# Patient Record
Sex: FEMALE | Race: WHITE | Employment: OTHER | URBAN - METROPOLITAN AREA
[De-identification: names, ages, dates, MRNs, and addresses within clinical notes are randomized per-mention and may not be internally consistent; named-entity substitution may affect disease eponyms.]

---

## 2017-01-05 ENCOUNTER — HOSPITAL ENCOUNTER (OUTPATIENT)
Dept: LAB | Age: 57
Discharge: HOME | End: 2017-01-05
Payer: COMMERCIAL

## 2017-01-05 ENCOUNTER — OFFICE VISIT (OUTPATIENT)
Dept: FAMILY MEDICINE CLINIC | Age: 57
End: 2017-01-05

## 2017-01-05 VITALS
RESPIRATION RATE: 16 BRPM | TEMPERATURE: 97.5 F | HEART RATE: 105 BPM | OXYGEN SATURATION: 98 % | HEIGHT: 62 IN | BODY MASS INDEX: 33.6 KG/M2 | SYSTOLIC BLOOD PRESSURE: 102 MMHG | DIASTOLIC BLOOD PRESSURE: 68 MMHG | WEIGHT: 182.6 LBS

## 2017-01-05 DIAGNOSIS — J40 BRONCHITIS: Primary | ICD-10-CM

## 2017-01-05 LAB
C3 COMPLEMENT: 181 MG/DL (ref 90–180)
C4 COMPLEMENT: 31 MG/DL (ref 10–40)

## 2017-01-05 PROCEDURE — 86256 FLUORESCENT ANTIBODY TITER: CPT

## 2017-01-05 PROCEDURE — 86255 FLUORESCENT ANTIBODY SCREEN: CPT

## 2017-01-05 PROCEDURE — 86235 NUCLEAR ANTIGEN ANTIBODY: CPT

## 2017-01-05 PROCEDURE — 86160 COMPLEMENT ANTIGEN: CPT

## 2017-01-05 PROCEDURE — 36415 COLL VENOUS BLD VENIPUNCTURE: CPT

## 2017-01-05 PROCEDURE — 99213 OFFICE O/P EST LOW 20 MIN: CPT | Performed by: NURSE PRACTITIONER

## 2017-01-05 PROCEDURE — 83516 IMMUNOASSAY NONANTIBODY: CPT

## 2017-01-05 RX ORDER — DOXYCYCLINE HYCLATE 100 MG
100 TABLET ORAL 2 TIMES DAILY
Qty: 14 TABLET | Refills: 0 | Status: SHIPPED | OUTPATIENT
Start: 2017-01-05 | End: 2017-01-12

## 2017-01-05 ASSESSMENT — ENCOUNTER SYMPTOMS
CHEST TIGHTNESS: 0
TROUBLE SWALLOWING: 0
FACIAL SWELLING: 0
WHEEZING: 0
HEARTBURN: 0
SINUS PRESSURE: 1
ABDOMINAL PAIN: 0
RHINORRHEA: 1
COUGH: 1
SORE THROAT: 1
VOMITING: 0
DIARRHEA: 0
HEMOPTYSIS: 0
NAUSEA: 0
SHORTNESS OF BREATH: 1

## 2017-01-06 LAB
CENTROMERE AB IGG: 3 AU/ML (ref 0–40)
ENA TO RNP ANTIBODY: 1 AU/ML (ref 0–40)
ENA TO SMITH (SM) ANTIBODY: 0 AU/ML (ref 0–40)
ENA TO SSB (LA) ANTIBODY: 1 AU/ML (ref 0–40)
SCLERODERMA (SCL-70) AB: 0 AU/ML (ref 0–40)
SSA 52 (RO) (ENA) AB, IGG: 11 AU/ML (ref 0–40)
SSA 60 (RO) (ENA) AB, IGG: 7 AU/ML (ref 0–40)

## 2017-01-07 LAB — DOUBLE STRANDED DNA AB, IGG: DETECTED

## 2017-01-08 LAB — DSDNA ANTIBODY TITER: ABNORMAL

## 2017-01-24 ENCOUNTER — HOSPITAL ENCOUNTER (OUTPATIENT)
Dept: GENERAL RADIOLOGY | Age: 57
Discharge: HOME | End: 2017-01-24
Payer: COMMERCIAL

## 2017-01-24 ENCOUNTER — OFFICE VISIT (OUTPATIENT)
Dept: INTERVENTIONAL RADIOLOGY/VASCULAR | Age: 57
End: 2017-01-24

## 2017-01-24 VITALS
TEMPERATURE: 97.5 F | HEIGHT: 63 IN | DIASTOLIC BLOOD PRESSURE: 84 MMHG | SYSTOLIC BLOOD PRESSURE: 124 MMHG | WEIGHT: 195 LBS | BODY MASS INDEX: 34.55 KG/M2

## 2017-01-24 DIAGNOSIS — M16.10 ARTHRITIS OF HIP: Primary | ICD-10-CM

## 2017-01-24 DIAGNOSIS — M16.10 ARTHRITIS OF HIP: ICD-10-CM

## 2017-01-24 PROCEDURE — 73502 X-RAY EXAM HIP UNI 2-3 VIEWS: CPT

## 2017-01-24 PROCEDURE — G8484 FLU IMMUNIZE NO ADMIN: HCPCS | Performed by: RADIOLOGY

## 2017-01-24 PROCEDURE — 3014F SCREEN MAMMO DOC REV: CPT | Performed by: RADIOLOGY

## 2017-01-24 PROCEDURE — 1036F TOBACCO NON-USER: CPT | Performed by: RADIOLOGY

## 2017-01-24 PROCEDURE — 99204 OFFICE O/P NEW MOD 45 MIN: CPT | Performed by: RADIOLOGY

## 2017-01-24 PROCEDURE — G8427 DOCREV CUR MEDS BY ELIG CLIN: HCPCS | Performed by: RADIOLOGY

## 2017-01-24 PROCEDURE — G8419 CALC BMI OUT NRM PARAM NOF/U: HCPCS | Performed by: RADIOLOGY

## 2017-01-24 PROCEDURE — 3017F COLORECTAL CA SCREEN DOC REV: CPT | Performed by: RADIOLOGY

## 2017-01-24 ASSESSMENT — ENCOUNTER SYMPTOMS
NAUSEA: 0
COUGH: 1
SHORTNESS OF BREATH: 1
VOMITING: 0
BACK PAIN: 1
ABDOMINAL PAIN: 1
DIARRHEA: 0
EYES NEGATIVE: 1
HEARTBURN: 1

## 2017-02-01 DIAGNOSIS — M79.7 FIBROMYALGIA MUSCLE PAIN: ICD-10-CM

## 2017-02-01 RX ORDER — GABAPENTIN 400 MG/1
CAPSULE ORAL
Qty: 270 CAPSULE | Refills: 0 | Status: SHIPPED | OUTPATIENT
Start: 2017-02-01 | End: 2017-04-30 | Stop reason: SDUPTHER

## 2017-02-19 DIAGNOSIS — F32.A DEPRESSION, UNSPECIFIED DEPRESSION TYPE: ICD-10-CM

## 2017-02-19 RX ORDER — CITALOPRAM 40 MG/1
TABLET ORAL
Qty: 90 TABLET | Refills: 0 | Status: SHIPPED | OUTPATIENT
Start: 2017-02-19 | End: 2017-05-19 | Stop reason: SDUPTHER

## 2017-02-22 ENCOUNTER — OFFICE VISIT (OUTPATIENT)
Dept: PHYSICAL MEDICINE AND REHAB | Age: 57
End: 2017-02-22

## 2017-02-22 VITALS
HEIGHT: 63 IN | SYSTOLIC BLOOD PRESSURE: 114 MMHG | DIASTOLIC BLOOD PRESSURE: 84 MMHG | BODY MASS INDEX: 33.49 KG/M2 | WEIGHT: 189 LBS

## 2017-02-22 DIAGNOSIS — Z79.899 HIGH RISK MEDICATION USE: Chronic | ICD-10-CM

## 2017-02-22 DIAGNOSIS — D68.51 FACTOR 5 LEIDEN MUTATION, HETEROZYGOUS (HCC): ICD-10-CM

## 2017-02-22 DIAGNOSIS — M53.3 SI (SACROILIAC) PAIN: ICD-10-CM

## 2017-02-22 DIAGNOSIS — M79.7 FIBROMYALGIA MUSCLE PAIN: ICD-10-CM

## 2017-02-22 DIAGNOSIS — M79.7 FIBROMYALGIA MUSCLE PAIN: Primary | ICD-10-CM

## 2017-02-22 DIAGNOSIS — M54.2 NECK PAIN: ICD-10-CM

## 2017-02-22 DIAGNOSIS — E55.9 VITAMIN D DEFICIENCY: ICD-10-CM

## 2017-02-22 DIAGNOSIS — M54.6 BILATERAL THORACIC BACK PAIN, UNSPECIFIED CHRONICITY: ICD-10-CM

## 2017-02-22 DIAGNOSIS — G89.29 CHRONIC LOW BACK PAIN WITH SCIATICA, SCIATICA LATERALITY UNSPECIFIED, UNSPECIFIED BACK PAIN LATERALITY: ICD-10-CM

## 2017-02-22 DIAGNOSIS — R76.8 POSITIVE ANA (ANTINUCLEAR ANTIBODY): ICD-10-CM

## 2017-02-22 DIAGNOSIS — M54.40 CHRONIC LOW BACK PAIN WITH SCIATICA, SCIATICA LATERALITY UNSPECIFIED, UNSPECIFIED BACK PAIN LATERALITY: ICD-10-CM

## 2017-02-22 PROCEDURE — 3014F SCREEN MAMMO DOC REV: CPT | Performed by: PHYSICAL MEDICINE & REHABILITATION

## 2017-02-22 PROCEDURE — 3017F COLORECTAL CA SCREEN DOC REV: CPT | Performed by: PHYSICAL MEDICINE & REHABILITATION

## 2017-02-22 PROCEDURE — G8484 FLU IMMUNIZE NO ADMIN: HCPCS | Performed by: PHYSICAL MEDICINE & REHABILITATION

## 2017-02-22 PROCEDURE — G8417 CALC BMI ABV UP PARAM F/U: HCPCS | Performed by: PHYSICAL MEDICINE & REHABILITATION

## 2017-02-22 PROCEDURE — 99213 OFFICE O/P EST LOW 20 MIN: CPT | Performed by: PHYSICAL MEDICINE & REHABILITATION

## 2017-02-22 PROCEDURE — G8427 DOCREV CUR MEDS BY ELIG CLIN: HCPCS | Performed by: PHYSICAL MEDICINE & REHABILITATION

## 2017-02-22 PROCEDURE — 1036F TOBACCO NON-USER: CPT | Performed by: PHYSICAL MEDICINE & REHABILITATION

## 2017-02-22 RX ORDER — CYCLOBENZAPRINE HCL 10 MG
10 TABLET ORAL 2 TIMES DAILY PRN
Qty: 30 TABLET | Refills: 1 | Status: SHIPPED | OUTPATIENT
Start: 2017-02-22 | End: 2017-05-15 | Stop reason: ALTCHOICE

## 2017-02-22 RX ORDER — CARISOPRODOL 250 MG/1
250 TABLET ORAL 3 TIMES DAILY PRN
Qty: 60 TABLET | Refills: 1 | Status: SHIPPED | OUTPATIENT
Start: 2017-02-22 | End: 2017-02-22 | Stop reason: SDUPTHER

## 2017-02-22 RX ORDER — CARISOPRODOL 250 MG/1
250 TABLET ORAL 3 TIMES DAILY PRN
Qty: 270 TABLET | Refills: 1 | Status: SHIPPED | OUTPATIENT
Start: 2017-02-22 | End: 2017-05-15

## 2017-02-22 ASSESSMENT — ENCOUNTER SYMPTOMS
ABDOMINAL PAIN: 0
ANAL BLEEDING: 0
BOWEL INCONTINENCE: 0
CHOKING: 0
CONSTIPATION: 0
ABDOMINAL DISTENTION: 0
APNEA: 0
EYE PAIN: 0
VOMITING: 0
BLOOD IN STOOL: 0
TROUBLE SWALLOWING: 0
CHEST TIGHTNESS: 0
FACIAL SWELLING: 0
COUGH: 0
PHOTOPHOBIA: 0
VISUAL CHANGE: 0
COLOR CHANGE: 0
NAUSEA: 0
BACK PAIN: 1
DIARRHEA: 1
WHEEZING: 0
EYE REDNESS: 0
SHORTNESS OF BREATH: 1

## 2017-02-23 ENCOUNTER — OFFICE VISIT (OUTPATIENT)
Dept: FAMILY MEDICINE CLINIC | Age: 57
End: 2017-02-23

## 2017-02-23 ENCOUNTER — HOSPITAL ENCOUNTER (OUTPATIENT)
Dept: GENERAL RADIOLOGY | Age: 57
Discharge: HOME | End: 2017-02-23
Payer: COMMERCIAL

## 2017-02-23 ENCOUNTER — HOSPITAL ENCOUNTER (OUTPATIENT)
Age: 57
Discharge: HOME | End: 2017-02-23
Payer: COMMERCIAL

## 2017-02-23 ENCOUNTER — TELEPHONE (OUTPATIENT)
Dept: FAMILY MEDICINE CLINIC | Age: 57
End: 2017-02-23

## 2017-02-23 ENCOUNTER — HOSPITAL ENCOUNTER (OUTPATIENT)
Age: 57
Setting detail: SPECIMEN
Discharge: HOME | End: 2017-02-23
Payer: COMMERCIAL

## 2017-02-23 VITALS
DIASTOLIC BLOOD PRESSURE: 72 MMHG | WEIGHT: 188 LBS | RESPIRATION RATE: 18 BRPM | HEIGHT: 62 IN | SYSTOLIC BLOOD PRESSURE: 98 MMHG | HEART RATE: 92 BPM | TEMPERATURE: 97 F | BODY MASS INDEX: 34.6 KG/M2 | OXYGEN SATURATION: 98 %

## 2017-02-23 DIAGNOSIS — R10.9 FLANK PAIN: ICD-10-CM

## 2017-02-23 DIAGNOSIS — R10.9 FLANK PAIN: Primary | ICD-10-CM

## 2017-02-23 DIAGNOSIS — Z23 NEED FOR VACCINATION: ICD-10-CM

## 2017-02-23 DIAGNOSIS — M79.642 LEFT HAND PAIN: ICD-10-CM

## 2017-02-23 DIAGNOSIS — Z12.39 BREAST CANCER SCREENING: ICD-10-CM

## 2017-02-23 DIAGNOSIS — K21.00 GASTROESOPHAGEAL REFLUX DISEASE WITH ESOPHAGITIS: ICD-10-CM

## 2017-02-23 DIAGNOSIS — R76.8 POSITIVE ANA (ANTINUCLEAR ANTIBODY): ICD-10-CM

## 2017-02-23 LAB
BILIRUBIN, POC: ABNORMAL
BLOOD URINE, POC: ABNORMAL
CLARITY, POC: CLEAR
COLOR, POC: YELLOW
GLUCOSE URINE, POC: ABNORMAL
KETONES, POC: ABNORMAL
LEUKOCYTE EST, POC: ABNORMAL
NITRITE, POC: ABNORMAL
PH, POC: 5
PROTEIN, POC: ABNORMAL
SPECIFIC GRAVITY, POC: 1.03
UROBILINOGEN, POC: 0.2

## 2017-02-23 PROCEDURE — 1036F TOBACCO NON-USER: CPT | Performed by: NURSE PRACTITIONER

## 2017-02-23 PROCEDURE — G8417 CALC BMI ABV UP PARAM F/U: HCPCS | Performed by: NURSE PRACTITIONER

## 2017-02-23 PROCEDURE — 3014F SCREEN MAMMO DOC REV: CPT | Performed by: NURSE PRACTITIONER

## 2017-02-23 PROCEDURE — 73130 X-RAY EXAM OF HAND: CPT

## 2017-02-23 PROCEDURE — 87086 URINE CULTURE/COLONY COUNT: CPT

## 2017-02-23 PROCEDURE — 3017F COLORECTAL CA SCREEN DOC REV: CPT | Performed by: NURSE PRACTITIONER

## 2017-02-23 PROCEDURE — 90715 TDAP VACCINE 7 YRS/> IM: CPT | Performed by: NURSE PRACTITIONER

## 2017-02-23 PROCEDURE — G8484 FLU IMMUNIZE NO ADMIN: HCPCS | Performed by: NURSE PRACTITIONER

## 2017-02-23 PROCEDURE — 90471 IMMUNIZATION ADMIN: CPT | Performed by: NURSE PRACTITIONER

## 2017-02-23 PROCEDURE — 99214 OFFICE O/P EST MOD 30 MIN: CPT | Performed by: NURSE PRACTITIONER

## 2017-02-23 PROCEDURE — G8427 DOCREV CUR MEDS BY ELIG CLIN: HCPCS | Performed by: NURSE PRACTITIONER

## 2017-02-23 PROCEDURE — 81003 URINALYSIS AUTO W/O SCOPE: CPT | Performed by: NURSE PRACTITIONER

## 2017-02-25 LAB — URINE CULTURE, ROUTINE: NORMAL

## 2017-03-10 ENCOUNTER — HOSPITAL ENCOUNTER (OUTPATIENT)
Dept: INTERVENTIONAL RADIOLOGY/VASCULAR | Age: 57
Discharge: HOME | End: 2017-03-10
Payer: COMMERCIAL

## 2017-03-10 VITALS
HEIGHT: 63 IN | BODY MASS INDEX: 32.96 KG/M2 | OXYGEN SATURATION: 96 % | DIASTOLIC BLOOD PRESSURE: 70 MMHG | RESPIRATION RATE: 18 BRPM | WEIGHT: 186 LBS | SYSTOLIC BLOOD PRESSURE: 121 MMHG | HEART RATE: 81 BPM

## 2017-03-10 DIAGNOSIS — I70.209 ARTERIAL OCCLUSION, LOWER EXTREMITY (HCC): ICD-10-CM

## 2017-03-10 LAB
GFR AFRICAN AMERICAN: >60
GFR NON-AFRICAN AMERICAN: >60
INR BLD: 1
PERFORMED ON: NORMAL
POC CREATININE: 0.7 MG/DL (ref 0.6–1.1)
POC SAMPLE TYPE: NORMAL
PROTHROMBIN TIME: 10.9 SEC (ref 8.1–13.7)

## 2017-03-10 PROCEDURE — 2580000003 HC RX 258: Performed by: RADIOLOGY

## 2017-03-10 PROCEDURE — 75716 ARTERY X-RAYS ARMS/LEGS: CPT | Performed by: RADIOLOGY

## 2017-03-10 PROCEDURE — C1760 CLOSURE DEV, VASC: HCPCS

## 2017-03-10 PROCEDURE — 2500000003 HC RX 250 WO HCPCS: Performed by: RADIOLOGY

## 2017-03-10 PROCEDURE — 6360000004 HC RX CONTRAST MEDICATION: Performed by: RADIOLOGY

## 2017-03-10 PROCEDURE — 99152 MOD SED SAME PHYS/QHP 5/>YRS: CPT | Performed by: RADIOLOGY

## 2017-03-10 PROCEDURE — 6360000002 HC RX W HCPCS: Performed by: RADIOLOGY

## 2017-03-10 PROCEDURE — 76937 US GUIDE VASCULAR ACCESS: CPT | Performed by: RADIOLOGY

## 2017-03-10 PROCEDURE — 99153 MOD SED SAME PHYS/QHP EA: CPT | Performed by: RADIOLOGY

## 2017-03-10 PROCEDURE — 85610 PROTHROMBIN TIME: CPT

## 2017-03-10 PROCEDURE — 36245 INS CATH ABD/L-EXT ART 1ST: CPT | Performed by: RADIOLOGY

## 2017-03-10 PROCEDURE — 75630 X-RAY AORTA LEG ARTERIES: CPT | Performed by: RADIOLOGY

## 2017-03-10 PROCEDURE — 36246 INS CATH ABD/L-EXT ART 2ND: CPT | Performed by: RADIOLOGY

## 2017-03-10 PROCEDURE — 36247 INS CATH ABD/L-EXT ART 3RD: CPT | Performed by: RADIOLOGY

## 2017-03-10 RX ORDER — LIDOCAINE HYDROCHLORIDE 20 MG/ML
5 INJECTION, SOLUTION INFILTRATION; PERINEURAL ONCE
Status: COMPLETED | OUTPATIENT
Start: 2017-03-10 | End: 2017-03-10

## 2017-03-10 RX ORDER — MIDAZOLAM HYDROCHLORIDE 1 MG/ML
2 INJECTION INTRAMUSCULAR; INTRAVENOUS ONCE
Status: DISCONTINUED | OUTPATIENT
Start: 2017-03-10 | End: 2017-03-10

## 2017-03-10 RX ORDER — 0.9 % SODIUM CHLORIDE 0.9 %
250 INTRAVENOUS SOLUTION INTRAVENOUS ONCE
Status: COMPLETED | OUTPATIENT
Start: 2017-03-10 | End: 2017-03-10

## 2017-03-10 RX ORDER — FENTANYL CITRATE 50 UG/ML
25 INJECTION, SOLUTION INTRAMUSCULAR; INTRAVENOUS
Status: DISCONTINUED | OUTPATIENT
Start: 2017-03-10 | End: 2017-03-13 | Stop reason: HOSPADM

## 2017-03-10 RX ORDER — MIDAZOLAM HYDROCHLORIDE 1 MG/ML
2 INJECTION INTRAMUSCULAR; INTRAVENOUS PRN
Status: DISCONTINUED | OUTPATIENT
Start: 2017-03-10 | End: 2017-03-13 | Stop reason: HOSPADM

## 2017-03-10 RX ORDER — SODIUM CHLORIDE 9 MG/ML
500 INJECTION, SOLUTION INTRAVENOUS CONTINUOUS
Status: DISCONTINUED | OUTPATIENT
Start: 2017-03-10 | End: 2017-03-13 | Stop reason: HOSPADM

## 2017-03-10 RX ORDER — ACETAMINOPHEN 325 MG/1
650 TABLET ORAL EVERY 4 HOURS PRN
Status: DISCONTINUED | OUTPATIENT
Start: 2017-03-10 | End: 2017-03-13 | Stop reason: HOSPADM

## 2017-03-10 RX ADMIN — IOVERSOL 50 ML: 678 INJECTION INTRA-ARTERIAL; INTRAVENOUS at 10:47

## 2017-03-10 RX ADMIN — MIDAZOLAM HYDROCHLORIDE 0.25 MG: 1 INJECTION, SOLUTION INTRAMUSCULAR; INTRAVENOUS at 10:15

## 2017-03-10 RX ADMIN — FENTANYL CITRATE 25 MCG: 50 INJECTION, SOLUTION INTRAMUSCULAR; INTRAVENOUS at 10:08

## 2017-03-10 RX ADMIN — MIDAZOLAM HYDROCHLORIDE 0.25 MG: 1 INJECTION, SOLUTION INTRAMUSCULAR; INTRAVENOUS at 10:08

## 2017-03-10 RX ADMIN — FENTANYL CITRATE 25 MCG: 50 INJECTION, SOLUTION INTRAMUSCULAR; INTRAVENOUS at 10:54

## 2017-03-10 RX ADMIN — SODIUM CHLORIDE 250 ML: 9 INJECTION, SOLUTION INTRAVENOUS at 10:00

## 2017-03-10 RX ADMIN — LIDOCAINE HYDROCHLORIDE 10 ML: 20 INJECTION, SOLUTION INFILTRATION; PERINEURAL at 10:11

## 2017-03-10 RX ADMIN — SODIUM CHLORIDE 500 ML: 9 INJECTION, SOLUTION INTRAVENOUS at 10:00

## 2017-03-10 RX ADMIN — FENTANYL CITRATE 25 MCG: 50 INJECTION, SOLUTION INTRAMUSCULAR; INTRAVENOUS at 10:15

## 2017-03-10 RX ADMIN — MIDAZOLAM HYDROCHLORIDE 0.25 MG: 1 INJECTION, SOLUTION INTRAMUSCULAR; INTRAVENOUS at 10:54

## 2017-03-10 RX ADMIN — SODIUM CHLORIDE 500 ML: 9 INJECTION, SOLUTION INTRAVENOUS at 10:33

## 2017-03-10 ASSESSMENT — PAIN - FUNCTIONAL ASSESSMENT
PAIN_FUNCTIONAL_ASSESSMENT: 0-10

## 2017-03-10 ASSESSMENT — PAIN SCALES - GENERAL
PAINLEVEL_OUTOF10: 1

## 2017-03-17 ENCOUNTER — TELEPHONE (OUTPATIENT)
Dept: PHYSICAL MEDICINE AND REHAB | Age: 57
End: 2017-03-17

## 2017-03-17 ENCOUNTER — HOSPITAL ENCOUNTER (OUTPATIENT)
Dept: WOMENS IMAGING | Age: 57
Discharge: HOME | End: 2017-03-17
Payer: COMMERCIAL

## 2017-03-17 DIAGNOSIS — Z12.39 BREAST CANCER SCREENING: ICD-10-CM

## 2017-03-17 PROCEDURE — G0202 SCR MAMMO BI INCL CAD: HCPCS

## 2017-03-20 ENCOUNTER — OFFICE VISIT (OUTPATIENT)
Dept: INTERVENTIONAL RADIOLOGY/VASCULAR | Age: 57
End: 2017-03-20

## 2017-03-20 VITALS
DIASTOLIC BLOOD PRESSURE: 97 MMHG | SYSTOLIC BLOOD PRESSURE: 138 MMHG | RESPIRATION RATE: 16 BRPM | OXYGEN SATURATION: 83 % | HEART RATE: 79 BPM

## 2017-03-20 DIAGNOSIS — R29.898 WEAKNESS OF BOTH LEGS: Primary | ICD-10-CM

## 2017-03-20 DIAGNOSIS — I73.9 PAD (PERIPHERAL ARTERY DISEASE) (HCC): ICD-10-CM

## 2017-03-20 PROCEDURE — 3014F SCREEN MAMMO DOC REV: CPT | Performed by: PHYSICIAN ASSISTANT

## 2017-03-20 PROCEDURE — G8427 DOCREV CUR MEDS BY ELIG CLIN: HCPCS | Performed by: PHYSICIAN ASSISTANT

## 2017-03-20 PROCEDURE — 99214 OFFICE O/P EST MOD 30 MIN: CPT | Performed by: PHYSICIAN ASSISTANT

## 2017-03-20 PROCEDURE — G8417 CALC BMI ABV UP PARAM F/U: HCPCS | Performed by: PHYSICIAN ASSISTANT

## 2017-03-20 PROCEDURE — 1036F TOBACCO NON-USER: CPT | Performed by: PHYSICIAN ASSISTANT

## 2017-03-20 PROCEDURE — 3017F COLORECTAL CA SCREEN DOC REV: CPT | Performed by: PHYSICIAN ASSISTANT

## 2017-03-20 PROCEDURE — G8484 FLU IMMUNIZE NO ADMIN: HCPCS | Performed by: PHYSICIAN ASSISTANT

## 2017-03-20 PROCEDURE — G8598 ASA/ANTIPLAT THER USED: HCPCS | Performed by: PHYSICIAN ASSISTANT

## 2017-04-19 ENCOUNTER — OFFICE VISIT (OUTPATIENT)
Dept: CARDIOLOGY | Age: 57
End: 2017-04-19

## 2017-04-19 ENCOUNTER — HOSPITAL ENCOUNTER (OUTPATIENT)
Dept: LAB | Age: 57
Discharge: HOME | End: 2017-04-19
Payer: COMMERCIAL

## 2017-04-19 VITALS
HEIGHT: 63 IN | SYSTOLIC BLOOD PRESSURE: 106 MMHG | HEART RATE: 84 BPM | WEIGHT: 188.8 LBS | RESPIRATION RATE: 16 BRPM | BODY MASS INDEX: 33.45 KG/M2 | DIASTOLIC BLOOD PRESSURE: 80 MMHG | OXYGEN SATURATION: 97 %

## 2017-04-19 DIAGNOSIS — K21.00 GASTROESOPHAGEAL REFLUX DISEASE WITH ESOPHAGITIS: ICD-10-CM

## 2017-04-19 DIAGNOSIS — R06.02 SOB (SHORTNESS OF BREATH): ICD-10-CM

## 2017-04-19 DIAGNOSIS — F32.A DEPRESSION, UNSPECIFIED DEPRESSION TYPE: ICD-10-CM

## 2017-04-19 DIAGNOSIS — E78.5 DYSLIPIDEMIA: Primary | ICD-10-CM

## 2017-04-19 DIAGNOSIS — R03.0 BORDERLINE HYPERTENSION: ICD-10-CM

## 2017-04-19 DIAGNOSIS — E78.5 DYSLIPIDEMIA: ICD-10-CM

## 2017-04-19 DIAGNOSIS — E11.9 TYPE 2 DIABETES MELLITUS WITHOUT COMPLICATION, WITHOUT LONG-TERM CURRENT USE OF INSULIN (HCC): ICD-10-CM

## 2017-04-19 LAB
ALBUMIN SERPL-MCNC: 4 G/DL (ref 3.9–4.9)
ALP BLD-CCNC: 70 U/L (ref 40–130)
ALT SERPL-CCNC: 81 U/L (ref 0–33)
ANION GAP SERPL CALCULATED.3IONS-SCNC: 11 MEQ/L (ref 7–13)
AST SERPL-CCNC: 61 U/L (ref 0–35)
BASOPHILS ABSOLUTE: 0 K/UL (ref 0–0.2)
BASOPHILS RELATIVE PERCENT: 1.2 %
BILIRUB SERPL-MCNC: 0.4 MG/DL (ref 0–1.2)
BUN BLDV-MCNC: 16 MG/DL (ref 6–20)
CALCIUM SERPL-MCNC: 8.8 MG/DL (ref 8.6–10.2)
CHLORIDE BLD-SCNC: 99 MEQ/L (ref 98–107)
CHOLESTEROL, TOTAL: 161 MG/DL (ref 0–199)
CO2: 23 MEQ/L (ref 22–29)
CREAT SERPL-MCNC: 0.95 MG/DL (ref 0.5–0.9)
CREATININE URINE: 88.6 MG/DL
EOSINOPHILS ABSOLUTE: 0 K/UL (ref 0–0.7)
EOSINOPHILS RELATIVE PERCENT: 0.3 %
GFR AFRICAN AMERICAN: >60
GFR NON-AFRICAN AMERICAN: >60
GLOBULIN: 2.7 G/DL (ref 2.3–3.5)
GLUCOSE BLD-MCNC: 103 MG/DL (ref 74–109)
HBA1C MFR BLD: 5.8 % (ref 4.8–5.9)
HCT VFR BLD CALC: 37 % (ref 37–47)
HDLC SERPL-MCNC: 37 MG/DL (ref 40–59)
HEMOGLOBIN: 12.5 G/DL (ref 12–16)
LDL CHOLESTEROL CALCULATED: 91 MG/DL (ref 0–129)
LYMPHOCYTES ABSOLUTE: 1.5 K/UL (ref 1–4.8)
LYMPHOCYTES RELATIVE PERCENT: 42.2 %
MCH RBC QN AUTO: 30.6 PG (ref 27–31.3)
MCHC RBC AUTO-ENTMCNC: 33.7 % (ref 33–37)
MCV RBC AUTO: 90.8 FL (ref 82–100)
MICROALBUMIN UR-MCNC: <1.2 MG/DL
MICROALBUMIN/CREAT UR-RTO: NORMAL MG/G (ref 0–30)
MONOCYTES ABSOLUTE: 0.2 K/UL (ref 0.2–0.8)
MONOCYTES RELATIVE PERCENT: 5.4 %
NEUTROPHILS ABSOLUTE: 1.8 K/UL (ref 1.4–6.5)
NEUTROPHILS RELATIVE PERCENT: 50.9 %
PDW BLD-RTO: 13.2 % (ref 11.5–14.5)
PLATELET # BLD: 224 K/UL (ref 130–400)
POTASSIUM SERPL-SCNC: 4.3 MEQ/L (ref 3.5–5.1)
RBC # BLD: 4.08 M/UL (ref 4.2–5.4)
SLIDE REVIEW: ABNORMAL
SODIUM BLD-SCNC: 133 MEQ/L (ref 132–144)
TOTAL PROTEIN: 6.7 G/DL (ref 6.4–8.1)
TRIGL SERPL-MCNC: 163 MG/DL (ref 0–200)
WBC # BLD: 3.5 K/UL (ref 4.8–10.8)

## 2017-04-19 PROCEDURE — 1036F TOBACCO NON-USER: CPT | Performed by: INTERNAL MEDICINE

## 2017-04-19 PROCEDURE — 3014F SCREEN MAMMO DOC REV: CPT | Performed by: INTERNAL MEDICINE

## 2017-04-19 PROCEDURE — 36415 COLL VENOUS BLD VENIPUNCTURE: CPT

## 2017-04-19 PROCEDURE — 85025 COMPLETE CBC W/AUTO DIFF WBC: CPT

## 2017-04-19 PROCEDURE — 82043 UR ALBUMIN QUANTITATIVE: CPT

## 2017-04-19 PROCEDURE — 83036 HEMOGLOBIN GLYCOSYLATED A1C: CPT

## 2017-04-19 PROCEDURE — 3017F COLORECTAL CA SCREEN DOC REV: CPT | Performed by: INTERNAL MEDICINE

## 2017-04-19 PROCEDURE — G8427 DOCREV CUR MEDS BY ELIG CLIN: HCPCS | Performed by: INTERNAL MEDICINE

## 2017-04-19 PROCEDURE — G8598 ASA/ANTIPLAT THER USED: HCPCS | Performed by: INTERNAL MEDICINE

## 2017-04-19 PROCEDURE — G8417 CALC BMI ABV UP PARAM F/U: HCPCS | Performed by: INTERNAL MEDICINE

## 2017-04-19 PROCEDURE — 82570 ASSAY OF URINE CREATININE: CPT

## 2017-04-19 PROCEDURE — 99213 OFFICE O/P EST LOW 20 MIN: CPT | Performed by: INTERNAL MEDICINE

## 2017-04-19 PROCEDURE — 80061 LIPID PANEL: CPT

## 2017-04-19 PROCEDURE — 80053 COMPREHEN METABOLIC PANEL: CPT

## 2017-04-19 ASSESSMENT — ENCOUNTER SYMPTOMS
CHEST TIGHTNESS: 0
SHORTNESS OF BREATH: 1

## 2017-04-20 DIAGNOSIS — E78.5 DYSLIPIDEMIA: ICD-10-CM

## 2017-04-20 RX ORDER — EZETIMIBE 10 MG/1
TABLET ORAL
Qty: 90 TABLET | Refills: 0 | Status: SHIPPED | OUTPATIENT
Start: 2017-04-20 | End: 2017-07-19 | Stop reason: SDUPTHER

## 2017-04-24 ENCOUNTER — OFFICE VISIT (OUTPATIENT)
Dept: FAMILY MEDICINE CLINIC | Age: 57
End: 2017-04-24

## 2017-04-24 VITALS
RESPIRATION RATE: 22 BRPM | HEIGHT: 63 IN | BODY MASS INDEX: 33.66 KG/M2 | DIASTOLIC BLOOD PRESSURE: 70 MMHG | SYSTOLIC BLOOD PRESSURE: 124 MMHG | OXYGEN SATURATION: 95 % | HEART RATE: 90 BPM | TEMPERATURE: 98 F | WEIGHT: 190 LBS

## 2017-04-24 DIAGNOSIS — E78.5 DYSLIPIDEMIA: ICD-10-CM

## 2017-04-24 DIAGNOSIS — R03.0 BORDERLINE HYPERTENSION: ICD-10-CM

## 2017-04-24 DIAGNOSIS — F32.A DEPRESSION, UNSPECIFIED DEPRESSION TYPE: ICD-10-CM

## 2017-04-24 DIAGNOSIS — D68.51 FACTOR 5 LEIDEN MUTATION, HETEROZYGOUS (HCC): ICD-10-CM

## 2017-04-24 DIAGNOSIS — E11.9 TYPE 2 DIABETES MELLITUS WITHOUT COMPLICATION, WITHOUT LONG-TERM CURRENT USE OF INSULIN (HCC): Primary | ICD-10-CM

## 2017-04-24 DIAGNOSIS — E55.9 VITAMIN D DEFICIENCY: ICD-10-CM

## 2017-04-24 DIAGNOSIS — G57.93 NEUROPATHY OF BOTH FEET: ICD-10-CM

## 2017-04-24 DIAGNOSIS — K21.00 GASTROESOPHAGEAL REFLUX DISEASE WITH ESOPHAGITIS: ICD-10-CM

## 2017-04-24 DIAGNOSIS — R76.8 POSITIVE ANA (ANTINUCLEAR ANTIBODY): ICD-10-CM

## 2017-04-24 PROCEDURE — G8598 ASA/ANTIPLAT THER USED: HCPCS | Performed by: NURSE PRACTITIONER

## 2017-04-24 PROCEDURE — G8417 CALC BMI ABV UP PARAM F/U: HCPCS | Performed by: NURSE PRACTITIONER

## 2017-04-24 PROCEDURE — 99214 OFFICE O/P EST MOD 30 MIN: CPT | Performed by: NURSE PRACTITIONER

## 2017-04-24 PROCEDURE — 3017F COLORECTAL CA SCREEN DOC REV: CPT | Performed by: NURSE PRACTITIONER

## 2017-04-24 PROCEDURE — 1036F TOBACCO NON-USER: CPT | Performed by: NURSE PRACTITIONER

## 2017-04-24 PROCEDURE — 3044F HG A1C LEVEL LT 7.0%: CPT | Performed by: NURSE PRACTITIONER

## 2017-04-24 PROCEDURE — 3014F SCREEN MAMMO DOC REV: CPT | Performed by: NURSE PRACTITIONER

## 2017-04-24 PROCEDURE — G8427 DOCREV CUR MEDS BY ELIG CLIN: HCPCS | Performed by: NURSE PRACTITIONER

## 2017-04-24 ASSESSMENT — PATIENT HEALTH QUESTIONNAIRE - PHQ9
2. FEELING DOWN, DEPRESSED OR HOPELESS: 0
1. LITTLE INTEREST OR PLEASURE IN DOING THINGS: 0
SUM OF ALL RESPONSES TO PHQ9 QUESTIONS 1 & 2: 0
SUM OF ALL RESPONSES TO PHQ QUESTIONS 1-9: 0

## 2017-04-28 ENCOUNTER — HOSPITAL ENCOUNTER (OUTPATIENT)
Dept: PULMONOLOGY | Age: 57
Discharge: HOME | End: 2017-04-28
Payer: COMMERCIAL

## 2017-04-28 PROCEDURE — 6360000002 HC RX W HCPCS: Performed by: INTERNAL MEDICINE

## 2017-04-28 PROCEDURE — 94729 DIFFUSING CAPACITY: CPT

## 2017-04-28 PROCEDURE — 94060 EVALUATION OF WHEEZING: CPT

## 2017-04-28 PROCEDURE — 94726 PLETHYSMOGRAPHY LUNG VOLUMES: CPT

## 2017-04-28 RX ORDER — ALBUTEROL SULFATE 2.5 MG/3ML
2.5 SOLUTION RESPIRATORY (INHALATION) ONCE
Status: COMPLETED | OUTPATIENT
Start: 2017-04-28 | End: 2017-04-28

## 2017-04-28 RX ADMIN — ALBUTEROL SULFATE 2.5 MG: 2.5 SOLUTION RESPIRATORY (INHALATION) at 14:29

## 2017-04-30 DIAGNOSIS — M79.7 FIBROMYALGIA MUSCLE PAIN: ICD-10-CM

## 2017-04-30 RX ORDER — GABAPENTIN 400 MG/1
CAPSULE ORAL
Qty: 270 CAPSULE | Refills: 0 | Status: SHIPPED | OUTPATIENT
Start: 2017-04-30 | End: 2017-07-29 | Stop reason: SDUPTHER

## 2017-05-09 ENCOUNTER — HOSPITAL ENCOUNTER (OUTPATIENT)
Dept: SLEEP CENTER | Age: 57
Discharge: HOME | End: 2017-05-09
Payer: COMMERCIAL

## 2017-05-09 PROCEDURE — 95810 POLYSOM 6/> YRS 4/> PARAM: CPT

## 2017-05-15 ENCOUNTER — OFFICE VISIT (OUTPATIENT)
Dept: PHYSICAL MEDICINE AND REHAB | Age: 57
End: 2017-05-15

## 2017-05-15 VITALS
HEIGHT: 63 IN | DIASTOLIC BLOOD PRESSURE: 70 MMHG | SYSTOLIC BLOOD PRESSURE: 100 MMHG | WEIGHT: 188 LBS | BODY MASS INDEX: 33.31 KG/M2

## 2017-05-15 DIAGNOSIS — R76.8 POSITIVE ANA (ANTINUCLEAR ANTIBODY): ICD-10-CM

## 2017-05-15 DIAGNOSIS — M25.532 WRIST PAIN, LEFT: ICD-10-CM

## 2017-05-15 DIAGNOSIS — R29.898 WEAKNESS OF BOTH LEGS: Primary | ICD-10-CM

## 2017-05-15 DIAGNOSIS — M79.7 FIBROMYALGIA MUSCLE PAIN: ICD-10-CM

## 2017-05-15 DIAGNOSIS — M54.2 NECK PAIN: ICD-10-CM

## 2017-05-15 DIAGNOSIS — M54.6 BILATERAL THORACIC BACK PAIN, UNSPECIFIED CHRONICITY: ICD-10-CM

## 2017-05-15 DIAGNOSIS — R20.2 PARESTHESIA OF BOTH FEET: ICD-10-CM

## 2017-05-15 DIAGNOSIS — Z79.899 HIGH RISK MEDICATION USE: Chronic | ICD-10-CM

## 2017-05-15 DIAGNOSIS — M53.3 SI (SACROILIAC) PAIN: ICD-10-CM

## 2017-05-15 DIAGNOSIS — Z79.899 HIGH RISK MEDICATION USE: ICD-10-CM

## 2017-05-15 PROCEDURE — 3017F COLORECTAL CA SCREEN DOC REV: CPT | Performed by: PHYSICAL MEDICINE & REHABILITATION

## 2017-05-15 PROCEDURE — G8417 CALC BMI ABV UP PARAM F/U: HCPCS | Performed by: PHYSICAL MEDICINE & REHABILITATION

## 2017-05-15 PROCEDURE — G8427 DOCREV CUR MEDS BY ELIG CLIN: HCPCS | Performed by: PHYSICAL MEDICINE & REHABILITATION

## 2017-05-15 PROCEDURE — 1036F TOBACCO NON-USER: CPT | Performed by: PHYSICAL MEDICINE & REHABILITATION

## 2017-05-15 PROCEDURE — 3014F SCREEN MAMMO DOC REV: CPT | Performed by: PHYSICAL MEDICINE & REHABILITATION

## 2017-05-15 PROCEDURE — G8598 ASA/ANTIPLAT THER USED: HCPCS | Performed by: PHYSICAL MEDICINE & REHABILITATION

## 2017-05-15 PROCEDURE — 99214 OFFICE O/P EST MOD 30 MIN: CPT | Performed by: PHYSICAL MEDICINE & REHABILITATION

## 2017-05-15 ASSESSMENT — ENCOUNTER SYMPTOMS
BOWEL INCONTINENCE: 0
COUGH: 0
CONSTIPATION: 0
VOMITING: 0
CHEST TIGHTNESS: 0
VISUAL CHANGE: 0
COLOR CHANGE: 0
NAUSEA: 0
DIARRHEA: 0
BACK PAIN: 1
APNEA: 0
WHEEZING: 0
EYES NEGATIVE: 1
ABDOMINAL PAIN: 0
SHORTNESS OF BREATH: 1

## 2017-05-19 DIAGNOSIS — F32.A DEPRESSION, UNSPECIFIED DEPRESSION TYPE: ICD-10-CM

## 2017-05-19 RX ORDER — CITALOPRAM 40 MG/1
TABLET ORAL
Qty: 90 TABLET | Refills: 0 | Status: SHIPPED | OUTPATIENT
Start: 2017-05-19 | End: 2017-08-18 | Stop reason: SDUPTHER

## 2017-05-24 ENCOUNTER — OFFICE VISIT (OUTPATIENT)
Dept: CARDIOLOGY | Age: 57
End: 2017-05-24

## 2017-05-24 VITALS
HEIGHT: 63 IN | SYSTOLIC BLOOD PRESSURE: 120 MMHG | DIASTOLIC BLOOD PRESSURE: 80 MMHG | OXYGEN SATURATION: 98 % | WEIGHT: 193 LBS | BODY MASS INDEX: 34.2 KG/M2 | HEART RATE: 92 BPM

## 2017-05-24 DIAGNOSIS — R29.898 WEAKNESS OF BOTH LEGS: ICD-10-CM

## 2017-05-24 DIAGNOSIS — I26.99 OTHER PULMONARY EMBOLISM WITHOUT ACUTE COR PULMONALE, UNSPECIFIED CHRONICITY (HCC): Primary | ICD-10-CM

## 2017-05-24 DIAGNOSIS — I82.4Z9 DEEP VEIN THROMBOSIS (DVT) OF DISTAL VEIN OF LOWER EXTREMITY, UNSPECIFIED CHRONICITY, UNSPECIFIED LATERALITY (HCC): ICD-10-CM

## 2017-05-24 DIAGNOSIS — R06.02 SOB (SHORTNESS OF BREATH): ICD-10-CM

## 2017-05-24 PROCEDURE — G8417 CALC BMI ABV UP PARAM F/U: HCPCS | Performed by: INTERNAL MEDICINE

## 2017-05-24 PROCEDURE — G8427 DOCREV CUR MEDS BY ELIG CLIN: HCPCS | Performed by: INTERNAL MEDICINE

## 2017-05-24 PROCEDURE — 99213 OFFICE O/P EST LOW 20 MIN: CPT | Performed by: INTERNAL MEDICINE

## 2017-05-24 PROCEDURE — 3017F COLORECTAL CA SCREEN DOC REV: CPT | Performed by: INTERNAL MEDICINE

## 2017-05-24 PROCEDURE — 1036F TOBACCO NON-USER: CPT | Performed by: INTERNAL MEDICINE

## 2017-05-24 PROCEDURE — G8598 ASA/ANTIPLAT THER USED: HCPCS | Performed by: INTERNAL MEDICINE

## 2017-05-24 PROCEDURE — 3014F SCREEN MAMMO DOC REV: CPT | Performed by: INTERNAL MEDICINE

## 2017-05-24 ASSESSMENT — ENCOUNTER SYMPTOMS: SHORTNESS OF BREATH: 1

## 2017-07-12 ENCOUNTER — OFFICE VISIT (OUTPATIENT)
Dept: PHYSICAL MEDICINE AND REHAB | Age: 57
End: 2017-07-12

## 2017-07-12 VITALS
HEIGHT: 63 IN | BODY MASS INDEX: 32.07 KG/M2 | SYSTOLIC BLOOD PRESSURE: 104 MMHG | WEIGHT: 181 LBS | DIASTOLIC BLOOD PRESSURE: 70 MMHG

## 2017-07-12 DIAGNOSIS — Z79.899 HIGH RISK MEDICATION USE: ICD-10-CM

## 2017-07-12 DIAGNOSIS — M25.532 WRIST PAIN, LEFT: Primary | ICD-10-CM

## 2017-07-12 DIAGNOSIS — Z79.899 HIGH RISK MEDICATION USE: Chronic | ICD-10-CM

## 2017-07-12 DIAGNOSIS — M79.7 FIBROMYALGIA MUSCLE PAIN: ICD-10-CM

## 2017-07-12 DIAGNOSIS — M53.3 SI (SACROILIAC) PAIN: ICD-10-CM

## 2017-07-12 DIAGNOSIS — M54.40 CHRONIC LOW BACK PAIN WITH SCIATICA, SCIATICA LATERALITY UNSPECIFIED, UNSPECIFIED BACK PAIN LATERALITY: ICD-10-CM

## 2017-07-12 DIAGNOSIS — E55.9 HYPOVITAMINOSIS D: ICD-10-CM

## 2017-07-12 DIAGNOSIS — R76.8 POSITIVE ANA (ANTINUCLEAR ANTIBODY): ICD-10-CM

## 2017-07-12 DIAGNOSIS — R20.2 PARESTHESIA OF BOTH FEET: ICD-10-CM

## 2017-07-12 DIAGNOSIS — G89.29 CHRONIC LOW BACK PAIN WITH SCIATICA, SCIATICA LATERALITY UNSPECIFIED, UNSPECIFIED BACK PAIN LATERALITY: ICD-10-CM

## 2017-07-12 PROBLEM — K52.9 COLITIS: Status: ACTIVE | Noted: 2017-07-12

## 2017-07-12 PROCEDURE — 3014F SCREEN MAMMO DOC REV: CPT | Performed by: PHYSICAL MEDICINE & REHABILITATION

## 2017-07-12 PROCEDURE — 1036F TOBACCO NON-USER: CPT | Performed by: PHYSICAL MEDICINE & REHABILITATION

## 2017-07-12 PROCEDURE — G8417 CALC BMI ABV UP PARAM F/U: HCPCS | Performed by: PHYSICAL MEDICINE & REHABILITATION

## 2017-07-12 PROCEDURE — G8598 ASA/ANTIPLAT THER USED: HCPCS | Performed by: PHYSICAL MEDICINE & REHABILITATION

## 2017-07-12 PROCEDURE — 3017F COLORECTAL CA SCREEN DOC REV: CPT | Performed by: PHYSICAL MEDICINE & REHABILITATION

## 2017-07-12 PROCEDURE — 99213 OFFICE O/P EST LOW 20 MIN: CPT | Performed by: PHYSICAL MEDICINE & REHABILITATION

## 2017-07-12 PROCEDURE — G8427 DOCREV CUR MEDS BY ELIG CLIN: HCPCS | Performed by: PHYSICAL MEDICINE & REHABILITATION

## 2017-07-12 RX ORDER — SODIUM, POTASSIUM,MAG SULFATES 17.5-3.13G
SOLUTION, RECONSTITUTED, ORAL ORAL
COMMUNITY
Start: 2017-07-05 | End: 2017-08-25 | Stop reason: ALTCHOICE

## 2017-07-12 RX ORDER — CARISOPRODOL 250 MG/1
250 TABLET ORAL 2 TIMES DAILY PRN
Qty: 40 TABLET | Status: SHIPPED | COMMUNITY
Start: 2017-07-12 | End: 2017-07-12 | Stop reason: SDUPTHER

## 2017-07-12 RX ORDER — CARISOPRODOL 250 MG/1
250 TABLET ORAL 2 TIMES DAILY PRN
Qty: 40 TABLET | Refills: 0 | Status: SHIPPED | OUTPATIENT
Start: 2017-07-12 | End: 2017-11-09 | Stop reason: SDUPTHER

## 2017-07-12 RX ORDER — CARISOPRODOL 250 MG/1
1 TABLET ORAL 3 TIMES DAILY PRN
COMMUNITY
Start: 2017-05-24 | End: 2017-07-12 | Stop reason: DRUGHIGH

## 2017-07-12 ASSESSMENT — ENCOUNTER SYMPTOMS
EYES NEGATIVE: 1
DIARRHEA: 1
BOWEL INCONTINENCE: 0
COUGH: 0
APNEA: 0
CHEST TIGHTNESS: 0
WHEEZING: 0
VISUAL CHANGE: 0
NAUSEA: 0
BACK PAIN: 1
CONSTIPATION: 1
VOMITING: 0
SHORTNESS OF BREATH: 1
COLOR CHANGE: 0
ABDOMINAL PAIN: 0

## 2017-07-19 DIAGNOSIS — E78.5 DYSLIPIDEMIA: ICD-10-CM

## 2017-07-20 RX ORDER — EZETIMIBE 10 MG/1
TABLET ORAL
Qty: 90 TABLET | Refills: 1 | Status: SHIPPED | OUTPATIENT
Start: 2017-07-20 | End: 2018-02-16 | Stop reason: SDUPTHER

## 2017-07-29 DIAGNOSIS — M79.7 FIBROMYALGIA MUSCLE PAIN: ICD-10-CM

## 2017-07-29 RX ORDER — GABAPENTIN 400 MG/1
CAPSULE ORAL
Qty: 270 CAPSULE | Refills: 0 | Status: SHIPPED | OUTPATIENT
Start: 2017-07-29 | End: 2018-01-08 | Stop reason: SDUPTHER

## 2017-08-01 ENCOUNTER — TELEPHONE (OUTPATIENT)
Dept: FAMILY MEDICINE CLINIC | Age: 57
End: 2017-08-01

## 2017-08-01 ENCOUNTER — HOSPITAL ENCOUNTER (OUTPATIENT)
Dept: LAB | Age: 57
Discharge: HOME | End: 2017-08-01
Payer: COMMERCIAL

## 2017-08-01 ENCOUNTER — HOSPITAL ENCOUNTER (OUTPATIENT)
Age: 57
Setting detail: SPECIMEN
Discharge: HOME | End: 2017-08-01
Payer: COMMERCIAL

## 2017-08-01 DIAGNOSIS — E78.5 DYSLIPIDEMIA: ICD-10-CM

## 2017-08-01 DIAGNOSIS — E11.9 TYPE 2 DIABETES MELLITUS WITHOUT COMPLICATION, WITHOUT LONG-TERM CURRENT USE OF INSULIN (HCC): ICD-10-CM

## 2017-08-01 DIAGNOSIS — R30.0 DYSURIA: ICD-10-CM

## 2017-08-01 DIAGNOSIS — R03.0 BORDERLINE HYPERTENSION: ICD-10-CM

## 2017-08-01 DIAGNOSIS — R30.0 DYSURIA: Primary | ICD-10-CM

## 2017-08-01 DIAGNOSIS — E55.9 VITAMIN D DEFICIENCY: ICD-10-CM

## 2017-08-01 LAB
ALBUMIN SERPL-MCNC: 4.4 G/DL (ref 3.9–4.9)
ALP BLD-CCNC: 81 U/L (ref 40–130)
ALT SERPL-CCNC: 78 U/L (ref 0–33)
ANION GAP SERPL CALCULATED.3IONS-SCNC: 19 MEQ/L (ref 7–13)
AST SERPL-CCNC: 61 U/L (ref 0–35)
BACTERIA: ABNORMAL /HPF
BASOPHILS ABSOLUTE: 0 K/UL (ref 0–0.2)
BASOPHILS RELATIVE PERCENT: 0.9 %
BILIRUB SERPL-MCNC: 0.4 MG/DL (ref 0–1.2)
BILIRUBIN URINE: NEGATIVE
BLOOD, URINE: ABNORMAL
BUN BLDV-MCNC: 15 MG/DL (ref 6–20)
CALCIUM SERPL-MCNC: 9.2 MG/DL (ref 8.6–10.2)
CHLORIDE BLD-SCNC: 102 MEQ/L (ref 98–107)
CHOLESTEROL, TOTAL: 184 MG/DL (ref 0–199)
CLARITY: ABNORMAL
CO2: 21 MEQ/L (ref 22–29)
COLOR: YELLOW
CREAT SERPL-MCNC: 0.74 MG/DL (ref 0.5–0.9)
CREATININE URINE: 125.2 MG/DL
EOSINOPHILS ABSOLUTE: 0.1 K/UL (ref 0–0.7)
EOSINOPHILS RELATIVE PERCENT: 1.4 %
GFR AFRICAN AMERICAN: >60
GFR NON-AFRICAN AMERICAN: >60
GLOBULIN: 2.9 G/DL (ref 2.3–3.5)
GLUCOSE BLD-MCNC: 92 MG/DL (ref 74–109)
GLUCOSE URINE: NEGATIVE MG/DL
HBA1C MFR BLD: 5.9 % (ref 4.8–5.9)
HCT VFR BLD CALC: 38.2 % (ref 37–47)
HDLC SERPL-MCNC: 38 MG/DL (ref 40–59)
HEMOGLOBIN: 12.8 G/DL (ref 12–16)
KETONES, URINE: NEGATIVE MG/DL
LDL CHOLESTEROL CALCULATED: 104 MG/DL (ref 0–129)
LEUKOCYTE ESTERASE, URINE: ABNORMAL
LYMPHOCYTES ABSOLUTE: 1.8 K/UL (ref 1–4.8)
LYMPHOCYTES RELATIVE PERCENT: 35.2 %
MCH RBC QN AUTO: 30.6 PG (ref 27–31.3)
MCHC RBC AUTO-ENTMCNC: 33.6 % (ref 33–37)
MCV RBC AUTO: 91.1 FL (ref 82–100)
MICROALBUMIN UR-MCNC: 6.9 MG/DL
MICROALBUMIN/CREAT UR-RTO: 55.1 MG/G (ref 0–30)
MONOCYTES ABSOLUTE: 0.2 K/UL (ref 0.2–0.8)
MONOCYTES RELATIVE PERCENT: 3.6 %
NEUTROPHILS ABSOLUTE: 3 K/UL (ref 1.4–6.5)
NEUTROPHILS RELATIVE PERCENT: 58.9 %
NITRITE, URINE: NEGATIVE
PDW BLD-RTO: 13.5 % (ref 11.5–14.5)
PH UA: 7 (ref 5–9)
PLATELET # BLD: 249 K/UL (ref 130–400)
POTASSIUM SERPL-SCNC: 4.3 MEQ/L (ref 3.5–5.1)
PROTEIN UA: ABNORMAL MG/DL
RBC # BLD: 4.2 M/UL (ref 4.2–5.4)
RBC UA: ABNORMAL /HPF (ref 0–2)
SODIUM BLD-SCNC: 142 MEQ/L (ref 132–144)
SPECIFIC GRAVITY UA: 1.02 (ref 1–1.03)
TOTAL PROTEIN: 7.3 G/DL (ref 6.4–8.1)
TRIGL SERPL-MCNC: 211 MG/DL (ref 0–200)
UROBILINOGEN, URINE: 1 E.U./DL
VITAMIN D 25-HYDROXY: 47.8 NG/ML (ref 30–100)
WBC # BLD: 5 K/UL (ref 4.8–10.8)
WBC UA: >100 /HPF (ref 0–5)

## 2017-08-01 PROCEDURE — 36415 COLL VENOUS BLD VENIPUNCTURE: CPT

## 2017-08-01 PROCEDURE — 87186 SC STD MICRODIL/AGAR DIL: CPT

## 2017-08-01 PROCEDURE — 85025 COMPLETE CBC W/AUTO DIFF WBC: CPT

## 2017-08-01 PROCEDURE — 83036 HEMOGLOBIN GLYCOSYLATED A1C: CPT

## 2017-08-01 PROCEDURE — 82570 ASSAY OF URINE CREATININE: CPT

## 2017-08-01 PROCEDURE — 80053 COMPREHEN METABOLIC PANEL: CPT

## 2017-08-01 PROCEDURE — 82306 VITAMIN D 25 HYDROXY: CPT

## 2017-08-01 PROCEDURE — 87077 CULTURE AEROBIC IDENTIFY: CPT

## 2017-08-01 PROCEDURE — 82043 UR ALBUMIN QUANTITATIVE: CPT

## 2017-08-01 PROCEDURE — 87086 URINE CULTURE/COLONY COUNT: CPT

## 2017-08-01 PROCEDURE — 81001 URINALYSIS AUTO W/SCOPE: CPT

## 2017-08-01 PROCEDURE — 80061 LIPID PANEL: CPT

## 2017-08-04 ENCOUNTER — TELEPHONE (OUTPATIENT)
Dept: FAMILY MEDICINE CLINIC | Age: 57
End: 2017-08-04

## 2017-08-04 DIAGNOSIS — N30.01 ACUTE CYSTITIS WITH HEMATURIA: ICD-10-CM

## 2017-08-04 LAB
ORGANISM: ABNORMAL
URINE CULTURE, ROUTINE: ABNORMAL

## 2017-08-04 RX ORDER — NITROFURANTOIN 25; 75 MG/1; MG/1
100 CAPSULE ORAL 2 TIMES DAILY
Qty: 14 CAPSULE | Refills: 0 | Status: SHIPPED | OUTPATIENT
Start: 2017-08-04 | End: 2017-08-11

## 2017-08-04 RX ORDER — CIPROFLOXACIN 250 MG/1
250 TABLET, FILM COATED ORAL 2 TIMES DAILY
Qty: 14 TABLET | Refills: 0 | Status: SHIPPED | OUTPATIENT
Start: 2017-08-04 | End: 2017-08-04 | Stop reason: ALTCHOICE

## 2017-08-18 DIAGNOSIS — F32.A DEPRESSION, UNSPECIFIED DEPRESSION TYPE: ICD-10-CM

## 2017-08-18 RX ORDER — CITALOPRAM 40 MG/1
TABLET ORAL
Qty: 90 TABLET | Refills: 1 | Status: SHIPPED | OUTPATIENT
Start: 2017-08-18 | End: 2018-02-14 | Stop reason: SDUPTHER

## 2017-08-25 ENCOUNTER — OFFICE VISIT (OUTPATIENT)
Dept: FAMILY MEDICINE CLINIC | Age: 57
End: 2017-08-25

## 2017-08-25 VITALS
SYSTOLIC BLOOD PRESSURE: 110 MMHG | OXYGEN SATURATION: 98 % | WEIGHT: 186 LBS | TEMPERATURE: 97.4 F | BODY MASS INDEX: 32.96 KG/M2 | DIASTOLIC BLOOD PRESSURE: 70 MMHG | HEIGHT: 63 IN | RESPIRATION RATE: 18 BRPM | HEART RATE: 86 BPM

## 2017-08-25 DIAGNOSIS — Z86.711 HISTORY OF PULMONARY THROMBOSIS: ICD-10-CM

## 2017-08-25 DIAGNOSIS — E78.5 DYSLIPIDEMIA: ICD-10-CM

## 2017-08-25 DIAGNOSIS — E55.9 VITAMIN D DEFICIENCY: ICD-10-CM

## 2017-08-25 DIAGNOSIS — R03.0 BORDERLINE HYPERTENSION: ICD-10-CM

## 2017-08-25 DIAGNOSIS — D68.51 FACTOR 5 LEIDEN MUTATION, HETEROZYGOUS (HCC): ICD-10-CM

## 2017-08-25 DIAGNOSIS — G57.93 NEUROPATHY OF BOTH FEET: ICD-10-CM

## 2017-08-25 DIAGNOSIS — N39.0 RECURRENT UTI: ICD-10-CM

## 2017-08-25 DIAGNOSIS — Z86.718 HISTORY OF DVT (DEEP VEIN THROMBOSIS): ICD-10-CM

## 2017-08-25 DIAGNOSIS — M32.13 SYSTEMIC LUPUS ERYTHEMATOSUS WITH LUNG INVOLVEMENT, UNSPECIFIED SLE TYPE (HCC): ICD-10-CM

## 2017-08-25 DIAGNOSIS — E11.9 TYPE 2 DIABETES MELLITUS WITHOUT COMPLICATION, WITHOUT LONG-TERM CURRENT USE OF INSULIN (HCC): Primary | ICD-10-CM

## 2017-08-25 PROCEDURE — G8427 DOCREV CUR MEDS BY ELIG CLIN: HCPCS | Performed by: NURSE PRACTITIONER

## 2017-08-25 PROCEDURE — G8417 CALC BMI ABV UP PARAM F/U: HCPCS | Performed by: NURSE PRACTITIONER

## 2017-08-25 PROCEDURE — 3017F COLORECTAL CA SCREEN DOC REV: CPT | Performed by: NURSE PRACTITIONER

## 2017-08-25 PROCEDURE — 3014F SCREEN MAMMO DOC REV: CPT | Performed by: NURSE PRACTITIONER

## 2017-08-25 PROCEDURE — 99214 OFFICE O/P EST MOD 30 MIN: CPT | Performed by: NURSE PRACTITIONER

## 2017-08-25 PROCEDURE — 1036F TOBACCO NON-USER: CPT | Performed by: NURSE PRACTITIONER

## 2017-08-25 PROCEDURE — 3046F HEMOGLOBIN A1C LEVEL >9.0%: CPT | Performed by: NURSE PRACTITIONER

## 2017-08-25 PROCEDURE — G8598 ASA/ANTIPLAT THER USED: HCPCS | Performed by: NURSE PRACTITIONER

## 2017-08-25 ASSESSMENT — PATIENT HEALTH QUESTIONNAIRE - PHQ9
SUM OF ALL RESPONSES TO PHQ QUESTIONS 1-9: 2
1. LITTLE INTEREST OR PLEASURE IN DOING THINGS: 1
SUM OF ALL RESPONSES TO PHQ9 QUESTIONS 1 & 2: 2
2. FEELING DOWN, DEPRESSED OR HOPELESS: 1

## 2017-09-07 ENCOUNTER — OFFICE VISIT (OUTPATIENT)
Dept: UROLOGY | Age: 57
End: 2017-09-07

## 2017-09-07 VITALS
DIASTOLIC BLOOD PRESSURE: 80 MMHG | HEIGHT: 63 IN | BODY MASS INDEX: 32.43 KG/M2 | SYSTOLIC BLOOD PRESSURE: 120 MMHG | HEART RATE: 105 BPM | WEIGHT: 183 LBS

## 2017-09-07 DIAGNOSIS — N39.0 RECURRENT UTI: Primary | ICD-10-CM

## 2017-09-07 DIAGNOSIS — R10.31 RIGHT LOWER QUADRANT ABDOMINAL PAIN: ICD-10-CM

## 2017-09-07 LAB
BILIRUBIN, POC: NORMAL
BLOOD URINE, POC: NORMAL
CLARITY, POC: CLEAR
COLOR, POC: YELLOW
GLUCOSE URINE, POC: NORMAL
KETONES, POC: NORMAL
LEUKOCYTE EST, POC: NORMAL
NITRITE, POC: NORMAL
PH, POC: 6
PROTEIN, POC: NORMAL
SPECIFIC GRAVITY, POC: 1.01
UROBILINOGEN, POC: 0.2

## 2017-09-07 PROCEDURE — 99244 OFF/OP CNSLTJ NEW/EST MOD 40: CPT | Performed by: UROLOGY

## 2017-09-07 PROCEDURE — G8598 ASA/ANTIPLAT THER USED: HCPCS | Performed by: UROLOGY

## 2017-09-07 PROCEDURE — 81003 URINALYSIS AUTO W/O SCOPE: CPT | Performed by: UROLOGY

## 2017-09-07 PROCEDURE — 3014F SCREEN MAMMO DOC REV: CPT | Performed by: UROLOGY

## 2017-09-07 PROCEDURE — G8427 DOCREV CUR MEDS BY ELIG CLIN: HCPCS | Performed by: UROLOGY

## 2017-09-07 PROCEDURE — 3017F COLORECTAL CA SCREEN DOC REV: CPT | Performed by: UROLOGY

## 2017-09-07 PROCEDURE — 1036F TOBACCO NON-USER: CPT | Performed by: UROLOGY

## 2017-09-07 PROCEDURE — G8417 CALC BMI ABV UP PARAM F/U: HCPCS | Performed by: UROLOGY

## 2017-09-07 RX ORDER — NITROFURANTOIN 25; 75 MG/1; MG/1
100 CAPSULE ORAL 2 TIMES DAILY
Qty: 14 CAPSULE | Refills: 3 | Status: SHIPPED | OUTPATIENT
Start: 2017-09-07 | End: 2017-09-14

## 2017-09-07 ASSESSMENT — ENCOUNTER SYMPTOMS
NAUSEA: 0
SHORTNESS OF BREATH: 1
ABDOMINAL DISTENTION: 0
ABDOMINAL PAIN: 1
VOMITING: 0
DIARRHEA: 0
CONSTIPATION: 0

## 2017-09-11 ENCOUNTER — HOSPITAL ENCOUNTER (OUTPATIENT)
Dept: CT IMAGING | Age: 57
Discharge: HOME | End: 2017-09-11
Payer: COMMERCIAL

## 2017-09-11 ENCOUNTER — TELEPHONE (OUTPATIENT)
Dept: FAMILY MEDICINE CLINIC | Age: 57
End: 2017-09-11

## 2017-09-11 DIAGNOSIS — R10.31 RIGHT LOWER QUADRANT ABDOMINAL PAIN: ICD-10-CM

## 2017-09-11 DIAGNOSIS — N39.0 RECURRENT UTI: ICD-10-CM

## 2017-09-11 PROCEDURE — 74177 CT ABD & PELVIS W/CONTRAST: CPT

## 2017-09-11 PROCEDURE — 2500000003 HC RX 250 WO HCPCS: Performed by: RADIOLOGY

## 2017-09-11 PROCEDURE — 6360000004 HC RX CONTRAST MEDICATION: Performed by: RADIOLOGY

## 2017-09-11 RX ADMIN — BARIUM SULFATE 450 ML: 21 SUSPENSION ORAL at 08:38

## 2017-09-11 RX ADMIN — IOPAMIDOL 100 ML: 755 INJECTION, SOLUTION INTRAVENOUS at 08:39

## 2017-09-21 ENCOUNTER — OFFICE VISIT (OUTPATIENT)
Dept: UROLOGY | Age: 57
End: 2017-09-21

## 2017-09-21 VITALS
SYSTOLIC BLOOD PRESSURE: 124 MMHG | DIASTOLIC BLOOD PRESSURE: 84 MMHG | HEIGHT: 63 IN | HEART RATE: 91 BPM | BODY MASS INDEX: 32.43 KG/M2 | WEIGHT: 183 LBS

## 2017-09-21 DIAGNOSIS — Z87.440 HISTORY OF RECURRENT UTIS: Primary | ICD-10-CM

## 2017-09-21 DIAGNOSIS — R10.30 LOWER ABDOMINAL PAIN: ICD-10-CM

## 2017-09-21 PROCEDURE — 1036F TOBACCO NON-USER: CPT | Performed by: UROLOGY

## 2017-09-21 PROCEDURE — G8417 CALC BMI ABV UP PARAM F/U: HCPCS | Performed by: UROLOGY

## 2017-09-21 PROCEDURE — G8598 ASA/ANTIPLAT THER USED: HCPCS | Performed by: UROLOGY

## 2017-09-21 PROCEDURE — G8427 DOCREV CUR MEDS BY ELIG CLIN: HCPCS | Performed by: UROLOGY

## 2017-09-21 PROCEDURE — 3014F SCREEN MAMMO DOC REV: CPT | Performed by: UROLOGY

## 2017-09-21 PROCEDURE — 3017F COLORECTAL CA SCREEN DOC REV: CPT | Performed by: UROLOGY

## 2017-09-21 PROCEDURE — 99214 OFFICE O/P EST MOD 30 MIN: CPT | Performed by: UROLOGY

## 2017-09-21 RX ORDER — NITROFURANTOIN 25; 75 MG/1; MG/1
CAPSULE ORAL
Status: ON HOLD | COMMUNITY
Start: 2017-09-07 | End: 2017-09-25 | Stop reason: HOSPADM

## 2017-09-21 RX ORDER — HYDROXYCHLOROQUINE SULFATE 200 MG/1
200 TABLET, FILM COATED ORAL 2 TIMES DAILY
COMMUNITY
Start: 2017-09-08

## 2017-09-21 ASSESSMENT — ENCOUNTER SYMPTOMS
ABDOMINAL DISTENTION: 0
SHORTNESS OF BREATH: 0

## 2017-09-25 ENCOUNTER — HOSPITAL ENCOUNTER (OUTPATIENT)
Age: 57
Setting detail: OUTPATIENT SURGERY
Discharge: HOME | End: 2017-09-25
Attending: UROLOGY | Admitting: UROLOGY
Payer: COMMERCIAL

## 2017-09-25 VITALS
RESPIRATION RATE: 16 BRPM | SYSTOLIC BLOOD PRESSURE: 123 MMHG | HEART RATE: 85 BPM | BODY MASS INDEX: 32.25 KG/M2 | HEIGHT: 63 IN | WEIGHT: 182 LBS | TEMPERATURE: 97 F | DIASTOLIC BLOOD PRESSURE: 76 MMHG | OXYGEN SATURATION: 97 %

## 2017-09-25 PROCEDURE — 6370000000 HC RX 637 (ALT 250 FOR IP): Performed by: UROLOGY

## 2017-09-25 PROCEDURE — 52000 CYSTOURETHROSCOPY: CPT | Performed by: UROLOGY

## 2017-09-25 PROCEDURE — 2580000003 HC RX 258: Performed by: UROLOGY

## 2017-09-25 PROCEDURE — 3600000004 HC SURGERY LEVEL 4 BASE: Performed by: UROLOGY

## 2017-09-25 RX ORDER — ACETAMINOPHEN 325 MG/1
650 TABLET ORAL EVERY 4 HOURS PRN
Status: CANCELLED | OUTPATIENT
Start: 2017-09-25

## 2017-09-25 RX ORDER — CHLORHEXIDINE GLUCONATE 4 G/100ML
SOLUTION TOPICAL PRN
Status: DISCONTINUED | OUTPATIENT
Start: 2017-09-25 | End: 2017-09-25 | Stop reason: HOSPADM

## 2017-09-25 RX ORDER — ONDANSETRON 2 MG/ML
4 INJECTION INTRAMUSCULAR; INTRAVENOUS EVERY 6 HOURS PRN
Status: CANCELLED | OUTPATIENT
Start: 2017-09-25

## 2017-09-25 RX ORDER — NITROFURANTOIN 25; 75 MG/1; MG/1
100 CAPSULE ORAL
Status: COMPLETED | OUTPATIENT
Start: 2017-09-25 | End: 2017-09-25

## 2017-09-25 RX ORDER — SODIUM CHLORIDE 0.9 % (FLUSH) 0.9 %
10 SYRINGE (ML) INJECTION PRN
Status: CANCELLED | OUTPATIENT
Start: 2017-09-25

## 2017-09-25 RX ORDER — MAGNESIUM HYDROXIDE 1200 MG/15ML
LIQUID ORAL PRN
Status: DISCONTINUED | OUTPATIENT
Start: 2017-09-25 | End: 2017-09-25 | Stop reason: HOSPADM

## 2017-09-25 RX ORDER — SODIUM CHLORIDE 0.9 % (FLUSH) 0.9 %
10 SYRINGE (ML) INJECTION EVERY 12 HOURS SCHEDULED
Status: CANCELLED | OUTPATIENT
Start: 2017-09-25

## 2017-09-25 RX ADMIN — NITROFURANTOIN MONOHYDRATE AND NITROFURANTOIN MACROCRYSTALLINE 100 MG: 75; 25 CAPSULE ORAL at 09:18

## 2017-09-25 ASSESSMENT — PAIN - FUNCTIONAL ASSESSMENT: PAIN_FUNCTIONAL_ASSESSMENT: 0-10

## 2017-10-02 ENCOUNTER — OFFICE VISIT (OUTPATIENT)
Dept: PHYSICAL MEDICINE AND REHAB | Age: 57
End: 2017-10-02

## 2017-10-02 VITALS
BODY MASS INDEX: 32.43 KG/M2 | WEIGHT: 183 LBS | HEIGHT: 63 IN | SYSTOLIC BLOOD PRESSURE: 110 MMHG | DIASTOLIC BLOOD PRESSURE: 70 MMHG

## 2017-10-02 DIAGNOSIS — M32.13 DRUG-INDUCED SYSTEMIC LUPUS ERYTHEMATOSUS WITH LUNG INVOLVEMENT (HCC): ICD-10-CM

## 2017-10-02 DIAGNOSIS — Z79.899 HIGH RISK MEDICATION USE: ICD-10-CM

## 2017-10-02 DIAGNOSIS — M54.41 CHRONIC MIDLINE LOW BACK PAIN WITH RIGHT-SIDED SCIATICA: Primary | ICD-10-CM

## 2017-10-02 DIAGNOSIS — M53.3 SI (SACROILIAC) PAIN: ICD-10-CM

## 2017-10-02 DIAGNOSIS — M54.6 CHRONIC BILATERAL THORACIC BACK PAIN: ICD-10-CM

## 2017-10-02 DIAGNOSIS — G89.29 CHRONIC MIDLINE LOW BACK PAIN WITH RIGHT-SIDED SCIATICA: Primary | ICD-10-CM

## 2017-10-02 DIAGNOSIS — M79.7 FIBROMYALGIA MUSCLE PAIN: ICD-10-CM

## 2017-10-02 DIAGNOSIS — R76.8 POSITIVE ANA (ANTINUCLEAR ANTIBODY): ICD-10-CM

## 2017-10-02 DIAGNOSIS — E55.9 VITAMIN D DEFICIENCY: ICD-10-CM

## 2017-10-02 DIAGNOSIS — M32.0 DRUG-INDUCED SYSTEMIC LUPUS ERYTHEMATOSUS WITH LUNG INVOLVEMENT (HCC): ICD-10-CM

## 2017-10-02 DIAGNOSIS — G89.29 CHRONIC BILATERAL THORACIC BACK PAIN: ICD-10-CM

## 2017-10-02 DIAGNOSIS — D68.51 FACTOR 5 LEIDEN MUTATION, HETEROZYGOUS (HCC): ICD-10-CM

## 2017-10-02 DIAGNOSIS — Z79.899 HIGH RISK MEDICATION USE: Chronic | ICD-10-CM

## 2017-10-02 PROBLEM — M15.9 GENERALIZED OA: Status: ACTIVE | Noted: 2017-09-08

## 2017-10-02 PROBLEM — M79.642 PAIN IN BOTH HANDS: Status: ACTIVE | Noted: 2017-09-08

## 2017-10-02 PROBLEM — M79.641 PAIN IN BOTH HANDS: Status: ACTIVE | Noted: 2017-09-08

## 2017-10-02 PROBLEM — M32.9 SYSTEMIC LUPUS ERYTHEMATOSUS (HCC): Status: ACTIVE | Noted: 2017-09-08

## 2017-10-02 PROCEDURE — G8598 ASA/ANTIPLAT THER USED: HCPCS | Performed by: PHYSICAL MEDICINE & REHABILITATION

## 2017-10-02 PROCEDURE — 99214 OFFICE O/P EST MOD 30 MIN: CPT | Performed by: PHYSICAL MEDICINE & REHABILITATION

## 2017-10-02 PROCEDURE — 1036F TOBACCO NON-USER: CPT | Performed by: PHYSICAL MEDICINE & REHABILITATION

## 2017-10-02 PROCEDURE — 3017F COLORECTAL CA SCREEN DOC REV: CPT | Performed by: PHYSICAL MEDICINE & REHABILITATION

## 2017-10-02 PROCEDURE — G8484 FLU IMMUNIZE NO ADMIN: HCPCS | Performed by: PHYSICAL MEDICINE & REHABILITATION

## 2017-10-02 PROCEDURE — 3014F SCREEN MAMMO DOC REV: CPT | Performed by: PHYSICAL MEDICINE & REHABILITATION

## 2017-10-02 PROCEDURE — G8427 DOCREV CUR MEDS BY ELIG CLIN: HCPCS | Performed by: PHYSICAL MEDICINE & REHABILITATION

## 2017-10-02 PROCEDURE — G8417 CALC BMI ABV UP PARAM F/U: HCPCS | Performed by: PHYSICAL MEDICINE & REHABILITATION

## 2017-10-02 RX ORDER — METAXALONE 800 MG/1
800 TABLET ORAL 3 TIMES DAILY PRN
Qty: 90 TABLET | Refills: 1 | Status: SHIPPED | OUTPATIENT
Start: 2017-10-02 | End: 2018-04-18 | Stop reason: SDUPTHER

## 2017-10-02 RX ORDER — CARISOPRODOL 250 MG/1
250 TABLET ORAL 2 TIMES DAILY PRN
Qty: 40 TABLET | Refills: 0 | Status: SHIPPED | OUTPATIENT
Start: 2017-10-02 | End: 2017-12-11 | Stop reason: SDUPTHER

## 2017-10-02 ASSESSMENT — ENCOUNTER SYMPTOMS
EYES NEGATIVE: 1
VISUAL CHANGE: 0
ABDOMINAL PAIN: 0
DIARRHEA: 1
CONSTIPATION: 1
COUGH: 0
BACK PAIN: 1
SHORTNESS OF BREATH: 1
BOWEL INCONTINENCE: 0
WHEEZING: 0
CHEST TIGHTNESS: 0
APNEA: 0
VOMITING: 0
COLOR CHANGE: 0
NAUSEA: 0

## 2017-10-02 NOTE — PROGRESS NOTES
Subjective  Wayne Yoana, 62 y.o. female presents today with:    Back Pain      Leg Pain bilateral    Foot Pain bilateral, have not been hurting as much    Hand Pain bilateral    Arm Pain bilateral    Medication Refill Ultracet and Soma, pill count today-compliant       HPI Comments: She rarely takes the Ultracet- she has been taking the Soma more recently because of her recent pain flareup.-they both help a lot. Trailing Plaquenil with Dr Ty Guerin. She has no signs of drug abuse or overuse. Back Pain   This is a recurrent problem. The current episode started more than 1 year ago. The problem occurs constantly. The problem is unchanged. The pain is present in the lumbar spine. The quality of the pain is described as aching. Radiates to: Occasional down bilateral legs. The pain is at a severity of 8/10. The pain is moderate. The pain is the same all the time. The symptoms are aggravated by bending, twisting, coughing and position. Stiffness is present in the morning. Associated symptoms include leg pain and weakness. Pertinent negatives include no abdominal pain, bladder incontinence, bowel incontinence, chest pain, dysuria, fever, headaches, numbness, paresis, paresthesias, perianal numbness, tingling or weight loss. Risk factors include obesity, menopause, lack of exercise and sedentary lifestyle. Treatments tried: Caudal Blocks, TP injections, Tramadol and Flexeril,  The treatment provided moderate relief. Foot Pain   This is a chronic problem. The current episode started more than 1 year ago. The problem occurs constantly. The problem has been unchanged. Associated symptoms include arthralgias, fatigue, joint swelling, myalgias and weakness. Pertinent negatives include no abdominal pain, chest pain, chills, coughing, fever, headaches, nausea, neck pain, numbness, rash, visual change or vomiting. The symptoms are aggravated by bending and walking.  She has tried acetaminophen, rest, heat, relaxation, oral narcotics and lying down for the symptoms. The treatment provided moderate relief.        Past Medical History:   Diagnosis Date    Back pain     Depression     DM (diabetes mellitus) ([facility] [address] 11/25/2014    [facility] thrombosis) ([facility]     Dyslipidemia     Elevated troponin 5/25/2016    GERD (gastroesophageal reflux disease)     Neuropathy of both feet 8/22/2016    Obesity     Osteoarthritis     Pulmonary embolism (HCC)     SOB (shortness of breath) 4/19/2017    Systemic lupus erythematosus ([facility] [address] 9/8/2017    Vasomotor flushing     on hormone replacement therapy     Past Surgical History:   Procedure Laterality Date   Amalia Velancio Erp    COLONOSCOPY  12/16/13    DR SINGER repeat in 10 yrs per pt    COLONOSCOPY  08/2017    Dr Adela Jackson CYSTOURETHROSCOPY N/A 9/25/2017    Oneta Gall performed by Jimmy Hensley MD at [address] [place] [address]  12/16/13    DR SINGER     Social History     Social History    Marital status:      Spouse name: N/A    Number of children: N/A    Years of education: N/A     Occupational History    Housewife      Social History Main Topics    Smoking status: Former Smoker     Packs/day: 1.25     Years: 10.00     Types: Cigarettes     Quit date: 1/1/2006    Smokeless tobacco: Never Used      Comment: start smoking age 25    Alcohol use 0.6 oz/week     1 Cans of beer per week      Comment: mod    Drug use: No    Sexual activity: Yes     Other Topics Concern    None     Social History Narrative     Family History   Problem Relation Age of Onset    Substance Abuse Brother     High Blood Pressure Mother     High Cholesterol Mother     Arthritis Mother     High Blood Pressure Father     High Cholesterol Father     Clotting Disorder Father      clot to intestines    Arthritis Father     Substance Abuse Brother     Substance does not appear ill. No distress. HENT:   Head: Normocephalic and atraumatic. Right Ear: Hearing normal.   Left Ear: Hearing normal.   Nose: Nose normal.   Mouth/Throat: Oropharynx is clear and moist and mucous membranes are normal. No oral lesions. Normal dentition. No oropharyngeal exudate. No signs of toxic erosions. Eyes: Conjunctivae and EOM are normal. Pupils are equal, round, and reactive to light. Right eye exhibits no chemosis, no discharge and no exudate. Left eye exhibits no chemosis, no discharge and no exudate. No scleral icterus. Neck: Normal range of motion. Neck supple. No JVD present. No rigidity. No tracheal deviation and no edema present. No thyromegaly present. Cardiovascular: Intact distal pulses. Exam reveals no decreased pulses. Pulmonary/Chest: Effort normal and breath sounds normal. No accessory muscle usage. No apnea, no tachypnea and no bradypnea. No respiratory distress. She has no wheezes. She exhibits no tenderness. Abdominal: Soft. Bowel sounds are normal. She exhibits no distension and no mass. There is no tenderness. There is no rebound and no guarding. Musculoskeletal: She exhibits tenderness. She exhibits no edema. Right shoulder: Normal.        Left shoulder: Normal.        Right elbow: Normal.       Left elbow: Normal.        Right wrist: Normal.        Left wrist: Normal.        Right hip: Normal.        Left hip: Normal.        Right knee: Normal.        Left knee: Normal.        Right ankle: She exhibits decreased range of motion and swelling. Achilles tendon normal.        Left ankle: She exhibits decreased range of motion and swelling. Tenderness. Achilles tendon normal.        Cervical back: She exhibits tenderness and pain. Thoracic back: She exhibits tenderness and pain. Lumbar back: She exhibits decreased range of motion, tenderness, bony tenderness and pain.  She exhibits no swelling, no edema, no deformity, no laceration and normal pulse. Back:         Right upper arm: Normal.        Left upper arm: Normal.        Right forearm: She exhibits tenderness and bony tenderness. Left forearm: She exhibits tenderness and bony tenderness. Right hand: Normal.        Left hand: Normal.        Right upper leg: Normal.        Left upper leg: Normal.        Right lower leg: Normal.        Left lower leg: Normal.        Legs:       Right foot: There is decreased range of motion, tenderness and bony tenderness. Left foot: There is decreased range of motion, tenderness and bony tenderness. Tender areas are indicated by numbered spot         Lymphadenopathy:     She has no cervical adenopathy. Neurological: She is alert and oriented to person, place, and time. She displays abnormal reflex. She displays no atrophy and no tremor. A sensory deficit is present. No cranial nerve deficit. She exhibits normal muscle tone. Gait abnormal. Coordination normal. She displays no Babinski's sign on the right side. She displays no Babinski's sign on the left side. Skin: Skin is warm, dry and intact. No abrasion, no bruising, no ecchymosis, no laceration, no petechiae and no rash noted. Rash is not macular, not pustular and not urticarial. She is not diaphoretic. No cyanosis or erythema. No pallor. Nails show no clubbing. Psychiatric: She has a normal mood and affect. Her behavior is normal. Judgment and thought content normal. Her mood appears not anxious. Her affect is not angry, not blunt, not labile and not inappropriate. Her speech is not rapid and/or pressured, not delayed, not tangential and not slurred. She is not agitated, not aggressive, not hyperactive, not slowed, not withdrawn, not actively hallucinating and not combative. Thought content is not paranoid and not delusional. Cognition and memory are normal. Cognition and memory are not impaired. She does not express impulsivity or inappropriate judgment.  She does not exhibit a depressed mood. She expresses no homicidal and no suicidal ideation. She expresses no suicidal plans and no homicidal plans. She is communicative. She exhibits normal recent memory and normal remote memory. She is attentive. Vitals reviewed. Ortho Exam  Neurologic Exam     Mental Status   Oriented to person, place, and time. Speech: not slurred   Level of consciousness: alert  Knowledge: good. Able to name object. Able to read. Able to repeat. Cranial Nerves     CN III, IV, VI   Pupils are equal, round, and reactive to light. Extraocular motions are normal.     Motor Exam   Muscle bulk: normal  Overall muscle tone: normal          After a thorough review and discussion of the previous medical records, patient comprehensive medical, surgical, and family and social history, Review of Systems, their OARRS, their Screener and Opioid Assessment for Patients with Pain (SOAPP®-R), recent diagnostics, and symptomatic results to previous treatment, it is my impression that the patients is suffering with progressive and severe:    1. Chronic midline low back pain with right-sided sciatica  carisoprodol (SOMA) 250 MG tablet    metaxalone (SKELAXIN) 800 MG tablet   2. SI (sacroiliac) pain  traMADol-acetaminophen (ULTRACET) 37.5-325 MG per tablet    carisoprodol (SOMA) 250 MG tablet    metaxalone (SKELAXIN) 800 MG tablet   3. Chronic bilateral thoracic back pain  carisoprodol (SOMA) 250 MG tablet    metaxalone (SKELAXIN) 800 MG tablet   4. Fibromyalgia muscle pain  traMADol-acetaminophen (ULTRACET) 37.5-325 MG per tablet    metaxalone (SKELAXIN) 800 MG tablet   5. Vitamin D deficiency     6. High risk medication use - 07/11/17 OARRS PM&R, 09/29/17 OARRS PM&R, 02/22/17 Med Contract PM&R     7. High risk medication use  traMADol-acetaminophen (ULTRACET) 37.5-325 MG per tablet    OARRS and last refill reviewed. 2015 narcotic contract signed. 11/14/14 tox screening    8.  High risk medication use  OARRS PM&R 2/21/17, oarrs pm&r 5/13/17  traMADol-acetaminophen (ULTRACET) 37.5-325 MG per tablet    metaxalone (SKELAXIN) 800 MG tablet   9. Positive DESTINEE (antinuclear antibody) Dr. Tanisha Welsh  metaxalone (SKELAXIN) 800 MG tablet   10. Factor 5 Leiden mutation, heterozygous (HCC)  metaxalone (SKELAXIN) 800 MG tablet   11. 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 ([facility] [address]  metaxalone (SKELAXIN) 800 MG tablet       I am also concerned by lifestyle and mood issues including:    Past Medical History:   Diagnosis Date    Back pain     Depression     DM (diabetes mellitus) ([facility] [address] 11/25/2014    [facility] ([facility]     Dyslipidemia     Elevated troponin 5/25/2016    GERD (gastroesophageal reflux disease)     Neuropathy of both feet 8/22/2016    Obesity     Osteoarthritis     Pulmonary embolism (HCC)     SOB (shortness of breath) 4/19/2017    [facility] 9[facility]8/2017    Vasomotor flushing     on hormone replacement therapy           Given their medication, chronic pain and lifestyle and medications they are at risk for :    Falls, constipation, addiction  Loss of livelyhood due to severe pain, debility, weight gain and  vitamin D deficiency    The patient was educated regarding proper diet, fitness routine, and regulatory restrictions concerning pain medications. Previous notes, comprehensive past medical, surgical, family history, and diagnostics were reviewed. Patient education and councelling were provided regarding off label use,treatment options and medication and injection risks. Current and old OARRS ([place] Automated Prescription Reporting System) records reviewed, all refills reviewed since last visit,  Behavioral agreement/FELICITAS regulations   and Toxicology screen was reviewed with patient and is up to date. There are no current red flags.    They are making good progress regarding pain relief, they are performing at a functional level regarding activities of daily living and psychological functioning, they're not having any adverse effects or side effects from the current medications, and I see no findings of aberrant drug taking her addiction related behaviors. Attestation: The Prescription Monitoring Report for this patient was reviewed today. (Chuck Foy, )  Documentation: Possible medication side effects, risk of tolerance and/or dependence, and alternative treatments discussed., Obtaining appropriate analgesic effect of treatment., No signs of potential drug abuse or diversion identified. (Chuck Foy, )       Patient is currently taking:       I have changed Ms. Jackson's carisoprodol. I am also having her start on metaxalone. Additionally, I am having her maintain her mesalamine, vitamin D, ACCU-CHEK ADVANTAGE DIABETES, BLOOD GLUCOSE TEST STRIPS, Lancets, esomeprazole, albuterol sulfate HFA, hydrocortisone, hydrOXYzine, amitriptyline, traMADol-acetaminophen, diclofenac sodium, carisoprodol, ezetimibe, gabapentin, citalopram, metFORMIN, rivaroxaban, hydroxychloroquine, and traMADol-acetaminophen. I also recommend the following Medications:    Orders Placed This Encounter   Medications    traMADol-acetaminophen (ULTRACET) 37.5-325 MG per tablet     Sig: Take one tablet every 4 hours as needed for pain. Maximum of 2.5 tablets daily. Do not get narcotic pain medication from any other providers. Generic equivalent acceptable, unless otherwise noted. Dispense:  80 tablet     Refill:  0    carisoprodol (SOMA) 250 MG tablet     Sig: Take 1 tablet by mouth 2 times daily as needed (pain or muscles)     Dispense:  40 tablet     Refill:  0    metaxalone (SKELAXIN) 800 MG tablet     Sig: Take 1 tablet by mouth 3 times daily as needed for Pain Generic equivalent acceptable, unless otherwise noted. Dispense:  90 tablet     Refill:  1        -which helps with pain and function.     Otherwise, continue the current pain medications that I have prescibed. Radiologic:   Old films reviewed    CT ABDOMEN AND PELVIS WITH CONTRAST       CLINICAL HISTORY: N39.0 Recurrent UTI ICD10 RIGHT LOWER ABDOMINAL PAIN FOR 2-3 MONTHS       COMPARISON: 3/13/2015       TECHNIQUE: Spiral scans with oral contrast and intravenous bolus administration of  100ml of Isovue 370. Multiplanar 2-D reconstructions. All CT scans at this facility use dose modulation, iterative reconstruction, and/or weight based dosing when    appropriate to reduce radiation dose to as low as reasonably achievable.       FINDINGS: Patient is known to be status post appendectomy. The bowel is nonspecific. There is considerable formed stool throughout the colon which could represent constipation in the appropriate clinical setting. There are nondilated fluid-filled small    bowel loops in the right lower quadrant which could represent localized ileus or enteritis in the appropriate clinical setting.       There is mild hepatic steatosis. The gallbladder, bile ducts, and pancreas are unremarkable.       There is no urolithiasis or hydroureteronephrosis. There is right renal upper pole old focal cortical scarring and calyceal deformity, consistent with chronic pyelonephritis, unchanged from prior examination. The urinary bladder is unremarkable. Patient    is status post hysterectomy. There is no adnexal pathology.       There are small fat-containing inguinal hernias. There is minimal fat-containing umbilical hernia. There are atherosclerotic calcifications in the abdominal aorta and iliac arteries. There is no evidence of abdominal aortic aneurysm. There is no    retroperitoneal adenopathy or fibrosis.       There is mild spondylosis. There is slight degenerative L4-5 anterolisthesis and L2-3 retrolisthesis.  There are no suspicious or acute bone lesions.       Note: This interpretation includes assessment of the liver, spleen, gallbladder, bile ducts, pancreas, adrenal glands, kidneys, urogenital structures, abdominal vasculature, retroperitoneum, gastrointestinal tract, mesentery, lymph nodes, inguinal    regions, osseous structures, abdominopelvic walls, and visualized portions of the lower thorax.  Some structures may be congenitally or surgically absent/altered. Rocio Velásquez otherwise indicated in this report, these organs and structures demonstrate no    significant pathology or demonstrate findings which do not require additional follow-up/evaluation.           Impression   NONSPECIFIC BOWEL. CONSIDERABLE FORMED STOOL THROUGHOUT THE COLON WHICH COULD REPRESENT CONSTIPATION IN THE APPROPRIATE CLINICAL SETTING. NONDILATED FLUID-FILLED SMALL BOWEL LOOPS IN THE RIGHT LOWER QUADRANT WHICH COULD REPRESENT LOCALIZED    ILEUS OR ENTERITIS IN THE APPROPRIATE CLINICAL SETTING. FOCAL CHRONIC PYELONEPHRITIS RIGHT RENAL UPPER POLE. HEPATIC STEATOSIS. ADDITIONAL FINDINGS AS ABOVE.         ,     I discussed results with patients. see Follow up plans below  For any new studies. Care Everywhere Updates:  requested and reviewed. No new issues noted.            Labs:  Previous labs reviewed     Lab Results   Component Value Date     08/01/2017    K 4.3 08/01/2017     08/01/2017    CO2 21 08/01/2017    BUN 15 08/01/2017    CREATININE 0.74 08/01/2017    CALCIUM 9.2 08/01/2017    LABALBU 4.4 08/01/2017    LABALBU 4.2 03/13/2012    BILITOT 0.4 08/01/2017    ALKPHOS 81 08/01/2017    AST 61 08/01/2017    ALT 78 08/01/2017     Lab Results   Component Value Date    WBC 5.0 08/01/2017    RBC 4.20 08/01/2017    RBC 4.77 03/13/2012    HGB 12.8 08/01/2017    HCT 38.2 08/01/2017    MCV 91.1 08/01/2017    MCH 30.6 08/01/2017    MCHC 33.6 08/01/2017    RDW 13.5 08/01/2017     08/01/2017    MPV 8.2 07/31/2015       Lab Results   Component Value Date    LABAMPH Neg 11/14/2014    BARBSCNU Neg 11/14/2014    LABBENZ Neg 11/14/2014    LABBENZ NotDTCD 10/11/2012    CANSU Neg 11/14/2014 COCAIMETSCRU Neg 11/14/2014    PHENCYCLIDINESCREENURINE Neg 70/99/3177    TRICYCLIC Not Available 39/05/8459    DSCOMMENT see below 11/14/2014       Lab Results   Component Value Date    CODEINE Not Detected 12/19/2016    MORPHINE Not Detected 12/19/2016    Clarnce Shakira Not Detected 12/19/2016    OXYCODONE Not Detected 12/19/2016    NOROXYCODONE Not Detected 12/19/2016    NOROXYMU Not Detected 12/19/2016    [facility] Not Detected 12/19/2016    NORHYDU Not Detected 12/19/2016    HYDROMO Not Detected 12/19/2016    Kristina Patch Not Detected 12/19/2016    Caren Freiberg Not Detected 12/19/2016    FENTA Not Detected 12/19/2016    NORFENT Not Detected 12/19/2016    MEPERIDINE Not Detected 12/19/2016    TAPENU Not Detected 12/19/2016    TAPOSULFUR Not Detected 12/19/2016    METHADONE Not Detected 12/19/2016    LABPROP Not Detected 12/19/2016    TRAM Present 12/19/2016    AMPH Not Detected 12/19/2016    METHAMP Not Detected 12/19/2016    MDMA Not Detected 12/19/2016    ECMDA Not Detected 12/19/2016       Lab Results   Component Value Date    METPHEN Not Detected 12/19/2016    PHENTERMINE Not Detected 12/19/2016    BENZOYL Not Detected 12/19/2016    Pavithra Brunswick Not Detected 12/19/2016    ALPHAOHALPRA Not Detected 12/19/2016    CLONAZEPAM Not Detected 12/19/2016    Coello Lazar Not Detected 12/19/2016    DIAZEP Not Detected 12/19/2016    DEBORA Not Detected 12/19/2016    OXAZ Not Detected 12/19/2016    Dodge Eliz Not Detected 12/19/2016    LORAZEPAM Not Detected 12/19/2016    MIDAZOLAM Not Detected 12/19/2016    ZOLPIDEM Not Detected 12/19/2016    TEO Not Detected 12/19/2016    ETG See Note 12/19/2016    MARIJMET Not Detected 12/19/2016    PCP Not Detected 12/19/2016    PAINMGTDRUGP See Below 12/19/2016    EERPAINMGTPA See Note 12/19/2016    LABCREA 125.2 08/01/2017         , I discussed results with patient. See follow-up plans for new studies.     Therapies:  HEP-gentle stretching and relaxation techniques-demonstrated with patient-they are to do them twice a day. They are also advised to make the following lifestyle changes:  Goals       Limit Carbs to 2-3/meal maximum      Exercise 3x per week (30 min per time)            Piriformis stretches and abd strengthening       HEMOGLOBIN A1C < 6.5      SOAPP- R GOAL LESS THAN 9            12/19/16 SCORE: 15-MODERATE RISK   02/22/17 score: 22-High risk   05/15/17 score: 26-high risk  07/12/17 score: 22-high risk  10/02/17 score: 15-moderate risk       Weight < 160 lb (72.576 kg)           Injections or Epidurals:  Injection options were discussed. Patient gave verbal consent to ordered injections. See follow-up plans for planned injections. Supplements:  Vitamin D,   Education was given on:   Dietary and Fitness             Follow up with Primary Care Physician regarding their general medical needs. They are to follow up in 2 months to review medication, efficacy of injections, pill counts, OARRS check, SOAPPR assessment, review diagnostics, to review previous and future treatment plans and assess appropriateness for continued therapy. New Diagnostics  No orders of the defined types were placed in this encounter.       Francisca Dukes DO

## 2017-10-02 NOTE — MR AVS SNAPSHOT
[facility] [facility] [address]  [address] [phone]      You Were Seen for:         Comments    Chronic midline low back pain with right-sided sciatica   [[id number]]         Vital Signs     Blood Pressure Height Weight Breastfeeding? Body Mass Index Smoking Status    110/70 5' 3\" (1.6 m) 183 lb (83 kg) No 32.42 kg/m2 Former Smoker      Additional Information about your Body Mass Index (BMI)           Your BMI as listed above is considered obese (30 or more). BMI is an estimate of body fat, calculated from your height and weight. The higher your BMI, the greater your risk of heart disease, high blood pressure, type 2 diabetes, stroke, gallstones, arthritis, sleep apnea, and certain cancers. BMI is not perfect. It may overestimate body fat in athletes and people who are more muscular. Even a small weight loss (between 5 and 10 percent of your current weight) by decreasing your calorie intake and becoming more physically active will help lower your risk of developing or worsening diseases associated with obesity. Learn more at: [employer]             Today's Medication Changes          These changes are accurate as of: 10/2/17 11:10 AM.  If you have any questions, ask your nurse or doctor. START taking these medications           metaxalone 800 MG tablet   Commonly known as:  SKELAXIN   Instructions: Take 1 tablet by mouth 3 times daily as needed for Pain Generic equivalent acceptable, unless otherwise noted. Quantity:  90 tablet   Refills:  1   Started by:  Anil Smith, DO         CHANGE how you take these medications           * carisoprodol 250 MG tablet   Commonly known as:  SOMA   Instructions: Take 1 tablet by mouth 2 times daily as needed (muscle spasm)   Quantity:  40 tablet   Refills:  0   What changed:  Another medication with the same name was added.  Make sure you understand how and when to take each. Changed by:  Toño Wood DO       * carisoprodol 250 MG tablet   Commonly known as:  SOMA   Instructions: Take 1 tablet by mouth 2 times daily as needed (pain or muscles)   Quantity:  40 tablet   Refills:  0   What changed: You were already taking a medication with the same name, and this prescription was added. Make sure you understand how and when to take each. Changed by:  Toño Wood DO       * Notice: This list has 2 medication(s) that are the same as other medications prescribed for you. Read the directions carefully, and ask your doctor or other care provider to review them with you. Where to Get Your Medications      You can get these medications from any pharmacy     Bring a paper prescription for each of these medications     carisoprodol 250 MG tablet    metaxalone 800 MG tablet    traMADol-acetaminophen 37.5-325 MG per tablet               Your Current Medications Are              traMADol-acetaminophen (ULTRACET) 37.5-325 MG per tablet Take one tablet every 4 hours as needed for pain. Maximum of 2.5 tablets daily. Do not get narcotic pain medication from any other providers. Generic equivalent acceptable, unless otherwise noted. carisoprodol (SOMA) 250 MG tablet Take 1 tablet by mouth 2 times daily as needed (pain or muscles)    metaxalone (SKELAXIN) 800 MG tablet Take 1 tablet by mouth 3 times daily as needed for Pain Generic equivalent acceptable, unless otherwise noted.     hydroxychloroquine (PLAQUENIL) 200 MG tablet Take 200 mg by mouth 2 times daily     rivaroxaban (XARELTO) 20 MG TABS tablet TAKE ONE TABLET BY MOUTH ONCE DAILY    metFORMIN (GLUCOPHAGE) 500 MG tablet TAKE 1 TABLET THREE TIMES A DAY    citalopram (CELEXA) 40 MG tablet TAKE 1 TABLET DAILY    gabapentin (NEURONTIN) 400 MG capsule TAKE 1 CAPSULE THREE TIMES A DAY    ezetimibe (ZETIA) 10 MG tablet TAKE 1 TABLET DAILY Wrist pain, left    Type 2 diabetes mellitus without complication, without long-term current use of insulin (HCC)    SOB (shortness of breath)    Positive DESTINEE (antinuclear antibody) Dr. Solomon Cantor    Weakness of both legs    Paresthesia of both feet    Neuropathy of both feet- Dr. Joshua Oconnor    Elevated troponin    Factor 5 Leiden mutation, heterozygous ([facility]    Neck pain    Thoracic back pain    Borderline hypertension    Reactive airway disease    DM (diabetes mellitus) ([facility] [facility]    Pulmonary embolism ([facility]    DVT (deep venous thrombosis) ([facility]    Postmenopausal symptoms    Postmenopausal hormone replacement therapy    Hypovitaminosis D    Obesity    Dyslipidemia    GERD (gastroesophageal reflux disease)    Depression      Your Goals as of 10/2/2017 at 11:01 AM              Today    8/1/17 7/12/17       Diet     Limit Carbs to 2-3/meal maximum   Not on track    On track       Exercise    Exercise 3x per week (30 min per time)   On track    On track    Notes    Piriformis stretches and abd strengthening         Lifestyle    SOAPP- R GOAL LESS THAN 9   Not on track    Not on track    Notes    12/19/16 SCORE: 15-MODERATE RISK   02/22/17 score: 22-High risk   05/15/17 score: 26-high risk  07/12/17 score: 22-high risk  10/02/17 score: 15-moderate risk         Result Component    HEMOGLOBIN A1C < 6.5     5.9         Weight    Weight < 160 lb (72.576 kg)             Immunizations as of 10/2/2017     Name Date    Influenza Virus Vaccine 10/7/2014, 11/11/2013    Influenza, Misha Hebert, 3 Years and older, IM 10/29/2016    Pneumococcal Conjugate 7-valent 8/21/2014    Pneumococcal Polysaccharide ([id number]) 8/21/2014    Tdap (Boostrix, Adacel) 2/23/2017      Preventive Care        Date Due    HIV screening is recommended for all people regardless of risk factors  aged 15-65 years at least once (lifetime) who have never been HIV tested.  4/4/1975    Pap Smear 2/17/2014    Eye Exam By An Eye Doctor 9/8/2015    Diabetic Foot Exam 8/22/2017

## 2017-10-09 DIAGNOSIS — M25.532 WRIST PAIN, LEFT: ICD-10-CM

## 2017-10-09 DIAGNOSIS — M53.3 SI (SACROILIAC) PAIN: ICD-10-CM

## 2017-10-13 ENCOUNTER — OFFICE VISIT (OUTPATIENT)
Dept: PULMONOLOGY | Age: 57
End: 2017-10-13

## 2017-10-13 VITALS
BODY MASS INDEX: 33.31 KG/M2 | HEART RATE: 66 BPM | TEMPERATURE: 96.6 F | SYSTOLIC BLOOD PRESSURE: 102 MMHG | WEIGHT: 188 LBS | OXYGEN SATURATION: 95 % | HEIGHT: 63 IN | DIASTOLIC BLOOD PRESSURE: 66 MMHG

## 2017-10-13 DIAGNOSIS — R06.02 SHORTNESS OF BREATH: Primary | ICD-10-CM

## 2017-10-13 DIAGNOSIS — J98.4 RESTRICTIVE LUNG DISEASE: ICD-10-CM

## 2017-10-13 DIAGNOSIS — D68.59 HYPERCOAGULABLE STATE, PRIMARY (HCC): ICD-10-CM

## 2017-10-13 PROCEDURE — 3017F COLORECTAL CA SCREEN DOC REV: CPT | Performed by: INTERNAL MEDICINE

## 2017-10-13 PROCEDURE — G8484 FLU IMMUNIZE NO ADMIN: HCPCS | Performed by: INTERNAL MEDICINE

## 2017-10-13 PROCEDURE — 99204 OFFICE O/P NEW MOD 45 MIN: CPT | Performed by: INTERNAL MEDICINE

## 2017-10-13 PROCEDURE — G8598 ASA/ANTIPLAT THER USED: HCPCS | Performed by: INTERNAL MEDICINE

## 2017-10-13 PROCEDURE — 3014F SCREEN MAMMO DOC REV: CPT | Performed by: INTERNAL MEDICINE

## 2017-10-13 PROCEDURE — G8427 DOCREV CUR MEDS BY ELIG CLIN: HCPCS | Performed by: INTERNAL MEDICINE

## 2017-10-13 PROCEDURE — 1036F TOBACCO NON-USER: CPT | Performed by: INTERNAL MEDICINE

## 2017-10-13 PROCEDURE — G8417 CALC BMI ABV UP PARAM F/U: HCPCS | Performed by: INTERNAL MEDICINE

## 2017-10-13 ASSESSMENT — ENCOUNTER SYMPTOMS
BLOOD IN STOOL: 0
BACK PAIN: 0
DIARRHEA: 0
CONSTIPATION: 0
SINUS PRESSURE: 0
COUGH: 0
WHEEZING: 0
SHORTNESS OF BREATH: 1
RHINORRHEA: 0
CHEST TIGHTNESS: 0
ABDOMINAL PAIN: 0

## 2017-10-13 NOTE — PROGRESS NOTES
Taylor Sharp MD at [facility] ENDOSCOPY  12/16/13    DR SINGER     Family History   Problem Relation Age of Onset    Substance Abuse Brother     High Blood Pressure Mother     High Cholesterol Mother     Arthritis Mother     High Blood Pressure Father     High Cholesterol Father     Clotting Disorder Father      clot to intestines    Arthritis Father     Substance Abuse Brother     Substance Abuse Brother     Stroke Paternal Uncle 36     Social History     Social History    Marital status:      Spouse name: N/A    Number of children: N/A    Years of education: N/A     Occupational History    Housewife      Social History Main Topics    Smoking status: Former Smoker     Packs/day: 1.25     Years: 10.00     Types: Cigarettes     Quit date: 1/1/2006    Smokeless tobacco: Never Used      Comment: start smoking age 25    Alcohol use 0.6 oz/week     1 Cans of beer per week      Comment: mod    Drug use: No    Sexual activity: Yes     Other Topics Concern    Not on file     Social History Narrative    No narrative on file         Review of Systems   Constitutional: Negative for chills. HENT: Negative for congestion, rhinorrhea, sinus pressure and sneezing. Respiratory: Positive for shortness of breath. Negative for cough, chest tightness and wheezing. Cardiovascular: Negative for chest pain, palpitations and leg swelling. Gastrointestinal: Negative for abdominal pain, blood in stool, constipation and diarrhea. Genitourinary: Negative for difficulty urinating and hematuria. Musculoskeletal: Negative for back pain and gait problem. Neurological: Negative for dizziness and headaches. Psychiatric/Behavioral: Positive for sleep disturbance.          Objective:     Vitals:    10/13/17 1340   BP: 102/66   Site: Right Arm   Position: Sitting   Cuff Size: Large Adult   Pulse: 66   Temp: 96.6 °F (35.9 °C)   TempSrc: Temporal   SpO2: 95%   Weight: 188 lb (85.3 kg)   Height: 5' 3\" (1.6 m)         Physical Exam   Constitutional: She is oriented to person, place, and time. She appears well-developed and well-nourished. HENT:   Head: Normocephalic and atraumatic. Mouth/Throat: Oropharynx is clear and moist.   Eyes: EOM are normal. Pupils are equal, round, and reactive to light. Neck: No JVD present. No tracheal deviation present. No thyromegaly present. Cardiovascular: Normal rate and regular rhythm. Exam reveals no gallop. No murmur heard. Pulmonary/Chest: She has no wheezes. She has no rales. She exhibits no tenderness. Abdominal: She exhibits no distension. Musculoskeletal: Normal range of motion. She exhibits no edema. Neurological: She is alert and oriented to person, place, and time. Coordination normal.   Skin: Skin is warm and dry. No rash noted. Psychiatric: She has a normal mood and affect. Assessment:     1. Shortness of breath  ECHO Complete 2D W Doppler W Color   2. Hypercoagulable state, primary ([address]     3. Restrictive lung disease  CT Chest High Resolution    Pulse oximetry, overnight     The possibility of pulmonary hypertension as a result of recurrent PEs in the past plus minus other contributing factors such as chronic lung disease and/or sleep apnea should all be considered and evaluated all of this was discussed with the patient. I doubt lupus associated pneumonitis or pulmonary fibrosis but high-resolution CT will be evaluated.       Plan:     Orders Placed This Encounter   Procedures    CT Chest High Resolution     Standing Status:   Future     Standing Expiration Date:   11/13/2017     Order Specific Question:   Reason for exam:     Answer:   Restrictive lung disease    Pulse oximetry, overnight     Standing Status:   Future     Standing Expiration Date:   11/13/2017    ECHO Complete 2D W Doppler W Color     Standing Status:   Future     Standing Expiration Date:   11/13/2017     Order Specific Question:   Reason for exam:     Answer:   Shortness of breath     No orders of the defined types were placed in this encounter. Return in about 6 weeks (around 11/24/2017) for re-evaluation, after follow up CT, review tests.       Dia Ny MD

## 2017-10-16 ENCOUNTER — OFFICE VISIT (OUTPATIENT)
Dept: SURGERY | Age: 57
End: 2017-10-16

## 2017-10-16 VITALS
BODY MASS INDEX: 33.13 KG/M2 | HEART RATE: 84 BPM | SYSTOLIC BLOOD PRESSURE: 124 MMHG | WEIGHT: 187 LBS | HEIGHT: 63 IN | TEMPERATURE: 97.9 F | DIASTOLIC BLOOD PRESSURE: 80 MMHG | RESPIRATION RATE: 14 BRPM

## 2017-10-16 DIAGNOSIS — K40.20 NON-RECURRENT BILATERAL INGUINAL HERNIA WITHOUT OBSTRUCTION OR GANGRENE: ICD-10-CM

## 2017-10-16 DIAGNOSIS — D68.51 FACTOR V LEIDEN CARRIER (HCC): ICD-10-CM

## 2017-10-16 DIAGNOSIS — K42.9 UMBILICAL HERNIA WITHOUT OBSTRUCTION AND WITHOUT GANGRENE: Primary | ICD-10-CM

## 2017-10-16 PROCEDURE — 99202 OFFICE O/P NEW SF 15 MIN: CPT | Performed by: SURGERY

## 2017-10-16 PROCEDURE — 3017F COLORECTAL CA SCREEN DOC REV: CPT | Performed by: SURGERY

## 2017-10-16 PROCEDURE — G8598 ASA/ANTIPLAT THER USED: HCPCS | Performed by: SURGERY

## 2017-10-16 PROCEDURE — G8427 DOCREV CUR MEDS BY ELIG CLIN: HCPCS | Performed by: SURGERY

## 2017-10-16 PROCEDURE — 3014F SCREEN MAMMO DOC REV: CPT | Performed by: SURGERY

## 2017-10-16 PROCEDURE — G8484 FLU IMMUNIZE NO ADMIN: HCPCS | Performed by: SURGERY

## 2017-10-16 PROCEDURE — 1036F TOBACCO NON-USER: CPT | Performed by: SURGERY

## 2017-10-16 PROCEDURE — G8417 CALC BMI ABV UP PARAM F/U: HCPCS | Performed by: SURGERY

## 2017-10-18 NOTE — PROGRESS NOTES
Subjective:      Patient ID: Porfirio Bear is a 62 y.o. female. HPI She is here today to discuss hernia repair. She presented to Dr Chi Yeung with RLQ pain and recurrent UTIs. She underwent CT scan of the abdomen and pelvis and multiple hernias were noted and she was referred here. She reports long standing intermittent RLQ pain which is dull. The pain is worse with activity especially walking. Past Medical History:   Diagnosis Date    Back pain     Depression     DM (diabetes mellitus) [facility] [address] 2014    DVT [facility]deep venous thrombosis) [facility]     Dyslipidemia     Elevated troponin 2016    GERD (gastroesophageal reflux disease)     Neuropathy of both feet 2016    Obesity     Osteoarthritis     Pulmonary embolism (HCC)     SOB (shortness of breath) 2017    [facility] 2017    Vasomotor flushing     on hormone replacement therapy     Past Surgical History:   Procedure Laterality Date   [facility] COLONOSCOPY  13    DR SINGER repeat in 10 yrs per pt    COLONOSCOPY  2017    Dr Drake Melton CYSTOURETHROSCOPY N/A 2017    [address] [place] [address] performed by Milan Coe MD at [facility]  13    DR SINGER     Current Outpatient Prescriptions   Medication Sig Dispense Refill    diclofenac sodium (VOLTAREN) 1 % GEL Apply 4 g topically 4 times daily 300 g 3    carisoprodol (SOMA) 250 MG tablet Take 1 tablet by mouth 2 times daily as needed (pain or muscles) 40 tablet 0    metaxalone (SKELAXIN) 800 MG tablet Take 1 tablet by mouth 3 times daily as needed for Pain Generic equivalent acceptable, unless otherwise noted.  90 tablet 1    hydroxychloroquine (PLAQUENIL) 200 MG tablet Take 200 mg by mouth 2 times daily       rivaroxaban (XARELTO) 20 MG TABS tablet TAKE ONE TABLET BY MOUTH ONCE DAILY 30 tablet 5   

## 2017-10-26 ENCOUNTER — HOSPITAL ENCOUNTER (OUTPATIENT)
Dept: NON INVASIVE DIAGNOSTICS | Age: 57
Discharge: HOME | End: 2017-10-26
Payer: COMMERCIAL

## 2017-10-26 ENCOUNTER — HOSPITAL ENCOUNTER (OUTPATIENT)
Dept: CT IMAGING | Age: 57
Discharge: HOME | End: 2017-10-26
Payer: COMMERCIAL

## 2017-10-26 VITALS
DIASTOLIC BLOOD PRESSURE: 78 MMHG | WEIGHT: 185 LBS | HEIGHT: 63 IN | HEART RATE: 84 BPM | BODY MASS INDEX: 32.78 KG/M2 | SYSTOLIC BLOOD PRESSURE: 121 MMHG | RESPIRATION RATE: 16 BRPM

## 2017-10-26 DIAGNOSIS — R06.02 SHORTNESS OF BREATH: ICD-10-CM

## 2017-10-26 DIAGNOSIS — J98.4 RESTRICTIVE LUNG DISEASE: ICD-10-CM

## 2017-10-26 LAB
LV EF: 60 %
LVEF MODALITY: NORMAL

## 2017-10-26 PROCEDURE — 71250 CT THORAX DX C-: CPT

## 2017-10-26 PROCEDURE — 93306 TTE W/DOPPLER COMPLETE: CPT

## 2017-11-08 ENCOUNTER — HOSPITAL ENCOUNTER (OUTPATIENT)
Dept: LAB | Age: 57
Discharge: HOME | End: 2017-11-08
Payer: COMMERCIAL

## 2017-11-08 DIAGNOSIS — E55.9 VITAMIN D DEFICIENCY: ICD-10-CM

## 2017-11-08 DIAGNOSIS — E11.9 TYPE 2 DIABETES MELLITUS WITHOUT COMPLICATION, WITHOUT LONG-TERM CURRENT USE OF INSULIN (HCC): ICD-10-CM

## 2017-11-08 LAB
ALBUMIN SERPL-MCNC: 4.1 G/DL (ref 3.9–4.9)
ALP BLD-CCNC: 65 U/L (ref 40–130)
ALT SERPL-CCNC: 83 U/L (ref 0–33)
ANION GAP SERPL CALCULATED.3IONS-SCNC: 18 MEQ/L (ref 7–13)
AST SERPL-CCNC: 59 U/L (ref 0–35)
BASOPHILS ABSOLUTE: 0 K/UL (ref 0–0.2)
BASOPHILS RELATIVE PERCENT: 0.7 %
BILIRUB SERPL-MCNC: 0.3 MG/DL (ref 0–1.2)
BUN BLDV-MCNC: 19 MG/DL (ref 6–20)
CALCIUM SERPL-MCNC: 9.3 MG/DL (ref 8.6–10.2)
CHLORIDE BLD-SCNC: 102 MEQ/L (ref 98–107)
CHOLESTEROL, TOTAL: 158 MG/DL (ref 0–199)
CO2: 17 MEQ/L (ref 22–29)
CREAT SERPL-MCNC: 0.77 MG/DL (ref 0.5–0.9)
CREATININE URINE: 106.6 MG/DL
EOSINOPHILS ABSOLUTE: 0.1 K/UL (ref 0–0.7)
EOSINOPHILS RELATIVE PERCENT: 2.7 %
GFR AFRICAN AMERICAN: >60
GFR NON-AFRICAN AMERICAN: >60
GLOBULIN: 2.8 G/DL (ref 2.3–3.5)
GLUCOSE BLD-MCNC: 105 MG/DL (ref 74–109)
HBA1C MFR BLD: 6 % (ref 4.8–5.9)
HCT VFR BLD CALC: 38.9 % (ref 37–47)
HDLC SERPL-MCNC: 47 MG/DL (ref 40–59)
HEMOGLOBIN: 12.8 G/DL (ref 12–16)
LDL CHOLESTEROL CALCULATED: 88 MG/DL (ref 0–129)
LYMPHOCYTES ABSOLUTE: 0.7 K/UL (ref 1–4.8)
LYMPHOCYTES RELATIVE PERCENT: 16.6 %
MCH RBC QN AUTO: 30.3 PG (ref 27–31.3)
MCHC RBC AUTO-ENTMCNC: 33 % (ref 33–37)
MCV RBC AUTO: 91.8 FL (ref 82–100)
MICROALBUMIN UR-MCNC: <1.2 MG/DL
MICROALBUMIN/CREAT UR-RTO: NORMAL MG/G (ref 0–30)
MONOCYTES ABSOLUTE: 0.2 K/UL (ref 0.2–0.8)
MONOCYTES RELATIVE PERCENT: 3.8 %
NEUTROPHILS ABSOLUTE: 3.4 K/UL (ref 1.4–6.5)
NEUTROPHILS RELATIVE PERCENT: 76.2 %
PDW BLD-RTO: 13.8 % (ref 11.5–14.5)
PLATELET # BLD: 235 K/UL (ref 130–400)
POTASSIUM SERPL-SCNC: 4.7 MEQ/L (ref 3.5–5.1)
RBC # BLD: 4.23 M/UL (ref 4.2–5.4)
SODIUM BLD-SCNC: 137 MEQ/L (ref 132–144)
TOTAL PROTEIN: 6.9 G/DL (ref 6.4–8.1)
TRIGL SERPL-MCNC: 117 MG/DL (ref 0–200)
VITAMIN D 25-HYDROXY: 53.3 NG/ML (ref 30–100)
WBC # BLD: 4.4 K/UL (ref 4.8–10.8)

## 2017-11-08 PROCEDURE — 80061 LIPID PANEL: CPT

## 2017-11-08 PROCEDURE — 82570 ASSAY OF URINE CREATININE: CPT

## 2017-11-08 PROCEDURE — 80053 COMPREHEN METABOLIC PANEL: CPT

## 2017-11-08 PROCEDURE — 85025 COMPLETE CBC W/AUTO DIFF WBC: CPT

## 2017-11-08 PROCEDURE — 36415 COLL VENOUS BLD VENIPUNCTURE: CPT

## 2017-11-08 PROCEDURE — 82306 VITAMIN D 25 HYDROXY: CPT

## 2017-11-08 PROCEDURE — 82043 UR ALBUMIN QUANTITATIVE: CPT

## 2017-11-08 PROCEDURE — 83036 HEMOGLOBIN GLYCOSYLATED A1C: CPT

## 2017-11-09 ENCOUNTER — HOSPITAL ENCOUNTER (OUTPATIENT)
Dept: PREADMISSION TESTING | Age: 57
Discharge: HOME | End: 2017-11-09
Payer: COMMERCIAL

## 2017-11-09 VITALS
BODY MASS INDEX: 33.13 KG/M2 | RESPIRATION RATE: 20 BRPM | HEART RATE: 87 BPM | OXYGEN SATURATION: 96 % | TEMPERATURE: 97.1 F | SYSTOLIC BLOOD PRESSURE: 117 MMHG | WEIGHT: 187 LBS | HEIGHT: 63 IN | DIASTOLIC BLOOD PRESSURE: 79 MMHG

## 2017-11-09 DIAGNOSIS — K40.90 RIGHT INGUINAL HERNIA: Chronic | ICD-10-CM

## 2017-11-09 PROBLEM — K42.9 UMBILICAL HERNIA: Chronic | Status: ACTIVE | Noted: 2017-11-09

## 2017-11-09 LAB
EKG ATRIAL RATE: 85 BPM
EKG P AXIS: 45 DEGREES
EKG P-R INTERVAL: 128 MS
EKG Q-T INTERVAL: 374 MS
EKG QRS DURATION: 88 MS
EKG QTC CALCULATION (BAZETT): 445 MS
EKG R AXIS: -9 DEGREES
EKG T AXIS: 12 DEGREES
EKG VENTRICULAR RATE: 85 BPM

## 2017-11-09 PROCEDURE — 93005 ELECTROCARDIOGRAM TRACING: CPT

## 2017-11-09 RX ORDER — LIDOCAINE HYDROCHLORIDE 10 MG/ML
1 INJECTION, SOLUTION EPIDURAL; INFILTRATION; INTRACAUDAL; PERINEURAL
Status: CANCELLED | OUTPATIENT
Start: 2017-11-09 | End: 2017-11-09

## 2017-11-09 RX ORDER — SODIUM CHLORIDE 0.9 % (FLUSH) 0.9 %
10 SYRINGE (ML) INJECTION EVERY 12 HOURS SCHEDULED
Status: CANCELLED | OUTPATIENT
Start: 2017-11-09

## 2017-11-09 RX ORDER — KETOROLAC TROMETHAMINE 30 MG/ML
30 INJECTION, SOLUTION INTRAMUSCULAR; INTRAVENOUS ONCE
Status: CANCELLED | OUTPATIENT
Start: 2017-11-15 | End: 2017-11-19

## 2017-11-09 RX ORDER — SODIUM CHLORIDE 9 MG/ML
INJECTION, SOLUTION INTRAVENOUS CONTINUOUS
Status: CANCELLED | OUTPATIENT
Start: 2017-11-15

## 2017-11-09 RX ORDER — SODIUM CHLORIDE 0.9 % (FLUSH) 0.9 %
10 SYRINGE (ML) INJECTION PRN
Status: CANCELLED | OUTPATIENT
Start: 2017-11-09

## 2017-11-10 ENCOUNTER — PREP FOR PROCEDURE (OUTPATIENT)
Dept: SURGERY | Age: 57
End: 2017-11-10

## 2017-11-14 PROCEDURE — 93010 ELECTROCARDIOGRAM REPORT: CPT | Performed by: INTERNAL MEDICINE

## 2017-11-15 ENCOUNTER — HOSPITAL ENCOUNTER (OUTPATIENT)
Age: 57
Setting detail: OUTPATIENT SURGERY
Discharge: HOME | End: 2017-11-15
Attending: SURGERY | Admitting: SURGERY
Payer: COMMERCIAL

## 2017-11-15 ENCOUNTER — ANESTHESIA (OUTPATIENT)
Dept: OPERATING ROOM | Age: 57
End: 2017-11-15
Payer: COMMERCIAL

## 2017-11-15 ENCOUNTER — ANESTHESIA EVENT (OUTPATIENT)
Dept: OPERATING ROOM | Age: 57
End: 2017-11-15
Payer: COMMERCIAL

## 2017-11-15 VITALS
HEART RATE: 88 BPM | DIASTOLIC BLOOD PRESSURE: 71 MMHG | SYSTOLIC BLOOD PRESSURE: 105 MMHG | RESPIRATION RATE: 16 BRPM | OXYGEN SATURATION: 93 % | TEMPERATURE: 98.6 F

## 2017-11-15 VITALS
OXYGEN SATURATION: 85 % | RESPIRATION RATE: 2 BRPM | SYSTOLIC BLOOD PRESSURE: 129 MMHG | DIASTOLIC BLOOD PRESSURE: 82 MMHG

## 2017-11-15 PROBLEM — G89.29 CHRONIC ABDOMINAL PAIN: Chronic | Status: ACTIVE | Noted: 2017-11-15

## 2017-11-15 PROBLEM — R10.9 CHRONIC ABDOMINAL PAIN: Chronic | Status: ACTIVE | Noted: 2017-11-15

## 2017-11-15 LAB
GLUCOSE BLD-MCNC: 101 MG/DL (ref 60–115)
GLUCOSE BLD-MCNC: 99 MG/DL (ref 60–115)
PERFORMED ON: NORMAL
PERFORMED ON: NORMAL

## 2017-11-15 PROCEDURE — 6360000002 HC RX W HCPCS: Performed by: ANESTHESIOLOGY

## 2017-11-15 PROCEDURE — 2580000003 HC RX 258: Performed by: ANESTHESIOLOGY

## 2017-11-15 PROCEDURE — 6360000002 HC RX W HCPCS: Performed by: SURGERY

## 2017-11-15 PROCEDURE — 7100000011 HC PHASE II RECOVERY - ADDTL 15 MIN: Performed by: SURGERY

## 2017-11-15 PROCEDURE — 2500000003 HC RX 250 WO HCPCS: Performed by: SURGERY

## 2017-11-15 PROCEDURE — 3600000004 HC SURGERY LEVEL 4 BASE: Performed by: SURGERY

## 2017-11-15 PROCEDURE — 7100000001 HC PACU RECOVERY - ADDTL 15 MIN: Performed by: SURGERY

## 2017-11-15 PROCEDURE — 49505 PRP I/HERN INIT REDUC >5 YR: CPT | Performed by: SURGERY

## 2017-11-15 PROCEDURE — 3700000000 HC ANESTHESIA ATTENDED CARE: Performed by: SURGERY

## 2017-11-15 PROCEDURE — A6402 STERILE GAUZE <= 16 SQ IN: HCPCS | Performed by: SURGERY

## 2017-11-15 PROCEDURE — 6370000000 HC RX 637 (ALT 250 FOR IP): Performed by: SURGERY

## 2017-11-15 PROCEDURE — 2500000003 HC RX 250 WO HCPCS: Performed by: ANESTHESIOLOGY

## 2017-11-15 PROCEDURE — 3600000014 HC SURGERY LEVEL 4 ADDTL 15MIN: Performed by: SURGERY

## 2017-11-15 PROCEDURE — 2580000003 HC RX 258: Performed by: SURGERY

## 2017-11-15 PROCEDURE — 7100000010 HC PHASE II RECOVERY - FIRST 15 MIN: Performed by: SURGERY

## 2017-11-15 PROCEDURE — 2500000003 HC RX 250 WO HCPCS

## 2017-11-15 PROCEDURE — 7100000000 HC PACU RECOVERY - FIRST 15 MIN: Performed by: SURGERY

## 2017-11-15 PROCEDURE — 2720000010 HC SURG SUPPLY STERILE: Performed by: SURGERY

## 2017-11-15 PROCEDURE — 3700000001 HC ADD 15 MINUTES (ANESTHESIA): Performed by: SURGERY

## 2017-11-15 PROCEDURE — 49585 REPAIR UMBILICAL HERN,5+Y/O,REDUC: CPT | Performed by: SURGERY

## 2017-11-15 RX ORDER — OXYCODONE HYDROCHLORIDE AND ACETAMINOPHEN 5; 325 MG/1; MG/1
1 TABLET ORAL EVERY 4 HOURS PRN
Status: DISCONTINUED | OUTPATIENT
Start: 2017-11-15 | End: 2017-11-15 | Stop reason: HOSPADM

## 2017-11-15 RX ORDER — SODIUM CHLORIDE, SODIUM LACTATE, POTASSIUM CHLORIDE, CALCIUM CHLORIDE 600; 310; 30; 20 MG/100ML; MG/100ML; MG/100ML; MG/100ML
INJECTION, SOLUTION INTRAVENOUS CONTINUOUS
Status: DISCONTINUED | OUTPATIENT
Start: 2017-11-15 | End: 2017-11-15 | Stop reason: ALTCHOICE

## 2017-11-15 RX ORDER — PROPOFOL 10 MG/ML
INJECTION, EMULSION INTRAVENOUS PRN
Status: DISCONTINUED | OUTPATIENT
Start: 2017-11-15 | End: 2017-11-15 | Stop reason: SDUPTHER

## 2017-11-15 RX ORDER — ACETAMINOPHEN 325 MG/1
650 TABLET ORAL EVERY 4 HOURS PRN
Status: DISCONTINUED | OUTPATIENT
Start: 2017-11-15 | End: 2017-11-15 | Stop reason: HOSPADM

## 2017-11-15 RX ORDER — ONDANSETRON 2 MG/ML
4 INJECTION INTRAMUSCULAR; INTRAVENOUS
Status: COMPLETED | OUTPATIENT
Start: 2017-11-15 | End: 2017-11-15

## 2017-11-15 RX ORDER — SODIUM CHLORIDE 0.9 % (FLUSH) 0.9 %
10 SYRINGE (ML) INJECTION PRN
Status: DISCONTINUED | OUTPATIENT
Start: 2017-11-15 | End: 2017-11-15 | Stop reason: HOSPADM

## 2017-11-15 RX ORDER — DIPHENHYDRAMINE HYDROCHLORIDE 50 MG/ML
12.5 INJECTION INTRAMUSCULAR; INTRAVENOUS
Status: DISCONTINUED | OUTPATIENT
Start: 2017-11-15 | End: 2017-11-15 | Stop reason: HOSPADM

## 2017-11-15 RX ORDER — HYDROCODONE BITARTRATE AND ACETAMINOPHEN 5; 325 MG/1; MG/1
2 TABLET ORAL PRN
Status: DISCONTINUED | OUTPATIENT
Start: 2017-11-15 | End: 2017-11-15 | Stop reason: HOSPADM

## 2017-11-15 RX ORDER — SODIUM CHLORIDE 0.9 % (FLUSH) 0.9 %
10 SYRINGE (ML) INJECTION EVERY 12 HOURS SCHEDULED
Status: DISCONTINUED | OUTPATIENT
Start: 2017-11-15 | End: 2017-11-15 | Stop reason: HOSPADM

## 2017-11-15 RX ORDER — SODIUM CHLORIDE, SODIUM LACTATE, POTASSIUM CHLORIDE, CALCIUM CHLORIDE 600; 310; 30; 20 MG/100ML; MG/100ML; MG/100ML; MG/100ML
INJECTION, SOLUTION INTRAVENOUS CONTINUOUS PRN
Status: DISCONTINUED | OUTPATIENT
Start: 2017-11-15 | End: 2017-11-15 | Stop reason: SDUPTHER

## 2017-11-15 RX ORDER — OXYCODONE HYDROCHLORIDE AND ACETAMINOPHEN 5; 325 MG/1; MG/1
1 TABLET ORAL EVERY 6 HOURS PRN
Qty: 28 TABLET | Refills: 0 | Status: SHIPPED | OUTPATIENT
Start: 2017-11-15 | End: 2017-12-11 | Stop reason: ALTCHOICE

## 2017-11-15 RX ORDER — LIDOCAINE HYDROCHLORIDE 10 MG/ML
1 INJECTION, SOLUTION EPIDURAL; INFILTRATION; INTRACAUDAL; PERINEURAL
Status: DISCONTINUED | OUTPATIENT
Start: 2017-11-15 | End: 2017-11-15 | Stop reason: HOSPADM

## 2017-11-15 RX ORDER — MIDAZOLAM HYDROCHLORIDE 1 MG/ML
INJECTION INTRAMUSCULAR; INTRAVENOUS PRN
Status: DISCONTINUED | OUTPATIENT
Start: 2017-11-15 | End: 2017-11-15 | Stop reason: SDUPTHER

## 2017-11-15 RX ORDER — OXYCODONE HYDROCHLORIDE AND ACETAMINOPHEN 5; 325 MG/1; MG/1
2 TABLET ORAL EVERY 4 HOURS PRN
Status: DISCONTINUED | OUTPATIENT
Start: 2017-11-15 | End: 2017-11-15 | Stop reason: HOSPADM

## 2017-11-15 RX ORDER — HYDROMORPHONE HCL 110MG/55ML
0.5 PATIENT CONTROLLED ANALGESIA SYRINGE INTRAVENOUS EVERY 10 MIN PRN
Status: DISCONTINUED | OUTPATIENT
Start: 2017-11-15 | End: 2017-11-15 | Stop reason: HOSPADM

## 2017-11-15 RX ORDER — MAGNESIUM HYDROXIDE 1200 MG/15ML
LIQUID ORAL CONTINUOUS PRN
Status: DISCONTINUED | OUTPATIENT
Start: 2017-11-15 | End: 2017-11-15 | Stop reason: HOSPADM

## 2017-11-15 RX ORDER — ONDANSETRON 2 MG/ML
4 INJECTION INTRAMUSCULAR; INTRAVENOUS EVERY 6 HOURS PRN
Status: DISCONTINUED | OUTPATIENT
Start: 2017-11-15 | End: 2017-11-15 | Stop reason: HOSPADM

## 2017-11-15 RX ORDER — HYDROCODONE BITARTRATE AND ACETAMINOPHEN 5; 325 MG/1; MG/1
1 TABLET ORAL PRN
Status: DISCONTINUED | OUTPATIENT
Start: 2017-11-15 | End: 2017-11-15 | Stop reason: HOSPADM

## 2017-11-15 RX ORDER — 0.9 % SODIUM CHLORIDE 0.9 %
INTRAVENOUS SOLUTION INTRAVENOUS CONTINUOUS
Status: DISCONTINUED | OUTPATIENT
Start: 2017-11-15 | End: 2017-11-15 | Stop reason: HOSPADM

## 2017-11-15 RX ORDER — ROCURONIUM BROMIDE 10 MG/ML
INJECTION, SOLUTION INTRAVENOUS PRN
Status: DISCONTINUED | OUTPATIENT
Start: 2017-11-15 | End: 2017-11-15 | Stop reason: SDUPTHER

## 2017-11-15 RX ORDER — HYDROMORPHONE HCL 110MG/55ML
0.5 PATIENT CONTROLLED ANALGESIA SYRINGE INTRAVENOUS
Status: DISCONTINUED | OUTPATIENT
Start: 2017-11-15 | End: 2017-11-15 | Stop reason: HOSPADM

## 2017-11-15 RX ORDER — BUPIVACAINE HYDROCHLORIDE AND EPINEPHRINE 5; 5 MG/ML; UG/ML
INJECTION, SOLUTION EPIDURAL; INTRACAUDAL; PERINEURAL PRN
Status: DISCONTINUED | OUTPATIENT
Start: 2017-11-15 | End: 2017-11-15 | Stop reason: HOSPADM

## 2017-11-15 RX ORDER — HYDROMORPHONE HCL 110MG/55ML
1 PATIENT CONTROLLED ANALGESIA SYRINGE INTRAVENOUS
Status: DISCONTINUED | OUTPATIENT
Start: 2017-11-15 | End: 2017-11-15 | Stop reason: HOSPADM

## 2017-11-15 RX ORDER — ACETAMINOPHEN 650 MG/1
650 SUPPOSITORY RECTAL EVERY 4 HOURS PRN
Status: DISCONTINUED | OUTPATIENT
Start: 2017-11-15 | End: 2017-11-15 | Stop reason: HOSPADM

## 2017-11-15 RX ORDER — FENTANYL CITRATE 50 UG/ML
50 INJECTION, SOLUTION INTRAMUSCULAR; INTRAVENOUS EVERY 10 MIN PRN
Status: DISCONTINUED | OUTPATIENT
Start: 2017-11-15 | End: 2017-11-15 | Stop reason: HOSPADM

## 2017-11-15 RX ORDER — METOCLOPRAMIDE HYDROCHLORIDE 5 MG/ML
10 INJECTION INTRAMUSCULAR; INTRAVENOUS
Status: DISCONTINUED | OUTPATIENT
Start: 2017-11-15 | End: 2017-11-15 | Stop reason: HOSPADM

## 2017-11-15 RX ORDER — MEPERIDINE HYDROCHLORIDE 50 MG/ML
12.5 INJECTION INTRAMUSCULAR; INTRAVENOUS; SUBCUTANEOUS EVERY 5 MIN PRN
Status: DISCONTINUED | OUTPATIENT
Start: 2017-11-15 | End: 2017-11-15 | Stop reason: HOSPADM

## 2017-11-15 RX ADMIN — ROCURONIUM BROMIDE 40 MG: 10 INJECTION INTRAVENOUS at 08:53

## 2017-11-15 RX ADMIN — HYDROMORPHONE HYDROCHLORIDE 0.5 MG: 2 INJECTION, SOLUTION INTRAMUSCULAR; INTRAVENOUS; SUBCUTANEOUS at 10:25

## 2017-11-15 RX ADMIN — PROPOFOL 200 MG: 10 INJECTION, EMULSION INTRAVENOUS at 08:53

## 2017-11-15 RX ADMIN — SODIUM CHLORIDE, POTASSIUM CHLORIDE, SODIUM LACTATE AND CALCIUM CHLORIDE: 600; 310; 30; 20 INJECTION, SOLUTION INTRAVENOUS at 09:28

## 2017-11-15 RX ADMIN — FENTANYL CITRATE 200 MCG: 50 INJECTION, SOLUTION INTRAMUSCULAR; INTRAVENOUS at 08:53

## 2017-11-15 RX ADMIN — SODIUM CHLORIDE: 9 INJECTION, SOLUTION INTRAVENOUS at 08:19

## 2017-11-15 RX ADMIN — CEFTRIAXONE 1 G: 1 INJECTION, POWDER, FOR SOLUTION INTRAMUSCULAR; INTRAVENOUS at 08:58

## 2017-11-15 RX ADMIN — HYDROMORPHONE HYDROCHLORIDE 0.5 MG: 2 INJECTION, SOLUTION INTRAMUSCULAR; INTRAVENOUS; SUBCUTANEOUS at 10:12

## 2017-11-15 RX ADMIN — MIDAZOLAM HYDROCHLORIDE 2 MG: 1 INJECTION, SOLUTION INTRAMUSCULAR; INTRAVENOUS at 08:51

## 2017-11-15 RX ADMIN — ONDANSETRON HYDROCHLORIDE 4 MG: 2 INJECTION, SOLUTION INTRAVENOUS at 08:53

## 2017-11-15 RX ADMIN — HYDROMORPHONE HYDROCHLORIDE 0.5 MG: 2 INJECTION, SOLUTION INTRAMUSCULAR; INTRAVENOUS; SUBCUTANEOUS at 10:40

## 2017-11-15 RX ADMIN — OXYCODONE HYDROCHLORIDE AND ACETAMINOPHEN 1 TABLET: 5; 325 TABLET ORAL at 12:52

## 2017-11-15 ASSESSMENT — PULMONARY FUNCTION TESTS
PIF_VALUE: 21
PIF_VALUE: 19
PIF_VALUE: 16
PIF_VALUE: 20
PIF_VALUE: 2
PIF_VALUE: 22
PIF_VALUE: 21
PIF_VALUE: 19
PIF_VALUE: 20
PIF_VALUE: 21
PIF_VALUE: 21
PIF_VALUE: 32
PIF_VALUE: 1
PIF_VALUE: 1
PIF_VALUE: 22
PIF_VALUE: 33
PIF_VALUE: 19
PIF_VALUE: 21
PIF_VALUE: 19
PIF_VALUE: 22
PIF_VALUE: 22
PIF_VALUE: 20
PIF_VALUE: 20
PIF_VALUE: 29
PIF_VALUE: 21
PIF_VALUE: 2
PIF_VALUE: 20
PIF_VALUE: 19
PIF_VALUE: 20
PIF_VALUE: 22
PIF_VALUE: 31
PIF_VALUE: 4
PIF_VALUE: 33
PIF_VALUE: 32
PIF_VALUE: 2
PIF_VALUE: 20
PIF_VALUE: 20
PIF_VALUE: 1
PIF_VALUE: 20
PIF_VALUE: 20
PIF_VALUE: 0
PIF_VALUE: 19
PIF_VALUE: 1
PIF_VALUE: 21
PIF_VALUE: 3
PIF_VALUE: 20
PIF_VALUE: 20
PIF_VALUE: 22
PIF_VALUE: 2
PIF_VALUE: 20
PIF_VALUE: 2
PIF_VALUE: 22
PIF_VALUE: 20
PIF_VALUE: 21
PIF_VALUE: 12
PIF_VALUE: 29
PIF_VALUE: 20

## 2017-11-15 ASSESSMENT — ENCOUNTER SYMPTOMS: SHORTNESS OF BREATH: 1

## 2017-11-15 ASSESSMENT — PAIN SCALES - GENERAL
PAINLEVEL_OUTOF10: 9
PAINLEVEL_OUTOF10: 10

## 2017-11-15 NOTE — ANESTHESIA PRE PROCEDURE
Nursing notes reviewed no history of anesthetic complications:   Airway: Mallampati: II  TM distance: >3 FB   Neck ROM: full  Mouth opening: > = 3 FB Dental: normal exam         Pulmonary:normal exam    (+) shortness of breath: no interval change,                             Cardiovascular:    (+) hypertension: no interval change,       ECG reviewed                        Neuro/Psych:   Negative Neuro/Psych ROS              GI/Hepatic/Renal:   (+) GERD: no interval change,           Endo/Other:    (+) Type II DM, no interval change, , .                 Abdominal:           Vascular:                                        Anesthesia Plan      general     ASA 3       Induction: intravenous. MIPS: Postoperative opioids intended and Prophylactic antiemetics administered. Anesthetic plan and risks discussed with patient. Plan discussed with CRNA.     Attending anesthesiologist reviewed and agrees with Pre Eval content              Xiao Cervantes MD   11/15/2017

## 2017-11-15 NOTE — PROGRESS NOTES
Pt up to bathroom unable to urinate did tolerate ambulation. Discharge instructions gven to  and patient who verbalize understanding of them. Spoke with Dr. [address] patient may start Xarelto tomorrow.

## 2017-11-20 ENCOUNTER — OFFICE VISIT (OUTPATIENT)
Dept: FAMILY MEDICINE CLINIC | Age: 57
End: 2017-11-20

## 2017-11-20 VITALS
HEIGHT: 63 IN | RESPIRATION RATE: 16 BRPM | BODY MASS INDEX: 33.13 KG/M2 | OXYGEN SATURATION: 99 % | HEART RATE: 76 BPM | DIASTOLIC BLOOD PRESSURE: 70 MMHG | WEIGHT: 187 LBS | SYSTOLIC BLOOD PRESSURE: 124 MMHG | TEMPERATURE: 97.4 F

## 2017-11-20 DIAGNOSIS — G57.93 NEUROPATHY OF BOTH FEET: ICD-10-CM

## 2017-11-20 DIAGNOSIS — F32.A DEPRESSION, UNSPECIFIED DEPRESSION TYPE: ICD-10-CM

## 2017-11-20 DIAGNOSIS — J45.909 REACTIVE AIRWAY DISEASE WITHOUT COMPLICATION, UNSPECIFIED ASTHMA SEVERITY, UNSPECIFIED WHETHER PERSISTENT: ICD-10-CM

## 2017-11-20 DIAGNOSIS — E78.5 DYSLIPIDEMIA: ICD-10-CM

## 2017-11-20 DIAGNOSIS — R76.8 POSITIVE ANA (ANTINUCLEAR ANTIBODY): ICD-10-CM

## 2017-11-20 DIAGNOSIS — E11.9 TYPE 2 DIABETES MELLITUS WITHOUT COMPLICATION, WITHOUT LONG-TERM CURRENT USE OF INSULIN (HCC): Primary | ICD-10-CM

## 2017-11-20 DIAGNOSIS — N89.8 VAGINAL DRYNESS: ICD-10-CM

## 2017-11-20 DIAGNOSIS — L93.0 LUPUS ERYTHEMATOSUS, UNSPECIFIED FORM: ICD-10-CM

## 2017-11-20 DIAGNOSIS — E55.9 VITAMIN D DEFICIENCY: ICD-10-CM

## 2017-11-20 DIAGNOSIS — R03.0 BORDERLINE HYPERTENSION: ICD-10-CM

## 2017-11-20 DIAGNOSIS — Z90.710 S/P TOTAL HYSTERECTOMY: ICD-10-CM

## 2017-11-20 PROCEDURE — 3017F COLORECTAL CA SCREEN DOC REV: CPT | Performed by: NURSE PRACTITIONER

## 2017-11-20 PROCEDURE — G8598 ASA/ANTIPLAT THER USED: HCPCS | Performed by: NURSE PRACTITIONER

## 2017-11-20 PROCEDURE — 1036F TOBACCO NON-USER: CPT | Performed by: NURSE PRACTITIONER

## 2017-11-20 PROCEDURE — 99214 OFFICE O/P EST MOD 30 MIN: CPT | Performed by: NURSE PRACTITIONER

## 2017-11-20 PROCEDURE — 3014F SCREEN MAMMO DOC REV: CPT | Performed by: NURSE PRACTITIONER

## 2017-11-20 PROCEDURE — G8417 CALC BMI ABV UP PARAM F/U: HCPCS | Performed by: NURSE PRACTITIONER

## 2017-11-20 PROCEDURE — G8427 DOCREV CUR MEDS BY ELIG CLIN: HCPCS | Performed by: NURSE PRACTITIONER

## 2017-11-20 PROCEDURE — 3044F HG A1C LEVEL LT 7.0%: CPT | Performed by: NURSE PRACTITIONER

## 2017-11-20 PROCEDURE — G8482 FLU IMMUNIZE ORDER/ADMIN: HCPCS | Performed by: NURSE PRACTITIONER

## 2017-11-20 RX ORDER — DIAPER,BRIEF,INFANT-TODD,DISP
EACH MISCELLANEOUS
Qty: 1 TUBE | Refills: 1 | Status: SHIPPED | OUTPATIENT
Start: 2017-11-20

## 2017-11-20 ASSESSMENT — PATIENT HEALTH QUESTIONNAIRE - PHQ9
SUM OF ALL RESPONSES TO PHQ QUESTIONS 1-9: 0
2. FEELING DOWN, DEPRESSED OR HOPELESS: 0
1. LITTLE INTEREST OR PLEASURE IN DOING THINGS: 0
SUM OF ALL RESPONSES TO PHQ9 QUESTIONS 1 & 2: 0

## 2017-11-20 NOTE — PROGRESS NOTES
Subjective:     Diabetes Mellitus Type 2: Current symptoms/problems include none and neuropathy. Home blood sugar records:  trend: stable  Any episodes of hypoglycemia? no  Tobacco history: She  reports that she quit smoking about 11 years ago. Her smoking use included Cigarettes. She has a 12.50 pack-year smoking history. She has never used smokeless tobacco.   Known diabetic complications: none    Hypertension:  Home blood pressure monitoring: No.  She is adherent to a low sodium diet. Antihypertensive medication side effects: no medication side effects noted. Use of agents associated with hypertension: estrogens. Hyperlipidemia:  No new myalgias or GI upset on eztemibe (Zetia). Lupus: she has been feeling a lot better since being started on the Plaquenil for Lupus. She is also using topical Voltaren. Depression: Liz Byrd reports being in a good mood that is stable. The patient is not reporting insomnia, difficulty concentrating and usual interest in activities. This patient is not homicidal or suicidal.    Recent hernia surgery: she had surgery last week and had 2 hernia's repaired. There is one hernia left and it was \"difficult\". Vaginal dryness: she states that when she saw Dr. Emili Ortiz for recurrent UTI, he advised her to discuss a hormonal gel to use twice per week. She would like to try this. The five diabetic measures for control:   Hemoglobin A1C (%)   Date Value   11/08/2017 6.0 (H)     LDL Calculated (mg/dL)   Date Value   11/08/2017 88         Blood pressure less than 131/81,   BP Readings from Last 1 Encounters:   11/20/17 124/70     Smoking, non smoker is goal. This pt is not a smoker. This pt does an aspirin a day. Last eye exam was 2017  Last diabetic foot exam was 2017  Last urine microalbumin creatinine ratio was 2017    PMH: This 62 y.o. female  patient is  hypertensive, is  diabetic, is  hyperlipidemic.   She has no history of smoking and has a  family history of heart disease. She is  obese. Review of Systems  Patient denies chest pain, shortness of breath, headache and palpitations. Eyes: no rapid change in vision  Ears, nose, mouth, throat, and face: no changes in hearing or sore throat. Respiratory: No shortness of breath or cough. Cardiovascular: no chest pain or pressure. This patient reports no polyuria, polydipsia or episodes of hypoglycemia. Treatment Adherence:   Medication compliance:  compliant most of the time  Diet compliance:  compliant most of the time  Weight trend: stable  Current exercise: no regular exercise  What might prevent you from meeting your goal?: impairment:  physical: breathing and Lupus pain. Patient plan for overcoming barriers: N/A     Patient Confidence: 8/10      Objective:   EXAM:  Constitutional Blood pressure 124/70, pulse 76, temperature 97.4 °F (36.3 °C), temperature source Oral, resp. rate 16, height 5' 3\" (1.6 m), weight 187 lb (84.8 kg), SpO2 99 %, not currently breastfeeding. .  She has a normal affect, no acute distress, appears well developed and well nourished. Neck:  neck- supple, no mass, non-tender and no bruits  Lungs:  Normal expansion. Clear to auscultation. No rales, rhonchi, or wheezing., No chest wall tenderness. Heart:  Heart sounds are normal.  Regular rate and rhythm without murmur, gallop or rub. Abdomen:  Soft, non-tender, normal bowel sounds. No bruits, organomegaly or masses. Extremities: Extremities warm to touch, pink, with no edema. Assessment:     1. Type 2 diabetes mellitus without complication, without long-term current use of insulin (HCC)  Hemoglobin A1C    Microalbumin / Creatinine Urine Ratio   2. Borderline hypertension  CBC Auto Differential    Comprehensive Metabolic Panel   3. Dyslipidemia  Lipid Panel   4. Vitamin D deficiency  Vitamin D 25 Hydroxy   5. Depression, unspecified depression type     6. Neuropathy of both feet- Dr. Charles Amador     7.  Positive DESTINEE (antinuclear antibody) Dr. Cleopatra Feldman     8. Reactive airway disease without complication, unspecified asthma severity, unspecified whether persistent     9. Vaginal dryness  conjugated estrogens (PREMARIN) 0.625 MG/GM vaginal cream   10. Lupus erythematosus, unspecified form  Referral To Unknown External Rheumatology   11. S/P total hysterectomy         PLAN: Include orders in the DX section. Diabetes Counseling   Patient was counseled regarding disease risks and adopting healthy behaviors. Patient was provided education materials to assist with self management. Patient was provided log (or received log during previous visit) to record blood pressure, food intake and/or blood sugar. Patient was instructed to keep log up-to-date and to always bring log to all office visits. Follow up: 3 months and as needed. Blood work one week prior as ordered. 1. Diabetes is very well controlled on metformin TID. No side effects reported. Continue the same. 2. Blood pressure is stable with diet and physical activity. 3. Lipid panel is stable on zetia. Continue the same. 4. Vitamin D is stable on supplementation. Continue the same. 5. Mood has been stable on current medication. Continue the same. 6. 7. 10. She states that neuropathy symptoms have improved since starting the plaquenil. She states that she may not follow with Dr. Miky Perea due to her co-pay of 600 dollars. She is advised to call [facility] to clarify this bill. Referral given for Dr. Miky Perea today as well. She continues to follow with Dr. Steph Sims as well. 8. Breathing has been stable. She recently did sleep study but has not heard results. She follows with Dr. Alexandra Tijerina on November 30.     9. 11. Will start premarin twice weekly vaginally. She has had complete hysterectomy. I recommend routine pap testing of the vaginal canal while taking HRT.     Electronically signed by Danisha Quesada, 1:04 PM 11/20/17

## 2017-11-27 ENCOUNTER — OFFICE VISIT (OUTPATIENT)
Dept: SURGERY | Age: 57
End: 2017-11-27

## 2017-11-27 VITALS
WEIGHT: 188 LBS | HEIGHT: 63 IN | TEMPERATURE: 97.9 F | BODY MASS INDEX: 33.31 KG/M2 | DIASTOLIC BLOOD PRESSURE: 82 MMHG | HEART RATE: 72 BPM | RESPIRATION RATE: 12 BRPM | SYSTOLIC BLOOD PRESSURE: 120 MMHG

## 2017-11-27 DIAGNOSIS — Z09 SURGICAL FOLLOWUP: Primary | ICD-10-CM

## 2017-11-27 PROBLEM — K42.9 UMBILICAL HERNIA: Chronic | Status: RESOLVED | Noted: 2017-11-09 | Resolved: 2017-11-27

## 2017-11-27 PROBLEM — K40.90 RIGHT INGUINAL HERNIA: Chronic | Status: RESOLVED | Noted: 2017-11-09 | Resolved: 2017-11-27

## 2017-11-27 PROCEDURE — 99024 POSTOP FOLLOW-UP VISIT: CPT | Performed by: SURGERY

## 2017-11-27 NOTE — PROGRESS NOTES
Surgery Progress Note    She is here today for surgical follow up. She is s/p diagnostic laparoscopy with repair [facility] and  on 11/15/2017. She has no complaints today and is feeling better. Her pain is improving. She is still not sleeping well and sometimes wakes at 4 am. Her BMs have been irregular and she is having issues with constipation as well as diarrhea. She drove herself to the appointment today. She appears well. The incisions at the umbilicus and right groin are clear. There is some redness at the groin incision but there is no drainage from either incision. The belly is soft and non tender. She has no trouble getting off the exam table and ambulates fine. I gave her a copy of the pictures done at the time of surgery. Satisfactory course. She can slowly increase her activities back to normal over the next two weeks. She will recheck with me as needed. She will call if she wants to have the [facility] repaired.

## 2017-11-30 ENCOUNTER — OFFICE VISIT (OUTPATIENT)
Dept: PULMONOLOGY | Age: 57
End: 2017-11-30

## 2017-11-30 VITALS
WEIGHT: 187 LBS | TEMPERATURE: 97.6 F | OXYGEN SATURATION: 99 % | DIASTOLIC BLOOD PRESSURE: 76 MMHG | BODY MASS INDEX: 33.13 KG/M2 | HEART RATE: 94 BPM | SYSTOLIC BLOOD PRESSURE: 108 MMHG | HEIGHT: 63 IN

## 2017-11-30 DIAGNOSIS — J98.4 RESTRICTIVE LUNG DISEASE: Primary | ICD-10-CM

## 2017-11-30 PROCEDURE — G8417 CALC BMI ABV UP PARAM F/U: HCPCS | Performed by: INTERNAL MEDICINE

## 2017-11-30 PROCEDURE — 3014F SCREEN MAMMO DOC REV: CPT | Performed by: INTERNAL MEDICINE

## 2017-11-30 PROCEDURE — G8427 DOCREV CUR MEDS BY ELIG CLIN: HCPCS | Performed by: INTERNAL MEDICINE

## 2017-11-30 PROCEDURE — 3017F COLORECTAL CA SCREEN DOC REV: CPT | Performed by: INTERNAL MEDICINE

## 2017-11-30 PROCEDURE — 1036F TOBACCO NON-USER: CPT | Performed by: INTERNAL MEDICINE

## 2017-11-30 PROCEDURE — G8482 FLU IMMUNIZE ORDER/ADMIN: HCPCS | Performed by: INTERNAL MEDICINE

## 2017-11-30 PROCEDURE — 99214 OFFICE O/P EST MOD 30 MIN: CPT | Performed by: INTERNAL MEDICINE

## 2017-11-30 PROCEDURE — G8598 ASA/ANTIPLAT THER USED: HCPCS | Performed by: INTERNAL MEDICINE

## 2017-11-30 ASSESSMENT — ENCOUNTER SYMPTOMS
NAUSEA: 0
DIARRHEA: 0
RHINORRHEA: 0
CHEST TIGHTNESS: 0
SORE THROAT: 0
COUGH: 0
SINUS PRESSURE: 0
ABDOMINAL PAIN: 0
WHEEZING: 0
VOMITING: 0
SHORTNESS OF BREATH: 0

## 2017-11-30 NOTE — PROGRESS NOTES
Subjective: Rodney Greer is a 62 y.o. female who complains today of:     Chief Complaint   Patient presents with    Shortness of Breath     6 week follow up    Results     PFT, Sleep study       HPI  Patient is here for a follow-up visit regarding shortness breath. He should not PFTs done over 6 months ago which showed mild restriction with mild diffusion impairment and echocardiogram done recently showed mild pulmonary hypertension that otherwise was unremarkable. Sleep study done in May of this year showed minimal sleep-disordered breathing with an AHI of 2.3 and minimum oxygen saturation of 90%. I repeated a pulse oximetry study at night which again showed a minimal hypoxia with lowest O2 of 87% but only for very short time other than that she maintain oxygen saturation in the mid 90s. She did have cyclical changes which would suggest mild sleep apnea as was confirmed by sleep study done in May. CT of the chest showed minimal bilateral dependent fibrotic changes. Allergies:  Ciprocinonide [fluocinolone];  Sulfa antibiotics; Nsaids; and Statins  Past Medical History:   Diagnosis Date    Back pain     Depression     DM (diabetes mellitus) ([address] 11/25/2014    DVT (deep venous thrombosis) (HCC)     Dyslipidemia     Elevated troponin 5/25/2016    Factor V Leiden carrier [facility])     history of DVT, on Xarelto    GERD (gastroesophageal reflux disease)     Hx of blood clots 2013    PE, DVT    Hyperlipidemia     Neuropathy of both feet 8/22/2016    Obesity     Osteoarthritis     Pulmonary embolism ([address]     Right inguinal hernia 11/9/2017    SOB (shortness of breath) 4/19/2017    Systemic lupus erythematosus ([address] 9/9/2683    Umbilical hernia 99/8/1608    Vasomotor flushing     on hormone replacement therapy     Past Surgical History:   Procedure Laterality Date   Dillan Casper    COLONOSCOPY  12/16/13    DR SINGER repeat in 10 yrs per pt    COLONOSCOPY  08/2017    Dr De Paz Pac Right 11/15/2017    RIGHT  [facility]     LAPAROSCOPY N/A 11/15/2017    DIAGNOSTIC LAPAROSCOPY performed by Suraj White MD at [facility] [address] N/A 9/25/2017    [address] CYSTOSCOPY performed by Beryl Butler MD at [facility],1+N/M,XNZYC N/A 02/18/5757    HERNIA UMBILICAL REPAIR, [place] performed by Suraj White MD at [facility]  12/16/13    DR SINGER     Family History   Problem Relation Age of Onset    Substance Abuse Brother     High Blood Pressure Mother     High Cholesterol Mother     Arthritis Mother     High Blood Pressure Father     High Cholesterol Father     Clotting Disorder Father      clot to intestines    Arthritis Father     Substance Abuse Brother     Substance Abuse Brother     Stroke Paternal Uncle 36    Cystic Fibrosis Daughter      Social History     Social History    Marital status:      Spouse name: N/A    Number of children: N/A    Years of education: N/A     Occupational History    Housewife      Social History Main Topics    Smoking status: Former Smoker     Packs/day: 1.25     Years: 10.00     Types: Cigarettes     Quit date: 1/1/2006    Smokeless tobacco: Never Used      Comment: start smoking age 25    Alcohol use 0.6 oz/week     1 Cans of beer per week      Comment: mod    Drug use: No    Sexual activity: Yes     Other Topics Concern    Not on file     Social History Narrative    No narrative on file         Review of Systems   Constitutional: Negative for appetite change, chills, diaphoresis, fatigue and fever. HENT: Negative for congestion, nosebleeds, postnasal drip, rhinorrhea, sinus pressure, sneezing and sore throat. Eyes: Negative for visual disturbance.    Respiratory: with the patient and encouraged. Will continue observation at this point and follow-up in few months to ensure stability. If patient shows any progression then we can reevaluate with further testing      Plan:     No orders of the defined types were placed in this encounter. No orders of the defined types were placed in this encounter. Return in about 4 months (around 3/30/2018) for re-evaluation.       Tushar Flower MD

## 2017-12-11 ENCOUNTER — OFFICE VISIT (OUTPATIENT)
Dept: PHYSICAL MEDICINE AND REHAB | Age: 57
End: 2017-12-11

## 2017-12-11 VITALS
DIASTOLIC BLOOD PRESSURE: 70 MMHG | BODY MASS INDEX: 33.13 KG/M2 | HEIGHT: 63 IN | SYSTOLIC BLOOD PRESSURE: 120 MMHG | WEIGHT: 187 LBS

## 2017-12-11 DIAGNOSIS — E55.9 VITAMIN D DEFICIENCY: ICD-10-CM

## 2017-12-11 DIAGNOSIS — G89.29 CHRONIC LOW BACK PAIN WITH SCIATICA, SCIATICA LATERALITY UNSPECIFIED, UNSPECIFIED BACK PAIN LATERALITY: ICD-10-CM

## 2017-12-11 DIAGNOSIS — M54.6 CHRONIC BILATERAL THORACIC BACK PAIN: ICD-10-CM

## 2017-12-11 DIAGNOSIS — Z79.899 HIGH RISK MEDICATION USE: ICD-10-CM

## 2017-12-11 DIAGNOSIS — Z79.899 HIGH RISK MEDICATION USE: Chronic | ICD-10-CM

## 2017-12-11 DIAGNOSIS — M54.41 CHRONIC MIDLINE LOW BACK PAIN WITH RIGHT-SIDED SCIATICA: ICD-10-CM

## 2017-12-11 DIAGNOSIS — G57.93 NEUROPATHY OF BOTH FEET: ICD-10-CM

## 2017-12-11 DIAGNOSIS — M79.7 FIBROMYALGIA MUSCLE PAIN: Primary | ICD-10-CM

## 2017-12-11 DIAGNOSIS — M53.3 SI (SACROILIAC) PAIN: ICD-10-CM

## 2017-12-11 DIAGNOSIS — G89.29 CHRONIC BILATERAL THORACIC BACK PAIN: ICD-10-CM

## 2017-12-11 DIAGNOSIS — M54.40 CHRONIC LOW BACK PAIN WITH SCIATICA, SCIATICA LATERALITY UNSPECIFIED, UNSPECIFIED BACK PAIN LATERALITY: ICD-10-CM

## 2017-12-11 DIAGNOSIS — G89.29 CHRONIC MIDLINE LOW BACK PAIN WITH RIGHT-SIDED SCIATICA: ICD-10-CM

## 2017-12-11 PROCEDURE — G8427 DOCREV CUR MEDS BY ELIG CLIN: HCPCS | Performed by: PHYSICAL MEDICINE & REHABILITATION

## 2017-12-11 PROCEDURE — G8482 FLU IMMUNIZE ORDER/ADMIN: HCPCS | Performed by: PHYSICAL MEDICINE & REHABILITATION

## 2017-12-11 PROCEDURE — 1036F TOBACCO NON-USER: CPT | Performed by: PHYSICAL MEDICINE & REHABILITATION

## 2017-12-11 PROCEDURE — 99214 OFFICE O/P EST MOD 30 MIN: CPT | Performed by: PHYSICAL MEDICINE & REHABILITATION

## 2017-12-11 PROCEDURE — 3014F SCREEN MAMMO DOC REV: CPT | Performed by: PHYSICAL MEDICINE & REHABILITATION

## 2017-12-11 PROCEDURE — 3017F COLORECTAL CA SCREEN DOC REV: CPT | Performed by: PHYSICAL MEDICINE & REHABILITATION

## 2017-12-11 PROCEDURE — G8417 CALC BMI ABV UP PARAM F/U: HCPCS | Performed by: PHYSICAL MEDICINE & REHABILITATION

## 2017-12-11 PROCEDURE — G8598 ASA/ANTIPLAT THER USED: HCPCS | Performed by: PHYSICAL MEDICINE & REHABILITATION

## 2017-12-11 RX ORDER — CARISOPRODOL 250 MG/1
250 TABLET ORAL 2 TIMES DAILY PRN
Qty: 120 TABLET | Refills: 0 | Status: SHIPPED | OUTPATIENT
Start: 2017-12-11 | End: 2017-12-11 | Stop reason: ALTCHOICE

## 2017-12-11 ASSESSMENT — ENCOUNTER SYMPTOMS
SHORTNESS OF BREATH: 1
COLOR CHANGE: 0
CHEST TIGHTNESS: 0
ABDOMINAL PAIN: 0
DIARRHEA: 0
BACK PAIN: 1
COUGH: 0
EYES NEGATIVE: 1
CONSTIPATION: 0
WHEEZING: 0
APNEA: 0
BOWEL INCONTINENCE: 0
NAUSEA: 0
VOMITING: 0
VISUAL CHANGE: 0

## 2017-12-11 NOTE — PROGRESS NOTES
Subjective  Yahaira Gibson, 62 y.o. female presents today with:       Back Pain      Leg Pain bilateral    Foot Pain bilateral    Hand Pain bilateral    Medication Refill Ultracet and Soma, pill count today-compliant. Patient is now required to use [facility]       She rarely takes the Ultracet- she has been taking the Veneda Dus more recently because of her recent pain flareup.-they both help a lot. I cautioned her to slowly wean off of the Soma. Trailing Plaquenil with Dr Uri Mixon. She has no signs of drug abuse or overuse. Back Pain   This is a recurrent problem. The current episode started more than 1 year ago. The problem occurs constantly. The problem is unchanged. The pain is present in the lumbar spine. The quality of the pain is described as aching. Radiates to: Occasional down bilateral legs. The pain is at a severity of 8/10. The pain is moderate. The pain is the same all the time. The symptoms are aggravated by bending, twisting, coughing and position. Stiffness is present in the morning. Associated symptoms include leg pain and weakness. Pertinent negatives include no abdominal pain, bladder incontinence, bowel incontinence, chest pain, dysuria, fever, headaches, numbness, paresis, paresthesias, perianal numbness, tingling or weight loss. Risk factors include obesity, menopause, lack of exercise and sedentary lifestyle. Treatments tried: Caudal Blocks, TP injections, Tramadol and Flexeril,  The treatment provided moderate relief. Foot Pain   This is a chronic problem. The current episode started more than 1 year ago. The problem occurs constantly. The problem has been unchanged. Associated symptoms include arthralgias, fatigue, joint swelling, myalgias and weakness. Pertinent negatives include no abdominal pain, chest pain, chills, coughing, fever, headaches, nausea, neck pain, numbness, rash, visual change or vomiting. The symptoms are aggravated by bending and walking.  She has tried acetaminophen, rest, heat, relaxation, oral narcotics and lying down for the symptoms. The treatment provided moderate relief.        Past Medical History:   Diagnosis Date    Back pain     Depression     DM (diabetes mellitus) ([facility] 11/25/2014    DVT (deep venous thrombosis) (HCC)     Dyslipidemia     Elevated troponin 5/25/2016    Factor V Leiden carrier [facility])     history of DVT, on Xarelto    GERD (gastroesophageal reflux disease)     Hx of blood clots 2013    PE, DVT    Hyperlipidemia     Neuropathy of both feet 8/22/2016    Obesity     Osteoarthritis     Pulmonary embolism ([facility]     Right inguinal hernia 11/9/2017    SOB (shortness of breath) 4/19/2017    Systemic lupus erythematosus ([facility] 5/2/9924    Umbilical hernia 78/2/5056    Vasomotor flushing     on hormone replacement therapy     Past Surgical History:   Procedure Laterality Date   Vasiliy Mcgrath COLONOSCOPY  12/16/13    DR SINGER repeat in 10 yrs per pt    COLONOSCOPY  08/2017    Dr Sydnee Mirza Right 11/15/2017    RIGHT  HERNIA INGUINAL REPAIR performed by Olga Lidia Gil MD at [address] [facility]  ARTHROSCOPY Left     LAPAROSCOPY N/A 11/15/2017    DIAGNOSTIC LAPAROSCOPY performed by Olga Lidia Gil MD at [facility]. [place] [address] [address] N/A 9/25/2017    [address] [address] performed by Nancy Son MD at [facility]0+F/V,XWCBH N/A 20/59/3965    HERNIA UMBILICAL REPAIR, [facility] performed by Olga Lidia Gil MD at [facility]  12/16/13    DR SINGER     Social History     Social History    Marital status:      Spouse name: N/A    Number of children: N/A    Years of education: N/A     Occupational History    Housewife      Social History Main Topics    Smoking status: Former Smoker     Packs/day: 1.25     Years: 10.00     Types: Cigarettes     Quit date: risk medication use  traMADol-acetaminophen (ULTRACET) 37.5-325 MG per tablet    OARRS and last refill reviewed. 2015 narcotic contract signed. 11/14/14 tox screening    8. High risk medication use  OARRS PM&R 2/21/17, oarrs pm&r 5/13/17  traMADol-acetaminophen (ULTRACET) 37.5-325 MG per tablet   9. Chronic midline low back pain with right-sided sciatica  DISCONTINUED: carisoprodol (SOMA) 250 MG tablet   10. Chronic bilateral thoracic back pain  DISCONTINUED: carisoprodol (SOMA) 250 MG tablet       I am also concerned by lifestyle and mood issues including:    Past Medical History:   Diagnosis Date    Back pain     Depression     DM (diabetes mellitus) ([facility] 11/25/2014    [facility] venous thrombosis) ([facility])     Dyslipidemia     Elevated troponin 5/25/2016    Factor V Leiden carrier ([facility]     history of DVT, on Xarelto    GERD (gastroesophageal reflux disease)     Hx of blood clots 2013    PE, DVT    Hyperlipidemia     Neuropathy of both feet 8/22/2016    Obesity     Osteoarthritis     Pulmonary embolism ([facility]     Right inguinal hernia 11/9/2017    SOB (shortness of breath) 4/19/2017    Systemic lupus erythematosus ([facility] 5/1/9980    Umbilical hernia 50/4/3614    Vasomotor flushing     on hormone replacement therapy           Given their medication, chronic pain and lifestyle and medications they are at risk for :    Falls, constipation, addiction  Loss of livelyhood due to severe pain, debility, weight gain and  vitamin D deficiency    The patient was educated regarding proper diet, fitness routine, and regulatory restrictions concerning pain medications. Previous notes, comprehensive past medical, surgical, family history, and diagnostics were reviewed. Patient education and councelling were provided regarding off label use,treatment options and medication and injection risks.     Current and old OARRS ([place] Automated Pres[place] Reporting System) records reviewed, all refills reviewed since last visit,  Behavioral agreement/FELICITAS regulations   and Toxicology screen was reviewed with patient and is up to date. There are no current red flags. They are making good progress regarding pain relief, they are performing at a functional level regarding activities of daily living and psychological functioning, they're not having any adverse effects or side effects from the current medications, and I see no findings of aberrant drug taking her addiction related behaviors. Attestation: The Prescription Monitoring Report for this patient was reviewed today. (Judith Poole, )  Documentation: Obtaining appropriate analgesic effect of treatment., No signs of potential drug abuse or diversion identified., Existing medication contract. (Judith Poole, )       Patient is currently taking:       I have discontinued Ms. Jackson's oxyCODONE-acetaminophen and carisoprodol. I have also changed her traMADol-acetaminophen. Additionally, I am having her maintain her mesalamine, vitamin D, ACCU-CHEK ADVANTAGE DIABETES, BLOOD GLUCOSE TEST STRIPS, Lancets, esomeprazole, albuterol sulfate HFA, hydrOXYzine, amitriptyline, traMADol-acetaminophen, ezetimibe, gabapentin, citalopram, metFORMIN, rivaroxaban, hydroxychloroquine, metaxalone, diclofenac sodium, hydrocortisone, and conjugated estrogens. I also recommend the following Medications:    Orders Placed This Encounter   Medications    traMADol-acetaminophen (ULTRACET) 37.5-325 MG per tablet     Sig: Take one tablet every 4 hours as needed for pain. Maximum of 2.5 tablets daily. Do not get narcotic pain medication from any other providers. Generic equivalent acceptable, unless otherwise noted. .     Dispense:  240 tablet     Refill:  0    DISCONTD: carisoprodol (SOMA) 250 MG tablet     Sig: Take 1 tablet by mouth 2 times daily as needed (pain or muscles) .      Dispense:  120 tablet     Refill:  0   She is to wean off of her Soma--last Rx written 12/2017     -which helps with pain and function. Otherwise, continue the current pain medications that I have prescibed. Radiologic:   Old films reviewed  ,     I discussed results with patients. see Follow up plans below  For any new studies. Care Everywhere Updates:  requested and reviewed. No new issues noted.          Labs:  Previous labs reviewed     Lab Results   Component Value Date     11/08/2017    K 4.7 11/08/2017     11/08/2017    CO2 17 11/08/2017    BUN 19 11/08/2017    CREATININE 0.77 11/08/2017    CALCIUM 9.3 11/08/2017    LABALBU 4.1 11/08/2017    LABALBU 4.2 03/13/2012    BILITOT 0.3 11/08/2017    ALKPHOS 65 11/08/2017    AST 59 11/08/2017    ALT 83 11/08/2017     Lab Results   Component Value Date    WBC 4.4 11/08/2017    RBC 4.23 11/08/2017    RBC 4.77 03/13/2012    HGB 12.8 11/08/2017    HCT 38.9 11/08/2017    MCV 91.8 11/08/2017    MCH 30.3 11/08/2017    MCHC 33.0 11/08/2017    RDW 13.8 11/08/2017     11/08/2017    MPV 8.2 07/31/2015       Lab Results   Component Value Date    LABAMPH Neg 11/14/2014    BARBSCNU Neg 11/14/2014    LABBENZ Neg 11/14/2014    LABBENZ NotDTCD 10/11/2012    CANSU Neg 11/14/2014    COCAIMETSCRU Neg 11/14/2014    PHENCYCLIDINESCREENURINE Neg 03/91/0382    TRICYCLIC Not Available 42/99/1494    DSCOMMENT see below 11/14/2014       Lab Results   Component Value Date    CODEINE Not Detected 12/19/2016    MORPHINE Not Detected 12/19/2016    ACETYLMORPHI Not Detected 12/19/2016    OXYCODONE Not Detected 12/19/2016    NOROXYCODONE Not Detected 12/19/2016    NOROXYMU Not Detected 12/19/2016    [facility] Not Detected 12/19/2016    NORHYDU Not Detected 12/19/2016    HYDROMO Not Detected 12/19/2016    Nasir Heir Not Detected 12/19/2016    [place] Sweta Not Detected 12/19/2016    FENTA Not Detected 12/19/2016    NORFENT Not Detected 12/19/2016    MEPERIDINE Not Detected 12/19/2016    TAPENU Not Detected 12/19/2016    TAPOSULFUR Not Detected 12/19/2016 Fitness             Follow up with Primary Care Physician regarding their general medical needs. They are to follow up in 2 months to review medication, efficacy of injections, pill counts, OARRS check, SOAPPR assessment, review diagnostics, to review previous and future treatment plans and assess appropriateness for continued therapy. New Diagnostics  No orders of the defined types were placed in this encounter. Patient is to rarely take the Soma only with pain flareups and expect that the amount of getting here should at least last a year.     Nina Maloney, DO

## 2018-01-08 DIAGNOSIS — M79.7 FIBROMYALGIA MUSCLE PAIN: ICD-10-CM

## 2018-01-08 RX ORDER — GABAPENTIN 400 MG/1
CAPSULE ORAL
Qty: 270 CAPSULE | Refills: 0 | Status: SHIPPED | OUTPATIENT
Start: 2018-01-08 | End: 2018-01-10 | Stop reason: SDUPTHER

## 2018-01-10 DIAGNOSIS — M79.7 FIBROMYALGIA MUSCLE PAIN: ICD-10-CM

## 2018-01-11 RX ORDER — GABAPENTIN 400 MG/1
CAPSULE ORAL
Qty: 270 CAPSULE | Refills: 0 | Status: SHIPPED | OUTPATIENT
Start: 2018-01-11 | End: 2018-04-26 | Stop reason: SDUPTHER

## 2018-02-08 ENCOUNTER — OFFICE VISIT (OUTPATIENT)
Dept: PHYSICAL MEDICINE AND REHAB | Age: 58
End: 2018-02-08
Payer: COMMERCIAL

## 2018-02-08 VITALS
HEIGHT: 63 IN | DIASTOLIC BLOOD PRESSURE: 66 MMHG | WEIGHT: 182 LBS | SYSTOLIC BLOOD PRESSURE: 98 MMHG | BODY MASS INDEX: 32.25 KG/M2

## 2018-02-08 DIAGNOSIS — R10.9 CHRONIC ABDOMINAL PAIN: Primary | Chronic | ICD-10-CM

## 2018-02-08 DIAGNOSIS — Z79.899 HIGH RISK MEDICATION USE: Chronic | ICD-10-CM

## 2018-02-08 DIAGNOSIS — E55.9 VITAMIN D DEFICIENCY: ICD-10-CM

## 2018-02-08 DIAGNOSIS — G89.29 CHRONIC LOW BACK PAIN WITH SCIATICA, SCIATICA LATERALITY UNSPECIFIED, UNSPECIFIED BACK PAIN LATERALITY: ICD-10-CM

## 2018-02-08 DIAGNOSIS — M54.40 CHRONIC LOW BACK PAIN WITH SCIATICA, SCIATICA LATERALITY UNSPECIFIED, UNSPECIFIED BACK PAIN LATERALITY: ICD-10-CM

## 2018-02-08 DIAGNOSIS — M53.3 SI (SACROILIAC) PAIN: ICD-10-CM

## 2018-02-08 DIAGNOSIS — M79.7 FIBROMYALGIA MUSCLE PAIN: ICD-10-CM

## 2018-02-08 DIAGNOSIS — Z79.899 HIGH RISK MEDICATION USE: ICD-10-CM

## 2018-02-08 DIAGNOSIS — M79.641 PAIN IN BOTH HANDS: ICD-10-CM

## 2018-02-08 DIAGNOSIS — M79.642 PAIN IN BOTH HANDS: ICD-10-CM

## 2018-02-08 DIAGNOSIS — G89.29 CHRONIC ABDOMINAL PAIN: Primary | Chronic | ICD-10-CM

## 2018-02-08 LAB
AMPHETAMINE SCREEN, URINE: NORMAL
BARBITURATE SCREEN URINE: NORMAL
BENZODIAZEPINE SCREEN, URINE: NORMAL
CANNABINOID SCREEN URINE: NORMAL
COCAINE METABOLITE SCREEN URINE: NORMAL
Lab: NORMAL
OPIATE SCREEN URINE: NORMAL
PHENCYCLIDINE SCREEN URINE: NORMAL

## 2018-02-08 PROCEDURE — G8482 FLU IMMUNIZE ORDER/ADMIN: HCPCS | Performed by: PHYSICAL MEDICINE & REHABILITATION

## 2018-02-08 PROCEDURE — 99214 OFFICE O/P EST MOD 30 MIN: CPT | Performed by: PHYSICAL MEDICINE & REHABILITATION

## 2018-02-08 PROCEDURE — G8417 CALC BMI ABV UP PARAM F/U: HCPCS | Performed by: PHYSICAL MEDICINE & REHABILITATION

## 2018-02-08 PROCEDURE — 3017F COLORECTAL CA SCREEN DOC REV: CPT | Performed by: PHYSICAL MEDICINE & REHABILITATION

## 2018-02-08 PROCEDURE — 1036F TOBACCO NON-USER: CPT | Performed by: PHYSICAL MEDICINE & REHABILITATION

## 2018-02-08 PROCEDURE — 3014F SCREEN MAMMO DOC REV: CPT | Performed by: PHYSICAL MEDICINE & REHABILITATION

## 2018-02-08 PROCEDURE — G8427 DOCREV CUR MEDS BY ELIG CLIN: HCPCS | Performed by: PHYSICAL MEDICINE & REHABILITATION

## 2018-02-08 ASSESSMENT — ENCOUNTER SYMPTOMS
WHEEZING: 0
BACK PAIN: 1
DIARRHEA: 0
CONSTIPATION: 0
COUGH: 1
NAUSEA: 0
APNEA: 0
SHORTNESS OF BREATH: 1
SORE THROAT: 1
EYES NEGATIVE: 1
VISUAL CHANGE: 0
ABDOMINAL PAIN: 0
CHEST TIGHTNESS: 0
BOWEL INCONTINENCE: 0
COLOR CHANGE: 0
VOMITING: 0

## 2018-02-08 NOTE — PROGRESS NOTES
adenopathy. Bruises/bleeds easily. Psychiatric/Behavioral: Positive for dysphoric mood and sleep disturbance. Negative for suicidal ideas. The patient is nervous/anxious. All other systems reviewed and are negative. Objective    Vitals:    02/08/18 1007   BP: 98/66   Weight: 182 lb (82.6 kg)   Height: 5' 3\" (1.6 m)     Pain Score: Two     Physical Exam   Constitutional: She is oriented to person, place, and time. Vital signs are normal. She appears well-developed and well-nourished. Non-toxic appearance. She does not have a sickly appearance. She does not appear ill. No distress. HENT:   Head: Normocephalic and atraumatic. Right Ear: Hearing normal.   Left Ear: Hearing normal.   Nose: Nose normal.   Mouth/Throat: Oropharynx is clear and moist and mucous membranes are normal. No oral lesions. Normal dentition. No oropharyngeal exudate. No signs of toxic erosions. Eyes: Conjunctivae and EOM are normal. Pupils are equal, round, and reactive to light. Right eye exhibits no chemosis, no discharge and no exudate. Left eye exhibits no chemosis, no discharge and no exudate. No scleral icterus. Neck: Normal range of motion. Neck supple. No JVD present. No neck rigidity. No tracheal deviation and no edema present. No thyromegaly present. Cardiovascular: Intact distal pulses. Exam reveals no decreased pulses. Pulmonary/Chest: Effort normal. No accessory muscle usage. No apnea, no tachypnea and no bradypnea. No respiratory distress. She has decreased breath sounds. She has no wheezes. She exhibits no tenderness. Dry cough   Abdominal: Soft. Bowel sounds are normal. She exhibits no distension and no mass. There is no tenderness. There is no rebound and no guarding. Musculoskeletal: She exhibits tenderness. She exhibits no edema.         Right shoulder: Normal.        Left shoulder: Normal.        Right elbow: Normal.       Left elbow: Normal.        Right wrist: Normal.        Left wrist: 11/08/2017    CREATININE 0.77 11/08/2017    CALCIUM 9.3 11/08/2017    LABALBU 4.1 11/08/2017    LABALBU 4.2 03/13/2012    BILITOT 0.3 11/08/2017    ALKPHOS 65 11/08/2017    AST 59 11/08/2017    ALT 83 11/08/2017     Lab Results   Component Value Date    WBC 4.4 11/08/2017    RBC 4.23 11/08/2017    RBC 4.77 03/13/2012    HGB 12.8 11/08/2017    HCT 38.9 11/08/2017    MCV 91.8 11/08/2017    MCH 30.3 11/08/2017    MCHC 33.0 11/08/2017    RDW 13.8 11/08/2017     11/08/2017    MPV 8.2 07/31/2015       Lab Results   Component Value Date    LABAMPH Neg 11/14/2014    BARBSCNU Neg 11/14/2014    LABBENZ Neg 11/14/2014    LABBENZ NotDTCD 10/11/2012    CANSU Neg 11/14/2014    COCAIMETSCRU Neg 11/14/2014    PHENCYCLIDINESCREENURINE Neg 53/99/0728    TRICYCLIC Not Available 05/51/9937    DSCOMMENT see below 11/14/2014       Lab Results   Component Value Date    CODEINE Not Detected 12/19/2016    MORPHINE Not Detected 12/19/2016    Lanis Curls Not Detected 12/19/2016    OXYCODONE Not Detected 12/19/2016    NOROXYCODONE Not Detected 12/19/2016    NOROXYMU Not Detected 12/19/2016    [facility] Not Detected 12/19/2016    NORHYDU Not Detected 12/19/2016    HYDROMO Not Detected 12/19/2016    Vonita Naik Not Detected 12/19/2016    Leos Francis Not Detected 12/19/2016    FENTA Not Detected 12/19/2016    NORFENT Not Detected 12/19/2016    MEPERIDINE Not Detected 12/19/2016    TAPENU Not Detected 12/19/2016    TAPOSULFUR Not Detected 12/19/2016    METHADONE Not Detected 12/19/2016    LABPROP Not Detected 12/19/2016    TRAM Present 12/19/2016    AMPH Not Detected 12/19/2016    METHAMP Not Detected 12/19/2016    MDMA Not Detected 12/19/2016    ECMDA Not Detected 12/19/2016       Lab Results   Component Value Date    METPHEN Not Detected 12/19/2016    PHENTERMINE Not Detected 12/19/2016    BENZOYL Not Detected 12/19/2016    Jovana Madi Not Detected 12/19/2016    ALPHAOHALPRA Not Detected 12/19/2016    CLONAZEPAM Not Detected 12/19/2016    Altagracia Cano

## 2018-02-14 DIAGNOSIS — D68.51 FACTOR 5 LEIDEN MUTATION, HETEROZYGOUS (HCC): ICD-10-CM

## 2018-02-14 DIAGNOSIS — Z86.718 HISTORY OF DVT (DEEP VEIN THROMBOSIS): ICD-10-CM

## 2018-02-14 DIAGNOSIS — F32.A DEPRESSION, UNSPECIFIED DEPRESSION TYPE: ICD-10-CM

## 2018-02-14 DIAGNOSIS — Z86.711 HISTORY OF PULMONARY THROMBOSIS: ICD-10-CM

## 2018-02-15 RX ORDER — CITALOPRAM 40 MG/1
TABLET ORAL
Qty: 90 TABLET | Refills: 1 | Status: SHIPPED | OUTPATIENT
Start: 2018-02-15 | End: 2018-08-14 | Stop reason: SDUPTHER

## 2018-02-16 DIAGNOSIS — E78.5 DYSLIPIDEMIA: ICD-10-CM

## 2018-02-16 RX ORDER — EZETIMIBE 10 MG/1
TABLET ORAL
Qty: 90 TABLET | Refills: 1 | Status: SHIPPED | OUTPATIENT
Start: 2018-02-16 | End: 2018-08-15 | Stop reason: SDUPTHER

## 2018-03-21 DIAGNOSIS — Z86.718 HISTORY OF DVT (DEEP VEIN THROMBOSIS): ICD-10-CM

## 2018-03-21 DIAGNOSIS — D68.51 FACTOR 5 LEIDEN MUTATION, HETEROZYGOUS (HCC): ICD-10-CM

## 2018-03-21 DIAGNOSIS — Z86.711 HISTORY OF PULMONARY THROMBOSIS: ICD-10-CM

## 2018-04-18 ENCOUNTER — OFFICE VISIT (OUTPATIENT)
Dept: PHYSICAL MEDICINE AND REHAB | Age: 58
End: 2018-04-18
Payer: COMMERCIAL

## 2018-04-18 VITALS
SYSTOLIC BLOOD PRESSURE: 110 MMHG | HEIGHT: 63 IN | WEIGHT: 190 LBS | DIASTOLIC BLOOD PRESSURE: 70 MMHG | BODY MASS INDEX: 33.66 KG/M2

## 2018-04-18 DIAGNOSIS — D68.51 FACTOR 5 LEIDEN MUTATION, HETEROZYGOUS (HCC): ICD-10-CM

## 2018-04-18 DIAGNOSIS — M79.7 FIBROMYALGIA MUSCLE PAIN: ICD-10-CM

## 2018-04-18 DIAGNOSIS — M32.13 DRUG-INDUCED SYSTEMIC LUPUS ERYTHEMATOSUS WITH LUNG INVOLVEMENT (HCC): ICD-10-CM

## 2018-04-18 DIAGNOSIS — Z79.899 HIGH RISK MEDICATION USE: Chronic | ICD-10-CM

## 2018-04-18 DIAGNOSIS — M54.6 CHRONIC BILATERAL THORACIC BACK PAIN: ICD-10-CM

## 2018-04-18 DIAGNOSIS — Z79.899 HIGH RISK MEDICATION USE: ICD-10-CM

## 2018-04-18 DIAGNOSIS — G89.29 CHRONIC MIDLINE LOW BACK PAIN WITH RIGHT-SIDED SCIATICA: ICD-10-CM

## 2018-04-18 DIAGNOSIS — G89.29 CHRONIC BILATERAL THORACIC BACK PAIN: ICD-10-CM

## 2018-04-18 DIAGNOSIS — M32.0 DRUG-INDUCED SYSTEMIC LUPUS ERYTHEMATOSUS WITH LUNG INVOLVEMENT (HCC): ICD-10-CM

## 2018-04-18 DIAGNOSIS — R76.8 POSITIVE ANA (ANTINUCLEAR ANTIBODY): ICD-10-CM

## 2018-04-18 DIAGNOSIS — M53.3 SI (SACROILIAC) PAIN: ICD-10-CM

## 2018-04-18 DIAGNOSIS — M54.41 CHRONIC MIDLINE LOW BACK PAIN WITH RIGHT-SIDED SCIATICA: ICD-10-CM

## 2018-04-18 PROBLEM — R21 RASH AND NONSPECIFIC SKIN ERUPTION: Status: ACTIVE | Noted: 2018-03-12

## 2018-04-18 PROBLEM — M54.42 CHRONIC BILATERAL LOW BACK PAIN WITH BILATERAL SCIATICA: Status: ACTIVE | Noted: 2017-07-12

## 2018-04-18 PROCEDURE — 99213 OFFICE O/P EST LOW 20 MIN: CPT | Performed by: PHYSICAL MEDICINE & REHABILITATION

## 2018-04-18 PROCEDURE — 3014F SCREEN MAMMO DOC REV: CPT | Performed by: PHYSICAL MEDICINE & REHABILITATION

## 2018-04-18 PROCEDURE — 1036F TOBACCO NON-USER: CPT | Performed by: PHYSICAL MEDICINE & REHABILITATION

## 2018-04-18 PROCEDURE — G8427 DOCREV CUR MEDS BY ELIG CLIN: HCPCS | Performed by: PHYSICAL MEDICINE & REHABILITATION

## 2018-04-18 PROCEDURE — 3017F COLORECTAL CA SCREEN DOC REV: CPT | Performed by: PHYSICAL MEDICINE & REHABILITATION

## 2018-04-18 PROCEDURE — G8417 CALC BMI ABV UP PARAM F/U: HCPCS | Performed by: PHYSICAL MEDICINE & REHABILITATION

## 2018-04-18 RX ORDER — METAXALONE 800 MG/1
800 TABLET ORAL 3 TIMES DAILY PRN
Qty: 90 TABLET | Refills: 1 | Status: SHIPPED | OUTPATIENT
Start: 2018-04-18 | End: 2018-08-09 | Stop reason: SDUPTHER

## 2018-04-18 RX ORDER — BALSALAZIDE DISODIUM 750 MG/1
1 CAPSULE ORAL 3 TIMES DAILY
COMMUNITY
Start: 2018-02-19 | End: 2018-11-23 | Stop reason: SDUPTHER

## 2018-04-18 ASSESSMENT — ENCOUNTER SYMPTOMS
EYES NEGATIVE: 1
COLOR CHANGE: 0
DIARRHEA: 0
SORE THROAT: 0
VISUAL CHANGE: 0
BOWEL INCONTINENCE: 0
COUGH: 1
SHORTNESS OF BREATH: 1
NAUSEA: 0
CONSTIPATION: 0
VOMITING: 0
APNEA: 0
CHEST TIGHTNESS: 0
BACK PAIN: 1
WHEEZING: 0
ABDOMINAL PAIN: 0

## 2018-04-26 DIAGNOSIS — M79.7 FIBROMYALGIA MUSCLE PAIN: ICD-10-CM

## 2018-04-26 RX ORDER — GABAPENTIN 400 MG/1
CAPSULE ORAL
Qty: 270 CAPSULE | Refills: 0 | Status: SHIPPED | OUTPATIENT
Start: 2018-04-26 | End: 2018-08-09 | Stop reason: SDUPTHER

## 2018-05-01 ENCOUNTER — HOSPITAL ENCOUNTER (OUTPATIENT)
Dept: INTERVENTIONAL RADIOLOGY/VASCULAR | Age: 58
Discharge: HOME | End: 2018-05-03
Payer: COMMERCIAL

## 2018-05-01 VITALS
SYSTOLIC BLOOD PRESSURE: 133 MMHG | RESPIRATION RATE: 16 BRPM | HEART RATE: 93 BPM | OXYGEN SATURATION: 98 % | DIASTOLIC BLOOD PRESSURE: 87 MMHG

## 2018-05-01 DIAGNOSIS — M51.36 DDD (DEGENERATIVE DISC DISEASE), LUMBAR: ICD-10-CM

## 2018-05-01 DIAGNOSIS — N89.8 VAGINAL DRYNESS: ICD-10-CM

## 2018-05-01 PROCEDURE — 62323 NJX INTERLAMINAR LMBR/SAC: CPT | Performed by: PHYSICAL MEDICINE & REHABILITATION

## 2018-05-01 PROCEDURE — 2500000003 HC RX 250 WO HCPCS: Performed by: PHYSICAL MEDICINE & REHABILITATION

## 2018-05-01 PROCEDURE — 6360000004 HC RX CONTRAST MEDICATION: Performed by: PHYSICAL MEDICINE & REHABILITATION

## 2018-05-01 PROCEDURE — 6360000002 HC RX W HCPCS: Performed by: PHYSICAL MEDICINE & REHABILITATION

## 2018-05-01 RX ORDER — CONJUGATED ESTROGENS 0.62 MG/G
CREAM VAGINAL
Qty: 30 G | Refills: 3 | Status: SHIPPED | OUTPATIENT
Start: 2018-05-01 | End: 2018-06-25 | Stop reason: ALTCHOICE

## 2018-05-01 RX ORDER — METHYLPREDNISOLONE ACETATE 40 MG/ML
40 INJECTION, SUSPENSION INTRA-ARTICULAR; INTRALESIONAL; INTRAMUSCULAR; SOFT TISSUE ONCE
Status: CANCELLED | OUTPATIENT
Start: 2018-05-01 | End: 2018-05-01

## 2018-05-01 RX ORDER — METHYLPREDNISOLONE ACETATE 40 MG/ML
40 INJECTION, SUSPENSION INTRA-ARTICULAR; INTRALESIONAL; INTRAMUSCULAR; SOFT TISSUE ONCE
Status: COMPLETED | OUTPATIENT
Start: 2018-05-01 | End: 2018-05-01

## 2018-05-01 RX ORDER — LIDOCAINE HYDROCHLORIDE 5 MG/ML
50 INJECTION, SOLUTION INFILTRATION; INTRAVENOUS ONCE
Status: CANCELLED | OUTPATIENT
Start: 2018-05-01 | End: 2018-05-01

## 2018-05-01 RX ORDER — LIDOCAINE HYDROCHLORIDE 5 MG/ML
50 INJECTION, SOLUTION INFILTRATION; INTRAVENOUS ONCE
Status: COMPLETED | OUTPATIENT
Start: 2018-05-01 | End: 2018-05-01

## 2018-05-01 RX ADMIN — IOPAMIDOL 3 ML: 408 INJECTION, SOLUTION INTRATHECAL at 10:27

## 2018-05-01 RX ADMIN — METHYLPREDNISOLONE ACETATE 40 MG: 40 INJECTION, SUSPENSION INTRA-ARTICULAR; INTRALESIONAL; INTRAMUSCULAR; SOFT TISSUE at 10:28

## 2018-05-01 RX ADMIN — LIDOCAINE HYDROCHLORIDE 18 ML: 5 INJECTION, SOLUTION INFILTRATION at 10:26

## 2018-05-01 ASSESSMENT — PAIN SCALES - GENERAL
PAINLEVEL_OUTOF10: 0
PAINLEVEL_OUTOF10: 8

## 2018-05-01 ASSESSMENT — PAIN DESCRIPTION - FREQUENCY: FREQUENCY: CONTINUOUS

## 2018-05-01 ASSESSMENT — PAIN DESCRIPTION - PAIN TYPE: TYPE: CHRONIC PAIN

## 2018-05-01 ASSESSMENT — PAIN DESCRIPTION - DESCRIPTORS: DESCRIPTORS: ACHING

## 2018-05-01 ASSESSMENT — PAIN DESCRIPTION - LOCATION: LOCATION: BACK

## 2018-05-01 ASSESSMENT — PAIN DESCRIPTION - ORIENTATION: ORIENTATION: LOWER

## 2018-05-01 ASSESSMENT — PAIN DESCRIPTION - ONSET: ONSET: PROGRESSIVE

## 2018-05-01 ASSESSMENT — PAIN DESCRIPTION - PROGRESSION: CLINICAL_PROGRESSION: GRADUALLY WORSENING

## 2018-05-14 ENCOUNTER — HOSPITAL ENCOUNTER (OUTPATIENT)
Dept: LAB | Age: 58
Discharge: HOME | End: 2018-05-14
Payer: COMMERCIAL

## 2018-05-14 DIAGNOSIS — E55.9 VITAMIN D DEFICIENCY: ICD-10-CM

## 2018-05-14 DIAGNOSIS — E11.9 TYPE 2 DIABETES MELLITUS WITHOUT COMPLICATION, WITHOUT LONG-TERM CURRENT USE OF INSULIN (HCC): ICD-10-CM

## 2018-05-14 DIAGNOSIS — R03.0 BORDERLINE HYPERTENSION: ICD-10-CM

## 2018-05-14 DIAGNOSIS — E78.5 DYSLIPIDEMIA: ICD-10-CM

## 2018-05-14 LAB
ALBUMIN SERPL-MCNC: 4.2 G/DL (ref 3.9–4.9)
ALP BLD-CCNC: 63 U/L (ref 40–130)
ALT SERPL-CCNC: 64 U/L (ref 0–33)
ANION GAP SERPL CALCULATED.3IONS-SCNC: 15 MEQ/L (ref 7–13)
AST SERPL-CCNC: 43 U/L (ref 0–35)
BASOPHILS ABSOLUTE: 0 K/UL (ref 0–0.2)
BASOPHILS RELATIVE PERCENT: 0.8 %
BILIRUB SERPL-MCNC: <0.2 MG/DL (ref 0–1.2)
BUN BLDV-MCNC: 21 MG/DL (ref 6–20)
CALCIUM SERPL-MCNC: 9.6 MG/DL (ref 8.6–10.2)
CHLORIDE BLD-SCNC: 99 MEQ/L (ref 98–107)
CHOLESTEROL, TOTAL: 159 MG/DL (ref 0–199)
CO2: 25 MEQ/L (ref 22–29)
CREAT SERPL-MCNC: 0.83 MG/DL (ref 0.5–0.9)
CREATININE URINE: 85.8 MG/DL
EOSINOPHILS ABSOLUTE: 0.2 K/UL (ref 0–0.7)
EOSINOPHILS RELATIVE PERCENT: 4 %
GFR AFRICAN AMERICAN: >60
GFR NON-AFRICAN AMERICAN: >60
GLOBULIN: 2.4 G/DL (ref 2.3–3.5)
GLUCOSE BLD-MCNC: 116 MG/DL (ref 74–109)
HBA1C MFR BLD: 6.3 % (ref 4.8–5.9)
HCT VFR BLD CALC: 37 % (ref 37–47)
HDLC SERPL-MCNC: 55 MG/DL (ref 40–59)
HEMOGLOBIN: 12.7 G/DL (ref 12–16)
LDL CHOLESTEROL CALCULATED: 78 MG/DL (ref 0–129)
LYMPHOCYTES ABSOLUTE: 1.6 K/UL (ref 1–4.8)
LYMPHOCYTES RELATIVE PERCENT: 33.3 %
MCH RBC QN AUTO: 30.8 PG (ref 27–31.3)
MCHC RBC AUTO-ENTMCNC: 34.3 % (ref 33–37)
MCV RBC AUTO: 89.9 FL (ref 82–100)
MICROALBUMIN UR-MCNC: <1.2 MG/DL
MICROALBUMIN/CREAT UR-RTO: NORMAL MG/G (ref 0–30)
MONOCYTES ABSOLUTE: 0.2 K/UL (ref 0.2–0.8)
MONOCYTES RELATIVE PERCENT: 4.7 %
NEUTROPHILS ABSOLUTE: 2.8 K/UL (ref 1.4–6.5)
NEUTROPHILS RELATIVE PERCENT: 57.2 %
PDW BLD-RTO: 13.8 % (ref 11.5–14.5)
PLATELET # BLD: 265 K/UL (ref 130–400)
POTASSIUM SERPL-SCNC: 5 MEQ/L (ref 3.5–5.1)
RBC # BLD: 4.12 M/UL (ref 4.2–5.4)
SODIUM BLD-SCNC: 139 MEQ/L (ref 132–144)
TOTAL PROTEIN: 6.6 G/DL (ref 6.4–8.1)
TRIGL SERPL-MCNC: 129 MG/DL (ref 0–200)
VITAMIN D 25-HYDROXY: 45.6 NG/ML (ref 30–100)
WBC # BLD: 4.9 K/UL (ref 4.8–10.8)

## 2018-05-14 PROCEDURE — 36415 COLL VENOUS BLD VENIPUNCTURE: CPT

## 2018-05-14 PROCEDURE — 80053 COMPREHEN METABOLIC PANEL: CPT

## 2018-05-14 PROCEDURE — 82570 ASSAY OF URINE CREATININE: CPT

## 2018-05-14 PROCEDURE — 83036 HEMOGLOBIN GLYCOSYLATED A1C: CPT

## 2018-05-14 PROCEDURE — 85025 COMPLETE CBC W/AUTO DIFF WBC: CPT

## 2018-05-14 PROCEDURE — 82043 UR ALBUMIN QUANTITATIVE: CPT

## 2018-05-14 PROCEDURE — 80061 LIPID PANEL: CPT

## 2018-05-14 PROCEDURE — 82306 VITAMIN D 25 HYDROXY: CPT

## 2018-05-18 DIAGNOSIS — Z79.899 HIGH RISK MEDICATION USE: Chronic | ICD-10-CM

## 2018-05-18 DIAGNOSIS — Z79.899 HIGH RISK MEDICATION USE: ICD-10-CM

## 2018-05-18 DIAGNOSIS — M53.3 SI (SACROILIAC) PAIN: ICD-10-CM

## 2018-05-18 DIAGNOSIS — M79.7 FIBROMYALGIA MUSCLE PAIN: ICD-10-CM

## 2018-05-21 ENCOUNTER — OFFICE VISIT (OUTPATIENT)
Dept: FAMILY MEDICINE CLINIC | Age: 58
End: 2018-05-21
Payer: COMMERCIAL

## 2018-05-21 ENCOUNTER — HOSPITAL ENCOUNTER (OUTPATIENT)
Age: 58
Setting detail: SPECIMEN
Discharge: HOME | End: 2018-05-21
Payer: COMMERCIAL

## 2018-05-21 VITALS
RESPIRATION RATE: 16 BRPM | HEART RATE: 98 BPM | OXYGEN SATURATION: 96 % | BODY MASS INDEX: 34.91 KG/M2 | TEMPERATURE: 97.5 F | WEIGHT: 197 LBS | SYSTOLIC BLOOD PRESSURE: 102 MMHG | HEIGHT: 63 IN | DIASTOLIC BLOOD PRESSURE: 68 MMHG

## 2018-05-21 DIAGNOSIS — Z00.00 ROUTINE GENERAL MEDICAL EXAMINATION AT A HEALTH CARE FACILITY: ICD-10-CM

## 2018-05-21 DIAGNOSIS — E55.9 VITAMIN D DEFICIENCY: ICD-10-CM

## 2018-05-21 DIAGNOSIS — D68.51 FACTOR 5 LEIDEN MUTATION, HETEROZYGOUS (HCC): ICD-10-CM

## 2018-05-21 DIAGNOSIS — E66.9 CLASS 1 OBESITY WITH SERIOUS COMORBIDITY AND BODY MASS INDEX (BMI) OF 34.0 TO 34.9 IN ADULT, UNSPECIFIED OBESITY TYPE: ICD-10-CM

## 2018-05-21 DIAGNOSIS — G57.93 NEUROPATHY OF BOTH FEET: ICD-10-CM

## 2018-05-21 DIAGNOSIS — Q39.4 ESOPHAGEAL WEB: ICD-10-CM

## 2018-05-21 DIAGNOSIS — E11.9 TYPE 2 DIABETES MELLITUS WITHOUT COMPLICATION, WITHOUT LONG-TERM CURRENT USE OF INSULIN (HCC): Primary | ICD-10-CM

## 2018-05-21 DIAGNOSIS — E78.5 DYSLIPIDEMIA: ICD-10-CM

## 2018-05-21 DIAGNOSIS — R03.0 BORDERLINE HYPERTENSION: ICD-10-CM

## 2018-05-21 DIAGNOSIS — L93.0 LUPUS ERYTHEMATOSUS, UNSPECIFIED FORM: ICD-10-CM

## 2018-05-21 DIAGNOSIS — K21.00 GASTROESOPHAGEAL REFLUX DISEASE WITH ESOPHAGITIS: ICD-10-CM

## 2018-05-21 PROCEDURE — 88175 CYTOPATH C/V AUTO FLUID REDO: CPT

## 2018-05-21 PROCEDURE — G8417 CALC BMI ABV UP PARAM F/U: HCPCS | Performed by: NURSE PRACTITIONER

## 2018-05-21 PROCEDURE — 3017F COLORECTAL CA SCREEN DOC REV: CPT | Performed by: NURSE PRACTITIONER

## 2018-05-21 PROCEDURE — 99214 OFFICE O/P EST MOD 30 MIN: CPT | Performed by: NURSE PRACTITIONER

## 2018-05-21 PROCEDURE — 1036F TOBACCO NON-USER: CPT | Performed by: NURSE PRACTITIONER

## 2018-05-21 PROCEDURE — 3044F HG A1C LEVEL LT 7.0%: CPT | Performed by: NURSE PRACTITIONER

## 2018-05-21 PROCEDURE — G8427 DOCREV CUR MEDS BY ELIG CLIN: HCPCS | Performed by: NURSE PRACTITIONER

## 2018-05-21 PROCEDURE — 2022F DILAT RTA XM EVC RTNOPTHY: CPT | Performed by: NURSE PRACTITIONER

## 2018-05-22 LAB — HPV COMMENT: NORMAL

## 2018-06-25 ENCOUNTER — OFFICE VISIT (OUTPATIENT)
Dept: FAMILY MEDICINE CLINIC | Age: 58
End: 2018-06-25
Payer: COMMERCIAL

## 2018-06-25 VITALS
HEART RATE: 81 BPM | OXYGEN SATURATION: 96 % | RESPIRATION RATE: 14 BRPM | DIASTOLIC BLOOD PRESSURE: 70 MMHG | SYSTOLIC BLOOD PRESSURE: 110 MMHG | TEMPERATURE: 97.4 F | WEIGHT: 195 LBS | HEIGHT: 63 IN | BODY MASS INDEX: 34.55 KG/M2

## 2018-06-25 DIAGNOSIS — E66.9 CLASS 1 OBESITY WITH SERIOUS COMORBIDITY AND BODY MASS INDEX (BMI) OF 34.0 TO 34.9 IN ADULT, UNSPECIFIED OBESITY TYPE: ICD-10-CM

## 2018-06-25 DIAGNOSIS — N95.9 POSTMENOPAUSAL SYMPTOMS: Primary | ICD-10-CM

## 2018-06-25 DIAGNOSIS — Z79.890 POSTMENOPAUSAL HORMONE REPLACEMENT THERAPY: ICD-10-CM

## 2018-06-25 DIAGNOSIS — L93.0 LUPUS ERYTHEMATOSUS, UNSPECIFIED FORM: ICD-10-CM

## 2018-06-25 DIAGNOSIS — E55.9 VITAMIN D DEFICIENCY: ICD-10-CM

## 2018-06-25 DIAGNOSIS — Z23 NEED FOR SHINGLES VACCINE: ICD-10-CM

## 2018-06-25 DIAGNOSIS — E11.9 TYPE 2 DIABETES MELLITUS WITHOUT COMPLICATION, WITHOUT LONG-TERM CURRENT USE OF INSULIN (HCC): ICD-10-CM

## 2018-06-25 DIAGNOSIS — Z86.711 HISTORY OF PULMONARY THROMBOSIS: ICD-10-CM

## 2018-06-25 DIAGNOSIS — M79.7 FIBROMYALGIA MUSCLE PAIN: ICD-10-CM

## 2018-06-25 PROCEDURE — G8417 CALC BMI ABV UP PARAM F/U: HCPCS | Performed by: NURSE PRACTITIONER

## 2018-06-25 PROCEDURE — 1036F TOBACCO NON-USER: CPT | Performed by: NURSE PRACTITIONER

## 2018-06-25 PROCEDURE — G8427 DOCREV CUR MEDS BY ELIG CLIN: HCPCS | Performed by: NURSE PRACTITIONER

## 2018-06-25 PROCEDURE — 2022F DILAT RTA XM EVC RTNOPTHY: CPT | Performed by: NURSE PRACTITIONER

## 2018-06-25 PROCEDURE — 3017F COLORECTAL CA SCREEN DOC REV: CPT | Performed by: NURSE PRACTITIONER

## 2018-06-25 PROCEDURE — 99214 OFFICE O/P EST MOD 30 MIN: CPT | Performed by: NURSE PRACTITIONER

## 2018-06-25 PROCEDURE — 3044F HG A1C LEVEL LT 7.0%: CPT | Performed by: NURSE PRACTITIONER

## 2018-06-25 RX ORDER — PHENTERMINE HYDROCHLORIDE 37.5 MG/1
37.5 CAPSULE ORAL EVERY MORNING
Qty: 30 CAPSULE | Refills: 0 | Status: SHIPPED | OUTPATIENT
Start: 2018-06-25 | End: 2018-07-23 | Stop reason: SDUPTHER

## 2018-06-25 RX ORDER — ESTRADIOL 0.5 MG/1
TABLET ORAL
Qty: 15 TABLET | Refills: 11 | Status: SHIPPED | OUTPATIENT
Start: 2018-06-25 | End: 2018-07-23 | Stop reason: SDUPTHER

## 2018-06-29 ENCOUNTER — APPOINTMENT (OUTPATIENT)
Dept: GENERAL RADIOLOGY | Age: 58
End: 2018-06-29
Payer: COMMERCIAL

## 2018-06-29 ENCOUNTER — APPOINTMENT (OUTPATIENT)
Dept: CT IMAGING | Age: 58
End: 2018-06-29
Payer: COMMERCIAL

## 2018-06-29 ENCOUNTER — HOSPITAL ENCOUNTER (INPATIENT)
Age: 58
LOS: 4 days | Discharge: HOME | DRG: 281 | End: 2018-07-03
Attending: INTERNAL MEDICINE | Admitting: INTERNAL MEDICINE
Payer: COMMERCIAL

## 2018-06-29 ENCOUNTER — HOSPITAL ENCOUNTER (EMERGENCY)
Age: 58
Discharge: ANOTHER HOSPITAL | End: 2018-06-29
Attending: EMERGENCY MEDICINE
Payer: COMMERCIAL

## 2018-06-29 ENCOUNTER — OFFICE VISIT (OUTPATIENT)
Dept: FAMILY MEDICINE CLINIC | Age: 58
End: 2018-06-29
Payer: COMMERCIAL

## 2018-06-29 VITALS
OXYGEN SATURATION: 90 % | HEIGHT: 63 IN | TEMPERATURE: 98.5 F | DIASTOLIC BLOOD PRESSURE: 70 MMHG | BODY MASS INDEX: 31.15 KG/M2 | SYSTOLIC BLOOD PRESSURE: 118 MMHG | HEART RATE: 55 BPM | RESPIRATION RATE: 24 BRPM | WEIGHT: 175.8 LBS

## 2018-06-29 VITALS
OXYGEN SATURATION: 97 % | HEART RATE: 98 BPM | DIASTOLIC BLOOD PRESSURE: 66 MMHG | BODY MASS INDEX: 30.82 KG/M2 | TEMPERATURE: 97.6 F | WEIGHT: 174 LBS | SYSTOLIC BLOOD PRESSURE: 98 MMHG | RESPIRATION RATE: 18 BRPM

## 2018-06-29 DIAGNOSIS — E55.9 VITAMIN D DEFICIENCY: ICD-10-CM

## 2018-06-29 DIAGNOSIS — M54.50 RIGHT-SIDED LOW BACK PAIN WITHOUT SCIATICA, UNSPECIFIED CHRONICITY: ICD-10-CM

## 2018-06-29 DIAGNOSIS — R29.898 WEAKNESS OF BOTH LOWER EXTREMITIES: ICD-10-CM

## 2018-06-29 DIAGNOSIS — M53.3 SI (SACROILIAC) PAIN: Primary | ICD-10-CM

## 2018-06-29 DIAGNOSIS — R77.8 ELEVATED TROPONIN: ICD-10-CM

## 2018-06-29 DIAGNOSIS — N17.9 ACUTE RENAL FAILURE, UNSPECIFIED ACUTE RENAL FAILURE TYPE (HCC): Primary | ICD-10-CM

## 2018-06-29 DIAGNOSIS — R06.02 SOB (SHORTNESS OF BREATH): Primary | ICD-10-CM

## 2018-06-29 DIAGNOSIS — M79.7 FIBROMYALGIA MUSCLE PAIN: ICD-10-CM

## 2018-06-29 PROBLEM — N19 RENAL FAILURE: Status: ACTIVE | Noted: 2018-06-29

## 2018-06-29 LAB
ALBUMIN SERPL-MCNC: 4.5 G/DL (ref 3.9–4.9)
ALP BLD-CCNC: 49 U/L (ref 40–130)
ALT SERPL-CCNC: 62 U/L (ref 0–33)
ANION GAP SERPL CALCULATED.3IONS-SCNC: 23 MEQ/L (ref 7–13)
AST SERPL-CCNC: 44 U/L (ref 0–35)
BASOPHILS ABSOLUTE: 0.1 K/UL (ref 0–0.2)
BASOPHILS RELATIVE PERCENT: 0.6 %
BILIRUB SERPL-MCNC: 0.5 MG/DL (ref 0–1.2)
BUN BLDV-MCNC: 29 MG/DL (ref 6–20)
CALCIUM SERPL-MCNC: 9 MG/DL (ref 8.6–10.2)
CHLORIDE BLD-SCNC: 98 MEQ/L (ref 98–107)
CO2: 18 MEQ/L (ref 22–29)
CREAT SERPL-MCNC: 1.63 MG/DL (ref 0.5–0.9)
D DIMER: 0.74 MG/L FEU (ref 0–0.5)
EKG ATRIAL RATE: 107 BPM
EKG P AXIS: 43 DEGREES
EKG P-R INTERVAL: 130 MS
EKG Q-T INTERVAL: 360 MS
EKG QRS DURATION: 86 MS
EKG QTC CALCULATION (BAZETT): 480 MS
EKG R AXIS: -10 DEGREES
EKG T AXIS: 15 DEGREES
EKG VENTRICULAR RATE: 107 BPM
EOSINOPHILS ABSOLUTE: 0.2 K/UL (ref 0–0.7)
EOSINOPHILS RELATIVE PERCENT: 2.2 %
GFR AFRICAN AMERICAN: 39.2
GFR NON-AFRICAN AMERICAN: 32.4
GLOBULIN: 2.6 G/DL (ref 2.3–3.5)
GLUCOSE BLD-MCNC: 93 MG/DL (ref 74–109)
HCT VFR BLD CALC: 36.8 % (ref 37–47)
HEMOGLOBIN: 12.6 G/DL (ref 12–16)
LACTIC ACID: 2.5 MMOL/L (ref 0.5–2.2)
LYMPHOCYTES ABSOLUTE: 2.8 K/UL (ref 1–4.8)
LYMPHOCYTES RELATIVE PERCENT: 34.9 %
MCH RBC QN AUTO: 30.5 PG (ref 27–31.3)
MCHC RBC AUTO-ENTMCNC: 34.3 % (ref 33–37)
MCV RBC AUTO: 88.8 FL (ref 82–100)
MONOCYTES ABSOLUTE: 0.7 K/UL (ref 0.2–0.8)
MONOCYTES RELATIVE PERCENT: 8.6 %
NEUTROPHILS ABSOLUTE: 4.4 K/UL (ref 1.4–6.5)
NEUTROPHILS RELATIVE PERCENT: 53.7 %
PDW BLD-RTO: 13.1 % (ref 11.5–14.5)
PLATELET # BLD: 255 K/UL (ref 130–400)
POTASSIUM SERPL-SCNC: 3.8 MEQ/L (ref 3.5–5.1)
RBC # BLD: 4.14 M/UL (ref 4.2–5.4)
SODIUM BLD-SCNC: 139 MEQ/L (ref 132–144)
TOTAL PROTEIN: 7.1 G/DL (ref 6.4–8.1)
TROPONIN: 1.42 NG/ML (ref 0–0.01)
WBC # BLD: 8.1 K/UL (ref 4.8–10.8)

## 2018-06-29 PROCEDURE — 99285 EMERGENCY DEPT VISIT HI MDM: CPT

## 2018-06-29 PROCEDURE — 83605 ASSAY OF LACTIC ACID: CPT

## 2018-06-29 PROCEDURE — 3017F COLORECTAL CA SCREEN DOC REV: CPT | Performed by: NURSE PRACTITIONER

## 2018-06-29 PROCEDURE — 1210000000 HC MED SURG R&B

## 2018-06-29 PROCEDURE — 70450 CT HEAD/BRAIN W/O DYE: CPT

## 2018-06-29 PROCEDURE — 96374 THER/PROPH/DIAG INJ IV PUSH: CPT

## 2018-06-29 PROCEDURE — 93005 ELECTROCARDIOGRAM TRACING: CPT

## 2018-06-29 PROCEDURE — G8427 DOCREV CUR MEDS BY ELIG CLIN: HCPCS | Performed by: NURSE PRACTITIONER

## 2018-06-29 PROCEDURE — 1036F TOBACCO NON-USER: CPT | Performed by: NURSE PRACTITIONER

## 2018-06-29 PROCEDURE — 84484 ASSAY OF TROPONIN QUANT: CPT

## 2018-06-29 PROCEDURE — 85379 FIBRIN DEGRADATION QUANT: CPT

## 2018-06-29 PROCEDURE — 6370000000 HC RX 637 (ALT 250 FOR IP): Performed by: INTERNAL MEDICINE

## 2018-06-29 PROCEDURE — 99212 OFFICE O/P EST SF 10 MIN: CPT | Performed by: NURSE PRACTITIONER

## 2018-06-29 PROCEDURE — G8417 CALC BMI ABV UP PARAM F/U: HCPCS | Performed by: NURSE PRACTITIONER

## 2018-06-29 PROCEDURE — 80053 COMPREHEN METABOLIC PANEL: CPT

## 2018-06-29 PROCEDURE — 87040 BLOOD CULTURE FOR BACTERIA: CPT

## 2018-06-29 PROCEDURE — 85025 COMPLETE CBC W/AUTO DIFF WBC: CPT

## 2018-06-29 PROCEDURE — 2580000003 HC RX 258: Performed by: EMERGENCY MEDICINE

## 2018-06-29 PROCEDURE — 6360000002 HC RX W HCPCS: Performed by: EMERGENCY MEDICINE

## 2018-06-29 PROCEDURE — 71046 X-RAY EXAM CHEST 2 VIEWS: CPT

## 2018-06-29 RX ORDER — CITALOPRAM 20 MG/1
40 TABLET ORAL DAILY
Status: DISCONTINUED | OUTPATIENT
Start: 2018-06-30 | End: 2018-07-03 | Stop reason: HOSPADM

## 2018-06-29 RX ORDER — EZETIMIBE 10 MG/1
1 TABLET ORAL DAILY
Status: DISCONTINUED | OUTPATIENT
Start: 2018-06-30 | End: 2018-06-29

## 2018-06-29 RX ORDER — DEXTROSE MONOHYDRATE 25 G/50ML
12.5 INJECTION, SOLUTION INTRAVENOUS PRN
Status: DISCONTINUED | OUTPATIENT
Start: 2018-06-29 | End: 2018-07-03 | Stop reason: HOSPADM

## 2018-06-29 RX ORDER — NICOTINE POLACRILEX 4 MG
15 LOZENGE BUCCAL PRN
Status: DISCONTINUED | OUTPATIENT
Start: 2018-06-29 | End: 2018-07-03 | Stop reason: HOSPADM

## 2018-06-29 RX ORDER — 0.9 % SODIUM CHLORIDE 0.9 %
1000 INTRAVENOUS SOLUTION INTRAVENOUS ONCE
Status: COMPLETED | OUTPATIENT
Start: 2018-06-29 | End: 2018-06-29

## 2018-06-29 RX ORDER — HYDROXYZINE HYDROCHLORIDE 25 MG/1
25 TABLET, FILM COATED ORAL 3 TIMES DAILY PRN
Status: DISCONTINUED | OUTPATIENT
Start: 2018-06-29 | End: 2018-07-03 | Stop reason: HOSPADM

## 2018-06-29 RX ORDER — MORPHINE SULFATE 4 MG/ML
4 INJECTION, SOLUTION INTRAMUSCULAR; INTRAVENOUS EVERY 4 HOURS PRN
Status: DISCONTINUED | OUTPATIENT
Start: 2018-06-29 | End: 2018-06-30

## 2018-06-29 RX ORDER — ALBUTEROL SULFATE 90 UG/1
2 AEROSOL, METERED RESPIRATORY (INHALATION) EVERY 6 HOURS PRN
Status: DISCONTINUED | OUTPATIENT
Start: 2018-06-29 | End: 2018-07-03 | Stop reason: HOSPADM

## 2018-06-29 RX ORDER — ONDANSETRON 2 MG/ML
4 INJECTION INTRAMUSCULAR; INTRAVENOUS EVERY 6 HOURS PRN
Status: DISCONTINUED | OUTPATIENT
Start: 2018-06-29 | End: 2018-07-03 | Stop reason: HOSPADM

## 2018-06-29 RX ORDER — ESOMEPRAZOLE MAGNESIUM 40 MG/1
40 CAPSULE, DELAYED RELEASE ORAL DAILY
Status: DISCONTINUED | OUTPATIENT
Start: 2018-06-30 | End: 2018-06-29 | Stop reason: CLARIF

## 2018-06-29 RX ORDER — MAGNESIUM HYDROXIDE/ALUMINUM HYDROXICE/SIMETHICONE 120; 1200; 1200 MG/30ML; MG/30ML; MG/30ML
30 SUSPENSION ORAL EVERY 6 HOURS PRN
Status: DISCONTINUED | OUTPATIENT
Start: 2018-06-29 | End: 2018-07-03 | Stop reason: HOSPADM

## 2018-06-29 RX ORDER — KETOROLAC TROMETHAMINE 30 MG/ML
30 INJECTION, SOLUTION INTRAMUSCULAR; INTRAVENOUS ONCE
Status: COMPLETED | OUTPATIENT
Start: 2018-06-29 | End: 2018-06-29

## 2018-06-29 RX ORDER — AMITRIPTYLINE HYDROCHLORIDE 50 MG/1
50 TABLET, FILM COATED ORAL NIGHTLY
Status: DISCONTINUED | OUTPATIENT
Start: 2018-06-30 | End: 2018-07-03 | Stop reason: HOSPADM

## 2018-06-29 RX ORDER — SODIUM CHLORIDE 9 MG/ML
INJECTION, SOLUTION INTRAVENOUS CONTINUOUS
Status: DISPENSED | OUTPATIENT
Start: 2018-06-29 | End: 2018-06-30

## 2018-06-29 RX ORDER — PANTOPRAZOLE SODIUM 40 MG/1
40 TABLET, DELAYED RELEASE ORAL DAILY
Status: DISCONTINUED | OUTPATIENT
Start: 2018-06-30 | End: 2018-07-03 | Stop reason: HOSPADM

## 2018-06-29 RX ORDER — HYDROXYCHLOROQUINE SULFATE 200 MG/1
200 TABLET, FILM COATED ORAL 2 TIMES DAILY
Status: DISCONTINUED | OUTPATIENT
Start: 2018-06-30 | End: 2018-07-03 | Stop reason: HOSPADM

## 2018-06-29 RX ORDER — DEXTROSE MONOHYDRATE 50 MG/ML
100 INJECTION, SOLUTION INTRAVENOUS PRN
Status: DISCONTINUED | OUTPATIENT
Start: 2018-06-29 | End: 2018-07-03 | Stop reason: HOSPADM

## 2018-06-29 RX ORDER — BALSALAZIDE DISODIUM 750 MG/1
750 CAPSULE ORAL 3 TIMES DAILY
Status: DISCONTINUED | OUTPATIENT
Start: 2018-06-30 | End: 2018-07-03 | Stop reason: HOSPADM

## 2018-06-29 RX ADMIN — SODIUM CHLORIDE 1000 ML: 900 INJECTION, SOLUTION INTRAVENOUS at 18:53

## 2018-06-29 RX ADMIN — KETOROLAC TROMETHAMINE 30 MG: 30 INJECTION, SOLUTION INTRAMUSCULAR at 17:38

## 2018-06-29 RX ADMIN — SODIUM CHLORIDE 1000 ML: 9 INJECTION, SOLUTION INTRAVENOUS at 17:38

## 2018-06-29 ASSESSMENT — ENCOUNTER SYMPTOMS
APNEA: 0
NAUSEA: 0
COUGH: 0
PHOTOPHOBIA: 0
RHINORRHEA: 0
DIARRHEA: 0
WHEEZING: 0
BACK PAIN: 1
BACK PAIN: 1
COLOR CHANGE: 0
SORE THROAT: 0
ABDOMINAL DISTENTION: 0
VOMITING: 0
ABDOMINAL PAIN: 0
CONSTIPATION: 0
SINUS PRESSURE: 0
EYE PAIN: 0
SHORTNESS OF BREATH: 1

## 2018-06-29 ASSESSMENT — PAIN DESCRIPTION - DESCRIPTORS: DESCRIPTORS: ACHING

## 2018-06-29 ASSESSMENT — PAIN DESCRIPTION - LOCATION
LOCATION: BACK
LOCATION: BACK;ABDOMEN

## 2018-06-29 ASSESSMENT — PAIN DESCRIPTION - FREQUENCY: FREQUENCY: CONTINUOUS

## 2018-06-29 ASSESSMENT — PAIN SCALES - GENERAL
PAINLEVEL_OUTOF10: 8
PAINLEVEL_OUTOF10: 8

## 2018-06-29 ASSESSMENT — PAIN DESCRIPTION - PAIN TYPE
TYPE: ACUTE PAIN
TYPE: CHRONIC PAIN

## 2018-06-30 ENCOUNTER — APPOINTMENT (OUTPATIENT)
Dept: GENERAL RADIOLOGY | Age: 58
DRG: 281 | End: 2018-06-30
Attending: INTERNAL MEDICINE
Payer: COMMERCIAL

## 2018-06-30 ENCOUNTER — APPOINTMENT (OUTPATIENT)
Dept: CT IMAGING | Age: 58
DRG: 281 | End: 2018-06-30
Attending: INTERNAL MEDICINE
Payer: COMMERCIAL

## 2018-06-30 ENCOUNTER — APPOINTMENT (OUTPATIENT)
Dept: ULTRASOUND IMAGING | Age: 58
DRG: 281 | End: 2018-06-30
Attending: INTERNAL MEDICINE
Payer: COMMERCIAL

## 2018-06-30 LAB
ANION GAP SERPL CALCULATED.3IONS-SCNC: 16 MEQ/L (ref 7–13)
ANION GAP SERPL CALCULATED.3IONS-SCNC: 19 MEQ/L (ref 7–13)
BASOPHILS ABSOLUTE: 0 K/UL (ref 0–0.2)
BASOPHILS RELATIVE PERCENT: 1.1 %
BILIRUBIN URINE: NEGATIVE
BLOOD, URINE: NEGATIVE
BUN BLDV-MCNC: 30 MG/DL (ref 6–20)
BUN BLDV-MCNC: 31 MG/DL (ref 6–20)
CALCIUM SERPL-MCNC: 7.7 MG/DL (ref 8.6–10.2)
CALCIUM SERPL-MCNC: 8 MG/DL (ref 8.6–10.2)
CHLORIDE BLD-SCNC: 101 MEQ/L (ref 98–107)
CHLORIDE BLD-SCNC: 102 MEQ/L (ref 98–107)
CLARITY: CLEAR
CO2: 18 MEQ/L (ref 22–29)
CO2: 18 MEQ/L (ref 22–29)
COLOR: YELLOW
CREAT SERPL-MCNC: 1.09 MG/DL (ref 0.5–0.9)
CREAT SERPL-MCNC: 1.31 MG/DL (ref 0.5–0.9)
CREATININE URINE: 181.7 MG/DL
EOSINOPHILS ABSOLUTE: 0.2 K/UL (ref 0–0.7)
EOSINOPHILS RELATIVE PERCENT: 4.9 %
GFR AFRICAN AMERICAN: 50.4
GFR AFRICAN AMERICAN: >60
GFR NON-AFRICAN AMERICAN: 41.7
GFR NON-AFRICAN AMERICAN: 51.5
GLUCOSE BLD-MCNC: 112 MG/DL (ref 60–115)
GLUCOSE BLD-MCNC: 119 MG/DL (ref 74–109)
GLUCOSE BLD-MCNC: 163 MG/DL (ref 60–115)
GLUCOSE BLD-MCNC: 80 MG/DL (ref 60–115)
GLUCOSE BLD-MCNC: 85 MG/DL (ref 74–109)
GLUCOSE BLD-MCNC: 87 MG/DL (ref 60–115)
GLUCOSE URINE: NEGATIVE MG/DL
HCT VFR BLD CALC: 33.1 % (ref 37–47)
HEMOGLOBIN: 11 G/DL (ref 12–16)
KETONES, URINE: NEGATIVE MG/DL
LEUKOCYTE ESTERASE, URINE: NEGATIVE
LV EF: 30 %
LVEF MODALITY: NORMAL
LYMPHOCYTES ABSOLUTE: 1.2 K/UL (ref 1–4.8)
LYMPHOCYTES RELATIVE PERCENT: 27.8 %
MCH RBC QN AUTO: 30.4 PG (ref 27–31.3)
MCHC RBC AUTO-ENTMCNC: 33.4 % (ref 33–37)
MCV RBC AUTO: 91.2 FL (ref 82–100)
MICROALBUMIN UR-MCNC: 1.3 MG/DL
MICROALBUMIN/CREAT UR-RTO: 7.2 MG/G (ref 0–30)
MONOCYTES ABSOLUTE: 0.2 K/UL (ref 0.2–0.8)
MONOCYTES RELATIVE PERCENT: 4.4 %
NEUTROPHILS ABSOLUTE: 2.7 K/UL (ref 1.4–6.5)
NEUTROPHILS RELATIVE PERCENT: 61.8 %
NITRITE, URINE: NEGATIVE
PDW BLD-RTO: 13.4 % (ref 11.5–14.5)
PERFORMED ON: ABNORMAL
PERFORMED ON: NORMAL
PH UA: 5 (ref 5–9)
PLATELET # BLD: 175 K/UL (ref 130–400)
POTASSIUM SERPL-SCNC: 4 MEQ/L (ref 3.5–5.1)
POTASSIUM SERPL-SCNC: 4.2 MEQ/L (ref 3.5–5.1)
PROTEIN UA: NEGATIVE MG/DL
RBC # BLD: 3.63 M/UL (ref 4.2–5.4)
SODIUM BLD-SCNC: 135 MEQ/L (ref 132–144)
SODIUM BLD-SCNC: 139 MEQ/L (ref 132–144)
SPECIFIC GRAVITY UA: 1.03 (ref 1–1.03)
TROPONIN: 0.89 NG/ML (ref 0–0.01)
TROPONIN: 1.37 NG/ML (ref 0–0.01)
UROBILINOGEN, URINE: 0.2 E.U./DL
WBC # BLD: 4.3 K/UL (ref 4.8–10.8)

## 2018-06-30 PROCEDURE — 6370000000 HC RX 637 (ALT 250 FOR IP): Performed by: INTERNAL MEDICINE

## 2018-06-30 PROCEDURE — 99253 IP/OBS CNSLTJ NEW/EST LOW 45: CPT | Performed by: INTERNAL MEDICINE

## 2018-06-30 PROCEDURE — 71046 X-RAY EXAM CHEST 2 VIEWS: CPT

## 2018-06-30 PROCEDURE — 93880 EXTRACRANIAL BILAT STUDY: CPT

## 2018-06-30 PROCEDURE — 86256 FLUORESCENT ANTIBODY TITER: CPT

## 2018-06-30 PROCEDURE — 82570 ASSAY OF URINE CREATININE: CPT

## 2018-06-30 PROCEDURE — 86235 NUCLEAR ANTIGEN ANTIBODY: CPT

## 2018-06-30 PROCEDURE — 1210000000 HC MED SURG R&B

## 2018-06-30 PROCEDURE — 99223 1ST HOSP IP/OBS HIGH 75: CPT | Performed by: INTERNAL MEDICINE

## 2018-06-30 PROCEDURE — 82043 UR ALBUMIN QUANTITATIVE: CPT

## 2018-06-30 PROCEDURE — 86039 ANTINUCLEAR ANTIBODIES (ANA): CPT

## 2018-06-30 PROCEDURE — 72131 CT LUMBAR SPINE W/O DYE: CPT

## 2018-06-30 PROCEDURE — 51798 US URINE CAPACITY MEASURE: CPT

## 2018-06-30 PROCEDURE — 80048 BASIC METABOLIC PNL TOTAL CA: CPT

## 2018-06-30 PROCEDURE — 85025 COMPLETE CBC W/AUTO DIFF WBC: CPT

## 2018-06-30 PROCEDURE — 86255 FLUORESCENT ANTIBODY SCREEN: CPT

## 2018-06-30 PROCEDURE — 72125 CT NECK SPINE W/O DYE: CPT

## 2018-06-30 PROCEDURE — 86308 HETEROPHILE ANTIBODY SCREEN: CPT

## 2018-06-30 PROCEDURE — 81003 URINALYSIS AUTO W/O SCOPE: CPT

## 2018-06-30 PROCEDURE — 36415 COLL VENOUS BLD VENIPUNCTURE: CPT

## 2018-06-30 PROCEDURE — 94664 DEMO&/EVAL PT USE INHALER: CPT

## 2018-06-30 PROCEDURE — 93306 TTE W/DOPPLER COMPLETE: CPT

## 2018-06-30 PROCEDURE — 2580000003 HC RX 258: Performed by: INTERNAL MEDICINE

## 2018-06-30 PROCEDURE — 84484 ASSAY OF TROPONIN QUANT: CPT

## 2018-06-30 RX ORDER — OXYCODONE HYDROCHLORIDE AND ACETAMINOPHEN 5; 325 MG/1; MG/1
1 TABLET ORAL EVERY 6 HOURS PRN
Status: DISCONTINUED | OUTPATIENT
Start: 2018-06-30 | End: 2018-06-30

## 2018-06-30 RX ORDER — GABAPENTIN 400 MG/1
400 CAPSULE ORAL 3 TIMES DAILY
Status: DISCONTINUED | OUTPATIENT
Start: 2018-06-30 | End: 2018-07-03 | Stop reason: HOSPADM

## 2018-06-30 RX ORDER — OXYCODONE HYDROCHLORIDE AND ACETAMINOPHEN 5; 325 MG/1; MG/1
1 TABLET ORAL EVERY 4 HOURS PRN
Status: DISCONTINUED | OUTPATIENT
Start: 2018-06-30 | End: 2018-07-03

## 2018-06-30 RX ORDER — CARVEDILOL 6.25 MG/1
6.25 TABLET ORAL 2 TIMES DAILY WITH MEALS
Status: DISCONTINUED | OUTPATIENT
Start: 2018-06-30 | End: 2018-07-03 | Stop reason: HOSPADM

## 2018-06-30 RX ORDER — LORAZEPAM 2 MG/ML
1 INJECTION INTRAMUSCULAR
Status: ACTIVE | OUTPATIENT
Start: 2018-06-30 | End: 2018-06-30

## 2018-06-30 RX ADMIN — CARVEDILOL 6.25 MG: 6.25 TABLET, FILM COATED ORAL at 12:09

## 2018-06-30 RX ADMIN — ALUMINUM HYDROXIDE, MAGNESIUM HYDROXIDE, AND SIMETHICONE 30 ML: 200; 200; 20 SUSPENSION ORAL at 00:05

## 2018-06-30 RX ADMIN — METOPROLOL TARTRATE 25 MG: 25 TABLET ORAL at 08:32

## 2018-06-30 RX ADMIN — PANTOPRAZOLE SODIUM 40 MG: 40 TABLET, DELAYED RELEASE ORAL at 08:28

## 2018-06-30 RX ADMIN — SODIUM CHLORIDE: 9 INJECTION, SOLUTION INTRAVENOUS at 00:49

## 2018-06-30 RX ADMIN — CARVEDILOL 6.25 MG: 6.25 TABLET, FILM COATED ORAL at 16:20

## 2018-06-30 RX ADMIN — RIVAROXABAN 20 MG: 20 TABLET, FILM COATED ORAL at 08:28

## 2018-06-30 RX ADMIN — GABAPENTIN 400 MG: 400 CAPSULE ORAL at 20:46

## 2018-06-30 RX ADMIN — OXYCODONE HYDROCHLORIDE AND ACETAMINOPHEN 1 TABLET: 5; 325 TABLET ORAL at 20:46

## 2018-06-30 RX ADMIN — INSULIN LISPRO 1 UNITS: 100 INJECTION, SOLUTION INTRAVENOUS; SUBCUTANEOUS at 17:45

## 2018-06-30 RX ADMIN — GABAPENTIN 400 MG: 400 CAPSULE ORAL at 16:16

## 2018-06-30 RX ADMIN — OXYCODONE HYDROCHLORIDE AND ACETAMINOPHEN 1 TABLET: 5; 325 TABLET ORAL at 16:58

## 2018-06-30 RX ADMIN — CITALOPRAM HYDROBROMIDE 40 MG: 20 TABLET ORAL at 08:27

## 2018-06-30 RX ADMIN — HYDROXYCHLOROQUINE SULFATE 200 MG: 200 TABLET, FILM COATED ORAL at 08:27

## 2018-06-30 RX ADMIN — OXYCODONE HYDROCHLORIDE AND ACETAMINOPHEN 1 TABLET: 5; 325 TABLET ORAL at 11:09

## 2018-06-30 RX ADMIN — ALUMINUM HYDROXIDE, MAGNESIUM HYDROXIDE, AND SIMETHICONE 30 ML: 200; 200; 20 SUSPENSION ORAL at 12:13

## 2018-06-30 RX ADMIN — HYDROXYCHLOROQUINE SULFATE 200 MG: 200 TABLET, FILM COATED ORAL at 20:46

## 2018-06-30 RX ADMIN — HYDROXYCHLOROQUINE SULFATE 200 MG: 200 TABLET, FILM COATED ORAL at 00:48

## 2018-06-30 RX ADMIN — SODIUM CHLORIDE: 9 INJECTION, SOLUTION INTRAVENOUS at 20:45

## 2018-06-30 RX ADMIN — AMITRIPTYLINE HYDROCHLORIDE 50 MG: 50 TABLET, FILM COATED ORAL at 20:46

## 2018-06-30 RX ADMIN — AMITRIPTYLINE HYDROCHLORIDE 50 MG: 50 TABLET, FILM COATED ORAL at 00:48

## 2018-06-30 ASSESSMENT — ENCOUNTER SYMPTOMS
SHORTNESS OF BREATH: 1
WHEEZING: 0
ABDOMINAL PAIN: 0
HEMOPTYSIS: 0
ORTHOPNEA: 0
VOMITING: 0
EYES NEGATIVE: 1
DIARRHEA: 0
NAUSEA: 0
RESPIRATORY NEGATIVE: 1
GASTROINTESTINAL NEGATIVE: 1
COUGH: 0
DIARRHEA: 1

## 2018-06-30 ASSESSMENT — PAIN SCALES - GENERAL
PAINLEVEL_OUTOF10: 8
PAINLEVEL_OUTOF10: 7

## 2018-06-30 NOTE — CONSULTS
Inpatient consult to GI  Consult performed by: Justin Carreno  Consult ordered by: Melissa Samuels          Patient Name: Nyla Avendaño Date: 2018 10:12 PM  MR #: [id number]  : 1960    Attending Physician: Noe Addison MD  Reason for consult: Dysphagia     History of Presenting Illness: Kamari Jenkins is a 62 y.o. female on hospital day 1 with a history of DM, GERD, PE,DVT, SLE, Factor V Leiden carrier . History Obtained From:  patient  Patient reports she has a history of GERD. She reports taking Nexium at home. She reports seeing Dr. Chelsie Gregg in the past. She reports having two EGD with dilation. However, she does not remember exact dates or years. However, the patient reports she has has issues with dysphagia for years and the dilation did help. She reports in the last six months swallowing has become more difficult. Patient reports she has difficulty swallowing solids. She states that her difficulty swallowing has progressively become worse in the last few weeks. She now reports that liquids feel like it \" goes down slow\". She reports no regurgitation. Patient denies any chest pain, palpitations, fever, chills, N/V. Patient reports she does have issues with constipation alternating with diarrhea. She reports she has \"colitis\". She reports she takes probiotics at home to help with her \"colitis\". She denies hematochezia or melena.   History:      Past Medical History:   Diagnosis Date    Back pain     Depression     DM (diabetes mellitus) ([facility] 2014    DVT (deep venous thrombosis) ([facility])     Dyslipidemia     Elevated troponin 2016    Factor V Leiden carrier [facility])     history of DVT, on Xarelto    GERD (gastroesophageal reflux disease)     Hx of blood clots     PE, DVT    Hyperlipidemia     Neuropathy of both feet 2016    Obesity     Osteoarthritis     Pulmonary embolism ([facility] [address]     Renal failure 2018    Right inguinal hernia 2017    SOB Not on file     Social History Narrative    No narrative on file       Home Medications:      Prescriptions Prior to Admission: rivaroxaban (XARELTO) 20 MG TABS tablet, TAKE ONE TABLET BY MOUTH ONCE DAILY  estradiol (ESTRACE) 0.5 MG tablet, Take 3 days/week  phentermine 37.5 MG capsule, Take 1 capsule by mouth every morning for 30 days. Body mass index is 34.54 kg/m². Yokasta Coleman gabapentin (NEURONTIN) 400 MG capsule, TAKE 1 CAPSULE THREE TIMES A DAY  balsalazide (COLAZAL) 750 MG capsule, Take 1 capsule by mouth 3 times daily  metaxalone (SKELAXIN) 800 MG tablet, Take 1 tablet by mouth 3 times daily as needed for Pain Generic equivalent acceptable, unless otherwise noted. metFORMIN (GLUCOPHAGE) 500 MG tablet, TAKE 1 TABLET THREE TIMES A DAY  ezetimibe (ZETIA) 10 MG tablet, TAKE 1 TABLET DAILY  citalopram (CELEXA) 40 MG tablet, TAKE 1 TABLET DAILY  hydrocortisone 1 % cream, Apply topically 2 times daily. diclofenac sodium (VOLTAREN) 1 % GEL, Apply 4 g topically 4 times daily (Patient taking differently: Apply 4 g topically 4 times daily as needed )  hydroxychloroquine (PLAQUENIL) 200 MG tablet, Take 200 mg by mouth 2 times daily   traMADol-acetaminophen (ULTRACET) 37.5-325 MG per tablet, Take 1 tablet by mouth 2 times daily as needed   amitriptyline (ELAVIL) 25 MG tablet, Take 50 mg by mouth nightly   albuterol sulfate  (90 BASE) MCG/ACT inhaler, Inhale 2 puffs into the lungs every 6 hours as needed for Wheezing  hydrOXYzine (ATARAX) 25 MG tablet, Take 1 tablet by mouth 3 times daily as needed for Itching  esomeprazole (NEXIUM) 40 MG capsule, Take 1 capsule by mouth daily. Blood Glucose Monitoring Suppl (ACCU-CHEK ADVANTAGE DIABETES) KIT, Diabetic monitoring kit(any brand), with supplies for testing. Test fasting once daily.  Dia.[id number]  Glucose Blood (BLOOD GLUCOSE TEST STRIPS) STRP, Test once daily  Lancets MISC, Testing q day  Cholecalciferol (VITAMIN D) 2000 UNITS CAPS capsule, Take 1 capsule by mouth daily Current Hospital Medications:   Scheduled Meds:   carvedilol  6.25 mg Oral BID WC    gabapentin  400 mg Oral TID    amitriptyline  50 mg Oral Nightly    balsalazide  750 mg Oral TID    citalopram  40 mg Oral Daily    hydroxychloroquine  200 mg Oral BID    rivaroxaban  20 mg Oral Daily    insulin lispro  0-6 Units Subcutaneous TID     insulin lispro  0-3 Units Subcutaneous Nightly    pantoprazole  40 mg Oral Daily     Continuous Infusions:   sodium chloride 100 mL/hr at 06/30/18 0049    dextrose       PRN Meds:.oxyCODONE-acetaminophen, LORazepam, aluminum & magnesium hydroxide-simethicone, ondansetron, albuterol sulfate HFA, hydrOXYzine, glucose, dextrose, glucagon (rDNA), dextrose   sodium chloride 100 mL/hr at 06/30/18 0049    dextrose        Allergies: Allergies   Allergen Reactions    Sulfa Antibiotics Rash    Ciprofloxacin Hcl Swelling     facial, tongue swelling    Nsaids Other (See Comments)     Bleed risk dt Atlee Evens    Statins Other (See Comments)     Liver enzyme elevation      Review of Systems:       [x] CV, Resp, Neuro, , and all other systems reviewed and negative other than listed in HPI. Objective Findings:     Vitals:   Vitals:    06/29/18 2355 06/30/18 0021 06/30/18 0339 06/30/18 1209   BP: 105/63  (!) 97/55 108/72   Pulse: 81 88 90 81   Resp: 20 20     Temp: 97.3 °F (36.3 °C)  97.3 °F (36.3 °C)    TempSrc: Oral      SpO2: 100% 99% 99%    Weight:       Height:            Physical Examination:  General: No apparent distress, Alert and oriented, resting comfortably in bed. HEENT: Normocephalic, no scleral icterus. Neck: No JVD. Heart: Regular, no murmur, no rub/gallop. No RV heave. Lungs: Clear to ascultation, no rales/wheezing/rhonchi. Good chest wall excursion. Abdomen: Appearance:, Distension -none, Soft , tenderness -no, Bowel sounds normal, central obesity  Extremities: No clubbing/cyanosis, no edema.    Skin: Warm, dry, normal turgor, no rash, no bruise, no

## 2018-06-30 NOTE — CONSULTS
phentermine,  Neurontin, Colazal, Skelaxin, Glucophage, Zetia, Voltaren gel, Plaquenil,  Nexium, Atarax, and vitamin D.    PHYSICAL EXAMINATION:  VITAL SIGNS:  The patient is 5 feet 3 inches, 193 pounds. Blood pressure  100/50, heart rate 80 and regular, respirations 20 a minute, and afebrile. HEENT:  Normocephalic. Pupils equal and react to light. Extraocular  muscles intact. NECK:  Supple. No JVD or adenopathy. LUNGS:  Relatively clear. Chest x-ray is not chart. HEART:  Regular. 1/6 systolic murmur. ABDOMEN:  Obese. There is no guarding or rigidity. Left lower quadrant  hernia suspected. EXTREMITIES:  Lower extremities showed trace edema of the calves and ankles  bilaterally. No cyanosis. There is no calf pain on palpation. IMPRESSION:  1. GIOVANI, etiology uncertain, possibly related to hypotension, cannot rule  out primary glomerular issue with her history of lupus, possible  cardiorenal.  Echo being performed. 2.  History of pulmonary emboli, vascular coagulopathy, obesity, history of  lupus, factor V Leiden disorder, diabetes mellitus type 2, neuropathy, and  chronic back pain. PLAN:  Adjust medications. Review list of medicines that may interfere  with blood pressure and avoid nephrotoxic agents. Maintain good I and O's. Daily weights.       Tanisha Ellington DO    D: 06/30/2018 11:09:51       T: 06/30/2018 11:12:05     [id number]  Job#: [id number]     Doc#: [id number]    CC:

## 2018-06-30 NOTE — CONSULTS
12/16/13    DR SINGER       Social History     Social History    Marital status:      Spouse name: N/A    Number of children: N/A    Years of education: N/A     Occupational History    Housewife      Social History Main Topics    Smoking status: Former Smoker     Packs/day: 1.25     Years: 10.00     Types: Cigarettes     Quit date: 1/1/2006    Smokeless tobacco: Never Used      Comment: start smoking age 25    Alcohol use 0.6 oz/week     1 Cans of beer per week      Comment: mod    Drug use: No    Sexual activity: Yes     Other Topics Concern    Not on file     Social History Narrative    No narrative on file       Family History   Problem Relation Age of Onset    Substance Abuse Brother     High Blood Pressure Mother     High Cholesterol Mother     Arthritis Mother     High Blood Pressure Father     High Cholesterol Father     Clotting Disorder Father         clot to intestines    Arthritis Father     Substance Abuse Brother     Substance Abuse Brother     Stroke Paternal Uncle 36    Cystic Fibrosis Daughter        Current Facility-Administered Medications   Medication Dose Route Frequency Provider Last Rate Last Dose    carvedilol (COREG) tablet 6.25 mg  6.25 mg Oral BID  Maurizio Baldwin, DO   6.25 mg at 06/30/18 1209    gabapentin (NEURONTIN) capsule 400 mg  400 mg Oral TID Lois Rock MD        oxyCODONE-acetaminophen (PERCOCET) 5-325 MG per tablet 1 tablet  1 tablet Oral Q4H PRN Lois Rock MD        aluminum & magnesium hydroxide-simethicone (MAALOX) 200-200-20 MG/5ML suspension 30 mL  30 mL Oral Q6H PRN Herma Duverney, MD   30 mL at 06/30/18 1213    0.9 % sodium chloride infusion   Intravenous Continuous Herma Duverney,  mL/hr at 06/30/18 0049      ondansetron (ZOFRAN) injection 4 mg  4 mg Intravenous Q6H PRN Herma Duverney, MD        albuterol sulfate  (90 Base) MCG/ACT inhaler 2 puff  2 puff Inhalation Q6H PRN Herma Duverney, MD       [employer] amitriptyline (ELAVIL) tablet 50 mg  50 mg Oral Nightly Mauro Zhang MD   50 mg at 06/30/18 0048    balsalazide (COLAZAL) capsule 750 mg  750 mg Oral TID Mauro Zhang MD        citalopram (CELEXA) tablet 40 mg  40 mg Oral Daily Mauro Zhang MD   40 mg at 06/30/18 0827    hydroxychloroquine (PLAQUENIL) tablet 200 mg  200 mg Oral BID Mauro Zhang MD   200 mg at 06/30/18 0827    hydrOXYzine (ATARAX) tablet 25 mg  25 mg Oral TID PRN Mauro Zhang MD        rivaroxaban (XARELTO) tablet 20 mg  20 mg Oral Daily Mauro Zhang MD   20 mg at 06/30/18 0828    insulin lispro (HUMALOG) injection vial 0-6 Units  0-6 Units Subcutaneous TID WC Mauro Zhang MD        insulin lispro (HUMALOG) injection vial 0-3 Units  0-3 Units Subcutaneous Nightly Mauro Zhang MD        glucose (GLUTOSE) 40 % oral gel 15 g  15 g Oral PRN Mauro Zhang MD        dextrose 50 % solution 12.5 g  12.5 g Intravenous PRN Mauro Zhang MD        glucagon (rDNA) injection 1 mg  1 mg Intramuscular PRN Mauro Zhang MD        dextrose 5 % solution  100 mL/hr Intravenous PRN Mauro Zhang MD        pantoprazole (PROTONIX) tablet 40 mg  40 mg Oral Daily Mauro Zhang MD   40 mg at 06/30/18 3217       ALLERGIES: Sulfa antibiotics; Ciprofloxacin hcl; Nsaids; and Statins    Review of Systems   Constitutional: Positive for malaise/fatigue. Negative for chills, fever and weight loss. HENT: Negative. Eyes: Negative. Respiratory: Positive for shortness of breath. Negative for hemoptysis and wheezing. Cardiovascular: Negative for chest pain, palpitations, orthopnea, claudication, leg swelling and PND. Gastrointestinal: Positive for diarrhea. Negative for abdominal pain, nausea and vomiting. Genitourinary: Negative. Skin: Negative. Negative for rash. Neurological: Negative for dizziness, loss of consciousness, weakness and headaches. Endo/Heme/Allergies: Negative. VITALS:  Blood pressure 108/72, pulse 81, temperature 97.3 °F (36.3 °C), resp. rate 20, height 5' 3\" (1.6 m), weight 193 lb 1 oz (87.6 kg), SpO2 99 %, not currently breastfeeding. Body mass index is 34.2 kg/m². Physical Exam   Constitutional: She is oriented to person, place, and time. She appears well-developed and well-nourished. HENT:   Head: Normocephalic and atraumatic. Eyes: Pupils are equal, round, and reactive to light. Neck: Normal range of motion. Neck supple. No JVD present. No tracheal deviation present. No thyromegaly present. Cardiovascular: Normal rate, regular rhythm, normal heart sounds and intact distal pulses. PMI is not displaced. Exam reveals no gallop, no S3, no distant heart sounds and no friction rub. No murmur heard. Pulmonary/Chest: No respiratory distress. She has no wheezes. She has no rales. She exhibits no tenderness. Abdominal: Soft. Bowel sounds are normal. She exhibits no distension and no mass. There is no tenderness. There is no rebound and no guarding. Musculoskeletal: She exhibits edema. Neurological: She is alert and oriented to person, place, and time. No cranial nerve deficit. Skin: Skin is warm and dry. No rash noted. She is not diaphoretic. No erythema. No pallor. Psychiatric: She has a normal mood and affect.  Her behavior is normal. Judgment and thought content normal.       LABS:  Recent Results (from the past 24 hour(s))   EKG 12 Lead    Collection Time: 06/29/18  5:28 PM   Result Value Ref Range    Ventricular Rate 107 BPM    Atrial Rate 107 BPM    P-R Interval 130 ms    QRS Duration 86 ms    Q-T Interval 360 ms    QTc Calculation (Bazett) 480 ms    P Axis 43 degrees    R Axis -10 degrees    T Axis 15 degrees   Comprehensive Metabolic Panel    Collection Time: 06/29/18  5:57 PM   Result Value Ref Range    Sodium 139 132 - 144 mEq/L    Potassium 3.8 3.5 - 5.1 mEq/L    Chloride 98 98 - 107 mEq/L    CO2 18 (L) 22 - 29 mEq/L    Anion Gap

## 2018-06-30 NOTE — PROGRESS NOTES
Assumed care of patient at [address] [place] [address]. Pt resting in bed. Prevoid bladder scan was 200ml and pt urinated 150 ml. Urine sent down to lab per orders. Family at bedside.  Electronically signed by Belia Mayorga RN on 6/30/2018 at 4:49 PM

## 2018-06-30 NOTE — PROGRESS NOTES
Telemetry monitor on and patient was resting. Patient does not complain of any chest discomfort only back pain. Call light is with the patient. Her daughter is at the bedside sleeping.

## 2018-06-30 NOTE — H&P
sensory change and headaches. Psychiatric/Behavioral: Negative. Physical Exam   Constitutional: She is oriented to person, place, and time. She appears well-developed and well-nourished. She appears distressed. HENT:   Head: Normocephalic and atraumatic. Mouth/Throat: No oropharyngeal exudate. Eyes: Conjunctivae are normal. Pupils are equal, round, and reactive to light. Left eye exhibits no discharge. No scleral icterus. Neck: No JVD present. No tracheal deviation present. Cardiovascular: Regular rhythm, normal heart sounds and intact distal pulses. No murmur heard. Pulmonary/Chest: Effort normal and breath sounds normal. No respiratory distress. She has no rales. Abdominal: Soft. Bowel sounds are normal. She exhibits no distension. There is tenderness. There is no rebound. Musculoskeletal: Normal range of motion. She exhibits no edema or tenderness. Neurological: She is alert and oriented to person, place, and time. No cranial nerve deficit. Coordination normal.   Skin: Skin is warm and dry. She is not diaphoretic. Labs/imaging/ekg results are reviewed by me    Assessment/Plan:  1. Back pain/left hand numbness  Evaluate  for NSTEMI  Continue xalretol  Serial cardiac enzymes  Metoprolol 25mg bid  aspirin 325mg daily      2.   GIOVANI  Saline infusion  Monitor renal panel    Luke Romero MD  6/29/2018

## 2018-06-30 NOTE — PROGRESS NOTES
Savannah Vital is a 62 y.o. female patient.  Pt was seen and evaluated echo is done ,     Current Facility-Administered Medications   Medication Dose Route Frequency Provider Last Rate Last Dose    carvedilol (COREG) tablet 6.25 mg  6.25 mg Oral BID  Allyson Moran DO   6.25 mg at 06/30/18 1209    gabapentin (NEURONTIN) capsule 400 mg  400 mg Oral TID Emilie May MD        oxyCODONE-acetaminophen (PERCOCET) 5-325 MG per tablet 1 tablet  1 tablet Oral Q4H PRN Emilie May MD        aluminum & magnesium hydroxide-simethicone (MAALOX) 200-200-20 MG/5ML suspension 30 mL  30 mL Oral Q6H PRN Randolph Dukes MD   30 mL at 06/30/18 1213    0.9 % sodium chloride infusion   Intravenous Continuous Randolph Dukes  mL/hr at 06/30/18 0049      ondansetron (ZOFRAN) injection 4 mg  4 mg Intravenous Q6H PRN Randolph Dukes MD        albuterol sulfate  (90 Base) MCG/ACT inhaler 2 puff  2 puff Inhalation Q6H PRN Randolph Dukes MD        amitriptyline (ELAVIL) tablet 50 mg  50 mg Oral Nightly Randolph Dukes MD   50 mg at 06/30/18 0048    balsalazide (COLAZAL) capsule 750 mg  750 mg Oral TID Randolph Dukes MD        citalopram (CELEXA) tablet 40 mg  40 mg Oral Daily Randolph Dukes MD   40 mg at 06/30/18 0827    hydroxychloroquine (PLAQUENIL) tablet 200 mg  200 mg Oral BID Randolph Dukes MD   200 mg at 06/30/18 0827    hydrOXYzine (ATARAX) tablet 25 mg  25 mg Oral TID PRN Ranodlph Dukes MD        rivaroxaban (XARELTO) tablet 20 mg  20 mg Oral Daily Randolph Dukes MD   20 mg at 06/30/18 0828    insulin lispro (HUMALOG) injection vial 0-6 Units  0-6 Units Subcutaneous TID  Randolph Dukes MD        insulin lispro (HUMALOG) injection vial 0-3 Units  0-3 Units Subcutaneous Nightly Randolph Dukes MD        glucose (GLUTOSE) 40 % oral gel 15 g  15 g Oral PRN Randolph Dukes MD        dextrose 50 % solution 12.5 g  12.5 g Intravenous PRN MD Maria Elena Goldman glucagon (rDNA) injection 1 mg  1 mg Intramuscular PRN Sherry Vincent MD        dextrose 5 % solution  100 mL/hr Intravenous PRN Sherry Vincent MD        pantoprazole (PROTONIX) tablet 40 mg  40 mg Oral Daily Sherry Vincent MD   40 mg at 06/30/18 1774     Allergies   Allergen Reactions    Sulfa Antibiotics Rash    Ciprofloxacin Hcl Swelling     facial, tongue swelling    Nsaids Other (See Comments)     Bleed risk dt Wynell Pallas    Statins Other (See Comments)     Liver enzyme elevation     Active Problems:    Renal failure  Resolved Problems:    * No resolved hospital problems. *    Blood pressure 108/72, pulse 81, temperature 97.3 °F (36.3 °C), resp. rate 20, height 5' 3\" (1.6 m), weight 193 lb 1 oz (87.6 kg), SpO2 99 %, not currently breastfeeding. Subjective:  Symptoms:  She reports shortness of breath (with exertion), malaise and weakness. No cough, chest pain, headache, chest pressure, anorexia, diarrhea or anxiety. Diet:  No nausea or vomiting. Objective:  General Appearance:  Comfortable, well-appearing and in no acute distress. Vital signs: (most recent): Blood pressure 108/72, pulse 81, temperature 97.3 °F (36.3 °C), resp. rate 20, height 5' 3\" (1.6 m), weight 193 lb 1 oz (87.6 kg), SpO2 99 %, not currently breastfeeding. HEENT: Normal HEENT exam.    Lungs:  Normal effort and normal respiratory rate. Breath sounds clear to auscultation. Heart: Normal rate. Regular rhythm. S1 normal and S2 normal.    Abdomen: Abdomen is soft. Bowel sounds are normal.     Pulses: Distal pulses are intact. Neurological: Patient is alert and oriented to person, place and time. Pupils:  Pupils are equal, round, and reactive to light. Skin:  Warm and dry.       Lab Results   Component Value Date    WBC 4.3 (L) 06/30/2018    HGB 11.0 (L) 06/30/2018    HCT 33.1 (L) 06/30/2018    MCV 91.2 06/30/2018     06/30/2018     Lab Results   Component Value Date     06/30/2018    K 4.2

## 2018-06-30 NOTE — CONSULTS
[address]                       [facility], [address] [facility]                                   CONSULTATION    PATIENT NAME: Dwain Daniels                :        1960  MED REC NO:   [id number]                            ROOM:       N990  ACCOUNT NO:   [de-identified]                           ADMIT DATE: 2018  PROVIDER:     Mila Kirk MD    CONSULT DATE:  2018    HISTORY OF PRESENT ILLNESS:  This is a 63-year-old right-handed female  admitted with history of multiple medical issues. I was consulted. The  patient reported numbness and tingling of her left arm. It started a few  days ago. She also has gait ataxia and loss of balance for many years and  she is having some more falls. I had diagnosed her with lupus just of  recent and referred her to someone in [facility] who she is seeing and she is  now on Plaquenil. She had very high elevated titers of double stranded  DNA. She has also fibromyalgia and pain and we treat her with pain  medications. She is not complaining of any heaviness or weakness of her  arm. She denies any neck pain. She does have lower back pain. Denies any  bowel or bladder dysfunction. She has not had any recent injuries, trauma,  fevers, chills, or rigors. She has not had any rash or joint swelling. PAST MEDICAL HISTORY:  Remarkable for chronic abdominal pain, history of  depression, fibromyalgia, lupus just recently diagnosed. She has chronic  low back pain, deep vein thrombosis in the past.  She has history of  diabetes mellitus, neck pain, neuropathy, and paresthesias. She had  chronic sciatica, colitis, and numbness of her hands. MEDICATIONS:  Albuterol, amitriptyline, carvedilol, gabapentin,  hydroxyzine, Humulin insulin, and Xarelto. PHYSICAL EXAMINATION:  GENERAL:  She is in some distress due to multiple issues and pain. EXTREMITIES:  Reveals no pronator drift.   Fine finger movements

## 2018-07-01 ENCOUNTER — APPOINTMENT (OUTPATIENT)
Dept: MRI IMAGING | Age: 58
DRG: 281 | End: 2018-07-01
Attending: INTERNAL MEDICINE
Payer: COMMERCIAL

## 2018-07-01 LAB
ANION GAP SERPL CALCULATED.3IONS-SCNC: 15 MEQ/L (ref 7–13)
BUN BLDV-MCNC: 21 MG/DL (ref 6–20)
C-REACTIVE PROTEIN, HIGH SENSITIVITY: 40.4 MG/L (ref 0–5)
CALCIUM SERPL-MCNC: 7.5 MG/DL (ref 8.6–10.2)
CHLORIDE BLD-SCNC: 103 MEQ/L (ref 98–107)
CHOLESTEROL, TOTAL: 120 MG/DL (ref 0–199)
CO2: 17 MEQ/L (ref 22–29)
CREAT SERPL-MCNC: 0.72 MG/DL (ref 0.5–0.9)
GFR AFRICAN AMERICAN: >60
GFR NON-AFRICAN AMERICAN: >60
GLUCOSE BLD-MCNC: 117 MG/DL (ref 74–109)
GLUCOSE BLD-MCNC: 144 MG/DL (ref 60–115)
GLUCOSE BLD-MCNC: 147 MG/DL (ref 60–115)
GLUCOSE BLD-MCNC: 176 MG/DL (ref 60–115)
GLUCOSE BLD-MCNC: 88 MG/DL (ref 60–115)
HDLC SERPL-MCNC: 35 MG/DL (ref 40–59)
LDL CHOLESTEROL CALCULATED: 62 MG/DL (ref 0–129)
PERFORMED ON: ABNORMAL
PERFORMED ON: NORMAL
POTASSIUM SERPL-SCNC: 4.5 MEQ/L (ref 3.5–5.1)
SODIUM BLD-SCNC: 135 MEQ/L (ref 132–144)
TRIGL SERPL-MCNC: 113 MG/DL (ref 0–200)

## 2018-07-01 PROCEDURE — 36415 COLL VENOUS BLD VENIPUNCTURE: CPT

## 2018-07-01 PROCEDURE — G8978 MOBILITY CURRENT STATUS: HCPCS

## 2018-07-01 PROCEDURE — 86141 C-REACTIVE PROTEIN HS: CPT

## 2018-07-01 PROCEDURE — 6360000004 HC RX CONTRAST MEDICATION: Performed by: PSYCHIATRY & NEUROLOGY

## 2018-07-01 PROCEDURE — 1210000000 HC MED SURG R&B

## 2018-07-01 PROCEDURE — 6370000000 HC RX 637 (ALT 250 FOR IP): Performed by: INTERNAL MEDICINE

## 2018-07-01 PROCEDURE — 80061 LIPID PANEL: CPT

## 2018-07-01 PROCEDURE — G8979 MOBILITY GOAL STATUS: HCPCS

## 2018-07-01 PROCEDURE — 6360000002 HC RX W HCPCS: Performed by: PSYCHIATRY & NEUROLOGY

## 2018-07-01 PROCEDURE — 99232 SBSQ HOSP IP/OBS MODERATE 35: CPT | Performed by: INTERNAL MEDICINE

## 2018-07-01 PROCEDURE — 72141 MRI NECK SPINE W/O DYE: CPT

## 2018-07-01 PROCEDURE — 97162 PT EVAL MOD COMPLEX 30 MIN: CPT

## 2018-07-01 PROCEDURE — 70553 MRI BRAIN STEM W/O & W/DYE: CPT

## 2018-07-01 PROCEDURE — A9579 GAD-BASE MR CONTRAST NOS,1ML: HCPCS | Performed by: PSYCHIATRY & NEUROLOGY

## 2018-07-01 PROCEDURE — 80048 BASIC METABOLIC PNL TOTAL CA: CPT

## 2018-07-01 RX ORDER — DIPHENHYDRAMINE HCL 25 MG
50 TABLET ORAL ONCE
Status: CANCELLED | OUTPATIENT
Start: 2018-07-01 | End: 2018-07-01

## 2018-07-01 RX ORDER — LORAZEPAM 2 MG/ML
1 INJECTION INTRAMUSCULAR ONCE
Status: DISCONTINUED | OUTPATIENT
Start: 2018-07-01 | End: 2018-07-01 | Stop reason: CLARIF

## 2018-07-01 RX ORDER — NITROGLYCERIN 0.4 MG/1
0.4 TABLET SUBLINGUAL EVERY 5 MIN PRN
Status: CANCELLED | OUTPATIENT
Start: 2018-07-01

## 2018-07-01 RX ORDER — LORAZEPAM 2 MG/ML
INJECTION INTRAMUSCULAR
Status: DISPENSED
Start: 2018-07-01 | End: 2018-07-01

## 2018-07-01 RX ADMIN — PANTOPRAZOLE SODIUM 40 MG: 40 TABLET, DELAYED RELEASE ORAL at 09:21

## 2018-07-01 RX ADMIN — GABAPENTIN 400 MG: 400 CAPSULE ORAL at 09:20

## 2018-07-01 RX ADMIN — AMITRIPTYLINE HYDROCHLORIDE 50 MG: 50 TABLET, FILM COATED ORAL at 20:29

## 2018-07-01 RX ADMIN — INSULIN LISPRO 1 UNITS: 100 INJECTION, SOLUTION INTRAVENOUS; SUBCUTANEOUS at 17:30

## 2018-07-01 RX ADMIN — GADOTERIDOL 15 ML: 279.3 INJECTION, SOLUTION INTRAVENOUS at 12:48

## 2018-07-01 RX ADMIN — LORAZEPAM 1 MG: 2 INJECTION INTRAMUSCULAR; INTRAVENOUS at 11:24

## 2018-07-01 RX ADMIN — HYDROXYCHLOROQUINE SULFATE 200 MG: 200 TABLET, FILM COATED ORAL at 20:28

## 2018-07-01 RX ADMIN — GABAPENTIN 400 MG: 400 CAPSULE ORAL at 20:29

## 2018-07-01 RX ADMIN — RIVAROXABAN 20 MG: 20 TABLET, FILM COATED ORAL at 09:20

## 2018-07-01 RX ADMIN — OXYCODONE HYDROCHLORIDE AND ACETAMINOPHEN 1 TABLET: 5; 325 TABLET ORAL at 21:04

## 2018-07-01 RX ADMIN — INSULIN LISPRO 1 UNITS: 100 INJECTION, SOLUTION INTRAVENOUS; SUBCUTANEOUS at 09:22

## 2018-07-01 RX ADMIN — CITALOPRAM HYDROBROMIDE 40 MG: 20 TABLET ORAL at 09:20

## 2018-07-01 RX ADMIN — GABAPENTIN 400 MG: 400 CAPSULE ORAL at 14:29

## 2018-07-01 RX ADMIN — CARVEDILOL 6.25 MG: 6.25 TABLET, FILM COATED ORAL at 09:20

## 2018-07-01 RX ADMIN — HYDROXYCHLOROQUINE SULFATE 200 MG: 200 TABLET, FILM COATED ORAL at 09:21

## 2018-07-01 RX ADMIN — OXYCODONE HYDROCHLORIDE AND ACETAMINOPHEN 1 TABLET: 5; 325 TABLET ORAL at 09:20

## 2018-07-01 ASSESSMENT — PAIN DESCRIPTION - DESCRIPTORS: DESCRIPTORS: ACHING

## 2018-07-01 ASSESSMENT — PAIN DESCRIPTION - PAIN TYPE
TYPE: CHRONIC PAIN;ACUTE PAIN
TYPE: ACUTE PAIN

## 2018-07-01 ASSESSMENT — PAIN SCALES - GENERAL
PAINLEVEL_OUTOF10: 8
PAINLEVEL_OUTOF10: 0
PAINLEVEL_OUTOF10: 7
PAINLEVEL_OUTOF10: 5

## 2018-07-01 ASSESSMENT — ENCOUNTER SYMPTOMS: SHORTNESS OF BREATH: 0

## 2018-07-01 ASSESSMENT — PAIN DESCRIPTION - LOCATION
LOCATION: NECK;BACK
LOCATION: BACK;NECK

## 2018-07-01 NOTE — PROGRESS NOTES
REPAIR, 90 MINS SUPINE performed by Cintia Bob MD at [address]  12/16/13    DR SINGER       Chart Reviewed: Yes  Family / Caregiver Present: Yes (daughter)    Restrictions:        SUBJECTIVE:    Pre Treatment Pain Screening  Pain at present: 8  Intervention List: Patient able to continue with treatment  Comments / Details: med previously given    Post Treatment Pain Screening:   Pain Assessment  Pain Level: 8  Pain Type: Chronic pain;Acute pain  Pain Location: Back;Neck  Pain Descriptors: Aching    Prior Level of Function:  Social/Functional History  Lives With: Spouse  Type of Home: House  Home Layout: One level  Home Equipment:  (none)  ADL Assistance: Independent  Ambulation Assistance: Independent (reports high frequency of falls - 5 in the past week)  Transfer Assistance: Independent  Additional Comments: Pt reports that mechanism of falls is legs weakening and giving out. OBJECTIVE:   Vision/Hearing:  Vision: Within Functional Limits  Hearing: Within functional limits    Cognition:  Overall Orientation Status: Within Normal Limits  Follows Commands: Within Functional Limits         ROM:  RLE PROM: WFL  LLE PROM: WFL    Strength:  Strength RLE  Comment: observed to have full active function and no buckling during gait  Strength LLE  Comment: observed to have full active function and no buckling during gait    Neuro:  Balance  Posture: Good  Sitting - Static: Good  Sitting - Dynamic: Good  Standing - Static: Good  Standing - Dynamic: Good (with device)             Bed mobility  Rolling to Left: Modified independent  Rolling to Right: Modified independent  Supine to Sit: Modified independent  Sit to Supine: Modified independent  Comment: increased time required    Transfers  Sit to Stand: Modified independent  Stand to sit: Modified independent    Ambulation  Ambulation?: Yes  Ambulation 1  Surface: level tile  Device: Rolling Walker; No Device  Assistance: Modified Around Current Status ():  At least 1 percent but less than 20 percent impaired, limited or restricted  Mobility: Walking and Moving Around Goal Status (): 0 percent impaired, limited or restricted    Goals:  Patient goals : to be safe and not to fall  Short term goals  Short term goal 1: indep gait 50 feet with appropriate device if needed  Short term goal 2: SBA with stairs with safest technique determined and instructed to pt  Short term goal 3: indep with LE HEP    AMPAC (6 CLICK) BASIC MOBILITY  AM-PAC Inpatient Mobility Raw Score : 23     Therapy Time:   Individual   Time In 1105   Time Out 1130   Minutes [address] [place] [address], 07/01/18 at 11:36 AM

## 2018-07-01 NOTE — PROGRESS NOTES
Rechecked BP per  order, and pt BP is 111/64.     Dr. Rome Velásquez informed    Electronically signed by Herman Perez RN on 7/1/2018 at 2:18 AM

## 2018-07-01 NOTE — PROGRESS NOTES
 History of recurrent UTIs    Chronic bilateral thoracic back pain    Pain in both hands    Autoantibody titer elevated    Generalized OA    Lupus erythematosus    Chronic abdominal pain    Vaginal dryness    S/P total hysterectomy    Long-term use of Plaquenil    Rash and nonspecific skin eruption    Esophageal web    Class 1 obesity with serious comorbidity and body mass index (BMI) of 34.0 to 34.9 in adult    Renal failure       Current Facility-Administered Medications   Medication Dose Route Frequency Provider Last Rate Last Dose    LORazepam (ATIVAN) 2 MG/ML injection             carvedilol (COREG) tablet 6.25 mg  6.25 mg Oral BID  Telma Panda Bescak, DO   6.25 mg at 07/01/18 0920    gabapentin (NEURONTIN) capsule 400 mg  400 mg Oral TID Sean Rojas MD   400 mg at 07/01/18 0920    oxyCODONE-acetaminophen (PERCOCET) 5-325 MG per tablet 1 tablet  1 tablet Oral Q4H PRN Sean Rojas MD   1 tablet at 07/01/18 0920    aluminum & magnesium hydroxide-simethicone (MAALOX) 200-200-20 MG/5ML suspension 30 mL  30 mL Oral Q6H PRN Alfonzo Anne MD   30 mL at 06/30/18 1213    ondansetron (ZOFRAN) injection 4 mg  4 mg Intravenous Q6H PRN Alfonzo Anne MD        albuterol sulfate  (90 Base) MCG/ACT inhaler 2 puff  2 puff Inhalation Q6H PRN Alfonzo Anne MD        amitriptyline (ELAVIL) tablet 50 mg  50 mg Oral Nightly Alfonzo Anne MD   50 mg at 06/30/18 2046    balsalazide (COLAZAL) capsule 750 mg  750 mg Oral TID Alfonzo Anne MD        citalopram (CELEXA) tablet 40 mg  40 mg Oral Daily Alfonzo Anne MD   40 mg at 07/01/18 0920    hydroxychloroquine (PLAQUENIL) tablet 200 mg  200 mg Oral BID Alfonzo Anne MD   200 mg at 07/01/18 9966    hydrOXYzine (ATARAX) tablet 25 mg  25 mg Oral TID PRN Alfonzo Anne MD        rivaroxaban (XARELTO) tablet 20 mg  20 mg Oral Daily Alfonzo Anne MD   20 mg at 07/01/18 0920    insulin lispro (HUMALOG) injection vial 120 0 - 199 mg/dL    Triglycerides 113 0 - 200 mg/dL    HDL 35 (L) 40 - 59 mg/dL    LDL Calculated 62 0 - 129 mg/dL   High sensitivity CRP    Collection Time: 07/01/18  6:10 AM   Result Value Ref Range    CRP High Sensitivity 40.4 (H) 0.0 - 5.0 mg/L   Basic Metabolic Panel    Collection Time: 07/01/18  6:10 AM   Result Value Ref Range    Sodium 135 132 - 144 mEq/L    Potassium 4.5 3.5 - 5.1 mEq/L    Chloride 103 98 - 107 mEq/L    CO2 17 (L) 22 - 29 mEq/L    Anion Gap 15 (H) 7 - 13 mEq/L    Glucose 117 (H) 74 - 109 mg/dL    BUN 21 (H) 6 - 20 mg/dL    CREATININE 0.72 0.50 - 0.90 mg/dL    GFR Non-African American >60.0 >60    GFR  >60.0 >60    Calcium 7.5 (L) 8.6 - 10.2 mg/dL   POCT Glucose    Collection Time: 07/01/18  8:22 AM   Result Value Ref Range    POC Glucose 176 (H) 60 - 115 mg/dl    Performed on ACCU-CHEK      Troponin:    Lab Results   Component Value Date    TROPONINI 0.891 06/30/2018       EKG: normal sinus rhythm, nonspecific ST and T waves changes      ASSESSMENT:    NSTEMI- ? Type I vs II  ARF, resolved  Hx ofDVT/ PE/Factor V leiden carrier. on Xarelto  HTN  HLP  Hx of SLE  Obesity  Hx of DM        PLAN:   1. As always, aggressive risk factor modification is strongly recommended. We should adhere to the JNC VIII guidelines for HTN management and the NCEP ATP III guidelines for LDL-C management. 2. Check 2D Echo for LV function, PA pressures, wall motion abnormalities and any significant valvular disease. 3. Coronary evaluation when feasible. Likely LH given multiple risk factors for CAD and trop elevation. We'll plan for tomorrow  4. Monitor on tele  5. No nephrotoxic agents   6. Keep potassium greater than 4 magnesium greater than 2  7. Continue with oral anti-correlation given history of DVT/PE and factor V Leiden deficiency. We'll hold today in preparation for left heart catheterization  8. Maximize cardiac medications.   She is allergic to aspirin and statins     Thank you for allowing me to participate in the care of your patient, please don't hesitate to contact me if you have any further questions.     Electronically signed by Isha Galvez DO on 6/30/2018 at 1:15 PM

## 2018-07-01 NOTE — PROGRESS NOTES
Isidra Torres is a 62 y.o. female patient.  Pt was seen and evaluated going for possible cardiac cath, and esophogram needs to be done    Current Facility-Administered Medications   Medication Dose Route Frequency Provider Last Rate Last Dose    LORazepam (ATIVAN) injection 1 mg  1 mg Intravenous Once Radha Ochoa MD        LORazepam (ATIVAN) 2 MG/ML injection             carvedilol (COREG) tablet 6.25 mg  6.25 mg Oral BID  Jaye Thao DO   6.25 mg at 07/01/18 0920    gabapentin (NEURONTIN) capsule 400 mg  400 mg Oral TID Sheri Guzman MD   400 mg at 07/01/18 0920    oxyCODONE-acetaminophen (PERCOCET) 5-325 MG per tablet 1 tablet  1 tablet Oral Q4H PRN Sheri Guzman MD   1 tablet at 07/01/18 0920    aluminum & magnesium hydroxide-simethicone (MAALOX) 200-200-20 MG/5ML suspension 30 mL  30 mL Oral Q6H PRN Harris Pineda MD   30 mL at 06/30/18 1213    ondansetron (ZOFRAN) injection 4 mg  4 mg Intravenous Q6H PRN Harris Pineda MD        albuterol sulfate  (90 Base) MCG/ACT inhaler 2 puff  2 puff Inhalation Q6H PRN Harris Pineda MD        amitriptyline (ELAVIL) tablet 50 mg  50 mg Oral Nightly Harris Pineda MD   50 mg at 06/30/18 2046    balsalazide (COLAZAL) capsule 750 mg  750 mg Oral TID Harris Pineda MD        citalopram (CELEXA) tablet 40 mg  40 mg Oral Daily Harris Pineda MD   40 mg at 07/01/18 0920    hydroxychloroquine (PLAQUENIL) tablet 200 mg  200 mg Oral BID Harris Pineda MD   200 mg at 07/01/18 0807    hydrOXYzine (ATARAX) tablet 25 mg  25 mg Oral TID PRN Harris Pineda MD        rivaroxaban (XARELTO) tablet 20 mg  20 mg Oral Daily Harris Pineda MD   20 mg at 07/01/18 0920    insulin lispro (HUMALOG) injection vial 0-6 Units  0-6 Units Subcutaneous TID  Harris Pineda MD   1 Units at 07/01/18 0922    insulin lispro (HUMALOG) injection vial 0-3 Units  0-3 Units Subcutaneous Nightly Harris Pineda MD        glucose (GLUTOSE) 40 % (L) 06/30/2018    HCT 33.1 (L) 06/30/2018    MCV 91.2 06/30/2018     06/30/2018     Lab Results   Component Value Date     07/01/2018    K 4.5 07/01/2018     07/01/2018    CO2 17 07/01/2018    BUN 21 07/01/2018    CREATININE 0.72 07/01/2018    GLUCOSE 117 07/01/2018    GLUCOSE 139 03/13/2012    CALCIUM 7.5 07/01/2018      Past Medical History:   Diagnosis Date    Back pain     Depression     DM (diabetes mellitus) ([facility] 11/25/2014    DVT (deep venous thrombosis) ([facility])     Dyslipidemia     Elevated troponin 5/25/2016    Factor V Leiden carrier [facility])     history of DVT, on Xarelto    GERD (gastroesophageal reflux disease)     Hx of blood clots 2013    PE, DVT    Hyperlipidemia     Neuropathy of both feet 8/22/2016    Obesity     Osteoarthritis     Pulmonary embolism ([facility]     Renal failure 6/29/2018    Right inguinal hernia 11/9/2017    SOB (shortness of breath) 4/19/2017    Systemic lupus erythematosus ([facility] 3/3/2629    Umbilical hernia 43/5/9465    Vasomotor flushing     on hormone replacement therapy     Lab Results   Component Value Date    WBC 4.3 (L) 06/30/2018    HGB 11.0 (L) 06/30/2018    HCT 33.1 (L) 06/30/2018    MCV 91.2 06/30/2018     06/30/2018     Lab Results   Component Value Date     07/01/2018    K 4.5 07/01/2018     07/01/2018    CO2 17 07/01/2018    BUN 21 07/01/2018    CREATININE 0.72 07/01/2018    GLUCOSE 117 07/01/2018    GLUCOSE 139 03/13/2012    CALCIUM 7.5 07/01/2018        Assessment & Plan  1) GIOVANI   C/w IVF  2) facotr V def  C/w xaralto  3) back pain and numbness, h/p fall  CT of spine and lumbar noted  FU MRI  PT/OT  Neurology eval  4) NSTEMI type 2   FU echo  Possible needing cardiac cath  5) DVT ppx  Pt is already on Latanya Horan MD  7/1/2018

## 2018-07-01 NOTE — PROGRESS NOTES
radiation dose to as low as reasonably achievable. Ct Cervical Spine Wo Contrast    Result Date: 6/30/2018  EXAMINATION:  CT CERVICAL SPINE WO CONTRAST CLINICAL HISTORY:   back pain and left arm numbness Chief complaint ? ? C/o pain left arm and pain mid low back numbness left hand COMPARISONS:  NONE AVAILABLE TECHNIQUE:  Spiral axial unenhanced images of the cervical spine were obtained. Multiplanar two-dimensional reformatting was performed. FINDINGS:  There is moderate multilevel spondylosis, greatest at C4-5, C5-6 and C6-7. Vertebral body height is normal. Vertebral alignment is unremarkable. Cervical spine apophyseal joints are well-maintained bilaterally. There is no visible disc herniation. There is no significant Central spinal Canal stenosis. There is no bony foraminal stenosis. There is no paravertebral abnormality. There is no sign of underlying pathology. There are no signs of recent injury. Incidentally noted is moderate apophyseal joint arthrosis in the upper thoracic spine, greatest on the right at T1-T2 and T2-T3. SPONDYLOSIS AND APOPHYSEAL JOINT ARTHROSIS, DETAILS PROVIDED ABOVE. THERE IS NO ACUTE PROCESS. THERE IS NO SPINAL CANAL STENOSIS. THERE IS NO VISIBLE DISC HERNIATION. All CT scans at this facility use dose modulation, iterative reconstruction, and/or weight based dosing when appropriate to reduce radiation dose to as low as reasonably achievable. Ct Lumbar Spine Wo Contrast    Result Date: 6/30/2018  EXAMINATION:  CT LUMBAR SPINE WO CONTRAST CLINICAL HISTORY:   r/o compression . Pain in the left upper extremity. Low back pain. Left hand numbness. COMPARISONS:  October 11, 2012 lumbar spine x-ray TECHNIQUE:  Spiral axial images of the lumbar spine were obtained. Multiplanar two-dimensional reformatting was performed.  FINDINGS:  There is advanced discogenic degenerative disease at L2-3, with marked narrowing of the disc space, dense bony eburnation of endplates and vacuum phenomenon

## 2018-07-02 ENCOUNTER — APPOINTMENT (OUTPATIENT)
Dept: CARDIAC CATH/INVASIVE PROCEDURES | Age: 58
DRG: 281 | End: 2018-07-02
Attending: INTERNAL MEDICINE
Payer: COMMERCIAL

## 2018-07-02 LAB
GLUCOSE BLD-MCNC: 101 MG/DL (ref 60–115)
GLUCOSE BLD-MCNC: 204 MG/DL (ref 60–115)
GLUCOSE BLD-MCNC: 97 MG/DL (ref 60–115)
INR BLD: 1.1
LV EF: 30 %
LVEF MODALITY: NORMAL
PERFORMED ON: ABNORMAL
PERFORMED ON: NORMAL
PERFORMED ON: NORMAL
PROTHROMBIN TIME: 12 SEC (ref 9.6–12.3)

## 2018-07-02 PROCEDURE — 6370000000 HC RX 637 (ALT 250 FOR IP): Performed by: INTERNAL MEDICINE

## 2018-07-02 PROCEDURE — C1769 GUIDE WIRE: HCPCS

## 2018-07-02 PROCEDURE — 2580000003 HC RX 258: Performed by: INTERNAL MEDICINE

## 2018-07-02 PROCEDURE — 2500000003 HC RX 250 WO HCPCS

## 2018-07-02 PROCEDURE — 36415 COLL VENOUS BLD VENIPUNCTURE: CPT

## 2018-07-02 PROCEDURE — 1210000000 HC MED SURG R&B

## 2018-07-02 PROCEDURE — B2111ZZ FLUOROSCOPY OF MULTIPLE CORONARY ARTERIES USING LOW OSMOLAR CONTRAST: ICD-10-PCS | Performed by: INTERNAL MEDICINE

## 2018-07-02 PROCEDURE — B2151ZZ FLUOROSCOPY OF LEFT HEART USING LOW OSMOLAR CONTRAST: ICD-10-PCS | Performed by: INTERNAL MEDICINE

## 2018-07-02 PROCEDURE — 2580000003 HC RX 258

## 2018-07-02 PROCEDURE — 85610 PROTHROMBIN TIME: CPT

## 2018-07-02 PROCEDURE — 2709999900 HC NON-CHARGEABLE SUPPLY

## 2018-07-02 PROCEDURE — 6360000002 HC RX W HCPCS

## 2018-07-02 PROCEDURE — 2720000010 HC SURG SUPPLY STERILE

## 2018-07-02 PROCEDURE — C1894 INTRO/SHEATH, NON-LASER: HCPCS

## 2018-07-02 PROCEDURE — C1725 CATH, TRANSLUMIN NON-LASER: HCPCS

## 2018-07-02 PROCEDURE — 4A023N7 MEASUREMENT OF CARDIAC SAMPLING AND PRESSURE, LEFT HEART, PERCUTANEOUS APPROACH: ICD-10-PCS | Performed by: INTERNAL MEDICINE

## 2018-07-02 PROCEDURE — 93458 L HRT ARTERY/VENTRICLE ANGIO: CPT | Performed by: INTERNAL MEDICINE

## 2018-07-02 RX ORDER — FUROSEMIDE 20 MG/1
20 TABLET ORAL DAILY
Qty: 30 TABLET | Refills: 3 | Status: SHIPPED | OUTPATIENT
Start: 2018-07-02 | End: 2018-11-01 | Stop reason: SDUPTHER

## 2018-07-02 RX ORDER — CARVEDILOL 6.25 MG/1
6.25 TABLET ORAL 2 TIMES DAILY WITH MEALS
Qty: 60 TABLET | Refills: 3 | Status: SHIPPED | OUTPATIENT
Start: 2018-07-03 | End: 2018-11-01 | Stop reason: SDUPTHER

## 2018-07-02 RX ORDER — SODIUM CHLORIDE 9 MG/ML
INJECTION, SOLUTION INTRAVENOUS CONTINUOUS
Status: DISCONTINUED | OUTPATIENT
Start: 2018-07-02 | End: 2018-07-02

## 2018-07-02 RX ORDER — LISINOPRIL 5 MG/1
5 TABLET ORAL DAILY
Qty: 30 TABLET | Refills: 3 | Status: SHIPPED | OUTPATIENT
Start: 2018-07-02 | End: 2018-09-18 | Stop reason: ALTCHOICE

## 2018-07-02 RX ORDER — RANOLAZINE 500 MG/1
500 TABLET, EXTENDED RELEASE ORAL ONCE
Status: COMPLETED | OUTPATIENT
Start: 2018-07-02 | End: 2018-07-02

## 2018-07-02 RX ORDER — SODIUM CHLORIDE 9 MG/ML
INJECTION, SOLUTION INTRAVENOUS CONTINUOUS
Status: ACTIVE | OUTPATIENT
Start: 2018-07-02 | End: 2018-07-02

## 2018-07-02 RX ORDER — ASPIRIN 81 MG/1
81 TABLET, CHEWABLE ORAL ONCE
Status: COMPLETED | OUTPATIENT
Start: 2018-07-02 | End: 2018-07-02

## 2018-07-02 RX ADMIN — HYDROXYCHLOROQUINE SULFATE 200 MG: 200 TABLET, FILM COATED ORAL at 08:28

## 2018-07-02 RX ADMIN — OXYCODONE HYDROCHLORIDE AND ACETAMINOPHEN 1 TABLET: 5; 325 TABLET ORAL at 20:27

## 2018-07-02 RX ADMIN — RANOLAZINE 500 MG: 500 TABLET, FILM COATED, EXTENDED RELEASE ORAL at 16:10

## 2018-07-02 RX ADMIN — HYDROXYCHLOROQUINE SULFATE 200 MG: 200 TABLET, FILM COATED ORAL at 20:27

## 2018-07-02 RX ADMIN — CITALOPRAM HYDROBROMIDE 40 MG: 20 TABLET ORAL at 08:28

## 2018-07-02 RX ADMIN — GABAPENTIN 400 MG: 400 CAPSULE ORAL at 20:27

## 2018-07-02 RX ADMIN — GABAPENTIN 400 MG: 400 CAPSULE ORAL at 08:28

## 2018-07-02 RX ADMIN — CARVEDILOL 6.25 MG: 6.25 TABLET, FILM COATED ORAL at 08:28

## 2018-07-02 RX ADMIN — PANTOPRAZOLE SODIUM 40 MG: 40 TABLET, DELAYED RELEASE ORAL at 08:28

## 2018-07-02 RX ADMIN — AMITRIPTYLINE HYDROCHLORIDE 50 MG: 50 TABLET, FILM COATED ORAL at 20:27

## 2018-07-02 RX ADMIN — OXYCODONE HYDROCHLORIDE AND ACETAMINOPHEN 1 TABLET: 5; 325 TABLET ORAL at 08:27

## 2018-07-02 RX ADMIN — SODIUM CHLORIDE: 9 INJECTION, SOLUTION INTRAVENOUS at 05:15

## 2018-07-02 RX ADMIN — ASPIRIN 81 MG 81 MG: 81 TABLET ORAL at 16:10

## 2018-07-02 ASSESSMENT — PAIN DESCRIPTION - LOCATION: LOCATION: ARM;BACK

## 2018-07-02 ASSESSMENT — PAIN SCALES - GENERAL
PAINLEVEL_OUTOF10: 2
PAINLEVEL_OUTOF10: 8

## 2018-07-02 ASSESSMENT — PAIN DESCRIPTION - ORIENTATION: ORIENTATION: LEFT

## 2018-07-02 NOTE — PROGRESS NOTES
Neurology Follow up    SUBJECTIVE:  NO Headache, double vision,blurry vision,difficulty with speech,difficulty with swallowing,weakness,numbness,pain,nausea,vomitting,chocking,neck pain,dizziness,    PHYSICAL EXAM:    /61   Pulse 88   Temp 98.4 °F (36.9 °C)   Resp 18   Ht 5' 3\" (1.6 m)   Wt 193 lb 1 oz (87.6 kg)   SpO2 98%   BMI 34.20 kg/m²    General Appearance:      Mental Status Exam:             Level of Alertness:   awake            Orientation:   person, place, time            Memory:   normal            Fund of Knowledge:  normal            Attention/Concentration:  normal            Language:  normal      Funduscopic Exam:     Cranial Nerves        Cranial nerve II           Visual acuity:  normal           Visual fields:  normal      Cranial nerve III           Pupils:  equal, round, reactive to light      Cranial nerves III, IV, VI           Extraocular Movements: intact      Cranial nerve V           Facial sensation:  intact      Cranial nerve VII           Facial strength: intact      Cranial nerve VIII           Hearing:  intact      Cranial nerve IX           Palate:  intact      Cranial nerve XI         Shoulder shrug:  intact      Cranial nerve XII          Tongue movement:  normal    Motor:    Drift:  absent  Motor exam is symmetrical 4+ out of 5 all extremities bilaterally  Tone:  normal  Abnormal Movements:  absent            Sensory:        Pinprick             Right Upper Extremity:  normal             Left Upper Extremity:  normal             Right Lower Extremity:  normal             Left Lower Extremity:  normal           Vibration                         Touch            Proprioception                 Coordination:           Finger/Nose   Right:  normal              Left:  normal          Heel-Knee-Shin                Right:  normal              Left:  normal          Rapid Alternating Movements              Right:  normal              Left:  normal          Gait:

## 2018-07-02 NOTE — PROGRESS NOTES
A second 22g iv started in left wrist mult attempts by mult nurses unable to place a 20g  Report given to cath lab  Pt prepped

## 2018-07-02 NOTE — PROGRESS NOTES
Speech Language Pathology    NAME:  Kranthi Huitron  ROOM: [id number]  :  1960  DATE: 2018      Speech Therapy attempted to see Kranthi Huitron on this date for a/an:    [x] Bedside Swallow Evaluation   [] Speech/Language Evaluation   [] Modified Barium Swallow   [] Treatment    Pt was unable to be seen due to patient:   [] Currently off unit   [] Refused   [] Too lethargic to participate    [x] NPO for testing   [] On Bipap   [] Nursing Deferred:   [] Other:        Electronically signed by Otilio Garcia.  JOSE Hinkle on 18 at 9:26 AM

## 2018-07-03 ENCOUNTER — TELEPHONE (OUTPATIENT)
Dept: FAMILY MEDICINE CLINIC | Age: 58
End: 2018-07-03

## 2018-07-03 ENCOUNTER — APPOINTMENT (OUTPATIENT)
Dept: GENERAL RADIOLOGY | Age: 58
DRG: 281 | End: 2018-07-03
Attending: INTERNAL MEDICINE
Payer: COMMERCIAL

## 2018-07-03 VITALS
SYSTOLIC BLOOD PRESSURE: 101 MMHG | HEIGHT: 63 IN | OXYGEN SATURATION: 96 % | HEART RATE: 83 BPM | BODY MASS INDEX: 34.27 KG/M2 | DIASTOLIC BLOOD PRESSURE: 74 MMHG | WEIGHT: 193.4 LBS | RESPIRATION RATE: 16 BRPM | TEMPERATURE: 98.1 F

## 2018-07-03 LAB
ANA IGG, ELISA: DETECTED
EKG ATRIAL RATE: 77 BPM
EKG P AXIS: 47 DEGREES
EKG P-R INTERVAL: 118 MS
EKG Q-T INTERVAL: 416 MS
EKG QRS DURATION: 88 MS
EKG QTC CALCULATION (BAZETT): 470 MS
EKG R AXIS: -8 DEGREES
EKG T AXIS: -12 DEGREES
EKG VENTRICULAR RATE: 77 BPM
GLUCOSE BLD-MCNC: 120 MG/DL (ref 60–115)
GLUCOSE BLD-MCNC: 91 MG/DL (ref 60–115)
HCT VFR BLD CALC: 33 % (ref 37–47)
HEMOGLOBIN: 11.3 G/DL (ref 12–16)
MCH RBC QN AUTO: 31.2 PG (ref 27–31.3)
MCHC RBC AUTO-ENTMCNC: 34.4 % (ref 33–37)
MCV RBC AUTO: 90.7 FL (ref 82–100)
PDW BLD-RTO: 13.5 % (ref 11.5–14.5)
PERFORMED ON: ABNORMAL
PERFORMED ON: NORMAL
PLATELET # BLD: 222 K/UL (ref 130–400)
RBC # BLD: 3.64 M/UL (ref 4.2–5.4)
WBC # BLD: 5.5 K/UL (ref 4.8–10.8)

## 2018-07-03 PROCEDURE — 6370000000 HC RX 637 (ALT 250 FOR IP): Performed by: INTERNAL MEDICINE

## 2018-07-03 PROCEDURE — BD11YZZ FLUOROSCOPY OF ESOPHAGUS USING OTHER CONTRAST: ICD-10-PCS | Performed by: RADIOLOGY

## 2018-07-03 PROCEDURE — 74220 X-RAY XM ESOPHAGUS 1CNTRST: CPT

## 2018-07-03 PROCEDURE — 2500000003 HC RX 250 WO HCPCS: Performed by: INTERNAL MEDICINE

## 2018-07-03 PROCEDURE — 93458 L HRT ARTERY/VENTRICLE ANGIO: CPT | Performed by: INTERNAL MEDICINE

## 2018-07-03 PROCEDURE — 85027 COMPLETE CBC AUTOMATED: CPT

## 2018-07-03 PROCEDURE — 36415 COLL VENOUS BLD VENIPUNCTURE: CPT

## 2018-07-03 PROCEDURE — 93005 ELECTROCARDIOGRAM TRACING: CPT

## 2018-07-03 RX ORDER — OXYCODONE HYDROCHLORIDE AND ACETAMINOPHEN 5; 325 MG/1; MG/1
1 TABLET ORAL EVERY 8 HOURS PRN
Status: DISCONTINUED | OUTPATIENT
Start: 2018-07-03 | End: 2018-07-03 | Stop reason: HOSPADM

## 2018-07-03 RX ADMIN — HYDROXYCHLOROQUINE SULFATE 200 MG: 200 TABLET, FILM COATED ORAL at 12:38

## 2018-07-03 RX ADMIN — BARIUM SULFATE 135 ML: 980 POWDER, FOR SUSPENSION ORAL at 10:15

## 2018-07-03 RX ADMIN — PANTOPRAZOLE SODIUM 40 MG: 40 TABLET, DELAYED RELEASE ORAL at 12:38

## 2018-07-03 RX ADMIN — GABAPENTIN 400 MG: 400 CAPSULE ORAL at 12:38

## 2018-07-03 RX ADMIN — CITALOPRAM HYDROBROMIDE 40 MG: 20 TABLET ORAL at 12:38

## 2018-07-03 RX ADMIN — CARVEDILOL 6.25 MG: 6.25 TABLET, FILM COATED ORAL at 12:39

## 2018-07-03 RX ADMIN — ANTACID/ANTIFLATULENT 1 EACH: 380; 550; 10; 10 GRANULE, EFFERVESCENT ORAL at 10:15

## 2018-07-03 RX ADMIN — BARIUM SULFATE 176 ML: 960 POWDER, FOR SUSPENSION ORAL at 10:15

## 2018-07-03 ASSESSMENT — PAIN SCALES - GENERAL: PAINLEVEL_OUTOF10: 0

## 2018-07-03 NOTE — PROGRESS NOTES
injection 1 mg  1 mg Intramuscular PRN Randolph Dukes MD        dextrose 5 % solution  100 mL/hr Intravenous PRN Randolph Dukes MD        pantoprazole (PROTONIX) tablet 40 mg  40 mg Oral Daily Randolph Dukes MD   40 mg at 07/02/18 8473         OBJECTIVE:  Vital Signs:  Vitals:    07/03/18 0729   BP: 129/66   Pulse: 83   Resp:    Temp: 98.1 °F (36.7 °C)   SpO2: 97%       Focal exam:      General Exam (except as mentioned above):  CONSTITUTIONAL: Awake, alert, no apparent distress  EYES:  PERRL, conjunctiva normal  ENT:  Normocephalic, atraumatic  NECK:  Supple  BACK:  Symmetric  LUNGS:  CTAB except bilateral basilar crackles. CARDIOVASCULAR:  S1S2 present  ABDOMEN:  soft, non-distended, non-tender  MUSCULOSKELETAL:  There is no redness, warmth, or swelling of the joints. NEUROLOGIC:  Alert, awake, oriented x 3. No FND  EXTREMITIES: Warm and well perfused. LABS  Recent Labs      07/03/18   0558   WBC  5.5   RBC  3.64*   HGB  11.3*   HCT  33.0*   MCV  90.7   MCH  31.2   MCHC  34.4   RDW  13.5   PLT  222       Recent Labs      06/30/18   1216  07/01/18   0610   NA  135  135   K  4.0  4.5   CL  101  103   CO2  18*  17*   BUN  30*  21*   CREATININE  1.09*  0.72   GLUCOSE  85  117*   CALCIUM  8.0*  7.5*       No results for input(s): MG in the last 72 hours. ASSESSMENT AND PLAN    Active Hospital Problems    Diagnosis Date Noted    NSTEMI (non-ST elevated myocardial infarction) ([facility] [I21.4]      Priority: High    Esophageal dysphagia [R13.10]     Renal failure [N19] 06/29/2018     - NSTEMI: s/p LHC. No intervention done. optimizing medical management recommended.  -Esophageal dysphagia: Patient able to eat soft diet. She has had esophageal stricture x 2 in past with dilatation. Has esophagogram done today morning for re-evaluation. Should it show stricture, patient will be referred OP to GI for management.  Patient is able to eat and drink.  - Christiane:resolved  - History of lupus: patient has rheumatologist she follows up.   - h/o factor V Leiden deficiency: On Xarelto  - Chronic neck pain and back pain: CT and MRI of spine normal    DVT prophylaxis: Lovenox    D/c today .     Cary Monet MD  Pager : [phone]

## 2018-07-03 NOTE — PROGRESS NOTES
Speech Language Pathology    NAME:  Sherwin Rosa  ROOM: [id number]/[id number]  :  1960  DATE: 7/3/2018      Speech Therapy attempted to see Sherwin Rosa on this date for a/an:    [x] Bedside Swallow Evaluation   [] Speech/Language Evaluation   [] Modified Barium Swallow   [] Treatment    Pt was unable to be seen due to patient:   [] Currently off unit   [] Refused   [] Too lethargic to participate    [x] NPO for testing   [] On Bipap   [] Nursing Deferred:   [] Other:        Electronically signed by JOSE Gupta on 7/3/18 at 9:01 AM

## 2018-07-03 NOTE — PROGRESS NOTES
Gastroenterology Progress Note      Dionicio Smith   Hospitalization Day:4    Chief C/o: Dysphagia    SUBJECTIVE: Patient has no complaints at this time. She reports tolerating a soft diet and liquids without difficulty swallowing. Patient denies any odynophagia. Patient denies any regurgitation. GI ROS: denies N/V, as noted above. Physical    VITALS:  /66   Pulse 83   Temp 98.1 °F (36.7 °C) (Oral)   Resp 18   Ht 5' 3\" (1.6 m)   Wt 193 lb 6.4 oz (87.7 kg)   SpO2 97%   BMI 34.26 kg/m²   TEMPERATURE:  Current - Temp: 98.1 °F (36.7 °C); Max - Temp  Av.1 °F (36.7 °C)  Min: 97.5 °F (36.4 °C)  Max: 98.8 °F (37.1 °C)    General- no apparent distress, alert and oriented  Eyes- anicteric sclera, no pallor  Cardiovascular- RRR withtout murmur  Lungs- clear to auscultation bilaterally  Abdomen soft, nondistended, nontender, no organomegaly, Bowel sounds normal   Extremities- without edema  Skin- without jaundice    Data      Recent Labs      18   0558   WBC  5.5   HGB  11.3*   HCT  33.0*   MCV  90.7   PLT  222     Recent Labs      18   0610   NA  135   K  4.5   CL  103   CO2  17*   BUN  21*   CREATININE  0.72     No results for input(s): AST, ALT, ALB, BILIDIR, BILITOT, ALKPHOS in the last 72 hours. No results for input(s): LIPASE, AMYLASE in the last 72 hours. Recent Labs      18   0101   PROTIME  12.0   INR  1.1       ASSESSMENT :  62year old female patient with complaints of dysphagia. History of EGD with dilation in the past. Barium swallow evaluation complete and unremarkable. Patient tolerating soft mechanical diet and liquids without any difficulty. PLAN:  - PO PPI for 4 weeks  - Schedule patient in the GI clinic for follow up. Thank you for allowing me to participate in the care of your patient.   Feel free to contact me with any questions or concerns    Sarah Klein MD

## 2018-07-03 NOTE — PROGRESS NOTES
PM Assessment completed. Patient returned from cardiac cath with no issues or concerns. Vital signs are stable. Right wrist dressing is clean, dry, and intact. Denies any dizziness, SOB, or N/V at this time. Is complaining of lower back pain 8/10; percocet was given. Will continue to monitor and follow plan of care. Call light is within reach.   Electronically signed by Oscar Mckeon RN on 7/2/2018 at 7:45 PM

## 2018-07-03 NOTE — FLOWSHEET NOTE
1545- Saline lock removed, telemetry removed and returned to  [facility]- Discharge instructions given to patient and her spouse.   1635- Patient discharged via wheelchair

## 2018-07-04 LAB
BLOOD CULTURE, ROUTINE: NORMAL
CULTURE, BLOOD 2: NORMAL

## 2018-07-05 LAB
ANA BY IFA: ABNORMAL
DOUBLE STRANDED DNA AB, IGG: DETECTED

## 2018-07-05 PROCEDURE — 93010 ELECTROCARDIOGRAM REPORT: CPT | Performed by: INTERNAL MEDICINE

## 2018-07-06 LAB
ENA TO RNP ANTIBODY: 0 AU/ML (ref 0–40)
ENA TO SMITH (SM) ANTIBODY: 0 AU/ML (ref 0–40)
ENA TO SSB (LA) ANTIBODY: 1 AU/ML (ref 0–40)
SSA 52 (RO) (ENA) AB, IGG: 8 AU/ML (ref 0–40)
SSA 60 (RO) (ENA) AB, IGG: 4 AU/ML (ref 0–40)

## 2018-07-08 LAB — DSDNA ANTIBODY TITER: ABNORMAL

## 2018-07-12 NOTE — DISCHARGE SUMMARY
NO VISIBLE DISC HERNIATION. All CT scans at this facility use dose modulation, iterative reconstruction, and/or weight based dosing when appropriate to reduce radiation dose to as low as reasonably achievable. Ct Lumbar Spine Wo Contrast    Result Date: 6/30/2018  EXAMINATION:  CT LUMBAR SPINE WO CONTRAST CLINICAL HISTORY:   r/o compression . Pain in the left upper extremity. Low back pain. Left hand numbness. COMPARISONS:  October 11, 2012 lumbar spine x-ray TECHNIQUE:  Spiral axial images of the lumbar spine were obtained. Multiplanar two-dimensional reformatting was performed. FINDINGS:  There is advanced discogenic degenerative disease at L2-3, with marked narrowing of the disc space, dense bony eburnation of endplates and vacuum phenomenon within the intervertebral space worsened since 2012. There is advanced apophyseal joint arthrosis at L4-5 with facet deformity allowing approximately 2.5 mm anterolisthesis of L4 on L5. There is dense bony eburnation of articular facets. There is vacuum phenomenon within the left L4-5 apophyseal joint. There are no compression fractures. There is no underlying pathology. There is no central spinal canal stenosis. There is no evidence of disc herniation. ADVANCED DISCOGENIC DEGENERATIVE DISEASE AT L2-3. ADVANCED APOPHYSEAL JOINT ARTHROSIS AT L4-5. NO COMPRESSION FRACTURES. NO SIGN OF RECENT INJURY. NO DISC HERNIATION. NO CENTRAL SPINAL CANAL STENOSIS. All CT scans at this facility use dose modulation, iterative reconstruction, and/or weight based dosing when appropriate to reduce radiation dose to as low as reasonably achievable. Us Carotid Artery Bilateral    Result Date: 7/1/2018  EXAMINATION: US CAROTID ARTERY BILATERAL CLINICAL HISTORY: TIA versus CVA. Left upper extremity numbness and tingling. Vertigo. Diabetes. Hypertension. COMPARISONS: None available. FINDINGS: Bilateral carotid duplex sonogram was performed.  There is unremarkable antegrade blood flow in the  (90 Base) MCG/ACT inhaler  Inhale 2 puffs into the lungs every 6 hours as needed for Wheezing     amitriptyline 25 MG tablet  Commonly known as:  ELAVIL     balsalazide 750 MG capsule  Commonly known as:  COLAZAL     blood glucose test strips  Test once daily     citalopram 40 MG tablet  Commonly known as:  CELEXA  TAKE 1 TABLET DAILY     esomeprazole 40 MG delayed release capsule  Commonly known as:  NEXIUM  Take 1 capsule by mouth daily. estradiol 0.5 MG tablet  Commonly known as:  ESTRACE  Take 3 days/week     ezetimibe 10 MG tablet  Commonly known as:  ZETIA  TAKE 1 TABLET DAILY     gabapentin 400 MG capsule  Commonly known as:  NEURONTIN  TAKE 1 CAPSULE THREE TIMES A DAY     hydrocortisone 1 % cream  Apply topically 2 times daily. hydroxychloroquine 200 MG tablet  Commonly known as:  PLAQUENIL     hydrOXYzine 25 MG tablet  Commonly known as:  ATARAX  Take 1 tablet by mouth 3 times daily as needed for Itching     Lancets Misc  Testing q day     metaxalone 800 MG tablet  Commonly known as:  SKELAXIN  Take 1 tablet by mouth 3 times daily as needed for Pain Generic equivalent acceptable, unless otherwise noted. metFORMIN 500 MG tablet  Commonly known as:  GLUCOPHAGE  TAKE 1 TABLET THREE TIMES A DAY     phentermine 37.5 MG capsule  Take 1 capsule by mouth every morning for 30 days. Body mass index is 34.54 kg/m². .     rivaroxaban 20 MG Tabs tablet  Commonly known as:  XARELTO  TAKE ONE TABLET BY MOUTH ONCE DAILY     traMADol-acetaminophen 37.5-325 MG per tablet  Commonly known as:  ULTRACET     vitamin D 2000 units Caps capsule           Where to Get Your Medications      These medications were sent to [address] [place] [address], [address] [place] [address]    Phone:  [phone]   · carvedilol 6.25 MG tablet  · furosemide 20 MG tablet  · lisinopril 5 MG tablet           Patient Instructions:   Discharge Medication List as of

## 2018-07-17 ENCOUNTER — TELEPHONE (OUTPATIENT)
Dept: FAMILY MEDICINE CLINIC | Age: 58
End: 2018-07-17

## 2018-07-23 ENCOUNTER — HOSPITAL ENCOUNTER (OUTPATIENT)
Dept: CARDIAC REHAB | Age: 58
Setting detail: THERAPIES SERIES
Discharge: HOME | End: 2018-07-23
Payer: COMMERCIAL

## 2018-07-23 ENCOUNTER — OFFICE VISIT (OUTPATIENT)
Dept: FAMILY MEDICINE CLINIC | Age: 58
End: 2018-07-23
Payer: COMMERCIAL

## 2018-07-23 ENCOUNTER — TELEPHONE (OUTPATIENT)
Dept: CARDIOLOGY CLINIC | Age: 58
End: 2018-07-23

## 2018-07-23 VITALS
TEMPERATURE: 97.7 F | HEIGHT: 63 IN | RESPIRATION RATE: 12 BRPM | WEIGHT: 186 LBS | SYSTOLIC BLOOD PRESSURE: 114 MMHG | HEART RATE: 100 BPM | BODY MASS INDEX: 32.96 KG/M2 | OXYGEN SATURATION: 99 % | DIASTOLIC BLOOD PRESSURE: 60 MMHG

## 2018-07-23 DIAGNOSIS — N17.9 ACUTE RENAL FAILURE, UNSPECIFIED ACUTE RENAL FAILURE TYPE (HCC): ICD-10-CM

## 2018-07-23 DIAGNOSIS — I21.4 NSTEMI (NON-ST ELEVATED MYOCARDIAL INFARCTION) (HCC): ICD-10-CM

## 2018-07-23 DIAGNOSIS — E66.9 CLASS 1 OBESITY WITH SERIOUS COMORBIDITY AND BODY MASS INDEX (BMI) OF 34.0 TO 34.9 IN ADULT, UNSPECIFIED OBESITY TYPE: ICD-10-CM

## 2018-07-23 DIAGNOSIS — N89.8 VAGINAL DRYNESS: ICD-10-CM

## 2018-07-23 DIAGNOSIS — N95.9 POSTMENOPAUSAL SYMPTOMS: Primary | ICD-10-CM

## 2018-07-23 DIAGNOSIS — E66.9 CLASS 1 OBESITY WITH SERIOUS COMORBIDITY AND BODY MASS INDEX (BMI) OF 34.0 TO 34.9 IN ADULT, UNSPECIFIED OBESITY TYPE: Primary | ICD-10-CM

## 2018-07-23 PROCEDURE — 1036F TOBACCO NON-USER: CPT | Performed by: NURSE PRACTITIONER

## 2018-07-23 PROCEDURE — G8417 CALC BMI ABV UP PARAM F/U: HCPCS | Performed by: NURSE PRACTITIONER

## 2018-07-23 PROCEDURE — 1111F DSCHRG MED/CURRENT MED MERGE: CPT | Performed by: NURSE PRACTITIONER

## 2018-07-23 PROCEDURE — G8598 ASA/ANTIPLAT THER USED: HCPCS | Performed by: NURSE PRACTITIONER

## 2018-07-23 PROCEDURE — 3017F COLORECTAL CA SCREEN DOC REV: CPT | Performed by: NURSE PRACTITIONER

## 2018-07-23 PROCEDURE — 99214 OFFICE O/P EST MOD 30 MIN: CPT | Performed by: NURSE PRACTITIONER

## 2018-07-23 PROCEDURE — G8427 DOCREV CUR MEDS BY ELIG CLIN: HCPCS | Performed by: NURSE PRACTITIONER

## 2018-07-23 PROCEDURE — 93798 PHYS/QHP OP CAR RHAB W/ECG: CPT

## 2018-07-23 RX ORDER — ESTRADIOL 0.5 MG/1
TABLET ORAL
Qty: 15 TABLET | Refills: 11 | Status: SHIPPED | OUTPATIENT
Start: 2018-07-23 | End: 2019-01-29 | Stop reason: ALTCHOICE

## 2018-07-23 RX ORDER — PHENTERMINE HYDROCHLORIDE 37.5 MG/1
37.5 CAPSULE ORAL EVERY MORNING
Qty: 30 CAPSULE | Refills: 0 | Status: SHIPPED | OUTPATIENT
Start: 2018-07-23 | End: 2018-07-23 | Stop reason: SINTOL

## 2018-07-23 NOTE — LETTER
[place] [address]  Phone: [phone]  Fax: [phone]    BUTCH Keane CNP        July 23, 2018     Patient: Daryle [place]   YOB: 1960   Date of Visit: 7/23/2018       To Whom It May Concern: It is my medical opinion that Fred Ro requires a disability parking placard for the following reasons:  She cannot walk without assistance from another person or the use of an assistance device (cane, crutch, prosthetic device, wheelchair, etc.). She cannot walk 200 feet without stopping to rest.  Duration of need: 1 year    If you have any questions or concerns, please don't hesitate to call.     Sincerely,        BUTCH Keane CNP

## 2018-07-23 NOTE — TELEPHONE ENCOUNTER
[facility] PT SEES Shital Magdaleno. IS REQUESTING REFILL OF ADIPEX. WANTS TO MAKE SURE IT IS OK WITH DR. Victoria Mckeon THAT PT TAKES THIS MEDICATION SINCE SHE WAS RECENTLY HOSPITALIZED. [facility] WITH [facility]PONSE.  [phone].

## 2018-07-24 NOTE — TELEPHONE ENCOUNTER
Called and spoke with patient. She is aware that Adipex is not cleared through her cardiologist. Offered patient Contrave medication. Patient is interested in trying this medication. She is concerned about how much this would cost with her insurance. Advised patient we would have to send it through to figure out. Patient gave verbal okay to do this.

## 2018-07-25 ENCOUNTER — OFFICE VISIT (OUTPATIENT)
Dept: GASTROENTEROLOGY | Age: 58
End: 2018-07-25
Payer: COMMERCIAL

## 2018-07-25 ENCOUNTER — HOSPITAL ENCOUNTER (OUTPATIENT)
Dept: CARDIAC REHAB | Age: 58
Setting detail: THERAPIES SERIES
Discharge: HOME | End: 2018-07-25
Payer: COMMERCIAL

## 2018-07-25 VITALS
HEIGHT: 63 IN | SYSTOLIC BLOOD PRESSURE: 110 MMHG | DIASTOLIC BLOOD PRESSURE: 72 MMHG | OXYGEN SATURATION: 99 % | RESPIRATION RATE: 16 BRPM | TEMPERATURE: 98.1 F | BODY MASS INDEX: 33.27 KG/M2 | WEIGHT: 187.8 LBS | HEART RATE: 90 BPM

## 2018-07-25 DIAGNOSIS — R13.10 DYSPHAGIA, UNSPECIFIED TYPE: ICD-10-CM

## 2018-07-25 DIAGNOSIS — R19.7 DIARRHEA, UNSPECIFIED TYPE: ICD-10-CM

## 2018-07-25 DIAGNOSIS — K21.9 GASTROESOPHAGEAL REFLUX DISEASE, ESOPHAGITIS PRESENCE NOT SPECIFIED: Primary | ICD-10-CM

## 2018-07-25 DIAGNOSIS — R10.9 ABDOMINAL CRAMPING: ICD-10-CM

## 2018-07-25 PROCEDURE — G8598 ASA/ANTIPLAT THER USED: HCPCS | Performed by: INTERNAL MEDICINE

## 2018-07-25 PROCEDURE — 1111F DSCHRG MED/CURRENT MED MERGE: CPT | Performed by: INTERNAL MEDICINE

## 2018-07-25 PROCEDURE — G8427 DOCREV CUR MEDS BY ELIG CLIN: HCPCS | Performed by: INTERNAL MEDICINE

## 2018-07-25 PROCEDURE — 3017F COLORECTAL CA SCREEN DOC REV: CPT | Performed by: INTERNAL MEDICINE

## 2018-07-25 PROCEDURE — G8417 CALC BMI ABV UP PARAM F/U: HCPCS | Performed by: INTERNAL MEDICINE

## 2018-07-25 PROCEDURE — 93798 PHYS/QHP OP CAR RHAB W/ECG: CPT

## 2018-07-25 PROCEDURE — 99204 OFFICE O/P NEW MOD 45 MIN: CPT | Performed by: INTERNAL MEDICINE

## 2018-07-25 PROCEDURE — 1036F TOBACCO NON-USER: CPT | Performed by: INTERNAL MEDICINE

## 2018-07-25 NOTE — PROGRESS NOTES
Relation Age of Onset    Substance Abuse Brother     High Blood Pressure Mother     High Cholesterol Mother     Arthritis Mother     High Blood Pressure Father     High Cholesterol Father     Clotting Disorder Father         clot to intestines    Arthritis Father     Substance Abuse Brother     Substance Abuse Brother     Stroke Paternal Uncle 36    Cystic Fibrosis Daughter       Social History     Social History    Marital status:      Spouse name: N/A    Number of children: N/A    Years of education: N/A     Occupational History    Housewife      Social History Main Topics    Smoking status: Former Smoker     Packs/day: 1.25     Years: 10.00     Types: Cigarettes     Quit date: 1/1/2006    Smokeless tobacco: Never Used      Comment: start smoking age 25    Alcohol use 0.6 oz/week     1 Cans of beer per week      Comment: mod    Drug use: No    Sexual activity: Yes     Other Topics Concern    None     Social History Narrative    None     Blood pressure 110/72, pulse 90, temperature 98.1 °F (36.7 °C), temperature source Oral, resp. rate 16, height 5' 3\" (1.6 m), weight 187 lb 12.8 oz (85.2 kg), SpO2 99 %, not currently breastfeeding. Physical Exam   Constitutional: She is oriented to person, place, and time. She appears well-developed and well-nourished. No distress. Eyes: No scleral icterus. Cardiovascular: Normal rate and regular rhythm. Pulmonary/Chest: Effort normal and breath sounds normal.   Abdominal: Soft. Normal appearance and bowel sounds are normal. She exhibits no distension, no ascites and no mass. There is no hepatosplenomegaly. There is no tenderness. There is no rebound, no guarding and no CVA tenderness. Musculoskeletal: Normal range of motion. Lymphadenopathy:     She has no cervical adenopathy. Neurological: She is alert and oriented to person, place, and time. Psychiatric: She has a normal mood and affect.  Her behavior is normal. Judgment and type    Return in about 2 months (around 9/25/2018). Sarah Klein MD   Staff Gastroenterologist  [facility]    Please note this report has been partially produced using speech recognition software and may cause contain errors related to that system including grammar, punctuation and spelling as well as words and phrases that may seem inappropriate. If there are questions or concerns please feel free to contact me to clarify.

## 2018-07-27 ENCOUNTER — HOSPITAL ENCOUNTER (OUTPATIENT)
Age: 58
Setting detail: SPECIMEN
Discharge: HOME | End: 2018-07-27
Payer: COMMERCIAL

## 2018-07-27 ENCOUNTER — APPOINTMENT (OUTPATIENT)
Dept: CARDIAC REHAB | Age: 58
End: 2018-07-27
Payer: COMMERCIAL

## 2018-07-27 DIAGNOSIS — R19.7 DIARRHEA, UNSPECIFIED TYPE: ICD-10-CM

## 2018-07-27 DIAGNOSIS — R10.9 ABDOMINAL CRAMPING: ICD-10-CM

## 2018-07-27 PROCEDURE — 83993 ASSAY FOR CALPROTECTIN FECAL: CPT

## 2018-07-30 ENCOUNTER — APPOINTMENT (OUTPATIENT)
Dept: CARDIAC REHAB | Age: 58
End: 2018-07-30
Payer: COMMERCIAL

## 2018-07-31 ENCOUNTER — OFFICE VISIT (OUTPATIENT)
Dept: CARDIOLOGY CLINIC | Age: 58
End: 2018-07-31
Payer: COMMERCIAL

## 2018-07-31 VITALS
HEART RATE: 107 BPM | WEIGHT: 181.8 LBS | SYSTOLIC BLOOD PRESSURE: 110 MMHG | BODY MASS INDEX: 32.21 KG/M2 | OXYGEN SATURATION: 98 % | DIASTOLIC BLOOD PRESSURE: 68 MMHG | HEIGHT: 63 IN

## 2018-07-31 DIAGNOSIS — R06.02 SOB (SHORTNESS OF BREATH): ICD-10-CM

## 2018-07-31 DIAGNOSIS — I21.4 NSTEMI (NON-ST ELEVATED MYOCARDIAL INFARCTION) (HCC): ICD-10-CM

## 2018-07-31 DIAGNOSIS — I42.8 CARDIOMYOPATHY, NONISCHEMIC (HCC): ICD-10-CM

## 2018-07-31 DIAGNOSIS — E66.09 CLASS 1 OBESITY DUE TO EXCESS CALORIES WITH SERIOUS COMORBIDITY AND BODY MASS INDEX (BMI) OF 34.0 TO 34.9 IN ADULT: ICD-10-CM

## 2018-07-31 DIAGNOSIS — E78.5 DYSLIPIDEMIA: Primary | ICD-10-CM

## 2018-07-31 LAB — CALPROTECTIN: <16 UG/G

## 2018-07-31 PROCEDURE — G8427 DOCREV CUR MEDS BY ELIG CLIN: HCPCS | Performed by: INTERNAL MEDICINE

## 2018-07-31 PROCEDURE — G8417 CALC BMI ABV UP PARAM F/U: HCPCS | Performed by: INTERNAL MEDICINE

## 2018-07-31 PROCEDURE — 1111F DSCHRG MED/CURRENT MED MERGE: CPT | Performed by: INTERNAL MEDICINE

## 2018-07-31 PROCEDURE — 99214 OFFICE O/P EST MOD 30 MIN: CPT | Performed by: INTERNAL MEDICINE

## 2018-07-31 PROCEDURE — 3017F COLORECTAL CA SCREEN DOC REV: CPT | Performed by: INTERNAL MEDICINE

## 2018-07-31 PROCEDURE — G8598 ASA/ANTIPLAT THER USED: HCPCS | Performed by: INTERNAL MEDICINE

## 2018-07-31 PROCEDURE — 1036F TOBACCO NON-USER: CPT | Performed by: INTERNAL MEDICINE

## 2018-07-31 NOTE — PROGRESS NOTES
obesity with serious comorbidity and body mass index (BMI) of 34.0 to 34.9 in adult    Postmenopausal symptoms    Postmenopausal hormone replacement therapy    Pulmonary embolism (HCC)    DVT (deep venous thrombosis) (HCC)    DM (diabetes mellitus) ([address]    Reactive airway disease    Borderline hypertension    Neck pain    Thoracic back pain    Factor 5 Leiden mutation, heterozygous (HCC)    Elevated troponin    Neuropathy of both feet- Dr. Vishnu Romo    Weakness of both lower extremities    Paresthesia of both feet    Positive DESTINEE (antinuclear antibody) Dr. Ekaterina Rivera    SOB (shortness of breath)    Type 2 diabetes mellitus without complication, without long-term current use of insulin (HCC)    Wrist pain, left    Chronic bilateral low back pain with bilateral sciatica    Colitis    History of recurrent UTIs    Chronic bilateral thoracic back pain    Pain in both hands    Autoantibody titer elevated    Generalized OA    Lupus erythematosus    Chronic abdominal pain    Vaginal dryness    S/P total hysterectomy    Long-term use of Plaquenil    Rash and nonspecific skin eruption    Esophageal web    Class 1 obesity with serious comorbidity and body mass index (BMI) of 34.0 to 34.9 in adult    Renal failure    Esophageal dysphagia    NSTEMI (non-ST elevated myocardial infarction) [facility])       Past Surgical History:   Procedure Laterality Date    APPENDECTOMY      COLONOSCOPY      Dr. Tommy Salazar    COLONOSCOPY  12/16/13    DR SINGER repeat in 10 yrs per pt    COLONOSCOPY  08/2017    Dr Harry Wadsworth Right 11/15/2017    RIGHT  HERNIA INGUINAL REPAIR performed by Parrish Woodward MD at [facility]  ARTHGIOVANI Left     LAPAROSCOPY N/A 11/15/2017    DIAGNOSTIC LAPAROSCOPY performed by Parrish Woodward MD at [address] [facility] [place] CYSTOURETHROSCOPY N/A 9/25/2017    FLEXABLE CYSTOSCOPY performed by Piter Carlos MD at [place] [address] mouth daily 30 tablet 3    rivaroxaban (XARELTO) 20 MG TABS tablet TAKE ONE TABLET BY MOUTH ONCE DAILY 90 tablet 0    balsalazide (COLAZAL) 750 MG capsule Take 1 capsule by mouth 3 times daily      metaxalone (SKELAXIN) 800 MG tablet Take 1 tablet by mouth 3 times daily as needed for Pain Generic equivalent acceptable, unless otherwise noted. 90 tablet 1    metFORMIN (GLUCOPHAGE) 500 MG tablet TAKE 1 TABLET THREE TIMES A  tablet 1    ezetimibe (ZETIA) 10 MG tablet TAKE 1 TABLET DAILY 90 tablet 1    citalopram (CELEXA) 40 MG tablet TAKE 1 TABLET DAILY 90 tablet 1    hydrocortisone 1 % cream Apply topically 2 times daily. 1 Tube 1    diclofenac sodium (VOLTAREN) 1 % GEL Apply 4 g topically 4 times daily (Patient taking differently: Apply 4 g topically 4 times daily as needed ) 300 g 3    hydroxychloroquine (PLAQUENIL) 200 MG tablet Take 200 mg by mouth 2 times daily       traMADol-acetaminophen (ULTRACET) 37.5-325 MG per tablet Take 1 tablet by mouth 2 times daily as needed       amitriptyline (ELAVIL) 25 MG tablet Take 50 mg by mouth nightly       albuterol sulfate  (90 BASE) MCG/ACT inhaler Inhale 2 puffs into the lungs every 6 hours as needed for Wheezing 1 Inhaler 3    hydrOXYzine (ATARAX) 25 MG tablet Take 1 tablet by mouth 3 times daily as needed for Itching 90 tablet 1    esomeprazole (NEXIUM) 40 MG capsule Take 1 capsule by mouth daily. 30 capsule 5    Blood Glucose Monitoring Suppl (ACCU-CHEK ADVANTAGE DIABETES) KIT Diabetic monitoring kit(any brand), with supplies for testing. Test fasting once daily. Dia.00 1 kit 0    Glucose Blood (BLOOD GLUCOSE TEST STRIPS) STRP Test once daily 100 strip 4    Lancets MISC Testing q day 100 each 3    Cholecalciferol (VITAMIN D) 2000 UNITS CAPS capsule Take 1 capsule by mouth daily       gabapentin (NEURONTIN) 400 MG capsule TAKE 1 CAPSULE THREE TIMES A  capsule 0     No current facility-administered medications for this visit.

## 2018-08-01 ENCOUNTER — HOSPITAL ENCOUNTER (OUTPATIENT)
Dept: CARDIAC REHAB | Age: 58
Setting detail: THERAPIES SERIES
Discharge: HOME | End: 2018-08-01
Payer: COMMERCIAL

## 2018-08-01 ENCOUNTER — APPOINTMENT (OUTPATIENT)
Dept: CARDIAC REHAB | Age: 58
End: 2018-08-01
Payer: COMMERCIAL

## 2018-08-01 PROCEDURE — 93798 PHYS/QHP OP CAR RHAB W/ECG: CPT

## 2018-08-03 ENCOUNTER — APPOINTMENT (OUTPATIENT)
Dept: CARDIAC REHAB | Age: 58
End: 2018-08-03
Payer: COMMERCIAL

## 2018-08-03 ENCOUNTER — HOSPITAL ENCOUNTER (OUTPATIENT)
Dept: CARDIAC REHAB | Age: 58
Setting detail: THERAPIES SERIES
Discharge: HOME | End: 2018-08-03
Payer: COMMERCIAL

## 2018-08-03 PROCEDURE — 93798 PHYS/QHP OP CAR RHAB W/ECG: CPT

## 2018-08-06 ENCOUNTER — APPOINTMENT (OUTPATIENT)
Dept: CARDIAC REHAB | Age: 58
End: 2018-08-06
Payer: COMMERCIAL

## 2018-08-06 ENCOUNTER — HOSPITAL ENCOUNTER (OUTPATIENT)
Dept: CARDIAC REHAB | Age: 58
Setting detail: THERAPIES SERIES
Discharge: HOME | End: 2018-08-06
Payer: COMMERCIAL

## 2018-08-06 PROCEDURE — 93798 PHYS/QHP OP CAR RHAB W/ECG: CPT

## 2018-08-08 ENCOUNTER — APPOINTMENT (OUTPATIENT)
Dept: CARDIAC REHAB | Age: 58
End: 2018-08-08
Payer: COMMERCIAL

## 2018-08-08 ENCOUNTER — HOSPITAL ENCOUNTER (OUTPATIENT)
Dept: CARDIAC REHAB | Age: 58
Setting detail: THERAPIES SERIES
Discharge: HOME | End: 2018-08-08
Payer: COMMERCIAL

## 2018-08-08 PROCEDURE — 93798 PHYS/QHP OP CAR RHAB W/ECG: CPT

## 2018-08-09 ENCOUNTER — OFFICE VISIT (OUTPATIENT)
Dept: PHYSICAL MEDICINE AND REHAB | Age: 58
End: 2018-08-09
Payer: COMMERCIAL

## 2018-08-09 VITALS
DIASTOLIC BLOOD PRESSURE: 72 MMHG | HEIGHT: 64 IN | BODY MASS INDEX: 31.92 KG/M2 | WEIGHT: 187 LBS | SYSTOLIC BLOOD PRESSURE: 102 MMHG

## 2018-08-09 DIAGNOSIS — M54.6 CHRONIC BILATERAL THORACIC BACK PAIN: ICD-10-CM

## 2018-08-09 DIAGNOSIS — M79.7 FIBROMYALGIA MUSCLE PAIN: ICD-10-CM

## 2018-08-09 DIAGNOSIS — Z79.899 HIGH RISK MEDICATION USE: ICD-10-CM

## 2018-08-09 DIAGNOSIS — M53.3 SI (SACROILIAC) PAIN: ICD-10-CM

## 2018-08-09 DIAGNOSIS — D68.51 FACTOR 5 LEIDEN MUTATION, HETEROZYGOUS (HCC): ICD-10-CM

## 2018-08-09 DIAGNOSIS — M32.0 DRUG-INDUCED SYSTEMIC LUPUS ERYTHEMATOSUS WITH LUNG INVOLVEMENT (HCC): ICD-10-CM

## 2018-08-09 DIAGNOSIS — M54.41 CHRONIC MIDLINE LOW BACK PAIN WITH RIGHT-SIDED SCIATICA: ICD-10-CM

## 2018-08-09 DIAGNOSIS — R76.8 POSITIVE ANA (ANTINUCLEAR ANTIBODY): ICD-10-CM

## 2018-08-09 DIAGNOSIS — G89.29 CHRONIC BILATERAL THORACIC BACK PAIN: ICD-10-CM

## 2018-08-09 DIAGNOSIS — M54.40 LOW BACK PAIN WITH SCIATICA, SCIATICA LATERALITY UNSPECIFIED, UNSPECIFIED BACK PAIN LATERALITY, UNSPECIFIED CHRONICITY: ICD-10-CM

## 2018-08-09 DIAGNOSIS — Z79.899 HIGH RISK MEDICATION USE: Chronic | ICD-10-CM

## 2018-08-09 DIAGNOSIS — G89.29 CHRONIC MIDLINE LOW BACK PAIN WITH RIGHT-SIDED SCIATICA: ICD-10-CM

## 2018-08-09 DIAGNOSIS — M32.13 DRUG-INDUCED SYSTEMIC LUPUS ERYTHEMATOSUS WITH LUNG INVOLVEMENT (HCC): ICD-10-CM

## 2018-08-09 PROCEDURE — G8598 ASA/ANTIPLAT THER USED: HCPCS | Performed by: PHYSICAL MEDICINE & REHABILITATION

## 2018-08-09 PROCEDURE — 3017F COLORECTAL CA SCREEN DOC REV: CPT | Performed by: PHYSICAL MEDICINE & REHABILITATION

## 2018-08-09 PROCEDURE — G8417 CALC BMI ABV UP PARAM F/U: HCPCS | Performed by: PHYSICAL MEDICINE & REHABILITATION

## 2018-08-09 PROCEDURE — 1036F TOBACCO NON-USER: CPT | Performed by: PHYSICAL MEDICINE & REHABILITATION

## 2018-08-09 PROCEDURE — G8427 DOCREV CUR MEDS BY ELIG CLIN: HCPCS | Performed by: PHYSICAL MEDICINE & REHABILITATION

## 2018-08-09 PROCEDURE — 99215 OFFICE O/P EST HI 40 MIN: CPT | Performed by: PHYSICAL MEDICINE & REHABILITATION

## 2018-08-09 RX ORDER — GABAPENTIN 400 MG/1
CAPSULE ORAL
Qty: 270 CAPSULE | Refills: 0 | Status: SHIPPED | OUTPATIENT
Start: 2018-08-09 | End: 2018-09-04 | Stop reason: SDUPTHER

## 2018-08-09 RX ORDER — METAXALONE 800 MG/1
800 TABLET ORAL 3 TIMES DAILY PRN
Qty: 90 TABLET | Refills: 1 | Status: SHIPPED | OUTPATIENT
Start: 2018-08-09 | End: 2018-12-14 | Stop reason: SDUPTHER

## 2018-08-09 ASSESSMENT — ENCOUNTER SYMPTOMS
APNEA: 0
CHEST TIGHTNESS: 0
DIARRHEA: 0
VISUAL CHANGE: 0
COLOR CHANGE: 0
NAUSEA: 0
ABDOMINAL PAIN: 0
VOMITING: 0
BACK PAIN: 1
COUGH: 1
SORE THROAT: 0
EYES NEGATIVE: 1
BOWEL INCONTINENCE: 0
WHEEZING: 0
SHORTNESS OF BREATH: 1
CONSTIPATION: 0

## 2018-08-09 NOTE — TELEPHONE ENCOUNTER
10/3/2018 10:00 AM Kim Vásquez  [facility] [address]   10/5/2018 10:00 AM Kim Vásquez  [facility]   10/8/2018 10:00 AM Kim Vásquez  [facility]   10/10/2018 10:00 AM Kim Vásquez  [facility]   10/30/2018 1:00 PM [facility] ECHO RM [address]  E[address] [place]   11/1/2018 8:30 AM DO Valerie I[address]   11/29/2018 9:50 AM Kevin Diana, APRN - CNP [facility] [address]

## 2018-08-09 NOTE — PROGRESS NOTES
Exam   Constitutional: She is oriented to person, place, and time. Vital signs are normal. She appears well-developed and well-nourished. Non-toxic appearance. She does not have a sickly appearance. She does not appear ill. No distress. HENT:   Head: Normocephalic and atraumatic. Right Ear: Hearing normal.   Left Ear: Hearing normal.   Nose: Nose normal.   Mouth/Throat: Oropharynx is clear and moist and mucous membranes are normal. No oral lesions. Normal dentition. No oropharyngeal exudate. No signs of toxic erosions. Eyes: Conjunctivae and EOM are normal. Pupils are equal, round, and reactive to light. Right eye exhibits no chemosis, no discharge and no exudate. Left eye exhibits no chemosis, no discharge and no exudate. No scleral icterus. Neck: Normal range of motion. Neck supple. No JVD present. No neck rigidity. No tracheal deviation and no edema present. No thyromegaly present. Cardiovascular: Intact distal pulses. Exam reveals no decreased pulses. Pulmonary/Chest: Effort normal. No accessory muscle usage. No apnea, no tachypnea and no bradypnea. No respiratory distress. She has decreased breath sounds. She has no wheezes. She exhibits no tenderness. Dry cough   Abdominal: Soft. Bowel sounds are normal. She exhibits no distension and no mass. There is no tenderness. There is no rebound and no guarding. Musculoskeletal: She exhibits tenderness. She exhibits no edema. Right shoulder: Normal.        Left shoulder: Normal.        Right elbow: Normal.       Left elbow: Normal.        Right wrist: Normal.        Left wrist: Normal.        Right hip: Normal.        Left hip: Normal.        Right knee: Normal.        Left knee: Normal.        Right ankle: She exhibits decreased range of motion and swelling. Achilles tendon normal.        Left ankle: She exhibits decreased range of motion and swelling. Tenderness.  Achilles tendon normal.        Cervical back: She exhibits tenderness and pain. Thoracic back: She exhibits tenderness and pain. Lumbar back: She exhibits decreased range of motion, tenderness, bony tenderness and pain. She exhibits no swelling, no edema, no deformity, no laceration and normal pulse. Back:         Right upper arm: Normal.        Left upper arm: Normal.        Right forearm: She exhibits tenderness and bony tenderness. Left forearm: She exhibits tenderness and bony tenderness. Right hand: Normal.        Left hand: Normal.        Right upper leg: Normal.        Left upper leg: Normal.        Right lower leg: Normal.        Left lower leg: Normal.        Legs:       Right foot: There is decreased range of motion, tenderness and bony tenderness. Left foot: There is decreased range of motion, tenderness and bony tenderness. Tender areas are indicated by numbered spot         Lymphadenopathy:     She has no cervical adenopathy. Neurological: She is alert and oriented to person, place, and time. She displays abnormal reflex. She displays no atrophy and no tremor. A sensory deficit is present. No cranial nerve deficit. She exhibits normal muscle tone. Coordination normal. She displays no Babinski's sign on the right side. She displays no Babinski's sign on the left side. Skin: Skin is warm, dry and intact. No abrasion, no bruising, no ecchymosis, no laceration, no petechiae and no rash noted. Rash is not macular, not pustular and not urticarial. She is not diaphoretic. No cyanosis or erythema. No pallor. Nails show no clubbing. Psychiatric: She has a normal mood and affect. Her behavior is normal. Judgment and thought content normal. Her mood appears not anxious. Her affect is not angry, not blunt, not labile and not inappropriate. Her speech is not rapid and/or pressured, not delayed, not tangential and not slurred.  She is not agitated, not aggressive, not hyperactive, not slowed, not withdrawn, not actively a functional level regarding activities of daily living and psychological functioning, they're not having any adverse effects or side effects from the current medications, and I see no findings of aberrant drug taking her addiction related behaviors. Attestation: The Prescription Monitoring Report for this patient was reviewed today. (Dalphine Buerger, DO)  Documentation: No signs of potential drug abuse or diversion identified., Obtaining appropriate analgesic effect of treatment. , Possible medication side effects, risk of tolerance/dependence & alternative treatments discussed. (Dalphine Buerger, DO)       Patient is currently taking:       I am having Ms. Jackson maintain her vitamin D, ACCU-CHEK ADVANTAGE DIABETES, blood glucose test strips, Lancets, esomeprazole, albuterol sulfate HFA, hydrOXYzine, amitriptyline, traMADol-acetaminophen, hydroxychloroquine, diclofenac sodium, hydrocortisone, citalopram, ezetimibe, metFORMIN, balsalazide, rivaroxaban, carvedilol, lisinopril, furosemide, estradiol, naltrexone-bupropion, gabapentin, metaxalone, and traMADol-acetaminophen. I also recommend the following Medications:    Orders Placed This Encounter   Medications    metaxalone (SKELAXIN) 800 MG tablet     Sig: Take 1 tablet by mouth 3 times daily as needed for Pain Generic equivalent acceptable, unless otherwise noted. Dispense:  90 tablet     Refill:  1    traMADol-acetaminophen (ULTRACET) 37.5-325 MG per tablet     Sig: Take one tablet every 4 hours as needed for pain. Maximum of 2.5 tablets daily. Do not get narcotic pain medication from any other providers. Generic equivalent acceptable, unless otherwise noted. .     Dispense:  75 tablet     Refill:  0        -which helps with pain and function. Otherwise, continue the current pain medications that I have prescibed.       Radiologic:   Old films reviewed-  EXAMINATION: MRI CERVICAL SPINE WO CONTRAST       CLINICAL HISTORY: Kelly Corbin SLOWLY PROGRESSING . Neck and arm pain.       COMPARISONS: CT cervical spine, June 30, 2018       FINDINGS: Multiplanar, multisequence MRI of the cervical spine was obtained. The examination is degraded by motion. Study is diagnostic. There is mild multilevel cervical spondylosis, with the greatest disc space narrowing at C4-5, C5-6 and C6-7. Most    pronounced cervical spondylosis is present at C4-5 There is a mild degree of osteophyte/disc complex extending into the spinal canal, not producing significant spinal canal stenosis. There is no disc herniation. Apophyseal joint arthrosis is mild. Pedicles and posterior elements are intact. There are no signs of recent injury. There is no underlying pathology. Degenerative disease at the atlantoaxial articulation is mild. There is no sign of intrinsic spinal CORD abnormality. There is no    paravertebral.       CONCLUSION: CERVICAL SPONDYLOSIS, UNCHANGED SINCE CT PERFORMED YESTERDAY. THERE IS NO ACUTE SUPERIMPOSED PROCESS.       ,  MRI BRAIN W WO CONTRAST       CLINICAL HISTORY: Pain that radiates in the left upper extremity. Headache. Dizziness. Lupus erythematosus.       COMPARISONS: None available.       FINDINGS: Multiplanar, multisequence MRI of the brain was obtained. There is no mass. There is no edema. There is no hematoma. There is no hydrocephalus. There are no worrisome T2 hyperintense white matter lesions. There is no abnormal restricted    diffusion. There is no abnormal paramagnetic signal.       Images obtained following intravenous menstruation of 15 mL ProHance reveal no abnormal enhancement brain or its coverings. 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 This is unlikely to be symptomatic.       CONCLUSION: NO MRI BRAIN ABNORMALITY. I discussed results with patients. see Follow up plans below  For any new studies. Care Everywhere Updates:  requested and reviewed. Cardiology notes reviewed. No new issues noted.              Labs:  Previous labs reviewed     Lab Results   Component Value Date     07/01/2018    K 4.5 07/01/2018     07/01/2018    CO2 17 07/01/2018    BUN 21 07/01/2018    CREATININE 0.72 07/01/2018    CALCIUM 7.5 07/01/2018    LABALBU 4.5 06/29/2018    LABALBU 4.2 03/13/2012    BILITOT 0.5 06/29/2018    ALKPHOS 49 06/29/2018    AST 44 06/29/2018    ALT 62 06/29/2018     Lab Results   Component Value Date    WBC 5.5 07/03/2018    RBC 3.64 07/03/2018    RBC 4.77 03/13/2012    HGB 11.3 07/03/2018    HCT 33.0 07/03/2018    MCV 90.7 07/03/2018    MCH 31.2 07/03/2018    MCHC 34.4 07/03/2018    RDW 13.5 07/03/2018     07/03/2018    MPV 8.2 07/31/2015       Lab Results   Component Value Date    LABAMPH Neg 02/08/2018    BARBSCNU Neg 02/08/2018    LABBENZ Neg 02/08/2018    LABBENZ NotDTCD 10/11/2012    CANSU Neg 02/08/2018    COCAIMETSCRU Neg 02/08/2018    PHENCYCLIDINESCREENURINE Neg 76/70/5889    TRICYCLIC Not Available 51/58/5926    DSCOMMENT see below 02/08/2018       Lab Results   Component Value Date    CODEINE Not Detected 12/19/2016    MORPHINE Not Detected 12/19/2016    Avanell Hilding Not Detected 12/19/2016    OXYCODONE Not Detected 12/19/2016    NOROXYCODONE Not Detected 12/19/2016    NOROXYMU Not Detected 12/19/2016    [facility] Not Detected 12/19/2016    NORHYDU Not Detected 12/19/2016    HYDROMO Not Detected 12/19/2016    [facility] [place] Not Detected 12/19/2016    Mohsen Amezcua Not Detected 12/19/2016    FENTA Not Detected 12/19/2016    NORFENT Not Detected 12/19/2016    MEPERIDINE Not Detected 12/19/2016    TAPENU Not Detected 12/19/2016    TAPOSULFUR Not Detected 12/19/2016    METHADONE Not Detected 12/19/2016    LABPROP Not Detected 12/19/2016    TRAM Present 12/19/2016    AMPH Not Detected 12/19/2016    METHAMP Not Detected 12/19/2016    MDMA Not

## 2018-08-10 ENCOUNTER — APPOINTMENT (OUTPATIENT)
Dept: CARDIAC REHAB | Age: 58
End: 2018-08-10
Payer: COMMERCIAL

## 2018-08-10 ENCOUNTER — HOSPITAL ENCOUNTER (OUTPATIENT)
Dept: CARDIAC REHAB | Age: 58
Setting detail: THERAPIES SERIES
Discharge: HOME | End: 2018-08-10
Payer: COMMERCIAL

## 2018-08-10 PROCEDURE — 93798 PHYS/QHP OP CAR RHAB W/ECG: CPT

## 2018-08-13 ENCOUNTER — APPOINTMENT (OUTPATIENT)
Dept: CARDIAC REHAB | Age: 58
End: 2018-08-13
Payer: COMMERCIAL

## 2018-08-14 DIAGNOSIS — F32.A DEPRESSION, UNSPECIFIED DEPRESSION TYPE: ICD-10-CM

## 2018-08-14 RX ORDER — CITALOPRAM 40 MG/1
TABLET ORAL
Qty: 90 TABLET | Refills: 1 | Status: SHIPPED | OUTPATIENT
Start: 2018-08-14 | End: 2019-02-26 | Stop reason: SDUPTHER

## 2018-08-15 ENCOUNTER — APPOINTMENT (OUTPATIENT)
Dept: CARDIAC REHAB | Age: 58
End: 2018-08-15
Payer: COMMERCIAL

## 2018-08-15 DIAGNOSIS — E78.5 DYSLIPIDEMIA: ICD-10-CM

## 2018-08-15 RX ORDER — EZETIMIBE 10 MG/1
TABLET ORAL
Qty: 90 TABLET | Refills: 1 | Status: SHIPPED | OUTPATIENT
Start: 2018-08-15 | End: 2018-11-01

## 2018-08-15 NOTE — TELEPHONE ENCOUNTER
Last seen 7/23/2018. Has a follow up appointment scheduled for 8/24/2018. Medication is pending if okay. Please advise.

## 2018-08-17 ENCOUNTER — APPOINTMENT (OUTPATIENT)
Dept: CARDIAC REHAB | Age: 58
End: 2018-08-17
Payer: COMMERCIAL

## 2018-08-20 ENCOUNTER — HOSPITAL ENCOUNTER (OUTPATIENT)
Dept: CARDIAC REHAB | Age: 58
Setting detail: THERAPIES SERIES
Discharge: HOME | End: 2018-08-20
Payer: COMMERCIAL

## 2018-08-20 ENCOUNTER — APPOINTMENT (OUTPATIENT)
Dept: CARDIAC REHAB | Age: 58
End: 2018-08-20
Payer: COMMERCIAL

## 2018-08-20 PROCEDURE — 93798 PHYS/QHP OP CAR RHAB W/ECG: CPT

## 2018-08-22 ENCOUNTER — APPOINTMENT (OUTPATIENT)
Dept: CARDIAC REHAB | Age: 58
End: 2018-08-22
Payer: COMMERCIAL

## 2018-08-22 ENCOUNTER — HOSPITAL ENCOUNTER (OUTPATIENT)
Dept: CARDIAC REHAB | Age: 58
Setting detail: THERAPIES SERIES
Discharge: HOME | End: 2018-08-22
Payer: COMMERCIAL

## 2018-08-22 PROCEDURE — 93798 PHYS/QHP OP CAR RHAB W/ECG: CPT

## 2018-08-24 ENCOUNTER — HOSPITAL ENCOUNTER (OUTPATIENT)
Dept: CARDIAC REHAB | Age: 58
Setting detail: THERAPIES SERIES
Discharge: HOME | End: 2018-08-24
Payer: COMMERCIAL

## 2018-08-24 ENCOUNTER — APPOINTMENT (OUTPATIENT)
Dept: CARDIAC REHAB | Age: 58
End: 2018-08-24
Payer: COMMERCIAL

## 2018-08-24 ENCOUNTER — TELEPHONE (OUTPATIENT)
Dept: FAMILY MEDICINE CLINIC | Age: 58
End: 2018-08-24

## 2018-08-24 PROCEDURE — 93798 PHYS/QHP OP CAR RHAB W/ECG: CPT

## 2018-08-24 NOTE — TELEPHONE ENCOUNTER
Pal Troncoso. Since she was not able to start the medication, I do not have to see her right away. Keep normal routine appointment.

## 2018-08-24 NOTE — TELEPHONE ENCOUNTER
Called Patient to schedule her appt for the power outage but she never gotten the diet pills because it cost 200.00 dollars for the prescriptions so she didn't know if she needs to still be seen. cp

## 2018-08-27 ENCOUNTER — HOSPITAL ENCOUNTER (OUTPATIENT)
Dept: CARDIAC REHAB | Age: 58
Setting detail: THERAPIES SERIES
Discharge: HOME | End: 2018-08-27
Payer: COMMERCIAL

## 2018-08-27 ENCOUNTER — APPOINTMENT (OUTPATIENT)
Dept: CARDIAC REHAB | Age: 58
End: 2018-08-27
Payer: COMMERCIAL

## 2018-08-27 ENCOUNTER — PATIENT MESSAGE (OUTPATIENT)
Dept: FAMILY MEDICINE CLINIC | Age: 58
End: 2018-08-27

## 2018-08-27 PROCEDURE — 93798 PHYS/QHP OP CAR RHAB W/ECG: CPT

## 2018-08-27 RX ORDER — HYDROXYZINE HYDROCHLORIDE 25 MG/1
25 TABLET, FILM COATED ORAL 3 TIMES DAILY PRN
Qty: 90 TABLET | Refills: 0 | Status: SHIPPED | OUTPATIENT
Start: 2018-08-27 | End: 2018-09-25 | Stop reason: SDUPTHER

## 2018-08-27 NOTE — TELEPHONE ENCOUNTER
lov  7/31/2018. Patient calling states she takes carvedilol 6.25mg but rehab states she should take 3.125bid. Please advise.  Also would like to know if she can swim

## 2018-08-27 NOTE — TELEPHONE ENCOUNTER
Last seen 7/23/2018. Has a follow up appointment scheduled for 11/29/2018. Medication is pending if okay. Please advise.

## 2018-08-29 ENCOUNTER — APPOINTMENT (OUTPATIENT)
Dept: CARDIAC REHAB | Age: 58
End: 2018-08-29
Payer: COMMERCIAL

## 2018-08-29 ENCOUNTER — HOSPITAL ENCOUNTER (OUTPATIENT)
Dept: CARDIAC REHAB | Age: 58
Setting detail: THERAPIES SERIES
Discharge: HOME | End: 2018-08-29
Payer: COMMERCIAL

## 2018-08-29 PROCEDURE — 93798 PHYS/QHP OP CAR RHAB W/ECG: CPT

## 2018-08-30 ENCOUNTER — PROCEDURE VISIT (OUTPATIENT)
Dept: PHYSICAL MEDICINE AND REHAB | Age: 58
End: 2018-08-30
Payer: COMMERCIAL

## 2018-08-30 DIAGNOSIS — M54.40 LOW BACK PAIN WITH SCIATICA, SCIATICA LATERALITY UNSPECIFIED, UNSPECIFIED BACK PAIN LATERALITY, UNSPECIFIED CHRONICITY: ICD-10-CM

## 2018-08-30 DIAGNOSIS — M54.31 SCIATICA OF RIGHT SIDE: Primary | ICD-10-CM

## 2018-08-30 PROCEDURE — 76942 ECHO GUIDE FOR BIOPSY: CPT | Performed by: PHYSICAL MEDICINE & REHABILITATION

## 2018-08-30 PROCEDURE — 64445 NJX AA&/STRD SCIATIC NRV IMG: CPT | Performed by: PHYSICAL MEDICINE & REHABILITATION

## 2018-08-31 ENCOUNTER — APPOINTMENT (OUTPATIENT)
Dept: CARDIAC REHAB | Age: 58
End: 2018-08-31
Payer: COMMERCIAL

## 2018-08-31 ENCOUNTER — HOSPITAL ENCOUNTER (OUTPATIENT)
Dept: CARDIAC REHAB | Age: 58
Setting detail: THERAPIES SERIES
Discharge: HOME | End: 2018-08-31
Payer: COMMERCIAL

## 2018-08-31 PROCEDURE — 93798 PHYS/QHP OP CAR RHAB W/ECG: CPT

## 2018-09-04 ENCOUNTER — PROCEDURE VISIT (OUTPATIENT)
Dept: PHYSICAL MEDICINE AND REHAB | Age: 58
End: 2018-09-04
Payer: COMMERCIAL

## 2018-09-04 DIAGNOSIS — M79.7 FIBROMYALGIA: Primary | ICD-10-CM

## 2018-09-04 DIAGNOSIS — M79.10 MYALGIA: ICD-10-CM

## 2018-09-04 DIAGNOSIS — M79.7 FIBROMYALGIA MUSCLE PAIN: ICD-10-CM

## 2018-09-04 PROCEDURE — 20553 NJX 1/MLT TRIGGER POINTS 3/>: CPT | Performed by: PHYSICAL MEDICINE & REHABILITATION

## 2018-09-04 RX ORDER — GABAPENTIN 400 MG/1
CAPSULE ORAL
Qty: 270 CAPSULE | Refills: 0 | Status: SHIPPED | OUTPATIENT
Start: 2018-09-04 | End: 2018-11-05 | Stop reason: SDUPTHER

## 2018-09-04 NOTE — TELEPHONE ENCOUNTER
From: Azra Isabel  Sent: 9/3/2018 10:08 AM EDT  Subject: Medication Renewal Request    Azra Isabel would like a refill of the following medications:     gabapentin (NEURONTIN) 400 MG capsule Adrianne Mabry, APRN - CNP]   Patient Comment: please refill at [facility], almost out    Preferred pharmacy: [facility] [id number] [address] , [address] [place]

## 2018-09-05 ENCOUNTER — HOSPITAL ENCOUNTER (OUTPATIENT)
Dept: CARDIAC REHAB | Age: 58
Setting detail: THERAPIES SERIES
Discharge: HOME | End: 2018-09-05
Payer: COMMERCIAL

## 2018-09-05 ENCOUNTER — APPOINTMENT (OUTPATIENT)
Dept: CARDIAC REHAB | Age: 58
End: 2018-09-05
Payer: COMMERCIAL

## 2018-09-05 PROCEDURE — 93798 PHYS/QHP OP CAR RHAB W/ECG: CPT

## 2018-09-06 ENCOUNTER — PATIENT MESSAGE (OUTPATIENT)
Dept: FAMILY MEDICINE CLINIC | Age: 58
End: 2018-09-06

## 2018-09-07 ENCOUNTER — HOSPITAL ENCOUNTER (OUTPATIENT)
Dept: CARDIAC REHAB | Age: 58
Setting detail: THERAPIES SERIES
Discharge: HOME | End: 2018-09-07
Payer: COMMERCIAL

## 2018-09-07 ENCOUNTER — APPOINTMENT (OUTPATIENT)
Dept: CARDIAC REHAB | Age: 58
End: 2018-09-07
Payer: COMMERCIAL

## 2018-09-07 PROCEDURE — 93798 PHYS/QHP OP CAR RHAB W/ECG: CPT

## 2018-09-07 NOTE — TELEPHONE ENCOUNTER
From: Mary Jo Botello  To: BUTCH Rose - CNP  Sent: 9/6/2018 2:44 PM EDT  Subject: Prescription Question    I am on Cormax scalp application 6.22% because of my dry scalp. I have been on this before but I am out of refill. I was wondering could you call me in a prescription.

## 2018-09-10 ENCOUNTER — APPOINTMENT (OUTPATIENT)
Dept: CARDIAC REHAB | Age: 58
End: 2018-09-10
Payer: COMMERCIAL

## 2018-09-10 ENCOUNTER — HOSPITAL ENCOUNTER (OUTPATIENT)
Dept: CARDIAC REHAB | Age: 58
Setting detail: THERAPIES SERIES
Discharge: HOME | End: 2018-09-10
Payer: COMMERCIAL

## 2018-09-10 PROCEDURE — 93798 PHYS/QHP OP CAR RHAB W/ECG: CPT

## 2018-09-10 RX ORDER — CLOBETASOL PROPIONATE 0.5 MG/G
CREAM TOPICAL
Qty: 60 G | Refills: 0 | Status: SHIPPED | OUTPATIENT
Start: 2018-09-10 | End: 2019-08-13 | Stop reason: ALTCHOICE

## 2018-09-12 ENCOUNTER — TELEPHONE (OUTPATIENT)
Dept: FAMILY MEDICINE CLINIC | Age: 58
End: 2018-09-12

## 2018-09-12 ENCOUNTER — APPOINTMENT (OUTPATIENT)
Dept: CARDIAC REHAB | Age: 58
End: 2018-09-12
Payer: COMMERCIAL

## 2018-09-12 ENCOUNTER — HOSPITAL ENCOUNTER (OUTPATIENT)
Dept: CARDIAC REHAB | Age: 58
Setting detail: THERAPIES SERIES
Discharge: HOME | End: 2018-09-12
Payer: COMMERCIAL

## 2018-09-12 DIAGNOSIS — L93.0 LUPUS ERYTHEMATOSUS, UNSPECIFIED FORM: Primary | ICD-10-CM

## 2018-09-12 PROCEDURE — 93798 PHYS/QHP OP CAR RHAB W/ECG: CPT

## 2018-09-12 NOTE — TELEPHONE ENCOUNTER
I was not sure if patient needed another follow up appointment with Raghavendra to get this referral.

## 2018-09-13 DIAGNOSIS — M79.7 FIBROMYALGIA MUSCLE PAIN: ICD-10-CM

## 2018-09-13 DIAGNOSIS — M53.3 SI (SACROILIAC) PAIN: ICD-10-CM

## 2018-09-13 DIAGNOSIS — Z79.899 HIGH RISK MEDICATION USE: ICD-10-CM

## 2018-09-13 DIAGNOSIS — Z79.899 HIGH RISK MEDICATION USE: Chronic | ICD-10-CM

## 2018-09-14 ENCOUNTER — APPOINTMENT (OUTPATIENT)
Dept: CARDIAC REHAB | Age: 58
End: 2018-09-14
Payer: COMMERCIAL

## 2018-09-14 ENCOUNTER — HOSPITAL ENCOUNTER (OUTPATIENT)
Dept: CARDIAC REHAB | Age: 58
Setting detail: THERAPIES SERIES
Discharge: HOME | End: 2018-09-14
Payer: COMMERCIAL

## 2018-09-14 PROCEDURE — 93798 PHYS/QHP OP CAR RHAB W/ECG: CPT

## 2018-09-17 ENCOUNTER — APPOINTMENT (OUTPATIENT)
Dept: CARDIAC REHAB | Age: 58
End: 2018-09-17
Payer: COMMERCIAL

## 2018-09-17 ENCOUNTER — HOSPITAL ENCOUNTER (OUTPATIENT)
Dept: CARDIAC REHAB | Age: 58
Setting detail: THERAPIES SERIES
Discharge: HOME | End: 2018-09-17
Payer: COMMERCIAL

## 2018-09-17 PROCEDURE — 93798 PHYS/QHP OP CAR RHAB W/ECG: CPT

## 2018-09-18 ENCOUNTER — HOSPITAL ENCOUNTER (OUTPATIENT)
Dept: INTERVENTIONAL RADIOLOGY/VASCULAR | Age: 58
Discharge: HOME | End: 2018-09-20
Payer: COMMERCIAL

## 2018-09-18 ENCOUNTER — PREP FOR PROCEDURE (OUTPATIENT)
Dept: PHYSICAL MEDICINE AND REHAB | Age: 58
End: 2018-09-18

## 2018-09-18 VITALS
RESPIRATION RATE: 16 BRPM | HEART RATE: 84 BPM | DIASTOLIC BLOOD PRESSURE: 68 MMHG | OXYGEN SATURATION: 99 % | SYSTOLIC BLOOD PRESSURE: 109 MMHG

## 2018-09-18 DIAGNOSIS — M53.3 SI (SACROILIAC) PAIN: Primary | ICD-10-CM

## 2018-09-18 DIAGNOSIS — M53.3 SI (SACROILIAC) PAIN: ICD-10-CM

## 2018-09-18 PROCEDURE — 2500000003 HC RX 250 WO HCPCS: Performed by: PHYSICAL MEDICINE & REHABILITATION

## 2018-09-18 PROCEDURE — 6360000002 HC RX W HCPCS: Performed by: PHYSICAL MEDICINE & REHABILITATION

## 2018-09-18 PROCEDURE — 62323 NJX INTERLAMINAR LMBR/SAC: CPT | Performed by: PHYSICAL MEDICINE & REHABILITATION

## 2018-09-18 RX ORDER — LIDOCAINE HYDROCHLORIDE 5 MG/ML
50 INJECTION, SOLUTION INFILTRATION; INTRAVENOUS
Status: COMPLETED | OUTPATIENT
Start: 2018-09-18 | End: 2018-09-18

## 2018-09-18 RX ORDER — METHYLPREDNISOLONE ACETATE 40 MG/ML
40 INJECTION, SUSPENSION INTRA-ARTICULAR; INTRALESIONAL; INTRAMUSCULAR; SOFT TISSUE ONCE
Status: CANCELLED | OUTPATIENT
Start: 2018-09-18 | End: 2018-09-18

## 2018-09-18 RX ORDER — LIDOCAINE HYDROCHLORIDE 5 MG/ML
50 INJECTION, SOLUTION INFILTRATION; INTRAVENOUS
Status: CANCELLED | OUTPATIENT
Start: 2018-09-18 | End: 2018-09-18

## 2018-09-18 RX ORDER — METHYLPREDNISOLONE ACETATE 40 MG/ML
40 INJECTION, SUSPENSION INTRA-ARTICULAR; INTRALESIONAL; INTRAMUSCULAR; SOFT TISSUE ONCE
Status: COMPLETED | OUTPATIENT
Start: 2018-09-18 | End: 2018-09-18

## 2018-09-18 RX ADMIN — LIDOCAINE HYDROCHLORIDE 8 ML: 5 INJECTION, SOLUTION INFILTRATION at 11:58

## 2018-09-18 RX ADMIN — METHYLPREDNISOLONE ACETATE 40 MG: 40 INJECTION, SUSPENSION INTRA-ARTICULAR; INTRALESIONAL; INTRAMUSCULAR; SOFT TISSUE at 12:01

## 2018-09-18 ASSESSMENT — PAIN - FUNCTIONAL ASSESSMENT
PAIN_FUNCTIONAL_ASSESSMENT: 0-10
PAIN_FUNCTIONAL_ASSESSMENT: 0-10

## 2018-09-18 ASSESSMENT — PAIN DESCRIPTION - DESCRIPTORS: DESCRIPTORS: ACHING

## 2018-09-18 NOTE — PROGRESS NOTES
Pt back to CT HR via w/c. Dressed independently. No bleeding noted at site. Vitals stable. Pt states bottom feels numb. 1212 D/c instructions given and verb understanding. 1215 pt walked with walker to lobby. Home with . No changes noted, pt denies complaints.

## 2018-09-18 NOTE — SEDATION DOCUMENTATION
All medications given in SI space per Dr. Patricia Hauser. Patient tolerated procedure well. Post procedure vitals stable. Patient assisted from table to wheelchair with help. Taken back to CT holding room for observation and discharge.

## 2018-09-18 NOTE — SEDATION DOCUMENTATION
Patient assisted to table in prone position with   no help. Vital signs monitor available. Reassurance offered to patient.

## 2018-09-18 NOTE — PROGRESS NOTES
Pt to CT HR, using walker, gait steady. Pt into gown. Vitals stable. Procedure explained. Consent signed. History, medications, allergies reviewed. 1143 pt taken to specials via w/c.

## 2018-09-19 ENCOUNTER — APPOINTMENT (OUTPATIENT)
Dept: CARDIAC REHAB | Age: 58
End: 2018-09-19
Payer: COMMERCIAL

## 2018-09-21 ENCOUNTER — HOSPITAL ENCOUNTER (OUTPATIENT)
Dept: CARDIAC REHAB | Age: 58
Setting detail: THERAPIES SERIES
Discharge: HOME | End: 2018-09-21
Payer: COMMERCIAL

## 2018-09-21 ENCOUNTER — APPOINTMENT (OUTPATIENT)
Dept: CARDIAC REHAB | Age: 58
End: 2018-09-21
Payer: COMMERCIAL

## 2018-09-21 PROCEDURE — 93798 PHYS/QHP OP CAR RHAB W/ECG: CPT

## 2018-09-24 ENCOUNTER — APPOINTMENT (OUTPATIENT)
Dept: CARDIAC REHAB | Age: 58
End: 2018-09-24
Payer: COMMERCIAL

## 2018-09-24 ENCOUNTER — HOSPITAL ENCOUNTER (OUTPATIENT)
Dept: CARDIAC REHAB | Age: 58
Setting detail: THERAPIES SERIES
Discharge: HOME | End: 2018-09-24
Payer: COMMERCIAL

## 2018-09-24 PROCEDURE — 93798 PHYS/QHP OP CAR RHAB W/ECG: CPT

## 2018-09-25 ENCOUNTER — OFFICE VISIT (OUTPATIENT)
Dept: UROLOGY | Age: 58
End: 2018-09-25
Payer: COMMERCIAL

## 2018-09-25 VITALS
SYSTOLIC BLOOD PRESSURE: 126 MMHG | WEIGHT: 191 LBS | BODY MASS INDEX: 33.84 KG/M2 | HEIGHT: 63 IN | DIASTOLIC BLOOD PRESSURE: 70 MMHG | HEART RATE: 91 BPM

## 2018-09-25 DIAGNOSIS — N39.0 RECURRENT UTI: Primary | ICD-10-CM

## 2018-09-25 LAB
BILIRUBIN, POC: NORMAL
BLOOD URINE, POC: NORMAL
CLARITY, POC: CLEAR
COLOR, POC: YELLOW
GLUCOSE URINE, POC: NORMAL
KETONES, POC: NORMAL
LEUKOCYTE EST, POC: NORMAL
NITRITE, POC: NORMAL
PH, POC: 5.5
PROTEIN, POC: NORMAL
SPECIFIC GRAVITY, POC: 1.01
UROBILINOGEN, POC: 0.2

## 2018-09-25 PROCEDURE — G8598 ASA/ANTIPLAT THER USED: HCPCS | Performed by: UROLOGY

## 2018-09-25 PROCEDURE — 81003 URINALYSIS AUTO W/O SCOPE: CPT | Performed by: UROLOGY

## 2018-09-25 PROCEDURE — G8427 DOCREV CUR MEDS BY ELIG CLIN: HCPCS | Performed by: UROLOGY

## 2018-09-25 PROCEDURE — 99212 OFFICE O/P EST SF 10 MIN: CPT | Performed by: UROLOGY

## 2018-09-25 PROCEDURE — 3017F COLORECTAL CA SCREEN DOC REV: CPT | Performed by: UROLOGY

## 2018-09-25 PROCEDURE — G8417 CALC BMI ABV UP PARAM F/U: HCPCS | Performed by: UROLOGY

## 2018-09-25 PROCEDURE — 1036F TOBACCO NON-USER: CPT | Performed by: UROLOGY

## 2018-09-25 RX ORDER — LIDOCAINE HYDROCHLORIDE 5 MG/ML
30 INJECTION, SOLUTION INFILTRATION; INTRAVENOUS ONCE
Status: COMPLETED | OUTPATIENT
Start: 2018-08-30 | End: 2018-09-25

## 2018-09-25 RX ORDER — NITROFURANTOIN 25; 75 MG/1; MG/1
100 CAPSULE ORAL 2 TIMES DAILY
Qty: 14 CAPSULE | Refills: 2 | Status: SHIPPED | OUTPATIENT
Start: 2018-09-25 | End: 2018-10-01 | Stop reason: ALTCHOICE

## 2018-09-25 RX ORDER — LIDOCAINE HYDROCHLORIDE 5 MG/ML
30 INJECTION, SOLUTION INFILTRATION; INTRAVENOUS ONCE
Status: COMPLETED | OUTPATIENT
Start: 2018-09-04 | End: 2018-09-25

## 2018-09-25 RX ORDER — METAXALONE 800 MG/1
800 TABLET ORAL
COMMUNITY
End: 2018-09-25 | Stop reason: SDUPTHER

## 2018-09-25 RX ORDER — HYDROXYZINE PAMOATE 25 MG/1
25 CAPSULE ORAL
COMMUNITY
End: 2019-10-14 | Stop reason: SDUPTHER

## 2018-09-25 RX ADMIN — LIDOCAINE HYDROCHLORIDE 30 ML: 5 INJECTION, SOLUTION INFILTRATION; INTRAVENOUS at 12:33

## 2018-09-25 RX ADMIN — LIDOCAINE HYDROCHLORIDE 30 ML: 5 INJECTION, SOLUTION INFILTRATION; INTRAVENOUS at 09:50

## 2018-09-25 ASSESSMENT — ENCOUNTER SYMPTOMS: ABDOMINAL PAIN: 0

## 2018-09-25 NOTE — PROGRESS NOTES
Subjective:      Patient ID: Isela Conde is a 62 y.o. female. HPI This is a 61 yo female with GERD, Depression, Fibromyalgia, SI pain/chronic back pain, DM, DVT/PE/Xarelto, Obesity, HTN, RAD, seen last year for recurrent UTI's and lower abdominal pain. Since last seen on 9/25/17 at the time of negative cystoscopy, she has had no interval UTI's. She ha snot used Macrobid self-start antibiotics. She has no hematuria or dysuria or IVS. She has no new  complaints. She has no abd or flank pain.      Past Medical History:   Diagnosis Date    Back pain     Depression     DM (diabetes mellitus) ([facility] [address] 11/25/2014    [facility]) [facility])     Dyslipidemia     Elevated troponin 5/25/2016    Factor V Leiden carrier [facility])     history of DVT, on Xarelto    GERD (gastroesophageal reflux disease)     Hx of blood clots 2013    PE, DVT    Hyperlipidemia     Neuropathy of both feet 8/22/2016    Obesity     Osteoarthritis     Pulmonary embolism ([facility] [facility]     Renal failure 6/29/2018    Right inguinal hernia 11/9/2017    SOB (shortness of breath) 4/19/2017    Systemic lupus erythematosus ([facility] [address] 5/8/4933    Umbilical hernia 21/8/9474    Vasomotor flushing     on hormone replacement therapy     Past Surgical History:   Procedure Laterality Date   Lucia Smith    COLONOSCOPY  12/16/13    DR SINGER repeat in 10 yrs per pt    COLONOSCOPY  08/2017    Dr Sriram Horan Right 11/15/2017    RIGHT  HERNIA INGUINAL REPAIR performed by Yeison Melissa MD at [facility]  ARTHROSCOPY Left     LAPAROSCOPY N/A 11/15/2017    DIAGNOSTIC LAPAROSCOPY performed by Yeison Melissa MD at [facility]. [place] [address] N/A 9/25/2017    [facility] performed by Argelia Ewing MD at [facility],9+T/N,YZSHV N/A 31/58/2580    HERNIA UMBILICAL REPAIR, 90 MINS SUPINE performed by Merle Suraj Bose MD at [facility]  12/16/13    DR SINGER     Social History     Social History    Marital status:      Spouse name: N/A    Number of children: N/A    Years of education: N/A     Occupational History    Housewife      Social History Main Topics    Smoking status: Former Smoker     Packs/day: 1.25     Years: 10.00     Types: Cigarettes     Quit date: 1/1/2006    Smokeless tobacco: Never Used      Comment: start smoking age 25    Alcohol use 0.6 oz/week     1 Cans of beer per week      Comment: mod    Drug use: No    Sexual activity: Yes     Other Topics Concern    None     Social History Narrative    None     Family History   Problem Relation Age of Onset    Substance Abuse Brother     High Blood Pressure Mother     High Cholesterol Mother     Arthritis Mother     High Blood Pressure Father     High Cholesterol Father     Clotting Disorder Father         clot to intestines    Arthritis Father     Substance Abuse Brother     Substance Abuse Brother     Stroke Paternal Uncle 36    Cystic Fibrosis Daughter      Current Outpatient Prescriptions   Medication Sig Dispense Refill    hydrOXYzine (VISTARIL) 25 MG capsule Take 25 mg by mouth      clobetasol (TEMOVATE) 0.05 % cream Apply topically 2 times daily. 60 g 0    gabapentin (NEURONTIN) 400 MG capsule TAKE 1 CAPSULE THREE TIMES A DAY. 270 capsule 0    ezetimibe (ZETIA) 10 MG tablet TAKE 1 TABLET DAILY 90 tablet 1    citalopram (CELEXA) 40 MG tablet TAKE 1 TABLET DAILY 90 tablet 1    metaxalone (SKELAXIN) 800 MG tablet Take 1 tablet by mouth 3 times daily as needed for Pain Generic equivalent acceptable, unless otherwise noted.  90 tablet 1    estradiol (ESTRACE) 0.5 MG tablet Take 3 days/week 15 tablet 11    carvedilol (COREG) 6.25 MG tablet Take 1 tablet by mouth 2 times daily (with meals) 60 tablet 3    furosemide (LASIX) 20 MG tablet Take 1 tablet by mouth daily 30 tablet 3    rivaroxaban

## 2018-09-26 ENCOUNTER — HOSPITAL ENCOUNTER (OUTPATIENT)
Dept: CARDIAC REHAB | Age: 58
Setting detail: THERAPIES SERIES
Discharge: HOME | End: 2018-09-26
Payer: COMMERCIAL

## 2018-09-26 ENCOUNTER — APPOINTMENT (OUTPATIENT)
Dept: CARDIAC REHAB | Age: 58
End: 2018-09-26
Payer: COMMERCIAL

## 2018-09-26 PROCEDURE — 93798 PHYS/QHP OP CAR RHAB W/ECG: CPT

## 2018-09-28 ENCOUNTER — APPOINTMENT (OUTPATIENT)
Dept: CARDIAC REHAB | Age: 58
End: 2018-09-28
Payer: COMMERCIAL

## 2018-09-28 ENCOUNTER — HOSPITAL ENCOUNTER (OUTPATIENT)
Dept: CARDIAC REHAB | Age: 58
Setting detail: THERAPIES SERIES
Discharge: HOME | End: 2018-09-28
Payer: COMMERCIAL

## 2018-09-28 PROCEDURE — 93798 PHYS/QHP OP CAR RHAB W/ECG: CPT

## 2018-09-29 DIAGNOSIS — Z86.711 HISTORY OF PULMONARY THROMBOSIS: ICD-10-CM

## 2018-10-01 ENCOUNTER — OFFICE VISIT (OUTPATIENT)
Dept: FAMILY MEDICINE CLINIC | Age: 58
End: 2018-10-01
Payer: COMMERCIAL

## 2018-10-01 ENCOUNTER — APPOINTMENT (OUTPATIENT)
Dept: CARDIAC REHAB | Age: 58
End: 2018-10-01
Payer: COMMERCIAL

## 2018-10-01 ENCOUNTER — HOSPITAL ENCOUNTER (OUTPATIENT)
Dept: CARDIAC REHAB | Age: 58
Setting detail: THERAPIES SERIES
Discharge: HOME | End: 2018-10-01
Payer: COMMERCIAL

## 2018-10-01 VITALS
SYSTOLIC BLOOD PRESSURE: 108 MMHG | HEART RATE: 82 BPM | BODY MASS INDEX: 34.38 KG/M2 | RESPIRATION RATE: 16 BRPM | OXYGEN SATURATION: 97 % | HEIGHT: 63 IN | TEMPERATURE: 96.9 F | WEIGHT: 194 LBS | DIASTOLIC BLOOD PRESSURE: 60 MMHG

## 2018-10-01 DIAGNOSIS — R74.8 ELEVATED CK: Primary | ICD-10-CM

## 2018-10-01 DIAGNOSIS — R29.898 WEAKNESS OF BOTH LOWER EXTREMITIES: ICD-10-CM

## 2018-10-01 DIAGNOSIS — L93.0 LUPUS ERYTHEMATOSUS, UNSPECIFIED FORM: ICD-10-CM

## 2018-10-01 DIAGNOSIS — Z23 NEED FOR INFLUENZA VACCINATION: ICD-10-CM

## 2018-10-01 DIAGNOSIS — M79.7 FIBROMYALGIA MUSCLE PAIN: ICD-10-CM

## 2018-10-01 DIAGNOSIS — E79.0 ELEVATED URIC ACID IN BLOOD: ICD-10-CM

## 2018-10-01 PROCEDURE — G8482 FLU IMMUNIZE ORDER/ADMIN: HCPCS | Performed by: NURSE PRACTITIONER

## 2018-10-01 PROCEDURE — 90688 IIV4 VACCINE SPLT 0.5 ML IM: CPT | Performed by: NURSE PRACTITIONER

## 2018-10-01 PROCEDURE — G8427 DOCREV CUR MEDS BY ELIG CLIN: HCPCS | Performed by: NURSE PRACTITIONER

## 2018-10-01 PROCEDURE — 3017F COLORECTAL CA SCREEN DOC REV: CPT | Performed by: NURSE PRACTITIONER

## 2018-10-01 PROCEDURE — 90471 IMMUNIZATION ADMIN: CPT | Performed by: NURSE PRACTITIONER

## 2018-10-01 PROCEDURE — 93798 PHYS/QHP OP CAR RHAB W/ECG: CPT

## 2018-10-01 PROCEDURE — G8598 ASA/ANTIPLAT THER USED: HCPCS | Performed by: NURSE PRACTITIONER

## 2018-10-01 PROCEDURE — G8417 CALC BMI ABV UP PARAM F/U: HCPCS | Performed by: NURSE PRACTITIONER

## 2018-10-01 PROCEDURE — 1036F TOBACCO NON-USER: CPT | Performed by: NURSE PRACTITIONER

## 2018-10-01 PROCEDURE — 99214 OFFICE O/P EST MOD 30 MIN: CPT | Performed by: NURSE PRACTITIONER

## 2018-10-01 RX ORDER — RIVAROXABAN 20 MG/1
TABLET, FILM COATED ORAL
Qty: 90 TABLET | Refills: 0 | Status: SHIPPED | OUTPATIENT
Start: 2018-10-01 | End: 2019-01-02 | Stop reason: SDUPTHER

## 2018-10-03 ENCOUNTER — HOSPITAL ENCOUNTER (OUTPATIENT)
Dept: CARDIAC REHAB | Age: 58
Setting detail: THERAPIES SERIES
Discharge: HOME | End: 2018-10-03
Payer: COMMERCIAL

## 2018-10-03 ENCOUNTER — APPOINTMENT (OUTPATIENT)
Dept: CARDIAC REHAB | Age: 58
End: 2018-10-03
Payer: COMMERCIAL

## 2018-10-03 PROCEDURE — 93798 PHYS/QHP OP CAR RHAB W/ECG: CPT

## 2018-10-05 ENCOUNTER — APPOINTMENT (OUTPATIENT)
Dept: CARDIAC REHAB | Age: 58
End: 2018-10-05
Payer: COMMERCIAL

## 2018-10-05 ENCOUNTER — HOSPITAL ENCOUNTER (OUTPATIENT)
Dept: CARDIAC REHAB | Age: 58
Setting detail: THERAPIES SERIES
Discharge: HOME | End: 2018-10-05
Payer: COMMERCIAL

## 2018-10-05 ENCOUNTER — TELEPHONE (OUTPATIENT)
Dept: FAMILY MEDICINE CLINIC | Age: 58
End: 2018-10-05

## 2018-10-05 DIAGNOSIS — R74.8 ELEVATED CK: Primary | ICD-10-CM

## 2018-10-05 DIAGNOSIS — Z87.39 HISTORY OF RHABDOMYOLYSIS: ICD-10-CM

## 2018-10-05 LAB
ALBUMIN SERPL-MCNC: 4.3 G/DL
ALP BLD-CCNC: 63 U/L
ALT SERPL-CCNC: 76 U/L
ANION GAP SERPL CALCULATED.3IONS-SCNC: 16 MMOL/L
AST SERPL-CCNC: 61 U/L
BILIRUB SERPL-MCNC: 0.3 MG/DL (ref 0.1–1.4)
BUN BLDV-MCNC: 20 MG/DL
CALCIUM SERPL-MCNC: 9.7 MG/DL
CHLORIDE BLD-SCNC: 97 MMOL/L
CO2: 26 MMOL/L
CREAT SERPL-MCNC: 1.03 MG/DL
GFR CALCULATED: NORMAL
GLUCOSE BLD-MCNC: 86 MG/DL
POTASSIUM SERPL-SCNC: 5.2 MMOL/L
SODIUM BLD-SCNC: 139 MMOL/L
TOTAL CK: 3500 U/L
TOTAL PROTEIN: 6.7
URIC ACID: 8.2

## 2018-10-05 PROCEDURE — 93798 PHYS/QHP OP CAR RHAB W/ECG: CPT

## 2018-10-05 NOTE — TELEPHONE ENCOUNTER
Please let the patient know that I discussed some of her lab results and medical history of my collaborating physician Dr. Tanner Lynn and he recommends that we do a 24-hour urine protein test.  I ordered this test and she can go to [facility] lab to collect the necessary equipment and get instruction for doing the test.    He also recommends that she see Dr. Melissa Delcid secondary to recent kidney issues this past summer and current elevated CK level. I ordered a current referral and recommend that she schedule an appointment soon with him. Please find out when she is getting repeat blood work done.

## 2018-10-08 ENCOUNTER — APPOINTMENT (OUTPATIENT)
Dept: CARDIAC REHAB | Age: 58
End: 2018-10-08
Payer: COMMERCIAL

## 2018-10-08 ENCOUNTER — HOSPITAL ENCOUNTER (OUTPATIENT)
Dept: CARDIAC REHAB | Age: 58
Setting detail: THERAPIES SERIES
Discharge: HOME | End: 2018-10-08
Payer: COMMERCIAL

## 2018-10-08 PROCEDURE — 93798 PHYS/QHP OP CAR RHAB W/ECG: CPT

## 2018-10-10 ENCOUNTER — HOSPITAL ENCOUNTER (OUTPATIENT)
Dept: CARDIAC REHAB | Age: 58
Setting detail: THERAPIES SERIES
Discharge: HOME | End: 2018-10-10
Payer: COMMERCIAL

## 2018-10-10 ENCOUNTER — APPOINTMENT (OUTPATIENT)
Dept: CARDIAC REHAB | Age: 58
End: 2018-10-10
Payer: COMMERCIAL

## 2018-10-10 PROCEDURE — 93798 PHYS/QHP OP CAR RHAB W/ECG: CPT

## 2018-10-11 ENCOUNTER — OFFICE VISIT (OUTPATIENT)
Dept: PHYSICAL MEDICINE AND REHAB | Age: 58
End: 2018-10-11
Payer: COMMERCIAL

## 2018-10-11 VITALS
SYSTOLIC BLOOD PRESSURE: 102 MMHG | WEIGHT: 193 LBS | DIASTOLIC BLOOD PRESSURE: 62 MMHG | HEIGHT: 64 IN | BODY MASS INDEX: 32.95 KG/M2

## 2018-10-11 DIAGNOSIS — M54.6 CHRONIC BILATERAL THORACIC BACK PAIN: ICD-10-CM

## 2018-10-11 DIAGNOSIS — M79.7 FIBROMYALGIA MUSCLE PAIN: ICD-10-CM

## 2018-10-11 DIAGNOSIS — M32.0 DRUG-INDUCED SYSTEMIC LUPUS ERYTHEMATOSUS WITH LUNG INVOLVEMENT (HCC): ICD-10-CM

## 2018-10-11 DIAGNOSIS — M32.13 DRUG-INDUCED SYSTEMIC LUPUS ERYTHEMATOSUS WITH LUNG INVOLVEMENT (HCC): ICD-10-CM

## 2018-10-11 DIAGNOSIS — G89.29 CHRONIC MIDLINE LOW BACK PAIN WITH RIGHT-SIDED SCIATICA: ICD-10-CM

## 2018-10-11 DIAGNOSIS — Z86.711 HISTORY OF PULMONARY THROMBOSIS: ICD-10-CM

## 2018-10-11 DIAGNOSIS — Z79.899 HIGH RISK MEDICATION USE: ICD-10-CM

## 2018-10-11 DIAGNOSIS — G89.29 CHRONIC BILATERAL THORACIC BACK PAIN: ICD-10-CM

## 2018-10-11 DIAGNOSIS — M54.41 CHRONIC MIDLINE LOW BACK PAIN WITH RIGHT-SIDED SCIATICA: ICD-10-CM

## 2018-10-11 DIAGNOSIS — D68.51 FACTOR 5 LEIDEN MUTATION, HETEROZYGOUS (HCC): ICD-10-CM

## 2018-10-11 DIAGNOSIS — Z79.899 HIGH RISK MEDICATION USE: Chronic | ICD-10-CM

## 2018-10-11 DIAGNOSIS — R76.8 POSITIVE ANA (ANTINUCLEAR ANTIBODY): ICD-10-CM

## 2018-10-11 DIAGNOSIS — M53.3 SI (SACROILIAC) PAIN: ICD-10-CM

## 2018-10-11 PROBLEM — R74.8 ELEVATED ALDOLASE LEVEL: Status: ACTIVE | Noted: 2018-09-29

## 2018-10-11 PROBLEM — R74.8 HYPERCKEMIA: Status: ACTIVE | Noted: 2018-09-29

## 2018-10-11 PROBLEM — R79.89 ELEVATED LFTS: Status: ACTIVE | Noted: 2018-09-29

## 2018-10-11 PROBLEM — E79.0 HYPERURICEMIA: Status: ACTIVE | Noted: 2018-09-29

## 2018-10-11 PROCEDURE — G8482 FLU IMMUNIZE ORDER/ADMIN: HCPCS | Performed by: PHYSICAL MEDICINE & REHABILITATION

## 2018-10-11 PROCEDURE — 3017F COLORECTAL CA SCREEN DOC REV: CPT | Performed by: PHYSICAL MEDICINE & REHABILITATION

## 2018-10-11 PROCEDURE — 99215 OFFICE O/P EST HI 40 MIN: CPT | Performed by: PHYSICAL MEDICINE & REHABILITATION

## 2018-10-11 PROCEDURE — G8427 DOCREV CUR MEDS BY ELIG CLIN: HCPCS | Performed by: PHYSICAL MEDICINE & REHABILITATION

## 2018-10-11 PROCEDURE — G8417 CALC BMI ABV UP PARAM F/U: HCPCS | Performed by: PHYSICAL MEDICINE & REHABILITATION

## 2018-10-11 PROCEDURE — G8598 ASA/ANTIPLAT THER USED: HCPCS | Performed by: PHYSICAL MEDICINE & REHABILITATION

## 2018-10-11 PROCEDURE — 1036F TOBACCO NON-USER: CPT | Performed by: PHYSICAL MEDICINE & REHABILITATION

## 2018-10-11 RX ORDER — LIDOCAINE 50 MG/G
1 PATCH TOPICAL DAILY
Qty: 30 PATCH | Refills: 0 | Status: SHIPPED | OUTPATIENT
Start: 2018-10-11 | End: 2019-06-13 | Stop reason: SDUPTHER

## 2018-10-11 RX ORDER — METAXALONE 800 MG/1
800 TABLET ORAL 3 TIMES DAILY PRN
Qty: 90 TABLET | Refills: 1 | Status: CANCELLED | OUTPATIENT
Start: 2018-10-11

## 2018-10-11 ASSESSMENT — ENCOUNTER SYMPTOMS
BACK PAIN: 1
COLOR CHANGE: 0
SHORTNESS OF BREATH: 1
VISUAL CHANGE: 0
BOWEL INCONTINENCE: 0
VOMITING: 0
COUGH: 1
ABDOMINAL PAIN: 0
WHEEZING: 0
CHEST TIGHTNESS: 0
DIARRHEA: 0
CONSTIPATION: 0
EYES NEGATIVE: 1
NAUSEA: 0
SORE THROAT: 0
APNEA: 0

## 2018-10-11 NOTE — PROGRESS NOTES
dysuria, fever, headaches, numbness, paresis, paresthesias, perianal numbness, tingling or weight loss. Risk factors include obesity, menopause, lack of exercise and sedentary lifestyle. She has tried heat, analgesics and muscle relaxant (Caudal Blocks, TP injections, Tramadol and Flexeril, ) for the symptoms. The treatment provided moderate relief. Foot Pain   This is a chronic problem. The current episode started more than 1 year ago. The problem occurs constantly. The problem has been unchanged. Associated symptoms include arthralgias, coughing, joint swelling, myalgias and weakness. Pertinent negatives include no abdominal pain, chest pain, chills, congestion, fatigue, fever, headaches, nausea, neck pain, numbness, rash, sore throat, visual change or vomiting. The symptoms are aggravated by bending and walking. She has tried acetaminophen, rest, heat, relaxation, oral narcotics and lying down for the symptoms. The treatment provided moderate relief.        Past Medical History:   Diagnosis Date    Back pain     Depression     DM (diabetes mellitus) ([facility] [address] 11/25/2014    [facility] thrombosis[facility]     Dyslipidemia     Elevated troponin 5/25/2016    Factor V Leiden carrier [facility])     history of DVT, on Xarelto    GERD (gastroesophageal reflux disease)     Hx of blood clots 2013    PE, DVT    Hyperlipidemia     Neuropathy of both feet 8/22/2016    Obesity     Osteoarthritis     Pulmonary embolism ([facility] [address]     Renal failure 6/29/2018    Right inguinal hernia 11/9/2017    SOB (shortness of breath) 4/19/2017    Systemic lupus erythematosus ([facility] [address] [address] 1/9/5728    Umbilical hernia 42/1/6602    Vasomotor flushing     on hormone replacement therapy     Past Surgical History:   Procedure Laterality Date   Morena Trevizo    COLONOSCOPY  12/16/13    DR SINGER repeat in 10 yrs per pt    COLONOSCOPY  08/2017    Dr Josh Diaz Right 11/15/2017    RIGHT  HERNIA INGUINAL REPAIR performed by Meng Marquez MD at [address] ARTHROSCOPY Left     LAPAROSCOPY N/A 11/15/2017    DIAGNOSTIC LAPAROSCOPY performed by Meng Marquez MD at [facility]. [address] N/A 9/25/2017    FLEXABLE CYSTOSCOPY performed by Opal Hwang MD at [facility],9+H/Z,UVGIT N/A 55/24/7197    HERNIA UMBILICAL REPAIR, [place] performed by Meng Marquez MD at [address]  12/16/13    DR SINGER     Social History     Social History    Marital status:      Spouse name: N/A    Number of children: N/A    Years of education: N/A     Occupational History    Housewife      Social History Main Topics    Smoking status: Former Smoker     Packs/day: 1.25     Years: 10.00     Types: Cigarettes     Quit date: 1/1/2006    Smokeless tobacco: Never Used      Comment: start smoking age 25    Alcohol use 0.6 oz/week     1 Cans of beer per week      Comment: mod    Drug use: No    Sexual activity: Yes     Other Topics Concern    None     Social History Narrative    None     Family History   Problem Relation Age of Onset    Substance Abuse Brother     High Blood Pressure Mother     High Cholesterol Mother     Arthritis Mother     High Blood Pressure Father     High Cholesterol Father     Clotting Disorder Father         clot to intestines    Arthritis Father     Substance Abuse Brother     Substance Abuse Brother     Stroke Paternal Uncle 36    Cystic Fibrosis Daughter        Allergies   Allergen Reactions    Sulfa Antibiotics Rash    Ciprofloxacin Hcl Swelling     facial, tongue swelling    Nsaids Other (See Comments)     Bleed risk dt Xaralto    Statins Other (See Comments)     Liver enzyme elevation       Review of Systems   Constitutional: Positive for activity change.  Negative for appetite change, chills, fatigue, fever, unexpected weight change and Conjunctivae and EOM are normal. Right eye exhibits no chemosis, no discharge and no exudate. Left eye exhibits no chemosis, no discharge and no exudate. No scleral icterus. Neck: Normal range of motion. Neck supple. No JVD present. No neck rigidity. No tracheal deviation and no edema present. No thyromegaly present. Cardiovascular: Intact distal pulses. Exam reveals no decreased pulses. Pulmonary/Chest: Effort normal. No accessory muscle usage. No apnea, no tachypnea and no bradypnea. No respiratory distress. She has decreased breath sounds. She has no wheezes. She exhibits no tenderness. Dry cough   Abdominal: Soft. Bowel sounds are normal. She exhibits no distension and no mass. There is no tenderness. There is no rebound and no guarding. Musculoskeletal: She exhibits tenderness. She exhibits no edema. Right shoulder: Normal.        Left shoulder: Normal.        Right elbow: Normal.       Left elbow: Normal.        Right wrist: Normal.        Left wrist: Normal.        Right hip: Normal.        Left hip: Normal.        Right knee: Normal.        Left knee: Normal.        Right ankle: She exhibits decreased range of motion and swelling. Achilles tendon normal.        Left ankle: She exhibits decreased range of motion and swelling. Tenderness. Achilles tendon normal.        Cervical back: She exhibits tenderness and pain. Thoracic back: She exhibits tenderness and pain. Lumbar back: She exhibits decreased range of motion, tenderness, bony tenderness and pain. She exhibits no swelling, no edema, no deformity, no laceration and normal pulse. Back:         Right upper arm: Normal.        Left upper arm: Normal.        Right forearm: She exhibits tenderness and bony tenderness. Left forearm: She exhibits tenderness and bony tenderness.         Right hand: Normal.        Left hand: Normal.        Right upper leg: Normal.        Left upper leg: Normal.        Right lower leg: Normal.        Left lower leg: Normal.        Legs:       Right foot: There is decreased range of motion, tenderness and bony tenderness. Left foot: There is decreased range of motion, tenderness and bony tenderness. Tender areas are indicated by numbered spot         Lymphadenopathy:     She has no cervical adenopathy. Neurological: She is alert and oriented to person, place, and time. She displays abnormal reflex. She displays no atrophy and no tremor. A sensory deficit is present. No cranial nerve deficit. She exhibits normal muscle tone. Coordination normal. She displays no Babinski's sign on the right side. She displays no Babinski's sign on the left side. Skin: Skin is warm, dry and intact. No abrasion, no bruising, no ecchymosis, no laceration, no petechiae and no rash noted. Rash is not macular, not pustular and not urticarial. She is not diaphoretic. No cyanosis or erythema. No pallor. Nails show no clubbing. Psychiatric: She has a normal mood and affect. Her behavior is normal. Judgment and thought content normal. Her mood appears not anxious. Her affect is not angry, not blunt, not labile and not inappropriate. Her speech is not rapid and/or pressured, not delayed, not tangential and not slurred. She is not agitated, not aggressive, not hyperactive, not slowed, not withdrawn, not actively hallucinating and not combative. Thought content is not paranoid and not delusional. Cognition and memory are normal. Cognition and memory are not impaired. She does not express impulsivity or inappropriate judgment. She does not exhibit a depressed mood. She expresses no homicidal and no suicidal ideation. She expresses no suicidal plans and no homicidal plans. She is communicative. She exhibits normal recent memory and normal remote memory. She is attentive. Vitals reviewed. Ortho Exam  Neurologic Exam     Mental Status   Oriented to person, place, and time.    Speech: not slurred   Level of consciousness: alert  Knowledge: good. Able to name object. Able to read. Able to repeat. Able to write. Cranial Nerves     CN III, IV, VI   Pupils are equal, round, and reactive to light. Extraocular motions are normal.     Motor Exam   Muscle bulk: normal  Overall muscle tone: normal    Sensory Exam   Right arm light touch: decreased from fingers  Left arm light touch: decreased from fingers  Right leg light touch: decreased from knee  Left leg light touch: decreased from knee        After a thorough review and discussion of the previous medical records, patient comprehensive medical, surgical, and family and social history, Review of Systems, their OARRS, their Screener and Opioid Assessment for Patients with Pain (SOAPP®-R), recent diagnostics, and symptomatic results to previous treatment, it is my impression that the patients is suffering with progressive and severe:     Diagnosis Orders   1. History of pulmonary thrombosis     2. Chronic midline low back pain with right-sided sciatica  lidocaine (LIDODERM) 5 %   3. SI (sacroiliac) pain  lidocaine (LIDODERM) 5 %   4. Chronic bilateral thoracic back pain  lidocaine (LIDODERM) 5 %   5. High risk medication use  OARRS PM&R 2/21/17, oarrs pm&r 5/13/17     6. Fibromyalgia muscle pain  OK INJECT TRIGGER POINTS, 3 OR GREATER    OK INJECT TRIGGER POINTS, 3 OR GREATER    lidocaine (LIDODERM) 5 %   7. Positive DESTINEE (antinuclear antibody) Dr. Edward Farley     8. Factor 5 Leiden mutation, heterozygous ([facility]     9. 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 ([address]     10. High risk medication use      OARRS and last refill reviewed. 2015 narcotic contract signed.  11/14/14 tox screening        I am also concerned by lifestyle and mood issues including:    Past Medical History:   Diagnosis Date    Back pain     Depression     DM (diabetes mellitus) ([facility] 11/25/2014    DVT (deep venous thrombosis) ([facility])     Dyslipidemia     Elevated troponin Possible medication side effects, risk of tolerance/dependence & alternative treatments discussed. (Sabi Sarah, DO)       Patient is currently taking:       I am having Ms. Jackson start on lidocaine. I am also having her maintain her vitamin D, ACCU-CHEK ADVANTAGE DIABETES, blood glucose test strips, Lancets, esomeprazole, albuterol sulfate HFA, amitriptyline, traMADol-acetaminophen, hydroxychloroquine, diclofenac sodium, hydrocortisone, metFORMIN, balsalazide, carvedilol, furosemide, estradiol, metaxalone, citalopram, ezetimibe, gabapentin, clobetasol, hydrOXYzine, and XARELTO. I also recommend the following Medications:    Orders Placed This Encounter   Medications    lidocaine (LIDODERM) 5 %     Sig: Place 1 patch onto the skin daily 12 hours on, 12 hours off. Dispense:  30 patch     Refill:  0   Continue Neurontin Skelaxin Celexa will tear and tramadol     -which helps with pain and function. Otherwise, continue the current pain medications that I have prescibed. Radiologic:   Old films reviewed  DDD LANDON on epidurogram,     I discussed results with patients. see Follow up plans below  For any new studies. Care Everywhere Updates:  requested and reviewed. No new issues noted.                Labs:  Previous labs reviewed     Lab Results   Component Value Date     07/01/2018    K 4.5 07/01/2018     07/01/2018    CO2 17 07/01/2018    BUN 21 07/01/2018    CREATININE 0.72 07/01/2018    CALCIUM 7.5 07/01/2018    LABALBU 4.5 06/29/2018    LABALBU 4.2 03/13/2012    BILITOT 0.5 06/29/2018    ALKPHOS 49 06/29/2018    AST 44 06/29/2018    ALT 62 06/29/2018     Lab Results   Component Value Date    WBC 5.5 07/03/2018    RBC 3.64 07/03/2018    RBC 4.77 03/13/2012    HGB 11.3 07/03/2018    HCT 33.0 07/03/2018    MCV 90.7 07/03/2018    MCH 31.2 07/03/2018    MCHC 34.4 07/03/2018    RDW 13.5 07/03/2018     07/03/2018    MPV 8.2 07/31/2015       Lab Results

## 2018-10-12 ENCOUNTER — HOSPITAL ENCOUNTER (OUTPATIENT)
Dept: CARDIAC REHAB | Age: 58
Setting detail: THERAPIES SERIES
Discharge: HOME | End: 2018-10-12
Payer: COMMERCIAL

## 2018-10-12 ENCOUNTER — APPOINTMENT (OUTPATIENT)
Dept: CARDIAC REHAB | Age: 58
End: 2018-10-12
Payer: COMMERCIAL

## 2018-10-12 PROCEDURE — 93798 PHYS/QHP OP CAR RHAB W/ECG: CPT

## 2018-10-17 DIAGNOSIS — E79.0 ELEVATED URIC ACID IN BLOOD: ICD-10-CM

## 2018-10-17 DIAGNOSIS — M79.7 FIBROMYALGIA MUSCLE PAIN: ICD-10-CM

## 2018-10-17 DIAGNOSIS — R74.8 HYPERCKEMIA: ICD-10-CM

## 2018-10-17 DIAGNOSIS — E79.0 ELEVATED URIC ACID IN BLOOD: Primary | ICD-10-CM

## 2018-10-17 DIAGNOSIS — Z23 NEED FOR INFLUENZA VACCINATION: ICD-10-CM

## 2018-10-23 DIAGNOSIS — R74.8 HYPERCKEMIA: Primary | ICD-10-CM

## 2018-10-23 DIAGNOSIS — R74.8 ELEVATED CK: ICD-10-CM

## 2018-10-23 DIAGNOSIS — Z87.39 HISTORY OF RHABDOMYOLYSIS: ICD-10-CM

## 2018-10-24 ENCOUNTER — HOSPITAL ENCOUNTER (OUTPATIENT)
Dept: LAB | Age: 58
Discharge: HOME | End: 2018-10-24
Payer: COMMERCIAL

## 2018-10-24 PROCEDURE — 84156 ASSAY OF PROTEIN URINE: CPT

## 2018-10-25 ENCOUNTER — TELEPHONE (OUTPATIENT)
Dept: FAMILY MEDICINE CLINIC | Age: 58
End: 2018-10-25

## 2018-10-25 DIAGNOSIS — R74.8 ELEVATED CK: Primary | ICD-10-CM

## 2018-10-25 LAB
24HR URINE VOLUME (ML): 1300 ML
CREATININE 24 HOUR URINE: 0.89 G/TV (ref 0.8–1.5)
CREATININE URINE: 68.6 MG/DL
Lab: 24 HOURS
PROTEIN 24 HOUR URINE: 104 MG/TV (ref 0–200)

## 2018-10-30 ENCOUNTER — TELEPHONE (OUTPATIENT)
Dept: GASTROENTEROLOGY | Age: 58
End: 2018-10-30

## 2018-10-30 ENCOUNTER — HOSPITAL ENCOUNTER (OUTPATIENT)
Dept: NON INVASIVE DIAGNOSTICS | Age: 58
Discharge: HOME | End: 2018-10-30
Payer: COMMERCIAL

## 2018-10-30 ENCOUNTER — HOSPITAL ENCOUNTER (OUTPATIENT)
Dept: LAB | Age: 58
Discharge: HOME | End: 2018-10-30
Payer: COMMERCIAL

## 2018-10-30 DIAGNOSIS — E55.9 VITAMIN D DEFICIENCY: ICD-10-CM

## 2018-10-30 DIAGNOSIS — E11.9 TYPE 2 DIABETES MELLITUS WITHOUT COMPLICATION, WITHOUT LONG-TERM CURRENT USE OF INSULIN (HCC): ICD-10-CM

## 2018-10-30 DIAGNOSIS — R74.8 ELEVATED CK: ICD-10-CM

## 2018-10-30 DIAGNOSIS — E78.5 DYSLIPIDEMIA: ICD-10-CM

## 2018-10-30 DIAGNOSIS — I42.8 CARDIOMYOPATHY, NONISCHEMIC (HCC): ICD-10-CM

## 2018-10-30 LAB
ALBUMIN SERPL-MCNC: 4.3 G/DL (ref 3.9–4.9)
ALP BLD-CCNC: 61 U/L (ref 40–130)
ALT SERPL-CCNC: 84 U/L (ref 0–33)
ANION GAP SERPL CALCULATED.3IONS-SCNC: 15 MEQ/L (ref 7–13)
AST SERPL-CCNC: 58 U/L (ref 0–35)
BASOPHILS ABSOLUTE: 0.1 K/UL (ref 0–0.2)
BASOPHILS RELATIVE PERCENT: 1.1 %
BILIRUB SERPL-MCNC: 0.3 MG/DL (ref 0–1.2)
BUN BLDV-MCNC: 22 MG/DL (ref 6–20)
CALCIUM SERPL-MCNC: 9.7 MG/DL (ref 8.6–10.2)
CHLORIDE BLD-SCNC: 100 MEQ/L (ref 98–107)
CHOLESTEROL, TOTAL: 207 MG/DL (ref 0–199)
CO2: 24 MEQ/L (ref 22–29)
CREAT SERPL-MCNC: 0.85 MG/DL (ref 0.5–0.9)
CREATININE URINE: 26 MG/DL
EOSINOPHILS ABSOLUTE: 0.2 K/UL (ref 0–0.7)
EOSINOPHILS RELATIVE PERCENT: 4.3 %
GFR AFRICAN AMERICAN: >60
GFR NON-AFRICAN AMERICAN: >60
GLOBULIN: 3 G/DL (ref 2.3–3.5)
GLUCOSE BLD-MCNC: 99 MG/DL (ref 74–109)
HBA1C MFR BLD: 6.1 % (ref 4.8–5.9)
HCT VFR BLD CALC: 38.1 % (ref 37–47)
HDLC SERPL-MCNC: 51 MG/DL (ref 40–59)
HEMOGLOBIN: 13.2 G/DL (ref 12–16)
LACTATE DEHYDROGENASE: 590 U/L (ref 135–214)
LDL CHOLESTEROL CALCULATED: 112 MG/DL (ref 0–129)
LV EF: 60 %
LVEF MODALITY: NORMAL
LYMPHOCYTES ABSOLUTE: 1.7 K/UL (ref 1–4.8)
LYMPHOCYTES RELATIVE PERCENT: 37.1 %
MCH RBC QN AUTO: 31.7 PG (ref 27–31.3)
MCHC RBC AUTO-ENTMCNC: 34.8 % (ref 33–37)
MCV RBC AUTO: 91.2 FL (ref 82–100)
MICROALBUMIN UR-MCNC: <1.2 MG/DL
MICROALBUMIN/CREAT UR-RTO: NORMAL MG/G (ref 0–30)
MONOCYTES ABSOLUTE: 0.2 K/UL (ref 0.2–0.8)
MONOCYTES RELATIVE PERCENT: 5.2 %
NEUTROPHILS ABSOLUTE: 2.4 K/UL (ref 1.4–6.5)
NEUTROPHILS RELATIVE PERCENT: 52.3 %
PDW BLD-RTO: 13.1 % (ref 11.5–14.5)
PLATELET # BLD: 254 K/UL (ref 130–400)
POTASSIUM SERPL-SCNC: 4.3 MEQ/L (ref 3.5–5.1)
RBC # BLD: 4.18 M/UL (ref 4.2–5.4)
SODIUM BLD-SCNC: 139 MEQ/L (ref 132–144)
TOTAL CK: 3353 U/L (ref 0–170)
TOTAL PROTEIN: 7.3 G/DL (ref 6.4–8.1)
TRIGL SERPL-MCNC: 218 MG/DL (ref 0–200)
VITAMIN D 25-HYDROXY: 45.9 NG/ML (ref 30–100)
WBC # BLD: 4.5 K/UL (ref 4.8–10.8)

## 2018-10-30 PROCEDURE — 82043 UR ALBUMIN QUANTITATIVE: CPT

## 2018-10-30 PROCEDURE — 36415 COLL VENOUS BLD VENIPUNCTURE: CPT

## 2018-10-30 PROCEDURE — 80061 LIPID PANEL: CPT

## 2018-10-30 PROCEDURE — 93306 TTE W/DOPPLER COMPLETE: CPT

## 2018-10-30 PROCEDURE — 80053 COMPREHEN METABOLIC PANEL: CPT

## 2018-10-30 PROCEDURE — 82085 ASSAY OF ALDOLASE: CPT

## 2018-10-30 PROCEDURE — 83615 LACTATE (LD) (LDH) ENZYME: CPT

## 2018-10-30 PROCEDURE — 83036 HEMOGLOBIN GLYCOSYLATED A1C: CPT

## 2018-10-30 PROCEDURE — 82570 ASSAY OF URINE CREATININE: CPT

## 2018-10-30 PROCEDURE — 82306 VITAMIN D 25 HYDROXY: CPT

## 2018-10-30 PROCEDURE — 82550 ASSAY OF CK (CPK): CPT

## 2018-10-30 PROCEDURE — 85025 COMPLETE CBC W/AUTO DIFF WBC: CPT

## 2018-11-01 ENCOUNTER — TELEPHONE (OUTPATIENT)
Dept: FAMILY MEDICINE CLINIC | Age: 58
End: 2018-11-01

## 2018-11-01 ENCOUNTER — OFFICE VISIT (OUTPATIENT)
Dept: CARDIOLOGY CLINIC | Age: 58
End: 2018-11-01
Payer: COMMERCIAL

## 2018-11-01 VITALS
RESPIRATION RATE: 16 BRPM | WEIGHT: 199 LBS | HEART RATE: 90 BPM | OXYGEN SATURATION: 96 % | SYSTOLIC BLOOD PRESSURE: 136 MMHG | DIASTOLIC BLOOD PRESSURE: 62 MMHG | BODY MASS INDEX: 34.59 KG/M2

## 2018-11-01 DIAGNOSIS — R06.02 SOB (SHORTNESS OF BREATH): Primary | ICD-10-CM

## 2018-11-01 DIAGNOSIS — E78.5 DYSLIPIDEMIA: ICD-10-CM

## 2018-11-01 DIAGNOSIS — E66.9 CLASS 1 OBESITY WITH SERIOUS COMORBIDITY AND BODY MASS INDEX (BMI) OF 34.0 TO 34.9 IN ADULT, UNSPECIFIED OBESITY TYPE: ICD-10-CM

## 2018-11-01 LAB — ALDOLASE: 29.8 U/L (ref 1.5–8.1)

## 2018-11-01 PROCEDURE — 99213 OFFICE O/P EST LOW 20 MIN: CPT | Performed by: INTERNAL MEDICINE

## 2018-11-01 RX ORDER — CARVEDILOL 6.25 MG/1
6.25 TABLET ORAL 2 TIMES DAILY WITH MEALS
Qty: 180 TABLET | Refills: 2 | Status: SHIPPED | OUTPATIENT
Start: 2018-11-01 | End: 2019-05-02 | Stop reason: SDUPTHER

## 2018-11-01 RX ORDER — CARVEDILOL 6.25 MG/1
6.25 TABLET ORAL 2 TIMES DAILY WITH MEALS
Qty: 60 TABLET | Refills: 3 | Status: SHIPPED | OUTPATIENT
Start: 2018-11-01 | End: 2018-11-05 | Stop reason: SDUPTHER

## 2018-11-01 RX ORDER — FUROSEMIDE 20 MG/1
20 TABLET ORAL 2 TIMES DAILY
Qty: 60 TABLET | Refills: 3 | Status: SHIPPED | OUTPATIENT
Start: 2018-11-01 | End: 2019-05-02 | Stop reason: SDUPTHER

## 2018-11-01 RX ORDER — FUROSEMIDE 20 MG/1
20 TABLET ORAL DAILY
Qty: 90 TABLET | Refills: 2 | Status: SHIPPED | OUTPATIENT
Start: 2018-11-01 | End: 2018-11-05 | Stop reason: ALTCHOICE

## 2018-11-01 NOTE — PROGRESS NOTES
Chief Complaint   Patient presents with    Results     ECHO 10/30/18    Cardiomyopathy       6-30-18:     Patient is a 62 y.o. female who presents with a chief complaint of Sob. Patient is followed on a regular basis by BUTCH Young - CNP. Patient has been expressing shortness of breath of 2 days onset associated with left arm tingling. She also has abdominal pain at the hernia repair incisional site. Patient has a history of pulmonary embolism 2 years ago which she is on Xarelto. He has been expressing diarrhea as well. She was noted to have acute renal failure with a creatinine of 1.6 now improved to 1.09. Her cardiac enzyme was elevated at 1.4 and trending down. She denies any chest pain chest pressure heaviness. Patient had a normal myocardial perfusion stresses in 2016 with breast attenuation artifact. 2-D echocardiogram in 2000 seen was also essentially normal.      7-31-18: Patient presents for initial medical evaluation. Patient is followed on a regular basis by BUTCH Young CNP. S/p hospitalization for SOB, NSTEMI. S/p ECHO with EF of 30%, grade I DD, mild AI, no PFO. S/p LHC with normal coronaries, EF Of 30%. Has hx of DVT/PE, factor V leiden defiency, on life long [address] [place] [address] Road. No bleeding issues. Her BP has been low in cardiac rehab. meds adjusted. Pt denies chest pain,   nausea, vomiting, diarrhea, constipation, motor weakness, insomnia, weight loss, syncope, dizziness, lightheadedness, palpitations, PND, orthopnea, or claudication. . BP and hr are good. No LE discoloration or ulcers. No LE edema. No CHF type symptoms. Lipid profile is normal.         11-1-18: s/p ECHO at [facility], no official report but appears to have an EF of 50%. Has SOB with going up a few steps of stairs. She does go to the gym to swim.   Pt denies chest pain, dyspnea,change in exercise capacity, fatigue,  nausea, vomiting, diarrhea, constipation, motor weakness, insomnia, weight loss, syncope, Monitoring Suppl (ACCU-CHEK ADVANTAGE DIABETES) KIT Diabetic monitoring kit(any brand), with supplies for testing. Test fasting once daily. Dia.00 1 kit 0    Glucose Blood (BLOOD GLUCOSE TEST STRIPS) STRP Test once daily 100 strip 4    Lancets MISC Testing q day 100 each 3    Cholecalciferol (VITAMIN D) 2000 UNITS CAPS capsule Take 1 capsule by mouth daily        No current facility-administered medications for this visit. Sulfa antibiotics; Ciprofloxacin hcl; Nsaids; and Statins    Review of Systems:  General ROS: negative  Psychological ROS: negative  Hematological and Lymphatic ROS: No history of blood clots or bleeding disorder. Respiratory ROS: no cough, shortness of breath, or wheezing  Cardiovascular ROS: no chest pain or dyspnea on exertion  Gastrointestinal ROS: no abdominal pain, change in bowel habits, or black or bloody stools  Genito-Urinary ROS: no dysuria, trouble voiding, or hematuria  Musculoskeletal ROS: negative  Neurological ROS: no TIA or stroke symptoms  Dermatological ROS: negative    VITALS:  Blood pressure 136/62, pulse 90, resp. rate 16, weight 199 lb (90.3 kg), SpO2 96 %, not currently breastfeeding. Body mass index is 34.59 kg/m². Physical Examination:  General appearance - alert, well appearing, and in no distress  Mental status - alert, oriented to person, place, and time  Neck - Neck is supple, no JVD or carotid bruits. No thyromegaly or adenopathy.    Chest - clear to auscultation, no wheezes, rales or rhonchi, symmetric air entry  Heart - normal rate, regular rhythm, normal S1, S2, no murmurs, rubs, clicks or gallops  Abdomen - soft, nontender, nondistended, no masses or organomegaly  Neurological - alert, oriented, normal speech, no focal findings or movement disorder noted  Extremities - peripheral pulses normal, no pedal edema, no clubbing or cyanosis  Skin - normal coloration and turgor, no rashes, no suspicious skin lesions noted    No orders of the

## 2018-11-01 NOTE — TELEPHONE ENCOUNTER
Please call patient to inform her that recent labwork shows the followin. Her blood counts are unremarkable with no signs of anemia, her vitamin D level is normal, and her kidney function testing is normal    2. Her lipid panel shows elevated total cholesterol, elevated triglycerides, and borderline bad cholesterol level above goal for diabetes. 3.  Her diabetes is at goal control with an A1c of 6.1 and her urine microalbumin testing is normal    4. Her creatine kinase level remains extremely elevated and her lactate dehydrogenase level is also extremely elevated. These are enzymes found in muscle tissue that can be released with muscle cell damage. She should STOP her zetia. If she has decreased urination and/or is having progressively worsening muscle pain, particularly in one specific area of the body she should go to the ER immediately for further acute evaluation and IV fluids. Long term, it is important that she be seen by a kidney specialist and by a neurologist for further evaluation and management. She has already been referred to Dr. Darlyn Clay for nephrology, please make sure she has a visit scheduled ASAP. She is established with Dr. Jim Villeda for neurology care, please help her schedule ASAP visit at his office as well.

## 2018-11-01 NOTE — TELEPHONE ENCOUNTER
I have left a message for patient to call us back on both her cell and home number. I also sent information to her Mychart and asked her to call back or reply back to our message ASAP.

## 2018-11-05 ENCOUNTER — TELEPHONE (OUTPATIENT)
Dept: FAMILY MEDICINE CLINIC | Age: 58
End: 2018-11-05

## 2018-11-05 ENCOUNTER — OFFICE VISIT (OUTPATIENT)
Dept: FAMILY MEDICINE CLINIC | Age: 58
End: 2018-11-05
Payer: COMMERCIAL

## 2018-11-05 VITALS
OXYGEN SATURATION: 96 % | HEART RATE: 99 BPM | TEMPERATURE: 96.9 F | DIASTOLIC BLOOD PRESSURE: 62 MMHG | BODY MASS INDEX: 35.44 KG/M2 | RESPIRATION RATE: 12 BRPM | SYSTOLIC BLOOD PRESSURE: 108 MMHG | WEIGHT: 200 LBS | HEIGHT: 63 IN

## 2018-11-05 DIAGNOSIS — E55.9 VITAMIN D DEFICIENCY: ICD-10-CM

## 2018-11-05 DIAGNOSIS — G57.93 NEUROPATHY OF BOTH FEET: ICD-10-CM

## 2018-11-05 DIAGNOSIS — L93.0 LUPUS ERYTHEMATOSUS, UNSPECIFIED FORM: ICD-10-CM

## 2018-11-05 DIAGNOSIS — Z23 NEED FOR SHINGLES VACCINE: ICD-10-CM

## 2018-11-05 DIAGNOSIS — E78.5 DYSLIPIDEMIA: ICD-10-CM

## 2018-11-05 DIAGNOSIS — R61 EXCESSIVE SWEATING: ICD-10-CM

## 2018-11-05 DIAGNOSIS — M79.7 FIBROMYALGIA MUSCLE PAIN: ICD-10-CM

## 2018-11-05 DIAGNOSIS — R03.0 BORDERLINE HYPERTENSION: ICD-10-CM

## 2018-11-05 DIAGNOSIS — R74.8 ELEVATED ALDOLASE LEVEL: ICD-10-CM

## 2018-11-05 DIAGNOSIS — E11.9 TYPE 2 DIABETES MELLITUS WITHOUT COMPLICATION, WITHOUT LONG-TERM CURRENT USE OF INSULIN (HCC): Primary | ICD-10-CM

## 2018-11-05 DIAGNOSIS — R74.8 ELEVATED CK: ICD-10-CM

## 2018-11-05 DIAGNOSIS — K52.9 COLITIS: ICD-10-CM

## 2018-11-05 PROCEDURE — 99214 OFFICE O/P EST MOD 30 MIN: CPT | Performed by: NURSE PRACTITIONER

## 2018-11-05 RX ORDER — GABAPENTIN 400 MG/1
CAPSULE ORAL
Qty: 270 CAPSULE | Refills: 0 | Status: SHIPPED | OUTPATIENT
Start: 2018-11-05 | End: 2019-01-18 | Stop reason: SDUPTHER

## 2018-11-05 NOTE — PROGRESS NOTES
to take her off of it. She has been having some twinges of pain in her right lower quadrant but states that nothing has been severe. No recent change in her bowel patterns. The five diabetic measures for control:   Hemoglobin A1C (%)   Date Value   10/30/2018 6.1 (H)     LDL Calculated (mg/dL)   Date Value   10/30/2018 112         Blood pressure less than 131/81,   BP Readings from Last 1 Encounters:   11/05/18 108/62     Smoking, non smoker is goal. This pt is not a smoker. This pt does an aspirin a day. Last eye exam was 2018  Last diabetic foot exam was 2018  Last urine microalbumin creatinine ratio was 2018. PMH: This 62 y.o. female  patient is  hypertensive, is  diabetic, is  hyperlipidemic. She has a history of smoking and has a  family history of heart disease. She is  obese. Review of Systems  Patient denies chest pain, headache and palpitations. Eyes: no rapid change in vision  Ears, nose, mouth, throat, and face: no changes in hearing or sore throat  Respiratory: No shortness of breath or cough. Cardiovascular: no chest pain or pressure. This patient reports no polyuria, polydipsia or episodes of hypoglycemia. Treatment Adherence:   Medication compliance:  compliant most of the time  Diet compliance:  compliant most of the time  Weight trend: stable  Current exercise: aerobics 3 time(s) per week  What might prevent you from meeting your goal?: none  Patient plan for overcoming barriers: N/A     Patient Confidence: 8/10      Objective:   EXAM:  Constitutional Blood pressure 108/62, pulse 99, temperature 96.9 °F (36.1 °C), temperature source Temporal, resp. rate 12, height 5' 3\" (1.6 m), weight 200 lb (90.7 kg), SpO2 96 %, not currently breastfeeding. She has a normal affect, no acute distress, appears well developed and well nourished. Neck:  neck- supple, no mass, non-tender and no bruits  Lungs:  Normal expansion. Clear to auscultation.   No rales, rhonchi, or wheezing., No chest wall tenderness. Heart:  Heart sounds are normal.  Regular rate and rhythm without murmur, gallop or rub. Abdomen:  Soft, non-tender, normal bowel sounds. No bruits, organomegaly or masses. Extremities: Extremities warm to touch, pink, with no edema. Assessment:      Diagnosis Orders   1. Type 2 diabetes mellitus without complication, without long-term current use of insulin (HCC)  CBC Auto Differential    Comprehensive Metabolic Panel    Lipid Panel    Hemoglobin A1C    Microalbumin / Creatinine Urine Ratio   2. Borderline hypertension     3. Dyslipidemia     4. Vitamin D deficiency  Vitamin D 25 Hydroxy   5. Elevated aldolase level     6. Elevated CK     7. Fibromyalgia muscle pain  gabapentin (NEURONTIN) 400 MG capsule   8. Neuropathy of both feet- Dr. Jenny Donaldson     9. Excessive sweating     10. Lupus erythematosus, unspecified form     11. Colitis     12. Need for shingles vaccine  zoster recombinant adjuvanted vaccine [facility]) 50 MCG/0.5ML SUSR injection         PLAN: Include orders in the DX section. Diabetes Counseling   Patient was counseled regarding disease risks and adopting healthy behaviors. Patient was provided education materials to assist with self management. Patient was provided log (or received log during previous visit) to record blood pressure, food intake and/or blood sugar. Patient was instructed to keep log up-to-date and to always bring log to all office visits. Follow up: 3 months and as needed. Blood work one week prior as ordered. 1. Glucose isn't well-controlled with hemoglobin A1c of 6.1.    2.  Blood pressure is well controlled. 3.  Lipid panel is stable off of these that area. Triglycerides are elevated. Discussed recommended dietary changes. 4.  Vitamin D is stable on current supplementation. Continue the same. 5.  6.  She has an appointment coming up next week with nephrology secondary to highly elevated muscle enzymes.   Discussed that follow-up urine

## 2018-11-05 NOTE — TELEPHONE ENCOUNTER
Please contact Dr. Myers An office to let him know that patient is requesting to stay on her Colazal for colitis. She is having some current medical issues and wishes to hold off on trialing her symptoms off of medication. She is requesting medication refill and asked me for this but I feel it is more appropriate for GI specialist to address.

## 2018-11-07 ENCOUNTER — HOSPITAL ENCOUNTER (OUTPATIENT)
Dept: LAB | Age: 58
Discharge: HOME | End: 2018-11-07
Payer: COMMERCIAL

## 2018-11-07 LAB
ALBUMIN SERPL-MCNC: 4.4 G/DL (ref 3.9–4.9)
ANION GAP SERPL CALCULATED.3IONS-SCNC: 16 MEQ/L (ref 7–13)
BACTERIA: NORMAL /HPF
BILIRUBIN URINE: NEGATIVE
BLOOD, URINE: NEGATIVE
BUN BLDV-MCNC: 19 MG/DL (ref 6–20)
CALCIUM SERPL-MCNC: 9.2 MG/DL (ref 8.6–10.2)
CASTS: NORMAL /LPF
CHLORIDE BLD-SCNC: 100 MEQ/L (ref 98–107)
CLARITY: CLEAR
CO2: 24 MEQ/L (ref 22–29)
COLOR: YELLOW
CREAT SERPL-MCNC: 0.83 MG/DL (ref 0.5–0.9)
EPITHELIAL CELLS, UA: NORMAL /HPF
GFR AFRICAN AMERICAN: >60
GFR NON-AFRICAN AMERICAN: >60
GLUCOSE BLD-MCNC: 136 MG/DL (ref 74–109)
GLUCOSE URINE: NEGATIVE MG/DL
HCT VFR BLD CALC: 39.6 % (ref 37–47)
HEMOGLOBIN: 13.4 G/DL (ref 12–16)
KETONES, URINE: NEGATIVE MG/DL
LEUKOCYTE ESTERASE, URINE: ABNORMAL
MCH RBC QN AUTO: 31.1 PG (ref 27–31.3)
MCHC RBC AUTO-ENTMCNC: 33.9 % (ref 33–37)
MCV RBC AUTO: 91.7 FL (ref 82–100)
MUCUS: PRESENT
NITRITE, URINE: NEGATIVE
PDW BLD-RTO: 13.1 % (ref 11.5–14.5)
PH UA: 5 (ref 5–9)
PHOSPHORUS: 4 MG/DL (ref 2.5–4.5)
PLATELET # BLD: 267 K/UL (ref 130–400)
POTASSIUM SERPL-SCNC: 4.7 MEQ/L (ref 3.5–5.1)
PROTEIN UA: NEGATIVE MG/DL
RBC # BLD: 4.31 M/UL (ref 4.2–5.4)
RBC UA: NORMAL /HPF (ref 0–2)
SODIUM BLD-SCNC: 140 MEQ/L (ref 132–144)
SPECIFIC GRAVITY UA: 1.02 (ref 1–1.03)
UROBILINOGEN, URINE: 0.2 E.U./DL
WBC # BLD: 4.3 K/UL (ref 4.8–10.8)
WBC UA: NORMAL /HPF (ref 0–5)

## 2018-11-07 PROCEDURE — 81001 URINALYSIS AUTO W/SCOPE: CPT

## 2018-11-07 PROCEDURE — 85027 COMPLETE CBC AUTOMATED: CPT

## 2018-11-07 PROCEDURE — 80069 RENAL FUNCTION PANEL: CPT

## 2018-11-07 PROCEDURE — 36415 COLL VENOUS BLD VENIPUNCTURE: CPT

## 2018-11-08 NOTE — TELEPHONE ENCOUNTER
At this time it is unclear if she truly has colitis given that no previous endoscopy from Dr. Kaylan Carl shows evidence for colitis. Additionally we checked her stool for calprotectin and it was noted to be normal    Please make an appointment for her in the clinic to discuss this further.     Thanks  Araseli Pineda

## 2018-11-15 ENCOUNTER — TELEPHONE (OUTPATIENT)
Dept: FAMILY MEDICINE CLINIC | Age: 58
End: 2018-11-15

## 2018-11-15 DIAGNOSIS — R74.8 ELEVATED ALDOLASE LEVEL: ICD-10-CM

## 2018-11-15 DIAGNOSIS — R74.02 ELEVATED LDH: ICD-10-CM

## 2018-11-15 DIAGNOSIS — R74.8 ELEVATED CK: Primary | ICD-10-CM

## 2018-11-16 ENCOUNTER — HOSPITAL ENCOUNTER (EMERGENCY)
Age: 58
Discharge: OTHER INSTITUTION | End: 2018-11-16
Payer: COMMERCIAL

## 2018-11-16 ENCOUNTER — HOSPITAL ENCOUNTER (INPATIENT)
Age: 58
LOS: 1 days | Discharge: HOME | DRG: 596 | End: 2018-11-19
Attending: INTERNAL MEDICINE | Admitting: INTERNAL MEDICINE
Payer: COMMERCIAL

## 2018-11-16 ENCOUNTER — APPOINTMENT (OUTPATIENT)
Dept: CT IMAGING | Age: 58
End: 2018-11-16
Payer: COMMERCIAL

## 2018-11-16 VITALS
WEIGHT: 195 LBS | HEART RATE: 94 BPM | SYSTOLIC BLOOD PRESSURE: 156 MMHG | RESPIRATION RATE: 18 BRPM | TEMPERATURE: 97.7 F | DIASTOLIC BLOOD PRESSURE: 72 MMHG | OXYGEN SATURATION: 96 % | HEIGHT: 63 IN | BODY MASS INDEX: 34.55 KG/M2

## 2018-11-16 DIAGNOSIS — G24.9 DYSTONIA: Primary | ICD-10-CM

## 2018-11-16 PROBLEM — F13.90 BENZODIAZEPINE MISUSE: Status: ACTIVE | Noted: 2018-11-16

## 2018-11-16 PROBLEM — E87.20 LACTIC ACIDOSIS: Status: ACTIVE | Noted: 2018-11-16

## 2018-11-16 PROBLEM — L93.0 LUPUS ERYTHEMATOSUS: Chronic | Status: ACTIVE | Noted: 2017-09-08

## 2018-11-16 LAB
ALBUMIN SERPL-MCNC: 4.2 G/DL (ref 3.9–4.9)
ALP BLD-CCNC: 56 U/L (ref 40–130)
ALT SERPL-CCNC: 90 U/L (ref 0–33)
AMPHETAMINE SCREEN, URINE: ABNORMAL
ANION GAP SERPL CALCULATED.3IONS-SCNC: 17 MEQ/L (ref 7–13)
AST SERPL-CCNC: 68 U/L (ref 0–35)
BARBITURATE SCREEN URINE: ABNORMAL
BENZODIAZEPINE SCREEN, URINE: POSITIVE
BILIRUB SERPL-MCNC: 0.2 MG/DL (ref 0–1.2)
BILIRUBIN URINE: NEGATIVE
BLOOD, URINE: NEGATIVE
BUN BLDV-MCNC: 17 MG/DL (ref 6–20)
CALCIUM SERPL-MCNC: 9.3 MG/DL (ref 8.6–10.2)
CANNABINOID SCREEN URINE: ABNORMAL
CHLORIDE BLD-SCNC: 98 MEQ/L (ref 98–107)
CLARITY: CLEAR
CO2: 24 MEQ/L (ref 22–29)
COCAINE METABOLITE SCREEN URINE: ABNORMAL
COLOR: YELLOW
CREAT SERPL-MCNC: 0.91 MG/DL (ref 0.5–0.9)
ETHANOL PERCENT: NORMAL G/DL
ETHANOL: <10 MG/DL (ref 0–0.08)
GFR AFRICAN AMERICAN: >60
GFR NON-AFRICAN AMERICAN: >60
GLOBULIN: 2.9 G/DL (ref 2.3–3.5)
GLUCOSE BLD-MCNC: 133 MG/DL (ref 60–115)
GLUCOSE BLD-MCNC: 135 MG/DL (ref 74–109)
GLUCOSE URINE: NEGATIVE MG/DL
HCT VFR BLD CALC: 37.8 % (ref 37–47)
HEMOGLOBIN: 12.8 G/DL (ref 12–16)
KETONES, URINE: NEGATIVE MG/DL
LACTIC ACID: 2.6 MMOL/L (ref 0.5–2.2)
LEUKOCYTE ESTERASE, URINE: NEGATIVE
Lab: ABNORMAL
MCH RBC QN AUTO: 30.5 PG (ref 27–31.3)
MCHC RBC AUTO-ENTMCNC: 34 % (ref 33–37)
MCV RBC AUTO: 89.7 FL (ref 82–100)
NITRITE, URINE: NEGATIVE
OPIATE SCREEN URINE: ABNORMAL
PDW BLD-RTO: 12.9 % (ref 11.5–14.5)
PERFORMED ON: ABNORMAL
PH UA: 5 (ref 5–9)
PHENCYCLIDINE SCREEN URINE: ABNORMAL
PLATELET # BLD: 276 K/UL (ref 130–400)
POTASSIUM SERPL-SCNC: 4.5 MEQ/L (ref 3.5–5.1)
PROTEIN UA: NEGATIVE MG/DL
RBC # BLD: 4.21 M/UL (ref 4.2–5.4)
SODIUM BLD-SCNC: 139 MEQ/L (ref 132–144)
SPECIFIC GRAVITY UA: 1.02 (ref 1–1.03)
TOTAL PROTEIN: 7.1 G/DL (ref 6.4–8.1)
TRICYCLIC, URINE: POSITIVE
URINE REFLEX TO CULTURE: NORMAL
UROBILINOGEN, URINE: 0.2 E.U./DL
WBC # BLD: 5.3 K/UL (ref 4.8–10.8)

## 2018-11-16 PROCEDURE — 70450 CT HEAD/BRAIN W/O DYE: CPT

## 2018-11-16 PROCEDURE — 83605 ASSAY OF LACTIC ACID: CPT

## 2018-11-16 PROCEDURE — 80053 COMPREHEN METABOLIC PANEL: CPT

## 2018-11-16 PROCEDURE — G0480 DRUG TEST DEF 1-7 CLASSES: HCPCS

## 2018-11-16 PROCEDURE — 81003 URINALYSIS AUTO W/O SCOPE: CPT

## 2018-11-16 PROCEDURE — 85027 COMPLETE CBC AUTOMATED: CPT

## 2018-11-16 PROCEDURE — 80306 DRUG TEST PRSMV INSTRMNT: CPT

## 2018-11-16 PROCEDURE — 36415 COLL VENOUS BLD VENIPUNCTURE: CPT

## 2018-11-16 PROCEDURE — 2580000003 HC RX 258: Performed by: NURSE PRACTITIONER

## 2018-11-16 PROCEDURE — 99285 EMERGENCY DEPT VISIT HI MDM: CPT

## 2018-11-16 RX ORDER — ONDANSETRON 2 MG/ML
4 INJECTION INTRAMUSCULAR; INTRAVENOUS EVERY 6 HOURS PRN
Status: DISCONTINUED | OUTPATIENT
Start: 2018-11-16 | End: 2018-11-19 | Stop reason: HOSPADM

## 2018-11-16 RX ORDER — NICOTINE POLACRILEX 4 MG
15 LOZENGE BUCCAL PRN
Status: DISCONTINUED | OUTPATIENT
Start: 2018-11-16 | End: 2018-11-19 | Stop reason: HOSPADM

## 2018-11-16 RX ORDER — 0.9 % SODIUM CHLORIDE 0.9 %
1000 INTRAVENOUS SOLUTION INTRAVENOUS ONCE
Status: COMPLETED | OUTPATIENT
Start: 2018-11-16 | End: 2018-11-16

## 2018-11-16 RX ORDER — SODIUM CHLORIDE 0.9 % (FLUSH) 0.9 %
10 SYRINGE (ML) INJECTION EVERY 12 HOURS SCHEDULED
Status: DISCONTINUED | OUTPATIENT
Start: 2018-11-17 | End: 2018-11-19 | Stop reason: HOSPADM

## 2018-11-16 RX ORDER — DEXTROSE MONOHYDRATE 25 G/50ML
12.5 INJECTION, SOLUTION INTRAVENOUS PRN
Status: DISCONTINUED | OUTPATIENT
Start: 2018-11-16 | End: 2018-11-19 | Stop reason: HOSPADM

## 2018-11-16 RX ORDER — SODIUM CHLORIDE 0.9 % (FLUSH) 0.9 %
10 SYRINGE (ML) INJECTION PRN
Status: DISCONTINUED | OUTPATIENT
Start: 2018-11-16 | End: 2018-11-19 | Stop reason: HOSPADM

## 2018-11-16 RX ORDER — ACETAMINOPHEN 325 MG/1
650 TABLET ORAL EVERY 4 HOURS PRN
Status: DISCONTINUED | OUTPATIENT
Start: 2018-11-16 | End: 2018-11-19 | Stop reason: HOSPADM

## 2018-11-16 RX ORDER — DEXTROSE MONOHYDRATE 50 MG/ML
100 INJECTION, SOLUTION INTRAVENOUS PRN
Status: DISCONTINUED | OUTPATIENT
Start: 2018-11-16 | End: 2018-11-19 | Stop reason: HOSPADM

## 2018-11-16 RX ADMIN — SODIUM CHLORIDE 1000 ML: 9 INJECTION, SOLUTION INTRAVENOUS at 19:09

## 2018-11-16 ASSESSMENT — ENCOUNTER SYMPTOMS
BACK PAIN: 0
DIARRHEA: 0
SHORTNESS OF BREATH: 0
VOMITING: 0
ABDOMINAL PAIN: 0
VOICE CHANGE: 0
NAUSEA: 0
TROUBLE SWALLOWING: 0
COLOR CHANGE: 0
CONSTIPATION: 0
SORE THROAT: 0
COUGH: 0

## 2018-11-17 LAB
AMPHETAMINE SCREEN, URINE: NORMAL
ANION GAP SERPL CALCULATED.3IONS-SCNC: 13 MEQ/L (ref 7–13)
BARBITURATE SCREEN URINE: NORMAL
BENZODIAZEPINE SCREEN, URINE: NORMAL
BUN BLDV-MCNC: 15 MG/DL (ref 6–20)
C-REACTIVE PROTEIN: 7.7 MG/L (ref 0–5)
CALCIUM SERPL-MCNC: 9.2 MG/DL (ref 8.6–10.2)
CANNABINOID SCREEN URINE: NORMAL
CHLORIDE BLD-SCNC: 104 MEQ/L (ref 98–107)
CO2: 24 MEQ/L (ref 22–29)
COCAINE METABOLITE SCREEN URINE: NORMAL
CREAT SERPL-MCNC: 0.7 MG/DL (ref 0.5–0.9)
GFR AFRICAN AMERICAN: >60
GFR NON-AFRICAN AMERICAN: >60
GLUCOSE BLD-MCNC: 112 MG/DL (ref 74–109)
GLUCOSE BLD-MCNC: 118 MG/DL (ref 60–115)
GLUCOSE BLD-MCNC: 134 MG/DL (ref 60–115)
GLUCOSE BLD-MCNC: 240 MG/DL (ref 60–115)
GLUCOSE BLD-MCNC: 99 MG/DL (ref 60–115)
HCT VFR BLD CALC: 36.3 % (ref 37–47)
HEMOGLOBIN: 12.4 G/DL (ref 12–16)
LACTIC ACID: 1.2 MMOL/L (ref 0.5–2.2)
Lab: NORMAL
MCH RBC QN AUTO: 31.8 PG (ref 27–31.3)
MCHC RBC AUTO-ENTMCNC: 34.3 % (ref 33–37)
MCV RBC AUTO: 92.9 FL (ref 82–100)
OPIATE SCREEN URINE: NORMAL
PDW BLD-RTO: 13 % (ref 11.5–14.5)
PERFORMED ON: ABNORMAL
PERFORMED ON: NORMAL
PHENCYCLIDINE SCREEN URINE: NORMAL
PLATELET # BLD: 208 K/UL (ref 130–400)
POTASSIUM REFLEX MAGNESIUM: 4.1 MEQ/L (ref 3.5–5.1)
RBC # BLD: 3.9 M/UL (ref 4.2–5.4)
SEDIMENTATION RATE, ERYTHROCYTE: 14 MM (ref 0–30)
SODIUM BLD-SCNC: 141 MEQ/L (ref 132–144)
VITAMIN D 25-HYDROXY: 38.6 NG/ML (ref 30–100)
WBC # BLD: 3.9 K/UL (ref 4.8–10.8)

## 2018-11-17 PROCEDURE — 85597 PHOSPHOLIPID PLTLT NEUTRALIZ: CPT

## 2018-11-17 PROCEDURE — 85652 RBC SED RATE AUTOMATED: CPT

## 2018-11-17 PROCEDURE — G0378 HOSPITAL OBSERVATION PER HR: HCPCS

## 2018-11-17 PROCEDURE — 6370000000 HC RX 637 (ALT 250 FOR IP): Performed by: HOSPITALIST

## 2018-11-17 PROCEDURE — 96365 THER/PROPH/DIAG IV INF INIT: CPT

## 2018-11-17 PROCEDURE — 86256 FLUORESCENT ANTIBODY TITER: CPT

## 2018-11-17 PROCEDURE — 85027 COMPLETE CBC AUTOMATED: CPT

## 2018-11-17 PROCEDURE — 86160 COMPLEMENT ANTIGEN: CPT

## 2018-11-17 PROCEDURE — 82306 VITAMIN D 25 HYDROXY: CPT

## 2018-11-17 PROCEDURE — 86255 FLUORESCENT ANTIBODY SCREEN: CPT

## 2018-11-17 PROCEDURE — 36415 COLL VENOUS BLD VENIPUNCTURE: CPT

## 2018-11-17 PROCEDURE — 2580000003 HC RX 258: Performed by: PSYCHIATRY & NEUROLOGY

## 2018-11-17 PROCEDURE — 80048 BASIC METABOLIC PNL TOTAL CA: CPT

## 2018-11-17 PROCEDURE — 94664 DEMO&/EVAL PT USE INHALER: CPT

## 2018-11-17 PROCEDURE — 80307 DRUG TEST PRSMV CHEM ANLYZR: CPT

## 2018-11-17 PROCEDURE — 86140 C-REACTIVE PROTEIN: CPT

## 2018-11-17 PROCEDURE — 6360000002 HC RX W HCPCS: Performed by: PSYCHIATRY & NEUROLOGY

## 2018-11-17 PROCEDURE — 2580000003 HC RX 258: Performed by: HOSPITALIST

## 2018-11-17 PROCEDURE — 6370000000 HC RX 637 (ALT 250 FOR IP): Performed by: PSYCHIATRY & NEUROLOGY

## 2018-11-17 RX ORDER — TRAMADOL HYDROCHLORIDE 50 MG/1
50 TABLET ORAL 2 TIMES DAILY PRN
Status: DISCONTINUED | OUTPATIENT
Start: 2018-11-17 | End: 2018-11-19 | Stop reason: HOSPADM

## 2018-11-17 RX ORDER — ALBUTEROL SULFATE 90 UG/1
2 AEROSOL, METERED RESPIRATORY (INHALATION) EVERY 6 HOURS PRN
Status: DISCONTINUED | OUTPATIENT
Start: 2018-11-17 | End: 2018-11-19 | Stop reason: HOSPADM

## 2018-11-17 RX ORDER — PANTOPRAZOLE SODIUM 40 MG/1
40 TABLET, DELAYED RELEASE ORAL
Status: DISCONTINUED | OUTPATIENT
Start: 2018-11-17 | End: 2018-11-19 | Stop reason: HOSPADM

## 2018-11-17 RX ORDER — METAXALONE 800 MG/1
800 TABLET ORAL 3 TIMES DAILY PRN
Status: DISCONTINUED | OUTPATIENT
Start: 2018-11-17 | End: 2018-11-19 | Stop reason: HOSPADM

## 2018-11-17 RX ORDER — ESTRADIOL 1 MG/1
0.5 TABLET ORAL
Status: DISCONTINUED | OUTPATIENT
Start: 2018-11-19 | End: 2018-11-19 | Stop reason: HOSPADM

## 2018-11-17 RX ORDER — CITALOPRAM 20 MG/1
40 TABLET ORAL DAILY
Status: DISCONTINUED | OUTPATIENT
Start: 2018-11-17 | End: 2018-11-19 | Stop reason: HOSPADM

## 2018-11-17 RX ORDER — CARVEDILOL 6.25 MG/1
6.25 TABLET ORAL 2 TIMES DAILY WITH MEALS
Status: DISCONTINUED | OUTPATIENT
Start: 2018-11-17 | End: 2018-11-19 | Stop reason: HOSPADM

## 2018-11-17 RX ORDER — GABAPENTIN 400 MG/1
400 CAPSULE ORAL 3 TIMES DAILY
Status: DISCONTINUED | OUTPATIENT
Start: 2018-11-17 | End: 2018-11-19 | Stop reason: HOSPADM

## 2018-11-17 RX ORDER — FUROSEMIDE 20 MG/1
20 TABLET ORAL 2 TIMES DAILY
Status: DISCONTINUED | OUTPATIENT
Start: 2018-11-17 | End: 2018-11-19 | Stop reason: HOSPADM

## 2018-11-17 RX ORDER — LIDOCAINE 4 G/G
1 PATCH TOPICAL DAILY
Status: DISCONTINUED | OUTPATIENT
Start: 2018-11-17 | End: 2018-11-19 | Stop reason: HOSPADM

## 2018-11-17 RX ORDER — AMITRIPTYLINE HYDROCHLORIDE 50 MG/1
50 TABLET, FILM COATED ORAL NIGHTLY
Status: DISCONTINUED | OUTPATIENT
Start: 2018-11-17 | End: 2018-11-19 | Stop reason: HOSPADM

## 2018-11-17 RX ORDER — ACETAMINOPHEN 325 MG/1
325 TABLET ORAL 2 TIMES DAILY PRN
Status: DISCONTINUED | OUTPATIENT
Start: 2018-11-17 | End: 2018-11-19 | Stop reason: HOSPADM

## 2018-11-17 RX ORDER — LEVETIRACETAM 250 MG/1
250 TABLET ORAL 2 TIMES DAILY
Status: DISCONTINUED | OUTPATIENT
Start: 2018-11-17 | End: 2018-11-19 | Stop reason: HOSPADM

## 2018-11-17 RX ORDER — HYDROXYCHLOROQUINE SULFATE 200 MG/1
200 TABLET, FILM COATED ORAL 2 TIMES DAILY
Status: DISCONTINUED | OUTPATIENT
Start: 2018-11-17 | End: 2018-11-19 | Stop reason: HOSPADM

## 2018-11-17 RX ADMIN — HYDROXYCHLOROQUINE SULFATE 200 MG: 200 TABLET, FILM COATED ORAL at 21:06

## 2018-11-17 RX ADMIN — Medication 10 ML: at 21:10

## 2018-11-17 RX ADMIN — GABAPENTIN 400 MG: 400 CAPSULE ORAL at 21:06

## 2018-11-17 RX ADMIN — NYSTATIN 500000 UNITS: 500000 SUSPENSION ORAL at 21:06

## 2018-11-17 RX ADMIN — NYSTATIN 500000 UNITS: 500000 SUSPENSION ORAL at 09:08

## 2018-11-17 RX ADMIN — AMITRIPTYLINE HYDROCHLORIDE 50 MG: 50 TABLET, FILM COATED ORAL at 21:06

## 2018-11-17 RX ADMIN — NYSTATIN 500000 UNITS: 500000 SUSPENSION ORAL at 18:13

## 2018-11-17 RX ADMIN — Medication 10 ML: at 09:10

## 2018-11-17 RX ADMIN — CITALOPRAM HYDROBROMIDE 40 MG: 20 TABLET ORAL at 09:04

## 2018-11-17 RX ADMIN — RIVAROXABAN 20 MG: 20 TABLET, FILM COATED ORAL at 09:09

## 2018-11-17 RX ADMIN — NYSTATIN 500000 UNITS: 500000 SUSPENSION ORAL at 02:51

## 2018-11-17 RX ADMIN — FUROSEMIDE 20 MG: 20 TABLET ORAL at 21:06

## 2018-11-17 RX ADMIN — GABAPENTIN 400 MG: 400 CAPSULE ORAL at 09:06

## 2018-11-17 RX ADMIN — CARVEDILOL 6.25 MG: 6.25 TABLET, FILM COATED ORAL at 09:02

## 2018-11-17 RX ADMIN — HYDROXYCHLOROQUINE SULFATE 200 MG: 200 TABLET, FILM COATED ORAL at 09:06

## 2018-11-17 RX ADMIN — LEVETIRACETAM 250 MG: 250 TABLET ORAL at 21:06

## 2018-11-17 RX ADMIN — CARVEDILOL 6.25 MG: 6.25 TABLET, FILM COATED ORAL at 16:59

## 2018-11-17 RX ADMIN — SODIUM CHLORIDE 1000 MG: 9 INJECTION, SOLUTION INTRAVENOUS at 16:59

## 2018-11-17 RX ADMIN — VITAMIN D, TAB 1000IU (100/BT) 2000 UNITS: 25 TAB at 09:10

## 2018-11-17 RX ADMIN — GABAPENTIN 400 MG: 400 CAPSULE ORAL at 14:19

## 2018-11-17 RX ADMIN — FUROSEMIDE 20 MG: 20 TABLET ORAL at 09:05

## 2018-11-17 RX ADMIN — NYSTATIN 500000 UNITS: 500000 SUSPENSION ORAL at 14:19

## 2018-11-17 RX ADMIN — PANTOPRAZOLE SODIUM 40 MG: 40 TABLET, DELAYED RELEASE ORAL at 09:08

## 2018-11-17 ASSESSMENT — PAIN SCALES - GENERAL
PAINLEVEL_OUTOF10: 8
PAINLEVEL_OUTOF10: 0
PAINLEVEL_OUTOF10: 0

## 2018-11-17 ASSESSMENT — PAIN DESCRIPTION - LOCATION: LOCATION: HEAD

## 2018-11-17 ASSESSMENT — PAIN DESCRIPTION - ONSET: ONSET: SUDDEN

## 2018-11-17 ASSESSMENT — PAIN DESCRIPTION - PAIN TYPE: TYPE: ACUTE PAIN

## 2018-11-17 NOTE — H&P
5/25/2016    Factor V Leiden carrier [facility])     history of DVT, on Xarelto    GERD (gastroesophageal reflux disease)     HTN (hypertension)     HTN (hypertension)     Hyperlipidemia     Neuropathy of both feet 8/22/2016    NSTEMI (non-ST elevated myocardial infarction) (HCC)     Obesity     Osteoarthritis     Pulmonary embolism (HCC)     Renal failure 6/29/2018    Right inguinal hernia 11/9/2017    SOB (shortness of breath) 4/19/2017    Systemic lupus erythematosus ([address] 1/9/5641    Umbilical hernia 58/5/2558    Vasomotor flushing     on hormone replacement therapy     PAST SURGICAL HISTORY:    Past Surgical History:   Procedure Laterality Date   Liz Ribeiro COLONOSCOPY  12/16/13    DR SINGER repeat in 10 yrs per pt    COLONOSCOPY  08/2017    Dr Arlette Austin Right 11/15/2017    RIGHT  HERNIA INGUINAL REPAIR performed by Misty Alvarez MD at [address] [place], [place] [address]Mg ARTHROSCOPY Left     LAPAROSCOPY N/A 11/15/2017    DIAGNOSTIC LAPAROSCOPY performed by Misty Alvarez MD at [facility]. [place] N/A 9/25/2017    [address] [place] [address] 3 performed by Hortencia Sarmiento MD at [facility],8+O/J,YINQU N/A 12/58/3118    HERNIA UMBILICAL REPAIR, [facility] performed by Misty Alvarez MD at [place] [address]  12/16/13    DR SINGER     FAMILY HISTORY:    Family History   Problem Relation Age of Onset    Substance Abuse Brother     High Blood Pressure Mother     High Cholesterol Mother     Arthritis Mother     High Blood Pressure Father     High Cholesterol Father     Clotting Disorder Father         clot to intestines    Arthritis Father     Substance Abuse Brother     Substance Abuse Brother     Stroke Paternal Uncle 36    Cystic Fibrosis Daughter      SOCIAL HISTORY:    Social History     Social History    Marital status:      Spouse wheezing  CARDIOVASCULAR:  Normal apical impulse, regular rate and rhythm, normal S1 and S2, no S3 or S4, and no murmur noted  ABDOMEN:  normal bowel sounds, soft, non-distended, non-tender, no masses palpated, no hepatosplenomegally  MUSCULOSKELETAL:  full range of motion noted  motor strength is 5 out of 5 all extremities bilaterally  tone is normal  NEUROLOGIC:  Dystonia noted to upper torso and extremities. Awake, alert, oriented to name, place and time. Cranial nerves II-XII are grossly intact. SKIN:  no rashes and no lesions    DATA:     Diagnostic tests reviewed for today's visit:    Most recent labs and imaging results reviewed.      LABS:    Recent Results (from the past 24 hour(s))   POCT Glucose    Collection Time: 11/16/18  5:47 PM   Result Value Ref Range    POC Glucose 133 (H) 60 - 115 mg/dl    Performed on ACCU-CHEK    CBC    Collection Time: 11/16/18  6:21 PM   Result Value Ref Range    WBC 5.3 4.8 - 10.8 K/uL    RBC 4.21 4.20 - 5.40 M/uL    Hemoglobin 12.8 12.0 - 16.0 g/dL    Hematocrit 37.8 37.0 - 47.0 %    MCV 89.7 82.0 - 100.0 fL    MCH 30.5 27.0 - 31.3 pg    MCHC 34.0 33.0 - 37.0 %    RDW 12.9 11.5 - 14.5 %    Platelets 329 800 - 327 K/uL   Comprehensive Metabolic Panel    Collection Time: 11/16/18  6:34 PM   Result Value Ref Range    Sodium 139 132 - 144 mEq/L    Potassium 4.5 3.5 - 5.1 mEq/L    Chloride 98 98 - 107 mEq/L    CO2 24 22 - 29 mEq/L    Anion Gap 17 (H) 7 - 13 mEq/L    Glucose 135 (H) 74 - 109 mg/dL    BUN 17 6 - 20 mg/dL    CREATININE 0.91 (H) 0.50 - 0.90 mg/dL    GFR Non-African American >60.0 >60    GFR  >60.0 >60    Calcium 9.3 8.6 - 10.2 mg/dL    Total Protein 7.1 6.4 - 8.1 g/dL    Alb 4.2 3.9 - 4.9 g/dL    Total Bilirubin 0.2 0.0 - 1.2 mg/dL    Alkaline Phosphatase 56 40 - 130 U/L    ALT 90 (H) 0 - 33 U/L    AST 68 (H) 0 - 35 U/L    Globulin 2.9 2.3 - 3.5 g/dL   Ethanol    Collection Time: 11/16/18  6:34 PM   Result Value Ref Range    Ethanol Lvl <10 mg/dL INTRA-AXIAL OR EXTRA-AXIAL FINDINGS. IF SIGNS OR SYMPTOMS PERSIST THEN CONSIDER REPEAT CT SCAN IN 12 TO 24 HOURS OR MRI TO FURTHER EVALUATE IF THERE ARE NO CONTRAINDICATIONS All CT scans at this facility use dose modulation, iterative reconstruction, and/or weight based dosing when appropriate to reduce radiation dose to as low as reasonably achievable.        VTE Prophylaxis: continue xarelto    ASSESSMENT AND PLAN  Patient Active Hospital Problem List:   Dystonia (11/16/2018)    Assessment: Started yesterday, unsure of cause at this time, could be related to lupus, patient could be going through unknown benzodiazepine withdrawal although that is unlikely, could be psych related     Plan:   Admit for observation  Neuro checks  Neurology consulted and agreed to follow patient      Lactic acidosis (11/16/2018)    Assessment: Could be secondary to mild dehydration, lactic acid was 2.6, fluid bolus given in ER     Plan:   Repeat lactic acid, if still elevated will restart IV fluid     Thrush  Assessment: Noted on time  Plan:  Nystatin swish and swallow     Benzodiazepine misuse ([address] (11/16/2018)    Assessment: Urine tox screen positive for benzodiazepines, oarrs report does not reflect current prescription, patient states she has not knowingly ingesting it    Plan:   Repeat urine tox screen      Dyslipidemia ()    Assessment: Chronic     Plan:   Cardiac diet  Continue home medication      DM (diabetes mellitus) ([address] (11/25/2014)    Assessment: Type II with hyperglycemia     Plan:   Diabetic diet  Accu-Cheks before meals and at bedtime with SSI      Lupus erythematosus (9/8/2017)    Assessment: Chronic, could be cause of today's symptoms     Plan:   Continue Plaquenil      Factor V Leiden carrier ([address] ()    Assessment: No blood clots at this time     Plan:   Continue Xarelto      HTN (hypertension) ()    Assessment: Chronic     Plan:   Continue her medication           Plan of care discussed with:

## 2018-11-17 NOTE — CONSULTS
reflux disease)     HTN (hypertension)     HTN (hypertension)     Hyperlipidemia     Neuropathy of both feet 8/22/2016    NSTEMI (non-ST elevated myocardial infarction) [facility]     Obesity     Osteoarthritis     Pulmonary embolism ([facility]     Renal failure 6/29/2018    Right inguinal hernia 11/9/2017    SOB (shortness of breath) 4/19/2017    Systemic lupus erythematosus ([facility] 1/0/1466    Umbilical hernia 87/9/3981    Vasomotor flushing     on hormone replacement therapy     Past Surgical History:   Procedure Laterality Date   Abhijit Li COLONOSCOPY  12/16/13    DR SINGER repeat in 10 yrs per pt    COLONOSCOPY  08/2017    Dr Maile Maravilla Right 11/15/2017    RIGHT  HERNIA INGUINAL REPAIR performed by Dave Hsu MD at [address] [place] [address]  ARTHROSCOPY Left     LAPAROSCOPY N/A 11/15/2017    DIAGNOSTIC LAPAROSCOPY performed by Dave Hsu MD at [facility]. [address] N/A 9/25/2017    [facility] 3 performed by Zeny Santana MD at [facility]4+O/J,[facility] N/A 45/56/8180    HERNIA UMBILICAL REPAIR, [facility] performed by Dave Hsu MD at [address]  12/16/13    DR SINGER       Family History  Family History   Problem Relation Age of Onset    Substance Abuse Brother     High Blood Pressure Mother     High Cholesterol Mother     Arthritis Mother     High Blood Pressure Father     High Cholesterol Father     Clotting Disorder Father         clot to intestines    Arthritis Father     Substance Abuse Brother     Substance Abuse Brother     Stroke Paternal Uncle 36    Cystic Fibrosis Daughter      [] Unable to obtain due to ventilated and/ or neurologic status    Social History     Social History    Marital status:      Spouse name: N/A    Number of children: N/A    Years of education: N/A     Occupational fasting once daily. Dia.00  Lancets MISC, Testing q day  Cholecalciferol (VITAMIN D) 2000 UNITS CAPS capsule, Take 1 capsule by mouth daily   hydrOXYzine (VISTARIL) 25 MG capsule, Take 25 mg by mouth  clobetasol (TEMOVATE) 0.05 % cream, Apply topically 2 times daily. hydrocortisone 1 % cream, Apply topically 2 times daily. diclofenac sodium (VOLTAREN) 1 % GEL, Apply 4 g topically 4 times daily (Patient taking differently: Apply 4 g topically 4 times daily as needed )  Glucose Blood (BLOOD GLUCOSE TEST STRIPS) STRP, Test once daily    Current Hospital Medications:     Scheduled Meds:   nystatin  5 mL Oral 4x Daily    amitriptyline  50 mg Oral Nightly    carvedilol  6.25 mg Oral BID WC    vitamin D  2,000 Units Oral Daily    citalopram  40 mg Oral Daily    pantoprazole  40 mg Oral QAM AC    [START ON 2018] estradiol  0.5 mg Oral Q MWF    furosemide  20 mg Oral BID    gabapentin  400 mg Oral TID    hydroxychloroquine  200 mg Oral BID    lidocaine  1 patch Transdermal Daily    rivaroxaban  20 mg Oral Daily    sodium chloride flush  10 mL Intravenous 2 times per day    insulin lispro  0-6 Units Subcutaneous TID     insulin lispro  0-3 Units Subcutaneous Nightly     Continuous Infusions:   dextrose       PRN Meds:.albuterol sulfate HFA, metaxalone, traMADol **AND** acetaminophen, sodium chloride flush, magnesium hydroxide, ondansetron, acetaminophen, glucose, dextrose, glucagon (rDNA), dextrose  .  dextrose          Allergies: Allergies   Allergen Reactions    Sulfa Antibiotics Rash    Ciprofloxacin Hcl Swelling     facial, tongue swelling    Nsaids Other (See Comments)     Bleed risk dt [facility]    Statins Other (See Comments)     Liver enzyme elevation        Review of Systems:       Constitutional: Patient denies fever, chills, night sweats, weight loss, or change an appetite. Eyes: No vision change, eye pain, or double vision.     Ears, nose, mouth, throat: No change in the hearing, or sinus congestion. Cardiovascular: Denies orthopnea, palpitations, or chest pain. Respiratory: No cough or exertional dyspnea. GI: No heartburn, nausea, vomiting, diarrhea, or constipation. : Theres no loss of control of bowel or bladder, no urinary urgency, no urinary retention. MSK: No arthralgia or myalgia. Skin: Denies rashes. Neurological problems as discussed above. Psychiatric: No depression, anxiety, hallucinations. No suicidal ideation. Hematologic: No mucosal bleeding or easy bruising. Objective Findings:     Vitals:BP (!) 128/50   Pulse 79   Temp 98 °F (36.7 °C) (Oral)   Resp 18   Ht 5' 3\" (1.6 m)   Wt 192 lb 7.4 oz (87.3 kg)   SpO2 97%   BMI 34.09 kg/m²      Physical Examination:  GENERAL APPEARANCE: No distress, alert, interactive and cooperative. RESP: Clear to auscultation bilaterally on anterior examination    CARDIOVASCULAR: Regular, rate and rhythm, no murmurs, normal S1 and S2. Carotid pulses intact without any bruits. MENTAL STATE: Orientation was normal to time, place and person. Recent and remote memory was intact. Attention span and concentration were normal. Language testing was normal for comprehension, repetition, expression, and naming, correctly interpreted \"if the tiger is chased by the lion, who is in front? \" General fund of knowledge was intact. CRANIAL NERVES:   CN 2 Visual fields full to confrontation. CN 3, 4, 6 Pupils round, equally reactive to light. No ptosis. EOMs normal alignment, full range with normal saccades, pursuit and convergence. No nystagmus. CN 5 Facial sensation intact bilaterally. CN 7 Normal and symmetric facial strength. Nasolabial folds symmetric. Rare facial myoclonic jerks  CN 8 Hearing intact to finger rub bilaterally. CN 9 Palate elevates symmetrically. Myoclonic jerks of the palate  CN 11 Bilaterally normal strength of shoulder shrug and neck turning.    CN 12 Tongue midline, with normal bulk

## 2018-11-18 ENCOUNTER — APPOINTMENT (OUTPATIENT)
Dept: MRI IMAGING | Age: 58
DRG: 596 | End: 2018-11-18
Attending: INTERNAL MEDICINE
Payer: COMMERCIAL

## 2018-11-18 PROBLEM — G25.3 MYOCLONIC JERKING: Status: ACTIVE | Noted: 2018-11-18

## 2018-11-18 LAB
ANION GAP SERPL CALCULATED.3IONS-SCNC: 13 MEQ/L (ref 7–13)
BUN BLDV-MCNC: 20 MG/DL (ref 6–20)
CALCIUM SERPL-MCNC: 9.4 MG/DL (ref 8.6–10.2)
CHLORIDE BLD-SCNC: 104 MEQ/L (ref 98–107)
CO2: 22 MEQ/L (ref 22–29)
CREAT SERPL-MCNC: 0.73 MG/DL (ref 0.5–0.9)
GFR AFRICAN AMERICAN: >60
GFR NON-AFRICAN AMERICAN: >60
GLUCOSE BLD-MCNC: 155 MG/DL (ref 60–115)
GLUCOSE BLD-MCNC: 180 MG/DL (ref 74–109)
GLUCOSE BLD-MCNC: 212 MG/DL (ref 60–115)
GLUCOSE BLD-MCNC: 214 MG/DL (ref 60–115)
GLUCOSE BLD-MCNC: 233 MG/DL (ref 60–115)
HCT VFR BLD CALC: 37.8 % (ref 37–47)
HEMOGLOBIN: 13.2 G/DL (ref 12–16)
MCH RBC QN AUTO: 31.8 PG (ref 27–31.3)
MCHC RBC AUTO-ENTMCNC: 34.9 % (ref 33–37)
MCV RBC AUTO: 91.2 FL (ref 82–100)
PDW BLD-RTO: 12.8 % (ref 11.5–14.5)
PERFORMED ON: ABNORMAL
PLATELET # BLD: 245 K/UL (ref 130–400)
POTASSIUM REFLEX MAGNESIUM: 4.6 MEQ/L (ref 3.5–5.1)
RBC # BLD: 4.15 M/UL (ref 4.2–5.4)
SODIUM BLD-SCNC: 139 MEQ/L (ref 132–144)
WBC # BLD: 6.2 K/UL (ref 4.8–10.8)

## 2018-11-18 PROCEDURE — 36415 COLL VENOUS BLD VENIPUNCTURE: CPT

## 2018-11-18 PROCEDURE — 1210000000 HC MED SURG R&B

## 2018-11-18 PROCEDURE — 6370000000 HC RX 637 (ALT 250 FOR IP): Performed by: HOSPITALIST

## 2018-11-18 PROCEDURE — 6370000000 HC RX 637 (ALT 250 FOR IP): Performed by: PSYCHIATRY & NEUROLOGY

## 2018-11-18 PROCEDURE — 2580000003 HC RX 258: Performed by: PSYCHIATRY & NEUROLOGY

## 2018-11-18 PROCEDURE — 80048 BASIC METABOLIC PNL TOTAL CA: CPT

## 2018-11-18 PROCEDURE — 70551 MRI BRAIN STEM W/O DYE: CPT

## 2018-11-18 PROCEDURE — 85027 COMPLETE CBC AUTOMATED: CPT

## 2018-11-18 PROCEDURE — 6360000002 HC RX W HCPCS: Performed by: PSYCHIATRY & NEUROLOGY

## 2018-11-18 PROCEDURE — 2580000003 HC RX 258: Performed by: HOSPITALIST

## 2018-11-18 RX ADMIN — INSULIN LISPRO 2 UNITS: 100 INJECTION, SOLUTION INTRAVENOUS; SUBCUTANEOUS at 18:48

## 2018-11-18 RX ADMIN — INSULIN LISPRO 2 UNITS: 100 INJECTION, SOLUTION INTRAVENOUS; SUBCUTANEOUS at 14:46

## 2018-11-18 RX ADMIN — AMITRIPTYLINE HYDROCHLORIDE 50 MG: 50 TABLET, FILM COATED ORAL at 22:44

## 2018-11-18 RX ADMIN — PANTOPRAZOLE SODIUM 40 MG: 40 TABLET, DELAYED RELEASE ORAL at 06:07

## 2018-11-18 RX ADMIN — GABAPENTIN 400 MG: 400 CAPSULE ORAL at 22:44

## 2018-11-18 RX ADMIN — RIVAROXABAN 20 MG: 20 TABLET, FILM COATED ORAL at 09:51

## 2018-11-18 RX ADMIN — GABAPENTIN 400 MG: 400 CAPSULE ORAL at 09:51

## 2018-11-18 RX ADMIN — FUROSEMIDE 20 MG: 20 TABLET ORAL at 22:44

## 2018-11-18 RX ADMIN — VITAMIN D, TAB 1000IU (100/BT) 2000 UNITS: 25 TAB at 09:51

## 2018-11-18 RX ADMIN — HYDROXYCHLOROQUINE SULFATE 200 MG: 200 TABLET, FILM COATED ORAL at 09:53

## 2018-11-18 RX ADMIN — INSULIN LISPRO 1 UNITS: 100 INJECTION, SOLUTION INTRAVENOUS; SUBCUTANEOUS at 10:04

## 2018-11-18 RX ADMIN — NYSTATIN 500000 UNITS: 500000 SUSPENSION ORAL at 10:01

## 2018-11-18 RX ADMIN — GABAPENTIN 400 MG: 400 CAPSULE ORAL at 14:45

## 2018-11-18 RX ADMIN — CARVEDILOL 6.25 MG: 6.25 TABLET, FILM COATED ORAL at 18:49

## 2018-11-18 RX ADMIN — Medication 10 ML: at 22:44

## 2018-11-18 RX ADMIN — LEVETIRACETAM 250 MG: 250 TABLET ORAL at 22:44

## 2018-11-18 RX ADMIN — SODIUM CHLORIDE 1000 MG: 9 INJECTION, SOLUTION INTRAVENOUS at 09:59

## 2018-11-18 RX ADMIN — HYDROXYCHLOROQUINE SULFATE 200 MG: 200 TABLET, FILM COATED ORAL at 22:43

## 2018-11-18 RX ADMIN — NYSTATIN 500000 UNITS: 500000 SUSPENSION ORAL at 18:50

## 2018-11-18 RX ADMIN — Medication 10 ML: at 10:08

## 2018-11-18 RX ADMIN — NYSTATIN 500000 UNITS: 500000 SUSPENSION ORAL at 14:45

## 2018-11-18 RX ADMIN — LEVETIRACETAM 250 MG: 250 TABLET ORAL at 09:52

## 2018-11-18 RX ADMIN — CITALOPRAM HYDROBROMIDE 40 MG: 20 TABLET ORAL at 09:52

## 2018-11-18 RX ADMIN — NYSTATIN 500000 UNITS: 500000 SUSPENSION ORAL at 22:44

## 2018-11-18 ASSESSMENT — PAIN SCALES - GENERAL: PAINLEVEL_OUTOF10: 0

## 2018-11-18 NOTE — PROGRESS NOTES
Assessment completed, patient alert and oriented x 4, denies any pain at this time, lidoderm patch placed on patients lower back even though she states she doesn't have pain she wanted it on, lungs clear, heart rate regular, bowel sounds active, last bm 11-16-18, no tremors noted in her upper extremities, patient states since she has been receiving the steroid she feels a lot better, assisted up to the bathroom, gait steady without a device, neuro assessment noted with no deviations from normal
Patients watch and necklace returned to her, patient verbally verifies, alisha johnston   verified with this nurse
LABS  Recent Labs      11/16/18   1821  11/17/18   0526   WBC  5.3  3.9*   RBC  4.21  3.90*   HGB  12.8  12.4   HCT  37.8  36.3*   MCV  89.7  92.9   MCH  30.5  31.8*   MCHC  34.0  34.3   RDW  12.9  13.0   PLT  276  208       Recent Labs      11/16/18   1834  11/17/18   0526   NA  139  141   K  4.5  4.1   CL  98  104   CO2  24  24   BUN  17  15   CREATININE  0.91*  0.70   GLUCOSE  135*  112*   CALCIUM  9.3  9.2       No results for input(s): MG in the last 72 hours. ASSESSMENT AND PLAN    Active Hospital Problems    Diagnosis Date Noted    Hyperlipidemia [E78.5]     Dystonia [G24.9] 11/16/2018    Lactic acidosis [E87.2] 11/16/2018    Benzodiazepine misuse ([facility] [F13.10] 11/16/2018    Factor V Leiden carrier ([facility] [D68.51]     HTN (hypertension) [I10]     Lupus erythematosus [L93.0] 09/08/2017    DM (diabetes mellitus) ([facility] [E11.9] 11/25/2014    Dyslipidemia [E78.5]      - Dystonia: neurology consulted.  MRI brain, complement levels  - factor V Leiden Deficiency: Continue Xarelto  - BDZ misuse  - Dyslipidemia  - DM: Continue home regimen of insulin  - HTN: Continue home meds  - lupus: Plaquenil  DVT prophylaxis:pelon Cheng MD  Pager : [phone]
11/17 stared keppra 250 mg BID for myoclonic jerks. We can uptitrate this as necessary.   - on 11/17/18 we took a video using the patient's cell phone to help benchmark response to treatment. - needs protonix while on steroids.    - compliment levels draw 11/17  - anti phospholipid antibody drawn 11/17  - anti ds DNA drawn 11/17  - ESR normal, CRP elevated at 7.7   - vitamin D normal 38.6            Electronically signed by Kyleigh Padron MD on 11/18/2018 at 1:03 PM

## 2018-11-19 VITALS
RESPIRATION RATE: 16 BRPM | HEART RATE: 82 BPM | WEIGHT: 192.46 LBS | HEIGHT: 63 IN | OXYGEN SATURATION: 99 % | TEMPERATURE: 97.5 F | SYSTOLIC BLOOD PRESSURE: 120 MMHG | DIASTOLIC BLOOD PRESSURE: 79 MMHG | BODY MASS INDEX: 34.1 KG/M2

## 2018-11-19 LAB
ANION GAP SERPL CALCULATED.3IONS-SCNC: 13 MEQ/L (ref 7–13)
BUN BLDV-MCNC: 27 MG/DL (ref 6–20)
CALCIUM SERPL-MCNC: 9.2 MG/DL (ref 8.6–10.2)
CHLORIDE BLD-SCNC: 102 MEQ/L (ref 98–107)
CO2: 23 MEQ/L (ref 22–29)
CREAT SERPL-MCNC: 0.76 MG/DL (ref 0.5–0.9)
GFR AFRICAN AMERICAN: >60
GFR NON-AFRICAN AMERICAN: >60
GLUCOSE BLD-MCNC: 129 MG/DL (ref 60–115)
GLUCOSE BLD-MCNC: 194 MG/DL (ref 60–115)
GLUCOSE BLD-MCNC: 210 MG/DL (ref 60–115)
GLUCOSE BLD-MCNC: 212 MG/DL (ref 74–109)
GLUCOSE BLD-MCNC: 222 MG/DL (ref 60–115)
HCT VFR BLD CALC: 33 % (ref 37–47)
HEMOGLOBIN: 11.6 G/DL (ref 12–16)
MCH RBC QN AUTO: 31.8 PG (ref 27–31.3)
MCHC RBC AUTO-ENTMCNC: 35 % (ref 33–37)
MCV RBC AUTO: 90.9 FL (ref 82–100)
PDW BLD-RTO: 13 % (ref 11.5–14.5)
PERFORMED ON: ABNORMAL
PLATELET # BLD: 244 K/UL (ref 130–400)
POTASSIUM REFLEX MAGNESIUM: 4 MEQ/L (ref 3.5–5.1)
RBC # BLD: 3.63 M/UL (ref 4.2–5.4)
SODIUM BLD-SCNC: 138 MEQ/L (ref 132–144)
WBC # BLD: 7.5 K/UL (ref 4.8–10.8)

## 2018-11-19 PROCEDURE — 85027 COMPLETE CBC AUTOMATED: CPT

## 2018-11-19 PROCEDURE — 80048 BASIC METABOLIC PNL TOTAL CA: CPT

## 2018-11-19 PROCEDURE — 6370000000 HC RX 637 (ALT 250 FOR IP): Performed by: HOSPITALIST

## 2018-11-19 PROCEDURE — 36415 COLL VENOUS BLD VENIPUNCTURE: CPT

## 2018-11-19 PROCEDURE — 2580000003 HC RX 258: Performed by: PSYCHIATRY & NEUROLOGY

## 2018-11-19 PROCEDURE — 2580000003 HC RX 258: Performed by: HOSPITALIST

## 2018-11-19 PROCEDURE — 6360000002 HC RX W HCPCS: Performed by: PSYCHIATRY & NEUROLOGY

## 2018-11-19 PROCEDURE — 6370000000 HC RX 637 (ALT 250 FOR IP): Performed by: PSYCHIATRY & NEUROLOGY

## 2018-11-19 RX ORDER — LEVETIRACETAM 250 MG/1
250 TABLET ORAL 2 TIMES DAILY
Qty: 60 TABLET | Refills: 1 | Status: SHIPPED | OUTPATIENT
Start: 2018-11-19 | End: 2020-05-20 | Stop reason: SDUPTHER

## 2018-11-19 RX ADMIN — INSULIN LISPRO 2 UNITS: 100 INJECTION, SOLUTION INTRAVENOUS; SUBCUTANEOUS at 12:49

## 2018-11-19 RX ADMIN — HYDROXYCHLOROQUINE SULFATE 200 MG: 200 TABLET, FILM COATED ORAL at 09:09

## 2018-11-19 RX ADMIN — SODIUM CHLORIDE 1000 MG: 9 INJECTION, SOLUTION INTRAVENOUS at 09:17

## 2018-11-19 RX ADMIN — NYSTATIN 500000 UNITS: 500000 SUSPENSION ORAL at 17:52

## 2018-11-19 RX ADMIN — INSULIN LISPRO 1 UNITS: 100 INJECTION, SOLUTION INTRAVENOUS; SUBCUTANEOUS at 09:10

## 2018-11-19 RX ADMIN — FUROSEMIDE 20 MG: 20 TABLET ORAL at 09:08

## 2018-11-19 RX ADMIN — GABAPENTIN 400 MG: 400 CAPSULE ORAL at 09:08

## 2018-11-19 RX ADMIN — GABAPENTIN 400 MG: 400 CAPSULE ORAL at 13:38

## 2018-11-19 RX ADMIN — NYSTATIN 500000 UNITS: 500000 SUSPENSION ORAL at 09:16

## 2018-11-19 RX ADMIN — Medication 10 ML: at 09:18

## 2018-11-19 RX ADMIN — CARVEDILOL 6.25 MG: 6.25 TABLET, FILM COATED ORAL at 09:12

## 2018-11-19 RX ADMIN — LEVETIRACETAM 250 MG: 250 TABLET ORAL at 09:13

## 2018-11-19 RX ADMIN — VITAMIN D, TAB 1000IU (100/BT) 2000 UNITS: 25 TAB at 09:09

## 2018-11-19 RX ADMIN — INSULIN LISPRO 2 UNITS: 100 INJECTION, SOLUTION INTRAVENOUS; SUBCUTANEOUS at 17:53

## 2018-11-19 RX ADMIN — CITALOPRAM HYDROBROMIDE 40 MG: 20 TABLET ORAL at 09:11

## 2018-11-19 RX ADMIN — NYSTATIN 500000 UNITS: 500000 SUSPENSION ORAL at 12:49

## 2018-11-19 RX ADMIN — RIVAROXABAN 20 MG: 20 TABLET, FILM COATED ORAL at 09:08

## 2018-11-19 RX ADMIN — ESTRADIOL 0.5 MG: 1 TABLET ORAL at 11:31

## 2018-11-19 RX ADMIN — CARVEDILOL 6.25 MG: 6.25 TABLET, FILM COATED ORAL at 17:52

## 2018-11-19 RX ADMIN — PANTOPRAZOLE SODIUM 40 MG: 40 TABLET, DELAYED RELEASE ORAL at 06:24

## 2018-11-19 ASSESSMENT — PAIN SCALES - GENERAL: PAINLEVEL_OUTOF10: 0

## 2018-11-19 NOTE — DISCHARGE INSTR - COC
Continuity of Care Form    Patient Name: Flo Thompson   :  1960  MRN:  [id number]    Admit date:  2018  Discharge date:  ***    Code Status Order: Full Code   Advance Directives:   Advance Care Flowsheet Documentation     Date/Time Healthcare Directive Type of Healthcare Directive Copy in [address] Agent's Name Healthcare Agent's Phone Number    18 0009  No, patient does not have an advance directive for healthcare treatment -- -- -- -- --          Admitting Physician:  Ananda Garcia MD  PCP: Jimi Avery, APRN - CNP    Discharging Nurse: [facility] Unit/Room#: W853/K238-22  Discharging Unit Phone Number: ***    Emergency Contact:   Extended Emergency Contact Information  Primary Emergency Contact: Zeb Jackson  Address: [address] [place] [address] Phone: [phone]  Mobile Phone: [phone]  Relation: Spouse  Secondary Emergency Contact: [address] Phone: [phone]  Mobile Phone: [phone]  Relation: Child    Past Surgical History:  Past Surgical History:   Procedure Laterality Date   [facility]      Dr. Nettie Stark  13    DR SINGER repeat in 10 yrs per pt    COLONOSCOPY  2017    Dr Laurie Mcguire Right 11/15/2017    RIGHT  HERNIA INGUINAL REPAIR performed by Shruti Gonzales MD at [address] [place] [address]  ARTHROSCOPY Left     LAPAROSCOPY N/A 11/15/2017    DIAGNOSTIC LAPAROSCOPY performed by Shruti Gonzales MD at [facility]. [place] [address] N/A 2017    [address] [place] [address] 3 performed by Charanjit Limon MD at [facility],6+I/D,GGAJV N/A     [address] performed by Shruti Gonzales MD at [address] [place] [address]  13    DR Mercy Jiang       Immunization

## 2018-11-19 NOTE — DISCHARGE SUMMARY
release capsule  Commonly known as:  NEXIUM  Take 1 capsule by mouth daily. estradiol 0.5 MG tablet  Commonly known as:  ESTRACE  Take 3 days/week     furosemide 20 MG tablet  Commonly known as:  LASIX  Take 1 tablet by mouth 2 times daily     gabapentin 400 MG capsule  Commonly known as:  NEURONTIN  TAKE 1 CAPSULE THREE TIMES A DAY. hydrocortisone 1 % cream  Apply topically 2 times daily. hydroxychloroquine 200 MG tablet  Commonly known as:  PLAQUENIL     hydrOXYzine 25 MG capsule  Commonly known as:  VISTARIL     Lancets Misc  Testing q day     lidocaine 5 %  Commonly known as:  LIDODERM  Place 1 patch onto the skin daily 12 hours on, 12 hours off.     metaxalone 800 MG tablet  Commonly known as:  SKELAXIN  Take 1 tablet by mouth 3 times daily as needed for Pain Generic equivalent acceptable, unless otherwise noted.      metFORMIN 500 MG tablet  Commonly known as:  GLUCOPHAGE  TAKE 1 TABLET THREE TIMES A DAY     traMADol-acetaminophen 37.5-325 MG per tablet  Commonly known as:  ULTRACET     vitamin D 2000 units Caps capsule     XARELTO 20 MG Tabs tablet  Generic drug:  rivaroxaban  TAKE 1 TABLET BY MOUTH ONCE DAILY            Physical Exam:    Vitals:  Vitals:    11/17/18 1916 11/18/18 0945 11/18/18 1849 11/19/18 0835   BP: 120/61 96/64 (!) 141/77 120/79   Pulse: 80 98 93 82   Resp: 18 18  16   Temp: 97.8 °F (36.6 °C) 97.9 °F (36.6 °C) 97.9 °F (36.6 °C) 97.5 °F (36.4 °C)   TempSrc: Oral Oral Oral Oral   SpO2:  96% 98% 99%   Weight:       Height:         Weight: Weight: 192 lb 7.4 oz (87.3 kg)     24 hour intake/output:No intake or output data in the 24 hours ending 11/19/18 1135    General appearance - alert, well appearing, and in no distress  Chest - clear to auscultation, no wheezes, rales or rhonchi, symmetric air entry  Heart - normal rate, regular rhythm, normal S1, S2, no murmurs, rubs, clicks or gallops  Abdomen - soft, nontender, nondistended, no masses or organomegaly  Obese: Yes; Protuberant:

## 2018-11-20 LAB
COMPLEMENT C3: 149 MG/DL (ref 90–180)
COMPLEMENT C4: 21 MG/DL (ref 10–40)
DOUBLE STRANDED DNA AB, IGG: DETECTED

## 2018-11-20 NOTE — TELEPHONE ENCOUNTER
Patient needs Balsalazide / Disodium Caps 3 times daily 750 MG Colazal Caps (Generic) [employer] Medication was previously prescribed by Dr Brandt Lee, patient no longer sees this doctor, please advise.

## 2018-11-20 NOTE — TELEPHONE ENCOUNTER
Left message on machine for patient to return call during normal business hours of 8:30 AM and 5 PM.mb

## 2018-11-23 ENCOUNTER — TELEPHONE (OUTPATIENT)
Dept: FAMILY MEDICINE CLINIC | Age: 58
End: 2018-11-23

## 2018-11-23 LAB — DSDNA ANTIBODY TITER: ABNORMAL

## 2018-11-23 RX ORDER — BALSALAZIDE DISODIUM 750 MG/1
750 CAPSULE ORAL 3 TIMES DAILY
Qty: 90 CAPSULE | Refills: 0 | Status: SHIPPED | OUTPATIENT
Start: 2018-11-23 | End: 2018-11-30

## 2018-11-23 RX ORDER — BALSALAZIDE DISODIUM 750 MG/1
750 CAPSULE ORAL 3 TIMES DAILY
Qty: 90 CAPSULE | Refills: 3 | Status: CANCELLED | OUTPATIENT
Start: 2018-11-23

## 2018-11-23 NOTE — TELEPHONE ENCOUNTER
90 day rx sent but please let her know that this Rx will have to be prescribed by GI specialist in the future as per previous telephone encounter notes.

## 2018-11-29 ENCOUNTER — OFFICE VISIT (OUTPATIENT)
Dept: FAMILY MEDICINE CLINIC | Age: 58
End: 2018-11-29
Payer: COMMERCIAL

## 2018-11-29 VITALS
BODY MASS INDEX: 35.26 KG/M2 | OXYGEN SATURATION: 95 % | HEART RATE: 91 BPM | WEIGHT: 199 LBS | TEMPERATURE: 98.4 F | SYSTOLIC BLOOD PRESSURE: 118 MMHG | DIASTOLIC BLOOD PRESSURE: 78 MMHG | HEIGHT: 63 IN | RESPIRATION RATE: 14 BRPM

## 2018-11-29 DIAGNOSIS — G25.3 MYOCLONIC JERKING: Primary | ICD-10-CM

## 2018-11-29 DIAGNOSIS — G24.9 DYSTONIA: ICD-10-CM

## 2018-11-29 DIAGNOSIS — Z23 NEED FOR SHINGLES VACCINE: ICD-10-CM

## 2018-11-29 DIAGNOSIS — L93.0 LUPUS ERYTHEMATOSUS, UNSPECIFIED FORM: Chronic | ICD-10-CM

## 2018-11-29 DIAGNOSIS — E87.20 LACTIC ACIDOSIS: ICD-10-CM

## 2018-11-29 PROCEDURE — 99214 OFFICE O/P EST MOD 30 MIN: CPT | Performed by: NURSE PRACTITIONER

## 2018-11-29 ASSESSMENT — PATIENT HEALTH QUESTIONNAIRE - PHQ9
SUM OF ALL RESPONSES TO PHQ QUESTIONS 1-9: 0
2. FEELING DOWN, DEPRESSED OR HOPELESS: 0
SUM OF ALL RESPONSES TO PHQ9 QUESTIONS 1 & 2: 0
1. LITTLE INTEREST OR PLEASURE IN DOING THINGS: 0
SUM OF ALL RESPONSES TO PHQ QUESTIONS 1-9: 0

## 2018-11-29 NOTE — PROGRESS NOTES
well developed and well nourished. Neck:  neck- supple, no mass, non-tender and no bruits  Lungs:  Normal expansion. Clear to auscultation. No rales, rhonchi, or wheezing., No chest wall tenderness. Heart:  Heart sounds are normal.  Regular rate and rhythm without murmur, gallop or rub. Abdomen:  Soft, non-tender, normal bowel sounds. No bruits, organomegaly or masses. Extremities: Extremities warm to touch, pink, with no edema. Cranial nerves 2-12 intact. Pupils are equal and reactive. There is no nystagmus. The gait is normal. Reflexes are normal and symmetric. Sensation grossly intact. There is no left or right sided weakness. DIAGNOSIS:    Diagnosis Orders   1. Myoclonic jerking     2. Lactic acidosis     3. Dystonia     4. Lupus erythematosus, unspecified form     5. Need for shingles vaccine  zoster recombinant adjuvanted vaccine [facility]) 50 MCG/0.5ML SUSR injection         PLAN: Include orders in the DX section. Follow up: 2 months and as needed. Blood work one week prior as ordered. Neurological symptoms resolved after high-dose prednisone and initiation of Keppra therapy. She has appointment with neurology later today. Lactic acid level returned to normal prior to discharge. She was told by neurologist that he will help refer her to someone that can do the muscle biopsy for significantly elevated CK levels. No change in her overall muscle strength. No current tremor. She states that her urine test showed that she was positive for benzodiazepines but she swears that she has never taken any medication in this class. Discussed possibility of false positive with other medications but this may be checked again in the future. Please note this report has been partially produced using speech recognition software and may cause contain errors related to that system including grammar, punctuation and spelling as well as words and phrases that may seem inappropriate.  If there are

## 2018-11-30 ENCOUNTER — OFFICE VISIT (OUTPATIENT)
Dept: GASTROENTEROLOGY | Age: 58
End: 2018-11-30
Payer: COMMERCIAL

## 2018-11-30 VITALS
HEIGHT: 63 IN | HEART RATE: 83 BPM | BODY MASS INDEX: 35.61 KG/M2 | OXYGEN SATURATION: 93 % | WEIGHT: 201 LBS | TEMPERATURE: 98.4 F

## 2018-11-30 DIAGNOSIS — K52.9 COLITIS: Primary | ICD-10-CM

## 2018-11-30 DIAGNOSIS — K21.9 GASTROESOPHAGEAL REFLUX DISEASE, ESOPHAGITIS PRESENCE NOT SPECIFIED: ICD-10-CM

## 2018-11-30 DIAGNOSIS — R13.19 ESOPHAGEAL DYSPHAGIA: ICD-10-CM

## 2018-11-30 PROCEDURE — 99214 OFFICE O/P EST MOD 30 MIN: CPT | Performed by: INTERNAL MEDICINE

## 2018-11-30 RX ORDER — BALSALAZIDE DISODIUM 750 MG/1
750 CAPSULE ORAL 3 TIMES DAILY
Qty: 90 CAPSULE | Refills: 3 | Status: SHIPPED | OUTPATIENT
Start: 2018-11-30 | End: 2019-03-05 | Stop reason: SDUPTHER

## 2018-11-30 RX ORDER — OMEPRAZOLE 40 MG/1
40 CAPSULE, DELAYED RELEASE ORAL DAILY
Qty: 30 CAPSULE | Refills: 3 | Status: SHIPPED | OUTPATIENT
Start: 2018-11-30 | End: 2019-02-28 | Stop reason: SDUPTHER

## 2018-12-01 LAB — HEXAGONAL PHOSPHOLIPID NEUTRALIZAT TEST: NEGATIVE

## 2018-12-05 ENCOUNTER — TELEPHONE (OUTPATIENT)
Dept: GASTROENTEROLOGY | Age: 58
End: 2018-12-05

## 2018-12-10 ENCOUNTER — OFFICE VISIT (OUTPATIENT)
Dept: SURGERY | Age: 58
End: 2018-12-10
Payer: COMMERCIAL

## 2018-12-10 VITALS
TEMPERATURE: 98.6 F | SYSTOLIC BLOOD PRESSURE: 120 MMHG | WEIGHT: 198 LBS | HEIGHT: 63 IN | BODY MASS INDEX: 35.08 KG/M2 | DIASTOLIC BLOOD PRESSURE: 76 MMHG

## 2018-12-10 DIAGNOSIS — M62.81 MUSCLE WEAKNESS (GENERALIZED): Primary | ICD-10-CM

## 2018-12-10 PROCEDURE — 99203 OFFICE O/P NEW LOW 30 MIN: CPT | Performed by: SURGERY

## 2018-12-10 ASSESSMENT — ENCOUNTER SYMPTOMS
BLOOD IN STOOL: 0
CHEST TIGHTNESS: 0
NAUSEA: 0
ALLERGIC/IMMUNOLOGIC NEGATIVE: 1
COLOR CHANGE: 0
ABDOMINAL DISTENTION: 0
ABDOMINAL PAIN: 0
RHINORRHEA: 0
RECTAL PAIN: 0
SHORTNESS OF BREATH: 0

## 2018-12-11 ENCOUNTER — TELEPHONE (OUTPATIENT)
Dept: FAMILY MEDICINE CLINIC | Age: 58
End: 2018-12-11

## 2018-12-11 DIAGNOSIS — I82.4Z9 DEEP VEIN THROMBOSIS (DVT) OF DISTAL VEIN OF LOWER EXTREMITY, UNSPECIFIED CHRONICITY, UNSPECIFIED LATERALITY (HCC): ICD-10-CM

## 2018-12-11 DIAGNOSIS — I26.99 OTHER PULMONARY EMBOLISM WITHOUT ACUTE COR PULMONALE, UNSPECIFIED CHRONICITY (HCC): ICD-10-CM

## 2018-12-11 DIAGNOSIS — D68.51 FACTOR V LEIDEN CARRIER (HCC): Primary | Chronic | ICD-10-CM

## 2018-12-11 NOTE — TELEPHONE ENCOUNTER
Will leave for Matilde Solis to address when she returns. I am not familiar with patient's history. Surgery is scheduled well in advance.

## 2018-12-12 ENCOUNTER — HOSPITAL ENCOUNTER (OUTPATIENT)
Dept: GENERAL RADIOLOGY | Age: 58
Discharge: HOME | End: 2018-12-14
Payer: COMMERCIAL

## 2018-12-12 DIAGNOSIS — R13.19 ESOPHAGEAL DYSPHAGIA: ICD-10-CM

## 2018-12-12 PROCEDURE — 74230 X-RAY XM SWLNG FUNCJ C+: CPT

## 2018-12-12 PROCEDURE — 2500000003 HC RX 250 WO HCPCS: Performed by: INTERNAL MEDICINE

## 2018-12-12 RX ADMIN — BARIUM SULFATE 80 ML: 400 SUSPENSION ORAL at 13:38

## 2018-12-12 RX ADMIN — BARIUM SULFATE 50 G: 0.81 POWDER, FOR SUSPENSION ORAL at 13:38

## 2018-12-14 ENCOUNTER — OFFICE VISIT (OUTPATIENT)
Dept: PHYSICAL MEDICINE AND REHAB | Age: 58
End: 2018-12-14
Payer: COMMERCIAL

## 2018-12-14 VITALS
WEIGHT: 194 LBS | SYSTOLIC BLOOD PRESSURE: 122 MMHG | HEIGHT: 64 IN | DIASTOLIC BLOOD PRESSURE: 82 MMHG | BODY MASS INDEX: 33.12 KG/M2

## 2018-12-14 DIAGNOSIS — M79.10 MYALGIA: ICD-10-CM

## 2018-12-14 DIAGNOSIS — R76.8 POSITIVE ANA (ANTINUCLEAR ANTIBODY): ICD-10-CM

## 2018-12-14 DIAGNOSIS — M54.41 CHRONIC MIDLINE LOW BACK PAIN WITH RIGHT-SIDED SCIATICA: ICD-10-CM

## 2018-12-14 DIAGNOSIS — F32.0 CURRENT MILD EPISODE OF MAJOR DEPRESSIVE DISORDER WITHOUT PRIOR EPISODE (HCC): ICD-10-CM

## 2018-12-14 DIAGNOSIS — M54.6 MIDLINE THORACIC BACK PAIN, UNSPECIFIED CHRONICITY: ICD-10-CM

## 2018-12-14 DIAGNOSIS — G57.93 NEUROPATHY OF BOTH FEET: ICD-10-CM

## 2018-12-14 DIAGNOSIS — Z79.899 HIGH RISK MEDICATION USE: ICD-10-CM

## 2018-12-14 DIAGNOSIS — M32.13 DRUG-INDUCED SYSTEMIC LUPUS ERYTHEMATOSUS WITH LUNG INVOLVEMENT (HCC): ICD-10-CM

## 2018-12-14 DIAGNOSIS — E55.9 VITAMIN D DEFICIENCY: ICD-10-CM

## 2018-12-14 DIAGNOSIS — M32.0 DRUG-INDUCED SYSTEMIC LUPUS ERYTHEMATOSUS WITH LUNG INVOLVEMENT (HCC): ICD-10-CM

## 2018-12-14 DIAGNOSIS — Z79.899 HIGH RISK MEDICATION USE: Chronic | ICD-10-CM

## 2018-12-14 DIAGNOSIS — M54.40 CHRONIC LOW BACK PAIN WITH SCIATICA, SCIATICA LATERALITY UNSPECIFIED, UNSPECIFIED BACK PAIN LATERALITY: ICD-10-CM

## 2018-12-14 DIAGNOSIS — R29.898 WEAKNESS OF BOTH LOWER EXTREMITIES: ICD-10-CM

## 2018-12-14 DIAGNOSIS — D68.51 FACTOR 5 LEIDEN MUTATION, HETEROZYGOUS (HCC): ICD-10-CM

## 2018-12-14 DIAGNOSIS — G89.29 CHRONIC MIDLINE LOW BACK PAIN WITH RIGHT-SIDED SCIATICA: ICD-10-CM

## 2018-12-14 DIAGNOSIS — M54.41 CHRONIC BILATERAL LOW BACK PAIN WITH BILATERAL SCIATICA: ICD-10-CM

## 2018-12-14 DIAGNOSIS — M54.42 CHRONIC BILATERAL LOW BACK PAIN WITH BILATERAL SCIATICA: ICD-10-CM

## 2018-12-14 DIAGNOSIS — G89.29 CHRONIC BILATERAL THORACIC BACK PAIN: ICD-10-CM

## 2018-12-14 DIAGNOSIS — M79.7 FIBROMYALGIA MUSCLE PAIN: ICD-10-CM

## 2018-12-14 DIAGNOSIS — L93.0 DISCOID LUPUS ERYTHEMATOSUS: Chronic | ICD-10-CM

## 2018-12-14 DIAGNOSIS — G89.29 CHRONIC BILATERAL LOW BACK PAIN WITH BILATERAL SCIATICA: ICD-10-CM

## 2018-12-14 DIAGNOSIS — M53.3 SI (SACROILIAC) PAIN: Primary | ICD-10-CM

## 2018-12-14 DIAGNOSIS — M54.6 CHRONIC BILATERAL THORACIC BACK PAIN: ICD-10-CM

## 2018-12-14 DIAGNOSIS — G89.29 CHRONIC LOW BACK PAIN WITH SCIATICA, SCIATICA LATERALITY UNSPECIFIED, UNSPECIFIED BACK PAIN LATERALITY: ICD-10-CM

## 2018-12-14 PROCEDURE — 99214 OFFICE O/P EST MOD 30 MIN: CPT | Performed by: PHYSICAL MEDICINE & REHABILITATION

## 2018-12-14 RX ORDER — METAXALONE 800 MG/1
800 TABLET ORAL 3 TIMES DAILY PRN
Qty: 90 TABLET | Refills: 1 | Status: SHIPPED | OUTPATIENT
Start: 2018-12-14 | End: 2019-03-27 | Stop reason: SDUPTHER

## 2018-12-14 ASSESSMENT — ENCOUNTER SYMPTOMS
VOMITING: 0
NAUSEA: 0
DIARRHEA: 0
WHEEZING: 0
CHEST TIGHTNESS: 0
SORE THROAT: 0
SHORTNESS OF BREATH: 1
EYES NEGATIVE: 1
ABDOMINAL PAIN: 0
CONSTIPATION: 0
BACK PAIN: 1
COUGH: 1
COLOR CHANGE: 0
BOWEL INCONTINENCE: 0
APNEA: 0
VISUAL CHANGE: 0

## 2018-12-14 NOTE — PROGRESS NOTES
Cardiovascular: Intact distal pulses. Exam reveals no decreased pulses. Pulmonary/Chest: Effort normal. No accessory muscle usage. No apnea, no tachypnea and no bradypnea. No respiratory distress. She has decreased breath sounds. She has no wheezes. She exhibits no tenderness. Dry cough   Abdominal: Soft. Bowel sounds are normal. She exhibits no distension and no mass. There is no tenderness. There is no rebound and no guarding. Musculoskeletal: She exhibits tenderness. She exhibits no edema. Right shoulder: Normal.        Left shoulder: Normal.        Right elbow: Normal.       Left elbow: Normal.        Right wrist: Normal.        Left wrist: Normal.        Right hip: Normal.        Left hip: Normal.        Right knee: Normal.        Left knee: Normal.        Right ankle: She exhibits decreased range of motion and swelling. Achilles tendon normal.        Left ankle: She exhibits decreased range of motion and swelling. Tenderness. Achilles tendon normal.        Cervical back: She exhibits tenderness and pain. Thoracic back: She exhibits tenderness and pain. Lumbar back: She exhibits decreased range of motion, tenderness, bony tenderness and pain. She exhibits no swelling, no edema, no deformity, no laceration and normal pulse. Back:         Right upper arm: Normal.        Left upper arm: Normal.        Right forearm: She exhibits tenderness and bony tenderness. Left forearm: She exhibits tenderness and bony tenderness. Right hand: Normal.        Left hand: Normal.        Right upper leg: Normal.        Left upper leg: Normal.        Right lower leg: Normal.        Left lower leg: Normal.        Legs:       Right foot: There is decreased range of motion, tenderness and bony tenderness. Left foot: There is decreased range of motion, tenderness and bony tenderness.    Tender areas are indicated by numbered spot         Lymphadenopathy:     She has no cervical refill reviewed. 2015 narcotic contract signed. 11/14/14 tox screening        I am also concerned by lifestyle and mood issues including:    Past Medical History:   Diagnosis Date    Back pain     Depression     DM (diabetes mellitus) ([facility] 11/25/2014    D[facility]deep venous thrombosis) [facility]     Elevated troponin 5/25/2016    Factor V Leiden carrier ([facility]     history of DVT, on Xarelto    GERD (gastroesophageal reflux disease)     HTN (hypertension)     HTN (hypertension)     Hyperlipidemia     Neuropathy of both feet 8/22/2016    NSTEMI (non-ST elevated myocardial infarction) ([facility]     Obesity     Osteoarthritis     Pulmonary embolism ([facility]     Renal failure 6/29/2018    Right inguinal hernia 11/9/2017    SOB (shortness of breath) 4/19/2017    Systemic lupus erythematosus ([facility] 4/8/7049    Umbilical hernia 47/9/2637    Vasomotor flushing     on hormone replacement therapy           Given their medication, chronic pain and lifestyle and medications they are at risk for :    Falls, constipation, addiction  Loss of livelyhood due to severe pain, debility, weight gain and  vitamin D deficiency    The patient was educated regarding proper diet, fitness routine, and regulatory restrictions concerning pain medications. Previous notes, comprehensive past medical, surgical, family history, and diagnostics were reviewed. Patient education and councelling were provided regarding off label use,treatment options and medication and injection risks. Current and old [place] ([place] [employer] Reporting System) records reviewed, all refills reviewed since last visit,  Behavioral agreement/[employer] regulations   and Toxicology screen was reviewed with patient and is up to date. There are no current red flags.    They are making good progress regarding pain relief, they are performing at a functional level regarding activities of daily living and psychological functioning, they're not having any

## 2018-12-20 NOTE — TELEPHONE ENCOUNTER
General Surgery Staff/Dr. Flo Stiles:    Please advise to Mariajose's question below. She would like to know if you if Dr. Flo Stiles advises patient starting Xarelto Immediatly after surgery or if she can order additional Lovenox for a few days after the surgery?

## 2018-12-20 NOTE — TELEPHONE ENCOUNTER
Patient called confused about her surgery and Dr. Kori Lucas orders. I called and spoke with Geo Rae who said she will call the patient and explain to her Dr. Kori Lucas instructions.

## 2018-12-21 NOTE — TELEPHONE ENCOUNTER
I spoke with Vicki Credit regarding [facility] instructions regarding coagulopathy management. She understands and she will be seeing  on Wed. 1-16-19.

## 2019-01-02 DIAGNOSIS — Z86.711 HISTORY OF PULMONARY THROMBOSIS: ICD-10-CM

## 2019-01-18 DIAGNOSIS — M79.7 FIBROMYALGIA MUSCLE PAIN: ICD-10-CM

## 2019-01-18 RX ORDER — GABAPENTIN 400 MG/1
CAPSULE ORAL
Qty: 270 CAPSULE | Refills: 1 | Status: SHIPPED | OUTPATIENT
Start: 2019-01-18 | End: 2019-01-28 | Stop reason: SDUPTHER

## 2019-01-28 DIAGNOSIS — M79.7 FIBROMYALGIA MUSCLE PAIN: ICD-10-CM

## 2019-01-28 RX ORDER — GABAPENTIN 400 MG/1
400 CAPSULE ORAL 3 TIMES DAILY
Qty: 270 CAPSULE | Refills: 0 | Status: SHIPPED | OUTPATIENT
Start: 2019-01-28 | End: 2019-08-27 | Stop reason: SDUPTHER

## 2019-01-29 ENCOUNTER — HOSPITAL ENCOUNTER (OUTPATIENT)
Dept: ULTRASOUND IMAGING | Age: 59
Discharge: HOME | End: 2019-01-31
Payer: COMMERCIAL

## 2019-01-29 ENCOUNTER — OFFICE VISIT (OUTPATIENT)
Dept: FAMILY MEDICINE CLINIC | Age: 59
End: 2019-01-29
Payer: COMMERCIAL

## 2019-01-29 ENCOUNTER — HOSPITAL ENCOUNTER (OUTPATIENT)
Age: 59
Setting detail: SPECIMEN
Discharge: HOME | End: 2019-01-29
Payer: COMMERCIAL

## 2019-01-29 VITALS
TEMPERATURE: 96.3 F | BODY MASS INDEX: 36.47 KG/M2 | DIASTOLIC BLOOD PRESSURE: 64 MMHG | WEIGHT: 198.19 LBS | HEIGHT: 62 IN | SYSTOLIC BLOOD PRESSURE: 120 MMHG | RESPIRATION RATE: 12 BRPM

## 2019-01-29 DIAGNOSIS — Z86.718 HISTORY OF DVT OF LOWER EXTREMITY: ICD-10-CM

## 2019-01-29 DIAGNOSIS — L03.115 CELLULITIS OF RIGHT LOWER EXTREMITY: ICD-10-CM

## 2019-01-29 DIAGNOSIS — M79.89 PAIN AND SWELLING OF LEFT LOWER EXTREMITY: ICD-10-CM

## 2019-01-29 DIAGNOSIS — R21 RASH AND NONSPECIFIC SKIN ERUPTION: ICD-10-CM

## 2019-01-29 DIAGNOSIS — M79.605 PAIN AND SWELLING OF LEFT LOWER EXTREMITY: ICD-10-CM

## 2019-01-29 DIAGNOSIS — L03.115 CELLULITIS OF RIGHT LOWER EXTREMITY: Primary | ICD-10-CM

## 2019-01-29 PROCEDURE — 87205 SMEAR GRAM STAIN: CPT

## 2019-01-29 PROCEDURE — 87070 CULTURE OTHR SPECIMN AEROBIC: CPT

## 2019-01-29 PROCEDURE — 93970 EXTREMITY STUDY: CPT

## 2019-01-29 PROCEDURE — 99214 OFFICE O/P EST MOD 30 MIN: CPT | Performed by: FAMILY MEDICINE

## 2019-01-29 RX ORDER — CEPHALEXIN 500 MG/1
500 CAPSULE ORAL 3 TIMES DAILY
Qty: 21 CAPSULE | Refills: 0 | Status: SHIPPED | OUTPATIENT
Start: 2019-01-29 | End: 2019-02-05

## 2019-01-30 ENCOUNTER — TELEPHONE (OUTPATIENT)
Dept: FAMILY MEDICINE CLINIC | Age: 59
End: 2019-01-30

## 2019-02-01 ENCOUNTER — HOSPITAL ENCOUNTER (OUTPATIENT)
Dept: CT IMAGING | Age: 59
Discharge: HOME | End: 2019-02-03
Payer: COMMERCIAL

## 2019-02-01 ENCOUNTER — HOSPITAL ENCOUNTER (OUTPATIENT)
Dept: LAB | Age: 59
Discharge: HOME | End: 2019-02-01
Payer: COMMERCIAL

## 2019-02-01 DIAGNOSIS — R21 RASH AND NONSPECIFIC SKIN ERUPTION: ICD-10-CM

## 2019-02-01 DIAGNOSIS — Z86.711 HISTORY OF PULMONARY EMBOLISM: ICD-10-CM

## 2019-02-01 DIAGNOSIS — L03.115 CELLULITIS OF RIGHT LOWER EXTREMITY: ICD-10-CM

## 2019-02-01 PROBLEM — Z86.718 HISTORY OF DVT OF LOWER EXTREMITY: Status: ACTIVE | Noted: 2019-02-01

## 2019-02-01 LAB
BASOPHILS ABSOLUTE: 0.1 K/UL (ref 0–0.2)
BASOPHILS RELATIVE PERCENT: 1.3 %
C-REACTIVE PROTEIN: 9.2 MG/L (ref 0–5)
EOSINOPHILS ABSOLUTE: 0.6 K/UL (ref 0–0.7)
EOSINOPHILS RELATIVE PERCENT: 12.3 %
GRAM STAIN RESULT: NORMAL
HAV IGM SER IA-ACNC: NORMAL
HCT VFR BLD CALC: 41.3 % (ref 37–47)
HEMOGLOBIN: 14.5 G/DL (ref 12–16)
HEPATITIS B CORE IGM ANTIBODY: NORMAL
HEPATITIS B SURFACE ANTIGEN INTERPRETATION: NORMAL
HEPATITIS C ANTIBODY INTERPRETATION: NORMAL
HEPATITIS INTERPRETATION:: NORMAL
LYMPHOCYTES ABSOLUTE: 1.6 K/UL (ref 1–4.8)
LYMPHOCYTES RELATIVE PERCENT: 33.1 %
MCH RBC QN AUTO: 31 PG (ref 27–31.3)
MCHC RBC AUTO-ENTMCNC: 35 % (ref 33–37)
MCV RBC AUTO: 88.4 FL (ref 82–100)
MONOCYTES ABSOLUTE: 0.3 K/UL (ref 0.2–0.8)
MONOCYTES RELATIVE PERCENT: 5.6 %
NEUTROPHILS ABSOLUTE: 2.4 K/UL (ref 1.4–6.5)
NEUTROPHILS RELATIVE PERCENT: 47.7 %
PDW BLD-RTO: 13 % (ref 11.5–14.5)
PLATELET # BLD: 267 K/UL (ref 130–400)
RBC # BLD: 4.67 M/UL (ref 4.2–5.4)
TOTAL CK: 2754 U/L (ref 0–170)
URIC ACID, SERUM: 8 MG/DL (ref 2.4–5.7)
WBC # BLD: 5 K/UL (ref 4.8–10.8)
WOUND/ABSCESS: NORMAL

## 2019-02-01 PROCEDURE — 6360000004 HC RX CONTRAST MEDICATION: Performed by: INTERNAL MEDICINE

## 2019-02-01 PROCEDURE — 86140 C-REACTIVE PROTEIN: CPT

## 2019-02-01 PROCEDURE — 80074 ACUTE HEPATITIS PANEL: CPT

## 2019-02-01 PROCEDURE — 36415 COLL VENOUS BLD VENIPUNCTURE: CPT

## 2019-02-01 PROCEDURE — 85025 COMPLETE CBC W/AUTO DIFF WBC: CPT

## 2019-02-01 PROCEDURE — 86480 TB TEST CELL IMMUN MEASURE: CPT

## 2019-02-01 PROCEDURE — 71275 CT ANGIOGRAPHY CHEST: CPT

## 2019-02-01 PROCEDURE — 82550 ASSAY OF CK (CPK): CPT

## 2019-02-01 PROCEDURE — 82085 ASSAY OF ALDOLASE: CPT

## 2019-02-01 PROCEDURE — 84550 ASSAY OF BLOOD/URIC ACID: CPT

## 2019-02-01 RX ADMIN — IOPAMIDOL 100 ML: 612 INJECTION, SOLUTION INTRAVENOUS at 09:19

## 2019-02-01 ASSESSMENT — ENCOUNTER SYMPTOMS
DIARRHEA: 0
WHEEZING: 0
CONSTIPATION: 0
APNEA: 0
NAUSEA: 0
BLOOD IN STOOL: 0
CHEST TIGHTNESS: 0
VOMITING: 0
SHORTNESS OF BREATH: 0
ABDOMINAL PAIN: 0
COUGH: 0

## 2019-02-03 LAB — ALDOLASE: 25.2 U/L (ref 1.5–8.1)

## 2019-02-04 ENCOUNTER — OFFICE VISIT (OUTPATIENT)
Dept: FAMILY MEDICINE CLINIC | Age: 59
End: 2019-02-04
Payer: COMMERCIAL

## 2019-02-04 VITALS
HEART RATE: 70 BPM | DIASTOLIC BLOOD PRESSURE: 78 MMHG | SYSTOLIC BLOOD PRESSURE: 110 MMHG | RESPIRATION RATE: 16 BRPM | OXYGEN SATURATION: 99 % | TEMPERATURE: 96 F

## 2019-02-04 DIAGNOSIS — R21 RASH AND OTHER NONSPECIFIC SKIN ERUPTION: Primary | ICD-10-CM

## 2019-02-04 DIAGNOSIS — R79.83 HOMOCYSTEINEMIA: ICD-10-CM

## 2019-02-04 LAB
QUANTI TB GOLD PLUS: NEGATIVE
QUANTI TB1 MINUS NIL: 0.01 IU/ML (ref 0–0.34)
QUANTI TB2 MINUS NIL: 0 IU/ML (ref 0–0.34)
QUANTIFERON MITOGEN: >10 IU/ML
QUANTIFERON NIL: 0.02 IU/ML

## 2019-02-04 PROCEDURE — 99214 OFFICE O/P EST MOD 30 MIN: CPT | Performed by: FAMILY MEDICINE

## 2019-02-04 RX ORDER — HYDROXYZINE HYDROCHLORIDE 25 MG/1
TABLET, FILM COATED ORAL
Qty: 30 TABLET | Refills: 1 | Status: SHIPPED | OUTPATIENT
Start: 2019-02-04 | End: 2019-02-20 | Stop reason: SDUPTHER

## 2019-02-04 ASSESSMENT — ENCOUNTER SYMPTOMS
ABDOMINAL PAIN: 0
CHEST TIGHTNESS: 0
VOMITING: 0
SHORTNESS OF BREATH: 0
DIARRHEA: 0
NAUSEA: 0

## 2019-02-08 LAB
MTHFR BY PCR SPECIMEN: ABNORMAL
MTHFR INTERPRETATION: ABNORMAL
MTHFR MUTATION A1298C: ABNORMAL
MTHFR MUTATION C677T: NEGATIVE

## 2019-02-12 PROBLEM — D68.69 OTHER THROMBOPHILIA (HCC): Status: ACTIVE | Noted: 2019-02-12

## 2019-02-20 DIAGNOSIS — R21 RASH AND OTHER NONSPECIFIC SKIN ERUPTION: ICD-10-CM

## 2019-02-21 RX ORDER — HYDROXYZINE HYDROCHLORIDE 25 MG/1
TABLET, FILM COATED ORAL
Qty: 30 TABLET | Refills: 1 | Status: SHIPPED | OUTPATIENT
Start: 2019-02-21 | End: 2019-02-27

## 2019-02-25 ENCOUNTER — HOSPITAL ENCOUNTER (OUTPATIENT)
Dept: LAB | Age: 59
Discharge: HOME | End: 2019-02-25
Payer: COMMERCIAL

## 2019-02-25 DIAGNOSIS — E11.9 TYPE 2 DIABETES MELLITUS WITHOUT COMPLICATION, WITHOUT LONG-TERM CURRENT USE OF INSULIN (HCC): ICD-10-CM

## 2019-02-25 DIAGNOSIS — E55.9 VITAMIN D DEFICIENCY: ICD-10-CM

## 2019-02-25 LAB
ALBUMIN SERPL-MCNC: 4.2 G/DL (ref 3.5–4.6)
ALP BLD-CCNC: 75 U/L (ref 40–130)
ALT SERPL-CCNC: 78 U/L (ref 0–33)
ANION GAP SERPL CALCULATED.3IONS-SCNC: 14 MEQ/L (ref 9–15)
AST SERPL-CCNC: 55 U/L (ref 0–35)
BASOPHILS ABSOLUTE: 0 K/UL (ref 0–0.2)
BASOPHILS RELATIVE PERCENT: 0 %
BILIRUB SERPL-MCNC: 0.3 MG/DL (ref 0.2–0.7)
BUN BLDV-MCNC: 17 MG/DL (ref 6–20)
CALCIUM SERPL-MCNC: 9.5 MG/DL (ref 8.5–9.9)
CHLORIDE BLD-SCNC: 99 MEQ/L (ref 95–107)
CHOLESTEROL, TOTAL: 196 MG/DL (ref 0–199)
CO2: 26 MEQ/L (ref 20–31)
CREAT SERPL-MCNC: 0.9 MG/DL (ref 0.5–0.9)
CREATININE URINE: 128.8 MG/DL
EOSINOPHILS ABSOLUTE: 0.4 K/UL (ref 0–0.7)
EOSINOPHILS RELATIVE PERCENT: 11 %
GFR AFRICAN AMERICAN: >60
GFR NON-AFRICAN AMERICAN: >60
GLOBULIN: 2.7 G/DL (ref 2.3–3.5)
GLUCOSE BLD-MCNC: 99 MG/DL (ref 70–99)
HBA1C MFR BLD: 5.9 % (ref 4.8–5.9)
HCT VFR BLD CALC: 40.1 % (ref 37–47)
HDLC SERPL-MCNC: 52 MG/DL (ref 40–59)
HEMOGLOBIN: 13.3 G/DL (ref 12–16)
LDL CHOLESTEROL CALCULATED: 110 MG/DL (ref 0–129)
LYMPHOCYTES ABSOLUTE: 1.1 K/UL (ref 1–4.8)
LYMPHOCYTES RELATIVE PERCENT: 29 %
MCH RBC QN AUTO: 30 PG (ref 27–31.3)
MCHC RBC AUTO-ENTMCNC: 33.2 % (ref 33–37)
MCV RBC AUTO: 90.3 FL (ref 82–100)
MICROALBUMIN UR-MCNC: <1.2 MG/DL
MICROALBUMIN/CREAT UR-RTO: NORMAL MG/G (ref 0–30)
MONOCYTES ABSOLUTE: 0.3 K/UL (ref 0.2–0.8)
MONOCYTES RELATIVE PERCENT: 9 %
NEUTROPHILS ABSOLUTE: 1.9 K/UL (ref 1.4–6.5)
NEUTROPHILS RELATIVE PERCENT: 51 %
PDW BLD-RTO: 12.9 % (ref 11.5–14.5)
PLATELET # BLD: 238 K/UL (ref 130–400)
PLATELET SLIDE REVIEW: ADEQUATE
POTASSIUM SERPL-SCNC: 4.7 MEQ/L (ref 3.4–4.9)
RBC # BLD: 4.44 M/UL (ref 4.2–5.4)
SODIUM BLD-SCNC: 139 MEQ/L (ref 135–144)
TOTAL PROTEIN: 6.9 G/DL (ref 6.3–8)
TRIGL SERPL-MCNC: 172 MG/DL (ref 0–150)
VITAMIN D 25-HYDROXY: 37.9 NG/ML (ref 30–100)
WBC # BLD: 3.7 K/UL (ref 4.8–10.8)

## 2019-02-25 PROCEDURE — 36415 COLL VENOUS BLD VENIPUNCTURE: CPT

## 2019-02-25 PROCEDURE — 80053 COMPREHEN METABOLIC PANEL: CPT

## 2019-02-25 PROCEDURE — 82306 VITAMIN D 25 HYDROXY: CPT

## 2019-02-25 PROCEDURE — 80061 LIPID PANEL: CPT

## 2019-02-25 PROCEDURE — 85025 COMPLETE CBC W/AUTO DIFF WBC: CPT

## 2019-02-25 PROCEDURE — 82570 ASSAY OF URINE CREATININE: CPT

## 2019-02-25 PROCEDURE — 82043 UR ALBUMIN QUANTITATIVE: CPT

## 2019-02-25 PROCEDURE — 83036 HEMOGLOBIN GLYCOSYLATED A1C: CPT

## 2019-02-26 DIAGNOSIS — F32.A DEPRESSION, UNSPECIFIED DEPRESSION TYPE: ICD-10-CM

## 2019-02-26 RX ORDER — CITALOPRAM 40 MG/1
TABLET ORAL
Qty: 90 TABLET | Refills: 1 | Status: SHIPPED | OUTPATIENT
Start: 2019-02-26 | End: 2019-02-28 | Stop reason: SDUPTHER

## 2019-02-27 ENCOUNTER — PREP FOR PROCEDURE (OUTPATIENT)
Dept: SURGERY | Age: 59
End: 2019-02-27

## 2019-02-27 ENCOUNTER — OFFICE VISIT (OUTPATIENT)
Dept: SURGERY | Age: 59
End: 2019-02-27
Payer: COMMERCIAL

## 2019-02-27 VITALS
BODY MASS INDEX: 35.37 KG/M2 | DIASTOLIC BLOOD PRESSURE: 66 MMHG | SYSTOLIC BLOOD PRESSURE: 110 MMHG | TEMPERATURE: 97.9 F | HEIGHT: 62 IN | WEIGHT: 192.2 LBS

## 2019-02-27 DIAGNOSIS — M62.81 MUSCLE WEAKNESS (GENERALIZED): Primary | ICD-10-CM

## 2019-02-27 PROCEDURE — 99213 OFFICE O/P EST LOW 20 MIN: CPT | Performed by: SURGERY

## 2019-02-27 ASSESSMENT — ENCOUNTER SYMPTOMS
RECTAL PAIN: 0
ABDOMINAL PAIN: 0
ABDOMINAL DISTENTION: 0
RHINORRHEA: 0
SHORTNESS OF BREATH: 0
COLOR CHANGE: 0
CHEST TIGHTNESS: 0
ALLERGIC/IMMUNOLOGIC NEGATIVE: 1
BLOOD IN STOOL: 0
NAUSEA: 0

## 2019-02-28 ENCOUNTER — OFFICE VISIT (OUTPATIENT)
Dept: FAMILY MEDICINE CLINIC | Age: 59
End: 2019-02-28
Payer: COMMERCIAL

## 2019-02-28 VITALS
WEIGHT: 192 LBS | DIASTOLIC BLOOD PRESSURE: 64 MMHG | TEMPERATURE: 96.7 F | HEIGHT: 62 IN | OXYGEN SATURATION: 96 % | BODY MASS INDEX: 35.33 KG/M2 | RESPIRATION RATE: 18 BRPM | SYSTOLIC BLOOD PRESSURE: 110 MMHG | HEART RATE: 94 BPM

## 2019-02-28 DIAGNOSIS — I26.99 OTHER PULMONARY EMBOLISM WITHOUT ACUTE COR PULMONALE, UNSPECIFIED CHRONICITY (HCC): ICD-10-CM

## 2019-02-28 DIAGNOSIS — R05.9 COUGH: ICD-10-CM

## 2019-02-28 DIAGNOSIS — E11.9 TYPE 2 DIABETES MELLITUS WITHOUT COMPLICATION, WITHOUT LONG-TERM CURRENT USE OF INSULIN (HCC): Primary | Chronic | ICD-10-CM

## 2019-02-28 DIAGNOSIS — D68.51 FACTOR 5 LEIDEN MUTATION, HETEROZYGOUS (HCC): ICD-10-CM

## 2019-02-28 DIAGNOSIS — Z11.4 SCREENING FOR HIV (HUMAN IMMUNODEFICIENCY VIRUS): ICD-10-CM

## 2019-02-28 DIAGNOSIS — R74.8 ELEVATED CK: ICD-10-CM

## 2019-02-28 DIAGNOSIS — R52 BODY ACHES: ICD-10-CM

## 2019-02-28 DIAGNOSIS — F32.A DEPRESSION, UNSPECIFIED DEPRESSION TYPE: ICD-10-CM

## 2019-02-28 DIAGNOSIS — E78.5 DYSLIPIDEMIA: Chronic | ICD-10-CM

## 2019-02-28 DIAGNOSIS — L93.0 DISCOID LUPUS ERYTHEMATOSUS: Chronic | ICD-10-CM

## 2019-02-28 DIAGNOSIS — J20.9 ACUTE BRONCHITIS, UNSPECIFIED ORGANISM: ICD-10-CM

## 2019-02-28 DIAGNOSIS — I10 ESSENTIAL HYPERTENSION: Chronic | ICD-10-CM

## 2019-02-28 DIAGNOSIS — E55.9 VITAMIN D DEFICIENCY: ICD-10-CM

## 2019-02-28 DIAGNOSIS — K21.9 GASTROESOPHAGEAL REFLUX DISEASE, ESOPHAGITIS PRESENCE NOT SPECIFIED: ICD-10-CM

## 2019-02-28 LAB
INFLUENZA A ANTIBODY: NEGATIVE
INFLUENZA B ANTIBODY: NEGATIVE

## 2019-02-28 PROCEDURE — 87804 INFLUENZA ASSAY W/OPTIC: CPT | Performed by: NURSE PRACTITIONER

## 2019-02-28 PROCEDURE — 99214 OFFICE O/P EST MOD 30 MIN: CPT | Performed by: NURSE PRACTITIONER

## 2019-02-28 RX ORDER — CLARITHROMYCIN 500 MG/1
500 TABLET, COATED ORAL 2 TIMES DAILY
Qty: 20 TABLET | Refills: 0 | Status: SHIPPED | OUTPATIENT
Start: 2019-02-28 | End: 2020-03-02 | Stop reason: SDUPTHER

## 2019-02-28 RX ORDER — CITALOPRAM 40 MG/1
TABLET ORAL
Qty: 90 TABLET | Refills: 1 | Status: SHIPPED | OUTPATIENT
Start: 2019-02-28 | End: 2020-03-09 | Stop reason: SDUPTHER

## 2019-02-28 RX ORDER — OMEPRAZOLE 40 MG/1
40 CAPSULE, DELAYED RELEASE ORAL DAILY
Qty: 30 CAPSULE | Refills: 3 | Status: SHIPPED | OUTPATIENT
Start: 2019-02-28 | End: 2019-07-15 | Stop reason: SDUPTHER

## 2019-02-28 ASSESSMENT — PATIENT HEALTH QUESTIONNAIRE - PHQ9
1. LITTLE INTEREST OR PLEASURE IN DOING THINGS: 0
SUM OF ALL RESPONSES TO PHQ9 QUESTIONS 1 & 2: 1
2. FEELING DOWN, DEPRESSED OR HOPELESS: 1
SUM OF ALL RESPONSES TO PHQ QUESTIONS 1-9: 1
SUM OF ALL RESPONSES TO PHQ QUESTIONS 1-9: 1

## 2019-03-05 RX ORDER — BALSALAZIDE DISODIUM 750 MG/1
750 CAPSULE ORAL 3 TIMES DAILY
Qty: 90 CAPSULE | Refills: 3 | Status: SHIPPED | OUTPATIENT
Start: 2019-03-05 | End: 2019-03-06 | Stop reason: SDUPTHER

## 2019-03-06 RX ORDER — BALSALAZIDE DISODIUM 750 MG/1
750 CAPSULE ORAL 3 TIMES DAILY
Qty: 270 CAPSULE | Refills: 3 | Status: SHIPPED | OUTPATIENT
Start: 2019-03-06 | End: 2019-05-20

## 2019-03-11 ENCOUNTER — HOSPITAL ENCOUNTER (OUTPATIENT)
Dept: PREADMISSION TESTING | Age: 59
Discharge: HOME | End: 2019-03-15
Payer: COMMERCIAL

## 2019-03-11 VITALS
HEART RATE: 74 BPM | WEIGHT: 192 LBS | HEIGHT: 63 IN | OXYGEN SATURATION: 99 % | SYSTOLIC BLOOD PRESSURE: 112 MMHG | BODY MASS INDEX: 34.02 KG/M2 | RESPIRATION RATE: 16 BRPM | DIASTOLIC BLOOD PRESSURE: 73 MMHG | TEMPERATURE: 97.4 F

## 2019-03-11 LAB
EKG ATRIAL RATE: 84 BPM
EKG P AXIS: 40 DEGREES
EKG P-R INTERVAL: 126 MS
EKG Q-T INTERVAL: 392 MS
EKG QRS DURATION: 92 MS
EKG QTC CALCULATION (BAZETT): 463 MS
EKG R AXIS: -8 DEGREES
EKG T AXIS: 6 DEGREES
EKG VENTRICULAR RATE: 84 BPM

## 2019-03-11 PROCEDURE — 93005 ELECTROCARDIOGRAM TRACING: CPT

## 2019-03-11 RX ORDER — SODIUM CHLORIDE, SODIUM LACTATE, POTASSIUM CHLORIDE, CALCIUM CHLORIDE 600; 310; 30; 20 MG/100ML; MG/100ML; MG/100ML; MG/100ML
INJECTION, SOLUTION INTRAVENOUS CONTINUOUS
Status: CANCELLED | OUTPATIENT
Start: 2019-03-11

## 2019-03-11 RX ORDER — SODIUM CHLORIDE 0.9 % (FLUSH) 0.9 %
10 SYRINGE (ML) INJECTION PRN
Status: CANCELLED | OUTPATIENT
Start: 2019-03-11

## 2019-03-11 RX ORDER — LIDOCAINE HYDROCHLORIDE 10 MG/ML
1 INJECTION, SOLUTION EPIDURAL; INFILTRATION; INTRACAUDAL; PERINEURAL
Status: CANCELLED | OUTPATIENT
Start: 2019-03-11 | End: 2019-03-11

## 2019-03-11 RX ORDER — SODIUM CHLORIDE 0.9 % (FLUSH) 0.9 %
10 SYRINGE (ML) INJECTION EVERY 12 HOURS SCHEDULED
Status: CANCELLED | OUTPATIENT
Start: 2019-03-11

## 2019-03-13 ENCOUNTER — ANESTHESIA EVENT (OUTPATIENT)
Dept: OPERATING ROOM | Age: 59
End: 2019-03-13
Payer: COMMERCIAL

## 2019-03-13 PROCEDURE — 93010 ELECTROCARDIOGRAM REPORT: CPT | Performed by: INTERNAL MEDICINE

## 2019-03-14 ENCOUNTER — ANESTHESIA (OUTPATIENT)
Dept: OPERATING ROOM | Age: 59
End: 2019-03-14
Payer: COMMERCIAL

## 2019-03-14 ENCOUNTER — HOSPITAL ENCOUNTER (OUTPATIENT)
Age: 59
Setting detail: OUTPATIENT SURGERY
Discharge: HOME | End: 2019-03-14
Attending: SURGERY | Admitting: SURGERY
Payer: COMMERCIAL

## 2019-03-14 VITALS — TEMPERATURE: 96.3 F | OXYGEN SATURATION: 100 % | DIASTOLIC BLOOD PRESSURE: 85 MMHG | SYSTOLIC BLOOD PRESSURE: 143 MMHG

## 2019-03-14 VITALS
DIASTOLIC BLOOD PRESSURE: 61 MMHG | OXYGEN SATURATION: 98 % | HEART RATE: 76 BPM | BODY MASS INDEX: 33.66 KG/M2 | RESPIRATION RATE: 14 BRPM | WEIGHT: 190 LBS | HEIGHT: 63 IN | TEMPERATURE: 97.9 F | SYSTOLIC BLOOD PRESSURE: 120 MMHG

## 2019-03-14 DIAGNOSIS — M62.81 MUSCLE WEAKNESS (GENERALIZED): Primary | ICD-10-CM

## 2019-03-14 LAB
GLUCOSE BLD-MCNC: 117 MG/DL (ref 60–115)
GLUCOSE BLD-MCNC: 148 MG/DL (ref 60–115)
GLUCOSE BLD-MCNC: NORMAL MG/DL
PERFORMED ON: ABNORMAL
PERFORMED ON: ABNORMAL

## 2019-03-14 PROCEDURE — 7100000011 HC PHASE II RECOVERY - ADDTL 15 MIN: Performed by: SURGERY

## 2019-03-14 PROCEDURE — 7100000001 HC PACU RECOVERY - ADDTL 15 MIN: Performed by: SURGERY

## 2019-03-14 PROCEDURE — 3700000001 HC ADD 15 MINUTES (ANESTHESIA): Performed by: SURGERY

## 2019-03-14 PROCEDURE — 6360000002 HC RX W HCPCS: Performed by: NURSE ANESTHETIST, CERTIFIED REGISTERED

## 2019-03-14 PROCEDURE — 3600000003 HC SURGERY LEVEL 3 BASE: Performed by: SURGERY

## 2019-03-14 PROCEDURE — 3600000013 HC SURGERY LEVEL 3 ADDTL 15MIN: Performed by: SURGERY

## 2019-03-14 PROCEDURE — 20205 DEEP MUSCLE BIOPSY: CPT | Performed by: SURGERY

## 2019-03-14 PROCEDURE — 2500000003 HC RX 250 WO HCPCS: Performed by: NURSE ANESTHETIST, CERTIFIED REGISTERED

## 2019-03-14 PROCEDURE — 2580000003 HC RX 258: Performed by: NURSE PRACTITIONER

## 2019-03-14 PROCEDURE — 2580000003 HC RX 258: Performed by: SURGERY

## 2019-03-14 PROCEDURE — 88300 SURGICAL PATH GROSS: CPT

## 2019-03-14 PROCEDURE — 3700000000 HC ANESTHESIA ATTENDED CARE: Performed by: SURGERY

## 2019-03-14 PROCEDURE — 2709999900 HC NON-CHARGEABLE SUPPLY: Performed by: SURGERY

## 2019-03-14 PROCEDURE — 2500000003 HC RX 250 WO HCPCS: Performed by: NURSE PRACTITIONER

## 2019-03-14 PROCEDURE — 2500000003 HC RX 250 WO HCPCS: Performed by: SURGERY

## 2019-03-14 PROCEDURE — 6370000000 HC RX 637 (ALT 250 FOR IP): Performed by: SURGERY

## 2019-03-14 PROCEDURE — 7100000010 HC PHASE II RECOVERY - FIRST 15 MIN: Performed by: SURGERY

## 2019-03-14 PROCEDURE — 7100000000 HC PACU RECOVERY - FIRST 15 MIN: Performed by: SURGERY

## 2019-03-14 RX ORDER — SODIUM CHLORIDE 0.9 % (FLUSH) 0.9 %
10 SYRINGE (ML) INJECTION PRN
Status: DISCONTINUED | OUTPATIENT
Start: 2019-03-14 | End: 2019-03-14 | Stop reason: HOSPADM

## 2019-03-14 RX ORDER — ONDANSETRON 2 MG/ML
4 INJECTION INTRAMUSCULAR; INTRAVENOUS
Status: DISCONTINUED | OUTPATIENT
Start: 2019-03-14 | End: 2019-03-14 | Stop reason: HOSPADM

## 2019-03-14 RX ORDER — HYDROCODONE BITARTRATE AND ACETAMINOPHEN 5; 325 MG/1; MG/1
2 TABLET ORAL EVERY 4 HOURS PRN
Status: DISCONTINUED | OUTPATIENT
Start: 2019-03-14 | End: 2019-03-14 | Stop reason: HOSPADM

## 2019-03-14 RX ORDER — DEXAMETHASONE SODIUM PHOSPHATE 10 MG/ML
INJECTION, SOLUTION INTRAMUSCULAR; INTRAVENOUS PRN
Status: DISCONTINUED | OUTPATIENT
Start: 2019-03-14 | End: 2019-03-14 | Stop reason: SDUPTHER

## 2019-03-14 RX ORDER — SODIUM CHLORIDE, SODIUM LACTATE, POTASSIUM CHLORIDE, CALCIUM CHLORIDE 600; 310; 30; 20 MG/100ML; MG/100ML; MG/100ML; MG/100ML
INJECTION, SOLUTION INTRAVENOUS CONTINUOUS
Status: DISCONTINUED | OUTPATIENT
Start: 2019-03-14 | End: 2019-03-14 | Stop reason: HOSPADM

## 2019-03-14 RX ORDER — LIDOCAINE HYDROCHLORIDE 10 MG/ML
1 INJECTION, SOLUTION EPIDURAL; INFILTRATION; INTRACAUDAL; PERINEURAL
Status: COMPLETED | OUTPATIENT
Start: 2019-03-14 | End: 2019-03-14

## 2019-03-14 RX ORDER — SODIUM CHLORIDE 0.9 % (FLUSH) 0.9 %
10 SYRINGE (ML) INJECTION EVERY 12 HOURS SCHEDULED
Status: DISCONTINUED | OUTPATIENT
Start: 2019-03-14 | End: 2019-03-14 | Stop reason: HOSPADM

## 2019-03-14 RX ORDER — BUPIVACAINE HYDROCHLORIDE AND EPINEPHRINE 2.5; 5 MG/ML; UG/ML
INJECTION, SOLUTION EPIDURAL; INFILTRATION; INTRACAUDAL; PERINEURAL PRN
Status: DISCONTINUED | OUTPATIENT
Start: 2019-03-14 | End: 2019-03-14 | Stop reason: ALTCHOICE

## 2019-03-14 RX ORDER — HYDROCODONE BITARTRATE AND ACETAMINOPHEN 5; 325 MG/1; MG/1
1 TABLET ORAL EVERY 4 HOURS PRN
Status: DISCONTINUED | OUTPATIENT
Start: 2019-03-14 | End: 2019-03-14 | Stop reason: HOSPADM

## 2019-03-14 RX ORDER — PROPOFOL 10 MG/ML
INJECTION, EMULSION INTRAVENOUS PRN
Status: DISCONTINUED | OUTPATIENT
Start: 2019-03-14 | End: 2019-03-14 | Stop reason: SDUPTHER

## 2019-03-14 RX ORDER — MAGNESIUM HYDROXIDE 1200 MG/15ML
LIQUID ORAL CONTINUOUS PRN
Status: COMPLETED | OUTPATIENT
Start: 2019-03-14 | End: 2019-03-14

## 2019-03-14 RX ORDER — DIPHENHYDRAMINE HYDROCHLORIDE 50 MG/ML
12.5 INJECTION INTRAMUSCULAR; INTRAVENOUS
Status: DISCONTINUED | OUTPATIENT
Start: 2019-03-14 | End: 2019-03-14 | Stop reason: HOSPADM

## 2019-03-14 RX ORDER — MIDAZOLAM HYDROCHLORIDE 1 MG/ML
INJECTION INTRAMUSCULAR; INTRAVENOUS PRN
Status: DISCONTINUED | OUTPATIENT
Start: 2019-03-14 | End: 2019-03-14 | Stop reason: SDUPTHER

## 2019-03-14 RX ORDER — LIDOCAINE HYDROCHLORIDE 20 MG/ML
INJECTION, SOLUTION INFILTRATION; PERINEURAL PRN
Status: DISCONTINUED | OUTPATIENT
Start: 2019-03-14 | End: 2019-03-14 | Stop reason: SDUPTHER

## 2019-03-14 RX ORDER — ONDANSETRON 2 MG/ML
INJECTION INTRAMUSCULAR; INTRAVENOUS PRN
Status: DISCONTINUED | OUTPATIENT
Start: 2019-03-14 | End: 2019-03-14 | Stop reason: SDUPTHER

## 2019-03-14 RX ORDER — ROCURONIUM BROMIDE 10 MG/ML
INJECTION, SOLUTION INTRAVENOUS PRN
Status: DISCONTINUED | OUTPATIENT
Start: 2019-03-14 | End: 2019-03-14 | Stop reason: SDUPTHER

## 2019-03-14 RX ORDER — FENTANYL CITRATE 50 UG/ML
INJECTION, SOLUTION INTRAMUSCULAR; INTRAVENOUS PRN
Status: DISCONTINUED | OUTPATIENT
Start: 2019-03-14 | End: 2019-03-14 | Stop reason: SDUPTHER

## 2019-03-14 RX ORDER — ONDANSETRON 2 MG/ML
4 INJECTION INTRAMUSCULAR; INTRAVENOUS EVERY 6 HOURS PRN
Status: DISCONTINUED | OUTPATIENT
Start: 2019-03-14 | End: 2019-03-14 | Stop reason: HOSPADM

## 2019-03-14 RX ORDER — TRAMADOL HYDROCHLORIDE 50 MG/1
50 TABLET ORAL EVERY 8 HOURS PRN
Qty: 15 TABLET | Refills: 0 | Status: SHIPPED | OUTPATIENT
Start: 2019-03-14 | End: 2019-03-27 | Stop reason: SDUPTHER

## 2019-03-14 RX ORDER — FENTANYL CITRATE 50 UG/ML
25 INJECTION, SOLUTION INTRAMUSCULAR; INTRAVENOUS EVERY 5 MIN PRN
Status: DISCONTINUED | OUTPATIENT
Start: 2019-03-14 | End: 2019-03-14 | Stop reason: HOSPADM

## 2019-03-14 RX ORDER — MORPHINE SULFATE 2 MG/ML
1 INJECTION, SOLUTION INTRAMUSCULAR; INTRAVENOUS
Status: DISCONTINUED | OUTPATIENT
Start: 2019-03-14 | End: 2019-03-14 | Stop reason: HOSPADM

## 2019-03-14 RX ADMIN — ROCURONIUM BROMIDE 40 MG: 10 INJECTION INTRAVENOUS at 07:39

## 2019-03-14 RX ADMIN — DEXAMETHASONE SODIUM PHOSPHATE 10 MG: 10 INJECTION INTRAMUSCULAR; INTRAVENOUS at 07:45

## 2019-03-14 RX ADMIN — PROPOFOL 150 MG: 10 INJECTION, EMULSION INTRAVENOUS at 07:39

## 2019-03-14 RX ADMIN — MIDAZOLAM HYDROCHLORIDE 2 MG: 1 INJECTION, SOLUTION INTRAMUSCULAR; INTRAVENOUS at 07:35

## 2019-03-14 RX ADMIN — SUGAMMADEX 172 MG: 100 INJECTION, SOLUTION INTRAVENOUS at 08:37

## 2019-03-14 RX ADMIN — ONDANSETRON 4 MG: 2 INJECTION INTRAMUSCULAR; INTRAVENOUS at 08:09

## 2019-03-14 RX ADMIN — LIDOCAINE HYDROCHLORIDE 80 MG: 20 INJECTION, SOLUTION INFILTRATION; PERINEURAL at 07:39

## 2019-03-14 RX ADMIN — HYDROCODONE BITARTRATE AND ACETAMINOPHEN 2 TABLET: 5; 325 TABLET ORAL at 09:48

## 2019-03-14 RX ADMIN — FENTANYL CITRATE 50 MCG: 50 INJECTION, SOLUTION INTRAMUSCULAR; INTRAVENOUS at 07:39

## 2019-03-14 RX ADMIN — SODIUM CHLORIDE, POTASSIUM CHLORIDE, SODIUM LACTATE AND CALCIUM CHLORIDE: 600; 310; 30; 20 INJECTION, SOLUTION INTRAVENOUS at 06:33

## 2019-03-14 RX ADMIN — LIDOCAINE HYDROCHLORIDE 1 ML: 10 INJECTION, SOLUTION EPIDURAL; INFILTRATION; INTRACAUDAL; PERINEURAL at 06:34

## 2019-03-14 ASSESSMENT — PULMONARY FUNCTION TESTS
PIF_VALUE: 16
PIF_VALUE: 21
PIF_VALUE: 15
PIF_VALUE: 20
PIF_VALUE: 20
PIF_VALUE: 1
PIF_VALUE: 18
PIF_VALUE: 15
PIF_VALUE: 15
PIF_VALUE: 2
PIF_VALUE: 20
PIF_VALUE: 15
PIF_VALUE: 4
PIF_VALUE: 17
PIF_VALUE: 20
PIF_VALUE: 20
PIF_VALUE: 21
PIF_VALUE: 20
PIF_VALUE: 15
PIF_VALUE: 20
PIF_VALUE: 20
PIF_VALUE: 1
PIF_VALUE: 1
PIF_VALUE: 20
PIF_VALUE: 16
PIF_VALUE: 19
PIF_VALUE: 20
PIF_VALUE: 20
PIF_VALUE: 18
PIF_VALUE: 0
PIF_VALUE: 7
PIF_VALUE: 20
PIF_VALUE: 15
PIF_VALUE: 15
PIF_VALUE: 21
PIF_VALUE: 9
PIF_VALUE: 20
PIF_VALUE: 9
PIF_VALUE: 17
PIF_VALUE: 20
PIF_VALUE: 20
PIF_VALUE: 6
PIF_VALUE: 19
PIF_VALUE: 15
PIF_VALUE: 20
PIF_VALUE: 20
PIF_VALUE: 15
PIF_VALUE: 0
PIF_VALUE: 19
PIF_VALUE: 20
PIF_VALUE: 15
PIF_VALUE: 20
PIF_VALUE: 18
PIF_VALUE: 21
PIF_VALUE: 21
PIF_VALUE: 6
PIF_VALUE: 20
PIF_VALUE: 25
PIF_VALUE: 1
PIF_VALUE: 20
PIF_VALUE: 18
PIF_VALUE: 15
PIF_VALUE: 7
PIF_VALUE: 15
PIF_VALUE: 16
PIF_VALUE: 15

## 2019-03-14 ASSESSMENT — PAIN SCALES - GENERAL
PAINLEVEL_OUTOF10: 8
PAINLEVEL_OUTOF10: 0

## 2019-03-14 ASSESSMENT — PAIN - FUNCTIONAL ASSESSMENT: PAIN_FUNCTIONAL_ASSESSMENT: 0-10

## 2019-03-14 ASSESSMENT — ENCOUNTER SYMPTOMS: SHORTNESS OF BREATH: 1

## 2019-03-18 ENCOUNTER — OFFICE VISIT (OUTPATIENT)
Dept: GASTROENTEROLOGY | Age: 59
End: 2019-03-18
Payer: COMMERCIAL

## 2019-03-18 ENCOUNTER — HOSPITAL ENCOUNTER (OUTPATIENT)
Age: 59
Setting detail: SPECIMEN
Discharge: HOME | End: 2019-03-18
Payer: COMMERCIAL

## 2019-03-18 VITALS
SYSTOLIC BLOOD PRESSURE: 108 MMHG | HEIGHT: 63 IN | BODY MASS INDEX: 34.27 KG/M2 | HEART RATE: 116 BPM | TEMPERATURE: 97.4 F | DIASTOLIC BLOOD PRESSURE: 70 MMHG | WEIGHT: 193.4 LBS | OXYGEN SATURATION: 96 %

## 2019-03-18 DIAGNOSIS — K21.9 GASTROESOPHAGEAL REFLUX DISEASE, ESOPHAGITIS PRESENCE NOT SPECIFIED: Primary | ICD-10-CM

## 2019-03-18 DIAGNOSIS — R13.10 DYSPHAGIA, UNSPECIFIED TYPE: ICD-10-CM

## 2019-03-18 DIAGNOSIS — R19.7 DIARRHEA, UNSPECIFIED TYPE: ICD-10-CM

## 2019-03-18 PROCEDURE — 87324 CLOSTRIDIUM AG IA: CPT

## 2019-03-18 PROCEDURE — 87506 IADNA-DNA/RNA PROBE TQ 6-11: CPT

## 2019-03-18 PROCEDURE — 87449 NOS EACH ORGANISM AG IA: CPT

## 2019-03-18 PROCEDURE — 99214 OFFICE O/P EST MOD 30 MIN: CPT | Performed by: NURSE PRACTITIONER

## 2019-03-19 LAB
C DIFFICILE TOXIN, EIA: NORMAL
GI BACTERIAL PATHOGENS BY PCR: NORMAL

## 2019-03-25 ENCOUNTER — TELEPHONE (OUTPATIENT)
Dept: GASTROENTEROLOGY | Age: 59
End: 2019-03-25

## 2019-03-27 ENCOUNTER — OFFICE VISIT (OUTPATIENT)
Dept: PHYSICAL MEDICINE AND REHAB | Age: 59
End: 2019-03-27
Payer: COMMERCIAL

## 2019-03-27 ENCOUNTER — OFFICE VISIT (OUTPATIENT)
Dept: SURGERY | Age: 59
End: 2019-03-27
Payer: COMMERCIAL

## 2019-03-27 VITALS
WEIGHT: 188 LBS | DIASTOLIC BLOOD PRESSURE: 60 MMHG | HEIGHT: 63 IN | SYSTOLIC BLOOD PRESSURE: 100 MMHG | BODY MASS INDEX: 33.31 KG/M2

## 2019-03-27 VITALS
TEMPERATURE: 98 F | HEIGHT: 63 IN | DIASTOLIC BLOOD PRESSURE: 60 MMHG | SYSTOLIC BLOOD PRESSURE: 100 MMHG | BODY MASS INDEX: 33.73 KG/M2 | WEIGHT: 190.4 LBS

## 2019-03-27 DIAGNOSIS — M53.3 SI (SACROILIAC) PAIN: ICD-10-CM

## 2019-03-27 DIAGNOSIS — E55.9 VITAMIN D DEFICIENCY: Primary | ICD-10-CM

## 2019-03-27 DIAGNOSIS — G89.29 CHRONIC MIDLINE LOW BACK PAIN WITH RIGHT-SIDED SCIATICA: ICD-10-CM

## 2019-03-27 DIAGNOSIS — Z79.899 HIGH RISK MEDICATION USE: ICD-10-CM

## 2019-03-27 DIAGNOSIS — M62.81 MUSCLE WEAKNESS (GENERALIZED): Primary | ICD-10-CM

## 2019-03-27 DIAGNOSIS — D68.51 FACTOR 5 LEIDEN MUTATION, HETEROZYGOUS (HCC): ICD-10-CM

## 2019-03-27 DIAGNOSIS — M62.81 MUSCLE WEAKNESS (GENERALIZED): ICD-10-CM

## 2019-03-27 DIAGNOSIS — M79.7 FIBROMYALGIA MUSCLE PAIN: ICD-10-CM

## 2019-03-27 DIAGNOSIS — M54.41 CHRONIC MIDLINE LOW BACK PAIN WITH RIGHT-SIDED SCIATICA: ICD-10-CM

## 2019-03-27 DIAGNOSIS — M54.6 CHRONIC BILATERAL THORACIC BACK PAIN: ICD-10-CM

## 2019-03-27 DIAGNOSIS — R76.8 POSITIVE ANA (ANTINUCLEAR ANTIBODY): ICD-10-CM

## 2019-03-27 DIAGNOSIS — M32.13 DRUG-INDUCED SYSTEMIC LUPUS ERYTHEMATOSUS WITH LUNG INVOLVEMENT (HCC): ICD-10-CM

## 2019-03-27 DIAGNOSIS — M32.0 DRUG-INDUCED SYSTEMIC LUPUS ERYTHEMATOSUS WITH LUNG INVOLVEMENT (HCC): ICD-10-CM

## 2019-03-27 DIAGNOSIS — G89.29 CHRONIC BILATERAL THORACIC BACK PAIN: ICD-10-CM

## 2019-03-27 PROBLEM — M79.89 RIGHT LEG SWELLING: Status: ACTIVE | Noted: 2019-01-30

## 2019-03-27 PROBLEM — K13.79 MOUTH SORES: Status: ACTIVE | Noted: 2019-03-18

## 2019-03-27 PROBLEM — Z79.52 LONG TERM CURRENT USE OF SYSTEMIC STEROIDS: Status: ACTIVE | Noted: 2019-03-18

## 2019-03-27 PROBLEM — M33.22 POLYMYOSITIS WITH MYOPATHY (HCC): Status: ACTIVE | Noted: 2019-03-18

## 2019-03-27 PROCEDURE — 99214 OFFICE O/P EST MOD 30 MIN: CPT | Performed by: PHYSICAL MEDICINE & REHABILITATION

## 2019-03-27 PROCEDURE — 99212 OFFICE O/P EST SF 10 MIN: CPT | Performed by: SURGERY

## 2019-03-27 RX ORDER — CYCLOBENZAPRINE HCL 10 MG
10 TABLET ORAL 2 TIMES DAILY PRN
Qty: 60 TABLET | Refills: 0 | Status: SHIPPED | OUTPATIENT
Start: 2019-03-27 | End: 2019-04-01

## 2019-03-27 RX ORDER — TRAMADOL HYDROCHLORIDE 50 MG/1
50 TABLET ORAL EVERY 8 HOURS PRN
Qty: 15 TABLET | Refills: 0 | Status: SHIPPED | OUTPATIENT
Start: 2019-03-27 | End: 2019-04-01

## 2019-03-27 RX ORDER — METAXALONE 800 MG/1
800 TABLET ORAL 3 TIMES DAILY PRN
Qty: 90 TABLET | Refills: 1 | Status: SHIPPED | OUTPATIENT
Start: 2019-03-27 | End: 2019-03-27

## 2019-03-27 ASSESSMENT — ENCOUNTER SYMPTOMS
BACK PAIN: 1
CHEST TIGHTNESS: 0
WHEEZING: 0
ABDOMINAL PAIN: 0
SORE THROAT: 0
SHORTNESS OF BREATH: 1
NAUSEA: 0
APNEA: 0
DIARRHEA: 0
BOWEL INCONTINENCE: 0
VOMITING: 0
VISUAL CHANGE: 0
EYES NEGATIVE: 1
COUGH: 1
COLOR CHANGE: 0
CONSTIPATION: 0

## 2019-03-29 ENCOUNTER — HOSPITAL ENCOUNTER (OUTPATIENT)
Dept: LAB | Age: 59
Discharge: HOME | End: 2019-03-29
Payer: COMMERCIAL

## 2019-03-29 DIAGNOSIS — I10 ESSENTIAL HYPERTENSION: Chronic | ICD-10-CM

## 2019-03-29 DIAGNOSIS — E55.9 VITAMIN D DEFICIENCY: ICD-10-CM

## 2019-03-29 LAB
ALBUMIN SERPL-MCNC: 3.8 G/DL (ref 3.5–4.6)
ALP BLD-CCNC: 55 U/L (ref 40–130)
ALT SERPL-CCNC: 25 U/L (ref 0–33)
ANION GAP SERPL CALCULATED.3IONS-SCNC: 16 MEQ/L (ref 9–15)
ANISOCYTOSIS: ABNORMAL
AST SERPL-CCNC: 19 U/L (ref 0–35)
ATYPICAL LYMPHOCYTE RELATIVE PERCENT: 16 %
BASOPHILS ABSOLUTE: 0 K/UL (ref 0–0.2)
BASOPHILS RELATIVE PERCENT: 0.5 %
BILIRUB SERPL-MCNC: 0.3 MG/DL (ref 0.2–0.7)
BUN BLDV-MCNC: 20 MG/DL (ref 6–20)
CALCIUM SERPL-MCNC: 8.3 MG/DL (ref 8.5–9.9)
CHLORIDE BLD-SCNC: 100 MEQ/L (ref 95–107)
CO2: 24 MEQ/L (ref 20–31)
CREAT SERPL-MCNC: 0.86 MG/DL (ref 0.5–0.9)
EOSINOPHILS ABSOLUTE: 0 K/UL (ref 0–0.7)
EOSINOPHILS RELATIVE PERCENT: 0.9 %
GFR AFRICAN AMERICAN: >60
GFR NON-AFRICAN AMERICAN: >60
GLOBULIN: 2.3 G/DL (ref 2.3–3.5)
GLUCOSE BLD-MCNC: 179 MG/DL (ref 70–99)
HCT VFR BLD CALC: 37 % (ref 37–47)
HEMATOLOGY PATH CONSULT: YES
HEMOGLOBIN: 12.3 G/DL (ref 12–16)
LYMPHOCYTES ABSOLUTE: 5.1 K/UL (ref 1–4.8)
LYMPHOCYTES RELATIVE PERCENT: 28 %
MCH RBC QN AUTO: 29.9 PG (ref 27–31.3)
MCHC RBC AUTO-ENTMCNC: 33.2 % (ref 33–37)
MCV RBC AUTO: 90.1 FL (ref 82–100)
MICROCYTES: ABNORMAL
MONOCYTES ABSOLUTE: 0.4 K/UL (ref 0.2–0.8)
MONOCYTES RELATIVE PERCENT: 2.8 %
NEUTROPHILS ABSOLUTE: 6.2 K/UL (ref 1.4–6.5)
NEUTROPHILS RELATIVE PERCENT: 53 %
PDW BLD-RTO: 13.5 % (ref 11.5–14.5)
PLATELET # BLD: 279 K/UL (ref 130–400)
PLATELET SLIDE REVIEW: ADEQUATE
POTASSIUM SERPL-SCNC: 3.1 MEQ/L (ref 3.4–4.9)
RBC # BLD: 4.11 M/UL (ref 4.2–5.4)
SLIDE REVIEW: ABNORMAL
SODIUM BLD-SCNC: 140 MEQ/L (ref 135–144)
TOTAL PROTEIN: 6.1 G/DL (ref 6.3–8)
VITAMIN D 25-HYDROXY: 36.3 NG/ML (ref 30–100)
WBC # BLD: 11.7 K/UL (ref 4.8–10.8)

## 2019-03-29 PROCEDURE — 82306 VITAMIN D 25 HYDROXY: CPT

## 2019-03-29 PROCEDURE — 85025 COMPLETE CBC W/AUTO DIFF WBC: CPT

## 2019-03-29 PROCEDURE — 80053 COMPREHEN METABOLIC PANEL: CPT

## 2019-03-29 PROCEDURE — 36415 COLL VENOUS BLD VENIPUNCTURE: CPT

## 2019-04-01 ENCOUNTER — OFFICE VISIT (OUTPATIENT)
Dept: GASTROENTEROLOGY | Age: 59
End: 2019-04-01
Payer: COMMERCIAL

## 2019-04-01 VITALS
HEART RATE: 78 BPM | WEIGHT: 195.4 LBS | OXYGEN SATURATION: 98 % | HEIGHT: 63 IN | BODY MASS INDEX: 34.62 KG/M2 | TEMPERATURE: 97.3 F

## 2019-04-01 DIAGNOSIS — Z87.19 HISTORY OF COLITIS: ICD-10-CM

## 2019-04-01 DIAGNOSIS — K21.9 GASTROESOPHAGEAL REFLUX DISEASE, ESOPHAGITIS PRESENCE NOT SPECIFIED: Primary | ICD-10-CM

## 2019-04-01 DIAGNOSIS — R19.7 DIARRHEA, UNSPECIFIED TYPE: ICD-10-CM

## 2019-04-01 DIAGNOSIS — R13.19 ESOPHAGEAL DYSPHAGIA: ICD-10-CM

## 2019-04-01 PROCEDURE — 99213 OFFICE O/P EST LOW 20 MIN: CPT | Performed by: NURSE PRACTITIONER

## 2019-04-01 RX ORDER — SODIUM, POTASSIUM,MAG SULFATES 17.5-3.13G
SOLUTION, RECONSTITUTED, ORAL ORAL
Qty: 1 BOTTLE | Refills: 0 | COMMUNITY
Start: 2019-04-01 | End: 2019-08-28

## 2019-04-01 NOTE — PROGRESS NOTES
Gastroenterology Clinic Follow up Visit    Emelia Rodriguez  [id number]  Chief Complaint   Patient presents with    Follow-up       Background:58 y.o. female last seen in GI clinic on 3/18/19 with ongoing complaints of GERD symptoms with intermittent dysphagia with both liquids and solids. Patient on balsalazide 3 times daily for a history of colitis. Patient's last colonoscopy was in 2013, but unable to obtain pathology report. Patient with ongoing symptoms of diarrhea 5-8 times daily. Interval change: Patient presents to the GI clinic with ongoing complaints of diarrhea. Patient reports she goes 5-6 times a day with urgency. She reports yellow mucousy stool. Her stool studies were negative for infection. Patient diagnosed in 2013 with colitis and has been taking balsalazide since then. Patient reports that she takes Imodium, which helps decrease diarrhea. His melena, hematochezia, or hematemesis. She denies abdominal pain, nausea or vomiting. Patient with ongoing complaints of dysphagia intermittently with both liquids and solids despite taking PPI correctly. Agents last upper endoscopy with dilation was in 2013. She denies unintentional weight loss or loss of appetite. Patient on Xarelto. She denies chest pain, shortness of breath, or palpitations. Denies fever or chills. Review of Systems   All other systems reviewed and are negative. Past medical history, past surgical history, medication list, social and familyhistory reviewed    Pulse 78, temperature 97.3 °F (36.3 °C), height 5' 3\" (1.6 m), weight 195 lb 6.4 oz (88.6 kg), SpO2 98 %, not currently breastfeeding. Physical Exam   Constitutional: She is oriented to person, place, and time. She appears well-developed and well-nourished. No distress. HENT:   Head: Normocephalic and atraumatic. Eyes: Pupils are equal, round, and reactive to light. Conjunctivae and EOM are normal. No scleral icterus.    Neck: Normal range of motion. Neck supple. Cardiovascular: Normal rate, regular rhythm and normal heart sounds. No murmur heard. Pulmonary/Chest: Effort normal and breath sounds normal. No respiratory distress. She has no wheezes. She has no rales. She exhibits no tenderness. Abdominal: Soft. Bowel sounds are normal. She exhibits no distension and no mass. There is no tenderness. There is no rebound and no guarding. Central obesity    Musculoskeletal: Normal range of motion. Lymphadenopathy:     She has no cervical adenopathy. Neurological: She is alert and oriented to person, place, and time. Skin: Skin is warm and dry. No rash noted. She is not diaphoretic. No erythema. No pallor. Psychiatric: She has a normal mood and affect. Her behavior is normal. Judgment and thought content normal.   Vitals reviewed. Laboratory, Pathology, Radiology reviewed in detail with relevantimportant investigations summarized below:    Recent Labs     03/29/19  1121   WBC 11.7*   HGB 12.3   HCT 37.0   MCV 90.1        Lab Results   Component Value Date    ALT 25 03/29/2019    AST 19 03/29/2019    ALKPHOS 55 03/29/2019    BILITOT 0.3 03/29/2019     Endoscopic investigations:   EGD-12/16/13- successfully dilated upper cervical esophageal web. A 60 Romanian savory dilator was used. Minimal antritis. Colonoscopy 12/16/13- normal colonoscopy including terminal ileum. Random colon biopsies. Grade II internal hemorrhoids. No pathology report available. Assessment and Plan:  Marcos Jarrell 62 y.o. female for follow up. Patient with ongoing complaints of dysphagia and GERD symptoms, despite taking omeprazole correctly 15-30 minutes before breakfast.  Antireflux lifestyle reiterated with multiple examples provided to her. Patient's last upper endoscopy with dilation was in 2013 as mentioned above. Will proceed with an upper endoscopy with possible dilation and biopsies.   Since we do not have access to pathology reports we cannot rule out EOE. Patient experiencing 5-6 episodes of diarrhea daily. Stool studies negative for infectious diarrhea. Will proceed with colonoscopy with random colonic biopsies. Patient reports she was diagnosed with colitis in 2013 and has been taking balsalazide since then. However, we do not have access to the pathology report and should proceed with upper and lower endoscopies. Patient on Xarelto. Will contact Dr. Dimas Dempsey to see if the patient is able to hold this medication. Follow-up to be decided after endoscopic evaluation. Selena Dyer, BUT[facility] - [facility]    Please note this report has been partially produced using speech recognitionsoftware  and may cause contain errors related to that system including grammar, punctuation and spelling as well as words andphrases that may seem inappropriate. If there are questions or concerns please feel free to contact me to clarify.

## 2019-04-02 ENCOUNTER — TELEPHONE (OUTPATIENT)
Dept: CARDIOLOGY CLINIC | Age: 59
End: 2019-04-02

## 2019-04-02 ENCOUNTER — TELEPHONE (OUTPATIENT)
Dept: FAMILY MEDICINE CLINIC | Age: 59
End: 2019-04-02

## 2019-04-02 DIAGNOSIS — Z86.711 HISTORY OF PULMONARY THROMBOSIS: ICD-10-CM

## 2019-04-02 DIAGNOSIS — D68.51 FACTOR V LEIDEN CARRIER (HCC): Chronic | ICD-10-CM

## 2019-04-02 DIAGNOSIS — I82.4Z9 DEEP VEIN THROMBOSIS (DVT) OF DISTAL VEIN OF LOWER EXTREMITY, UNSPECIFIED CHRONICITY, UNSPECIFIED LATERALITY (HCC): ICD-10-CM

## 2019-04-02 DIAGNOSIS — I26.99 OTHER PULMONARY EMBOLISM WITHOUT ACUTE COR PULMONALE, UNSPECIFIED CHRONICITY (HCC): ICD-10-CM

## 2019-04-02 LAB — HEMATOLOGY PATH CONSULT: NORMAL

## 2019-04-02 NOTE — TELEPHONE ENCOUNTER
Patient requesting medication refill.  Please approve or deny this request.    Rx requested:  Requested Prescriptions     Pending Prescriptions Disp Refills    rivaroxaban (XARELTO) 20 MG TABS tablet 90 tablet 0     Sig: TAKE 1 TABLET BY MOUTH ONCE DAILY         Last Office Visit:   2/28/2019      Next Visit Date:  Future Appointments   Date Time Provider Delmer Cece   5/2/2019  8:00 AM DO Carol Giordano   5/7/2019 10:15 AM Sander Oscar MD [facility]   5/20/2019 10:30 AM Aspen Oviedo APRN - [facility]   6/13/2019 10:15 AM Gavin Barnes DO [facility]

## 2019-04-03 DIAGNOSIS — Z86.711 HISTORY OF PULMONARY THROMBOSIS: ICD-10-CM

## 2019-04-04 ENCOUNTER — TELEPHONE (OUTPATIENT)
Dept: FAMILY MEDICINE CLINIC | Age: 59
End: 2019-04-04

## 2019-04-04 DIAGNOSIS — E87.5 HYPERKALEMIA: Primary | ICD-10-CM

## 2019-04-04 DIAGNOSIS — D72.829 LEUKOCYTOSIS, UNSPECIFIED TYPE: ICD-10-CM

## 2019-04-04 DIAGNOSIS — E83.51 HYPOCALCEMIA: ICD-10-CM

## 2019-04-04 RX ORDER — POTASSIUM CHLORIDE 20 MEQ/1
20 TABLET, EXTENDED RELEASE ORAL 2 TIMES DAILY
Qty: 6 TABLET | Refills: 0 | Status: SHIPPED | OUTPATIENT
Start: 2019-04-04 | End: 2019-06-07 | Stop reason: ALTCHOICE

## 2019-04-04 NOTE — TELEPHONE ENCOUNTER
Sometimes there are copay cards on the Internet for branded medications that she could print to make the medication more affordable. If she cannot afford the medication I would advise discussing other options with hematology doctor.

## 2019-04-04 NOTE — TELEPHONE ENCOUNTER
----- Message from Beth Sarkar sent at 4/3/2019  8:56 AM EDT -----  Patient wants to know if you can prescribe short-term potassium replacement and she can repeat non fasting labs

## 2019-04-05 DIAGNOSIS — Z79.899 HIGH RISK MEDICATION USE: ICD-10-CM

## 2019-04-05 DIAGNOSIS — M62.81 MUSCLE WEAKNESS (GENERALIZED): ICD-10-CM

## 2019-04-05 DIAGNOSIS — M53.3 SI (SACROILIAC) PAIN: ICD-10-CM

## 2019-04-05 DIAGNOSIS — Z79.899 HIGH RISK MEDICATION USE: Chronic | ICD-10-CM

## 2019-04-05 DIAGNOSIS — M79.7 FIBROMYALGIA MUSCLE PAIN: ICD-10-CM

## 2019-04-10 DIAGNOSIS — Z79.899 HIGH RISK MEDICATION USE: Chronic | ICD-10-CM

## 2019-04-10 DIAGNOSIS — M53.3 SI (SACROILIAC) PAIN: ICD-10-CM

## 2019-04-10 DIAGNOSIS — Z79.899 HIGH RISK MEDICATION USE: ICD-10-CM

## 2019-04-10 DIAGNOSIS — M79.7 FIBROMYALGIA MUSCLE PAIN: ICD-10-CM

## 2019-04-17 ENCOUNTER — OUTSIDE SERVICES (OUTPATIENT)
Dept: GASTROENTEROLOGY | Age: 59
End: 2019-04-17
Payer: COMMERCIAL

## 2019-04-17 DIAGNOSIS — K21.9 GASTROESOPHAGEAL REFLUX DISEASE, ESOPHAGITIS PRESENCE NOT SPECIFIED: ICD-10-CM

## 2019-04-17 DIAGNOSIS — K63.5 POLYP OF ASCENDING COLON, UNSPECIFIED TYPE: ICD-10-CM

## 2019-04-17 DIAGNOSIS — R19.7 DIARRHEA, UNSPECIFIED TYPE: ICD-10-CM

## 2019-04-17 DIAGNOSIS — R13.10 DYSPHAGIA, UNSPECIFIED TYPE: Primary | ICD-10-CM

## 2019-04-17 DIAGNOSIS — K52.9 COLITIS: ICD-10-CM

## 2019-04-17 PROCEDURE — 43248 EGD GUIDE WIRE INSERTION: CPT | Performed by: INTERNAL MEDICINE

## 2019-04-17 PROCEDURE — 45380 COLONOSCOPY AND BIOPSY: CPT | Performed by: INTERNAL MEDICINE

## 2019-04-17 PROCEDURE — 45385 COLONOSCOPY W/LESION REMOVAL: CPT | Performed by: INTERNAL MEDICINE

## 2019-04-17 NOTE — OP NOTE
Endoscopy Note    Patient: Harvey Clifford  : 1960     Procedure: Esophagogastroduodenoscopy with biopsy, esophageal dilation                       Colonoscopy with biopsy, polypectomy (cold snare)    Date:  2019     Endoscopist:   Vicenta Li MD    Referring Physician:  BUTCH Masterson - CNP    Preoperative diagnosis: Gastroesophageal reflux, dysphagia, chronic diarrhea  Postoperative Diagnosis: Normal esophageal mucosa, nonobstructing Schatzki's ring, 3 cm hiatal hernia, normal colonic mucosa, diminutive ascending colon polyp, small internal hemorrhoids    Anesthesia: MAC    Consent:  The patient or their legal guardian has signed a consent, and is aware of the potential risks, benefits, alternatives, and potential complications of this procedure. These include, but are not limited to hemorrhage, bleeding, post procedural pain, perforation, phlebitis, aspiration, hypotension, hypoxia, cardiovascular events such as arryhthmia, and possibly death. Additionally, the possibility of missed colonic polyps and interval colon cancer was discussed in the consent. Description of Procedure: The patient was then taken to the endoscopy suite, placed in the left lateral decubitus position and the above IV sedation was administrered. The Olympus videoendoscope was placed in the patient's mouth and under direct visualization passed into the esophagus. Visualization of the esophagus demonstrated normal mucosa. A nonobstructing Schatzki's ring was noted at the GE junction The diaphragmatic hiatus was noted at 33 cm, top of the gastric folds were noted at 30 cm from the incisors, Gastroesophageal junction was noted at 30 cm and  squamocolumnar junction was noted at 30 cm. consistent with a 3 cm hiatal hernia     The scope was then advanced into the stomach. Visualization of the gastric body and antrum demonstrated normal mucosa.   A retroflexed exam of the gastric cardia and fundus demonstrated normal mucosa. The pylorus was patent and the scope was advanced into the duodenum. Visualization of the duodenal bulb demonstrated normal mucosa. The second portion of the duodenum demonstrated normal mucosa. A guidewire was placed through the endoscope and the endoscope was removed. 20 mm savory dilators was advanced sequentially down the length of the esophagus used a fixed-point wire technique. The dilator and guidewire were removed. The patient tolerated the procedure well. The scope was then withdrawn back into the stomach, it was decompressed, and the scope was completely withdrawn. Patient was turned for the colonoscopy procedure. A digital rectal examination was performed and revealed negative without mass, lesions or tenderness. The Olympus video colonoscope was placed in the patient's rectum under digital direction and advanced to the cecum. The cecum was identified by characteristic anatomy and ballottment. The prep was good. The ileocecal valve was identified. The scope was then withdrawn back through the cecum, ascending, transverse, descending, sigmoid colon, and rectum. Careful circumferential examination of the mucosa in these areas demonstrated:    FINDINGS: Normal colonic mucosa throughout, random colon biopsies obtained. Cecum: Normal.  Ascending Colon: Abnormal  A 3-4 mm polyp, removed with cold snare, specimen not retrieved. Hepatic Flexure: Normal.  Transverse Colon:Normal.  Splenic Flexure: Normal.  Descending Colon: Normal.  Sigmoid Colon: Normal.  Rectum: Normal.  Retroflexed Views: Rectum did show small internal hemorrhoids. The scope was straightened, the colon was decompressed and the scope was withdrawn from the patient. The patient tolerated the procedure well and was taken to the PACU in good condition.     EBL: None  Complication: None  Specimen Sent: Yes    Impression:    - Normal esophageal mucosa, nonobstructing Schatzki's ring, 3 cm hiatal hernia, normal colonic mucosa, diminutive ascending colon polyp, small internal hemorrhoids    Recommendations:   - Observe response to dilation performed today  - Antireflux lifestyle  - Continue PPI  - Await random colon biopsies  - Surveillance colonoscopy in 5 years  - Follow up in GI clinic with Shamika Gilman in 4 weeks, to discuss pathology results and to discuss medication withdrawal    Shweta Roy MD  [facility]

## 2019-04-30 ENCOUNTER — TELEPHONE (OUTPATIENT)
Dept: SURGERY | Age: 59
End: 2019-04-30

## 2019-05-02 ENCOUNTER — OFFICE VISIT (OUTPATIENT)
Dept: CARDIOLOGY CLINIC | Age: 59
End: 2019-05-02
Payer: COMMERCIAL

## 2019-05-02 VITALS
HEIGHT: 63 IN | BODY MASS INDEX: 35.19 KG/M2 | DIASTOLIC BLOOD PRESSURE: 70 MMHG | HEART RATE: 86 BPM | OXYGEN SATURATION: 99 % | SYSTOLIC BLOOD PRESSURE: 110 MMHG | WEIGHT: 198.6 LBS

## 2019-05-02 DIAGNOSIS — R06.02 SOB (SHORTNESS OF BREATH): ICD-10-CM

## 2019-05-02 DIAGNOSIS — Z86.718 HISTORY OF DVT OF LOWER EXTREMITY: ICD-10-CM

## 2019-05-02 DIAGNOSIS — I10 ESSENTIAL HYPERTENSION: Chronic | ICD-10-CM

## 2019-05-02 DIAGNOSIS — E78.2 MIXED HYPERLIPIDEMIA: ICD-10-CM

## 2019-05-02 DIAGNOSIS — E78.5 DYSLIPIDEMIA: Primary | Chronic | ICD-10-CM

## 2019-05-02 PROCEDURE — 99213 OFFICE O/P EST LOW 20 MIN: CPT | Performed by: INTERNAL MEDICINE

## 2019-05-02 RX ORDER — CARVEDILOL 6.25 MG/1
6.25 TABLET ORAL 2 TIMES DAILY WITH MEALS
Qty: 180 TABLET | Refills: 3 | Status: SHIPPED | OUTPATIENT
Start: 2019-05-02 | End: 2020-02-03 | Stop reason: SDUPTHER

## 2019-05-02 RX ORDER — FUROSEMIDE 20 MG/1
20 TABLET ORAL DAILY
Qty: 90 TABLET | Refills: 3 | Status: SHIPPED | OUTPATIENT
Start: 2019-05-02 | End: 2020-02-03 | Stop reason: SDUPTHER

## 2019-05-02 NOTE — PROGRESS NOTES
Chief Complaint   Patient presents with    Hypertension    Hyperlipidemia    Discuss Medications     SHOULD PATIENT BE TAKING LASIX BID OR QD       6-30-18:     Patient is a 62 y.o. female who presents with a chief complaint of Sob. Patient is followed on a regular basis by BUTCH Wadr - CNP. Patient has been expressing shortness of breath of 2 days onset associated with left arm tingling. She also has abdominal pain at the hernia repair incisional site. Patient has a history of pulmonary embolism 2 years ago which she is on Xarelto. He has been expressing diarrhea as well. She was noted to have acute renal failure with a creatinine of 1.6 now improved to 1.09. Her cardiac enzyme was elevated at 1.4 and trending down. She denies any chest pain chest pressure heaviness. Patient had a normal myocardial perfusion stresses in 2016 with breast attenuation artifact. 2-D echocardiogram in 2000 seen was also essentially normal.      7-31-18: Patient presents for initial medical evaluation. Patient is followed on a regular basis by BUTCH Ward CNP. S/p hospitalization for SOB, NSTEMI. S/p ECHO with EF of 30%, grade I DD, mild AI, no PFO. S/p LHC with normal coronaries, EF Of 30%. Has hx of DVT/PE, factor V leiden defiency, on life long [address] [place] [address] Road. No bleeding issues. Her BP has been low in cardiac rehab. meds adjusted. Pt denies chest pain,   nausea, vomiting, diarrhea, constipation, motor weakness, insomnia, weight loss, syncope, dizziness, lightheadedness, palpitations, PND, orthopnea, or claudication. . BP and hr are good. No LE discoloration or ulcers. No LE edema. No CHF type symptoms. Lipid profile is normal.         11-1-18: s/p ECHO at [place], no official report but appears to have an EF of 50%. Has SOB with going up a few steps of stairs. She does go to the gym to swim.   Pt denies chest pain, dyspnea,change in exercise capacity, fatigue,  nausea, vomiting, diarrhea, constipation, motor weakness, insomnia, weight loss, syncope, dizziness, lightheadedness, palpitations, PND, orthopnea, or claudication. No nitro use. BP and hr are good. CAD is stable. No LE discoloration or ulcers. No LE edema. No CHF type symptoms. Lipid profile is normal. No recent hospitalization. No change in meds. On NOAC for hx of thromboembolic disease, no bleeding issues. 5-2-19: OFFICIAL ECHO from last visit showed an EF of 60%, grade I DD. Does have baseline SOB. Pt denies chest pain, nausea, vomiting, diarrhea, constipation, motor weakness, insomnia, weight loss, syncope, dizziness, lightheadedness, palpitations, PND, orthopnea, or claudication. Does have fatigue. No nitro use. BP and hr are good. CAD is stable. No LE discoloration or ulcers. No LE edema. No CHF type symptoms. Lipid profile is normal.On NOAC for hx of thromboembolic disease, no bleeding issues.    She is on oral steroids for SLE    Patient Active Problem List   Diagnosis    Dyslipidemia    GERD (gastroesophageal reflux disease)    Depression    Fibromyalgia muscle pain    SI (sacroiliac) pain    Vitamin D deficiency    Chronic low back pain    High risk medication use - 12/08/17 OARRS PM&R, 02/07/18 OARRS PM&R, 02/22/17 Med Contract PM&R    Class 1 obesity with serious comorbidity and body mass index (BMI) of 34.0 to 34.9 in adult    Postmenopausal symptoms    Postmenopausal hormone replacement therapy    Pulmonary embolism (HCC)    DVT (deep venous thrombosis) (HCC)    DM [facility]diabetes mellitus) [facility]    Reactive airway disease    Borderline hypertension    Neck pain    Thoracic back pain    Factor 5 Leiden mutation, heterozygous ([facility]    Neuropathy of both feet- Dr. Chip Kumar    Weakness of both lower extremities    Paresthesia of both feet    Positive DESTINEE (antinuclear antibody) Dr. Eliu Burkett    SOB (shortness of breath)    Type 2 diabetes mellitus without complication, without long-term current use of insulin (HCC)    Wrist pain, left    Chronic bilateral low back pain with bilateral sciatica    Colitis    History of recurrent UTIs    Chronic bilateral thoracic back pain    Pain in both hands    Autoantibody titer elevated    Generalized OA    Lupus erythematosus    Chronic abdominal pain    Vaginal dryness    S/P total hysterectomy    Long-term use of Plaquenil    Rash and nonspecific skin eruption    Esophageal web    Class 1 obesity with serious comorbidity and body mass index (BMI) of 34.0 to 34.9 in adult    Renal failure    Esophageal dysphagia    NSTEMI (non-ST elevated myocardial infarction) (HCC)    Back pain    History of rhabdomyolysis    Elevated aldolase level    Elevated LFTs    HyperCKemia    Hyperuricemia    Excessive sweating    Dystonia    Factor V Leiden carrier ([facility]    HTN (hypertension)    Lactic acidosis    Benzodiazepine misuse (HCC)    Hyperlipidemia    Myoclonic jerking    Muscle weakness (generalized)    History of pulmonary embolism    History of DVT of lower extremity    Other thrombophilia ([facility]    Long term current use of systemic steroids    Long-term use of high-risk medication    Mouth sores    Polymyositis with myopathy (HCC)    Right leg swelling       Past Surgical History:   Procedure Laterality Date   [facility] COLONOSCOPY      Dr. Salcedo Handing    COLONOSCOPY  12/16/13    DR SINGER repeat in 10 yrs per pt    COLONOSCOPY  08/2017    Dr Bert Brock Right 11/15/2017    RIGHT  HERNIA INGUINAL REPAIR [facility] Left     LAPAROSCOPY N/A 11/15/2017    DIAGNOSTIC LAPAROSCOPY performed by Estela Poole MD at [address] Right 3/14/2019    RIGHT QUADRICEPS MUSCLE BIOPSY performed by Carrie Mccracken MD at [facility] CYSTOURETHROSCOPY N/A 9/25/2017    FLEXABLE CYSTOSCOPY performed by Heraclio Mckeon MD at [address] [place] [address] REPAIR UMBILICAL RBAQ,9+X/W,IFVDT N/A     HERNIA UMBILICAL REPAIR, 80 MINS SUPINE performed by Juan Jose Corrales MD at [facility]  13    DR SINGER       Social History     Socioeconomic History    Marital status:      Spouse name: Not on file    Number of children: Not on file    Years of education: Not on file    Highest education level: Not on file   Occupational History    Occupation: Housewife   Social Needs    Financial resource strain: Not on file    Food insecurity:     Worry: Not on file     Inability: Not on file    Transportation needs:     Medical: Not on file     Non-medical: Not on file   Tobacco Use    Smoking status: Former Smoker     Packs/day: 1.25     Years: 10.00     Pack years: 12.50     Types: Cigarettes     Last attempt to quit: 2006     Years since quittin.3    Smokeless tobacco: Never Used    Tobacco comment: start smoking age 25   Substance and Sexual Activity    Alcohol use:  Yes     Alcohol/week: 0.6 oz     Types: 1 Cans of beer per week     Comment: mod    Drug use: No    Sexual activity: Yes   Lifestyle    Physical activity:     Days per week: Not on file     Minutes per session: Not on file    Stress: Not on file   Relationships    Social connections:     Talks on phone: Not on file     Gets together: Not on file     Attends Spiritism service: Not on file     Active member of club or organization: Not on file     Attends meetings of clubs or organizations: Not on file     Relationship status: Not on file    Intimate partner violence:     Fear of current or ex partner: Not on file     Emotionally abused: Not on file     Physically abused: Not on file     Forced sexual activity: Not on file   Other Topics Concern    Not on file   Social History Narrative    Not on file       Family History   Problem Relation Age of Onset    Substance Abuse Brother     High Blood Pressure Mother     High Cholesterol Mother  Arthritis Mother     High Blood Pressure Father     High Cholesterol Father     Clotting Disorder Father         clot to intestines    Arthritis Father     Substance Abuse Brother     Substance Abuse Brother     Stroke Paternal Uncle 36    Cystic Fibrosis Daughter        Current Outpatient Medications   Medication Sig Dispense Refill    traMADol-acetaminophen (ULTRACET) 37.5-325 MG per tablet Take 1 tablet by mouth every 4 hours as needed for Pain (Take one tablet every 4 hours as needed for pain. Maximum of 2.5 tablets daily. Do not get narcotic pain medication from any other providers. Generic equivalent acceptable, unless otherwise noted.) for up to 30 days.  75 tablet 0    rivaroxaban (XARELTO) 20 MG TABS tablet TAKE 1 TABLET BY MOUTH ONCE DAILY 90 tablet 1    Na Sulfate-K Sulfate-Mg Sulf 17.5-3.13-1.6 GM/177ML SOLN As directed 1 Bottle 0    balsalazide (COLAZAL) 750 MG capsule Take 1 capsule by mouth 3 times daily 270 capsule 3    triamcinolone (KENALOG) 0.1 % ointment Apply twice daily to affected areas of rash twice a day x 2 weeks then once a day x 2 weeks      omeprazole (PRILOSEC) 40 MG delayed release capsule Take 1 capsule by mouth daily 30 capsule 3    citalopram (CELEXA) 40 MG tablet TAKE 1 TABLET DAILY 90 tablet 1    esomeprazole (NEXIUM) 20 MG delayed release capsule Take 20 mg by mouth every morning (before breakfast)      cyanocobalamin ([employer] VITAMIN B12) 1000 MCG tablet Take 1 tablet by mouth daily 90 tablet 3    folic acid (FOLVITE) 1 MG tablet Take 1 tablet by mouth daily 90 tablet 3    levETIRAcetam (KEPPRA) 250 MG tablet Take 1 tablet by mouth 2 times daily 60 tablet 1    carvedilol (COREG) 6.25 MG tablet Take 1 tablet by mouth 2 times daily (with meals) 180 tablet 2    furosemide (LASIX) 20 MG tablet Take 1 tablet by mouth 2 times daily 60 tablet 3    metFORMIN (GLUCOPHAGE) 500 MG tablet TAKE 1 TABLET THREE TIMES A  tablet 1    lidocaine (LIDODERM) 5 % Place 1 patch onto the skin daily 12 hours on, 12 hours off. 30 patch 0    hydrOXYzine (VISTARIL) 25 MG capsule Take 25 mg by mouth      clobetasol (TEMOVATE) 0.05 % cream Apply topically 2 times daily. 60 g 0    hydrocortisone 1 % cream Apply topically 2 times daily. 1 Tube 1    diclofenac sodium (VOLTAREN) 1 % GEL Apply 4 g topically 4 times daily (Patient taking differently: Apply 4 g topically 4 times daily as needed ) 300 g 3    hydroxychloroquine (PLAQUENIL) 200 MG tablet Take 200 mg by mouth 2 times daily       amitriptyline (ELAVIL) 25 MG tablet Take 50 mg by mouth nightly       albuterol sulfate  (90 BASE) MCG/ACT inhaler Inhale 2 puffs into the lungs every 6 hours as needed for Wheezing 1 Inhaler 3    Blood Glucose Monitoring Suppl (ACCU-CHEK ADVANTAGE DIABETES) KIT Diabetic monitoring kit(any brand), with supplies for testing. Test fasting once daily. Dia.00 1 kit 0    Glucose Blood (BLOOD GLUCOSE TEST STRIPS) STRP Test once daily 100 strip 4    Lancets MISC Testing q day 100 each 3    Cholecalciferol (VITAMIN D) 2000 UNITS CAPS capsule Take 1 capsule by mouth daily       potassium chloride (KLOR-CON M) 20 MEQ extended release tablet Take 1 tablet by mouth 2 times daily for 3 days 6 tablet 0    gabapentin (NEURONTIN) 400 MG capsule Take 1 capsule by mouth 3 times daily for 30 days. . 270 capsule 0     No current facility-administered medications for this visit. Sulfa antibiotics; Ciprofloxacin hcl; Nsaids; and Statins    Review of Systems:  General ROS: negative  Psychological ROS: negative  Hematological and Lymphatic ROS: No history of blood clots or bleeding disorder.    Respiratory ROS: no cough, shortness of breath, or wheezing  Cardiovascular ROS: no chest pain or dyspnea on exertion  Gastrointestinal ROS: no abdominal pain, change in bowel habits, or black or bloody stools  Genito-Urinary ROS: no dysuria, trouble voiding, or hematuria  Musculoskeletal ROS: negative  Neurological ROS: no TIA or stroke symptoms  Dermatological ROS: negative    VITALS:  Blood pressure 110/70, pulse 86, height 5' 3\" (1.6 m), weight 198 lb 9.6 oz (90.1 kg), SpO2 99 %, not currently breastfeeding. Body mass index is 35.18 kg/m². Physical Examination:  General appearance - alert, well appearing, and in no distress  Mental status - alert, oriented to person, place, and time  Neck - Neck is supple, no JVD or carotid bruits. No thyromegaly or adenopathy. Chest - clear to auscultation, no wheezes, rales or rhonchi, symmetric air entry  Heart - normal rate, regular rhythm, normal S1, S2, no murmurs, rubs, clicks or gallops  Abdomen - soft, nontender, nondistended, no masses or organomegaly  Neurological - alert, oriented, normal speech, no focal findings or movement disorder noted  Extremities - peripheral pulses normal, no pedal edema, no clubbing or cyanosis  Skin - normal coloration and turgor, no rashes, no suspicious skin lesions noted    No orders of the defined types were placed in this encounter. ASSESSMENT:     Diagnosis Orders   1. Dyslipidemia     2. History of DVT of lower extremity     3. Essential hypertension     4. Mixed hyperlipidemia     5. SOB (shortness of breath)           PLAN:     Patient will need to continue to follow up with you for their general medical care and return to see me in the office in 3 months    As always, aggressive risk factor modification is strongly recommended. We should adhere to the JNC VIII guidelines for HTN management and the NCEP ATP III guidelines for LDL-C management. Cardiac diet is always recommended with low fat, cholesterol, calories and sodium. Continue medications at current doses. The importance of daily weights, dietary sodium restriction, and contact with cardiology if weight is increased more than 3 lbs in any 48 hour period was stressed.  The patient has been advised to contact us if they experience progressive SOB, orthopnea, PND or progressive edema. Patient was advised and encouraged to check blood pressure at home or at a pharmacy, maintain a logbook, and also call us back if blood pressure are above the target ranges or if it is low. Patient clearly understands and agrees to the instructions. We will need to continue to monitor muscle and liver enzymes, BUN, CR, and electrolytes. Details of medical condition explained and patient was warned about adverse consequences of uncontrolled medical conditions and possible side effects of prescribed medications.

## 2019-05-06 ENCOUNTER — OFFICE VISIT (OUTPATIENT)
Dept: SURGERY | Age: 59
End: 2019-05-06
Payer: COMMERCIAL

## 2019-05-06 VITALS
WEIGHT: 199 LBS | TEMPERATURE: 97.3 F | BODY MASS INDEX: 35.26 KG/M2 | DIASTOLIC BLOOD PRESSURE: 70 MMHG | SYSTOLIC BLOOD PRESSURE: 112 MMHG | HEIGHT: 63 IN

## 2019-05-06 DIAGNOSIS — R29.898 WEAKNESS OF BOTH LOWER EXTREMITIES: Primary | ICD-10-CM

## 2019-05-06 PROCEDURE — 99213 OFFICE O/P EST LOW 20 MIN: CPT | Performed by: SURGERY

## 2019-05-06 RX ORDER — METAXALONE 800 MG/1
800 TABLET ORAL PRN
Refills: 1 | COMMUNITY
Start: 2019-03-29 | End: 2020-05-01 | Stop reason: ALTCHOICE

## 2019-05-06 RX ORDER — GABAPENTIN 400 MG/1
400 CAPSULE ORAL 3 TIMES DAILY
COMMUNITY
Start: 2019-04-20 | End: 2019-07-15 | Stop reason: SDUPTHER

## 2019-05-06 RX ORDER — PREDNISONE 20 MG/1
TABLET ORAL
COMMUNITY
Start: 2019-04-18 | End: 2020-03-02 | Stop reason: ALTCHOICE

## 2019-05-06 RX ORDER — FOLIC ACID 1 MG/1
1 TABLET ORAL
COMMUNITY
Start: 2019-04-18 | End: 2019-05-06

## 2019-05-20 ENCOUNTER — OFFICE VISIT (OUTPATIENT)
Dept: GASTROENTEROLOGY | Age: 59
End: 2019-05-20
Payer: COMMERCIAL

## 2019-05-20 VITALS
HEART RATE: 97 BPM | WEIGHT: 201.2 LBS | RESPIRATION RATE: 16 BRPM | BODY MASS INDEX: 35.65 KG/M2 | HEIGHT: 63 IN | OXYGEN SATURATION: 97 %

## 2019-05-20 DIAGNOSIS — K21.9 GASTROESOPHAGEAL REFLUX DISEASE WITHOUT ESOPHAGITIS: Primary | ICD-10-CM

## 2019-05-20 DIAGNOSIS — K59.1 FUNCTIONAL DIARRHEA: ICD-10-CM

## 2019-05-20 DIAGNOSIS — R13.10 DYSPHAGIA, UNSPECIFIED TYPE: ICD-10-CM

## 2019-05-20 PROCEDURE — 99213 OFFICE O/P EST LOW 20 MIN: CPT | Performed by: NURSE PRACTITIONER

## 2019-05-20 RX ORDER — CHOLESTYRAMINE 4 G/9G
1 POWDER, FOR SUSPENSION ORAL 2 TIMES DAILY
Qty: 90 PACKET | Refills: 3 | Status: SHIPPED | OUTPATIENT
Start: 2019-05-20 | End: 2021-01-01 | Stop reason: ALTCHOICE

## 2019-05-20 NOTE — PROGRESS NOTES
rhythm and normal heart sounds. No murmur heard. Pulmonary/Chest: Effort normal and breath sounds normal. No respiratory distress. She has no wheezes. She has no rales. She exhibits no tenderness. Abdominal: Soft. Bowel sounds are normal. She exhibits no distension and no mass. There is no tenderness. There is no rebound and no guarding. Central obesity    Musculoskeletal: Normal range of motion. Lymphadenopathy:     She has no cervical adenopathy. Neurological: She is alert and oriented to person, place, and time. Skin: Skin is warm and dry. No rash noted. She is not diaphoretic. No erythema. No pallor. Psychiatric: She has a normal mood and affect. Her behavior is normal. Judgment and thought content normal.   Vitals reviewed. Laboratory, Pathology, Radiology reviewed in detail with relevantimportant investigations summarized below:    No results for input(s): WBC, HGB, HCT, MCV, PLT in the last 720 hours. Lab Results   Component Value Date    ALT 25 03/29/2019    AST 19 03/29/2019    ALKPHOS 55 03/29/2019    BILITOT 0.3 03/29/2019     Endoscopic investigations: Colonoscopy 4/17/19- normal colonic mucosa, diminutive ascending colon polyp, small internal hemorrhoids. EGD Dilation with 20 mm SG 4/2019- Normal esophageal mucosa, nonobstructing Schatzki's ring, 3 cm hiatal hernia. Pathology:  Mid esophageal biopsies:  Squamous  esophageal mucosa with minimal inflammation. Negative for intestinal-type goblet cells and fungal organisms. No glandular close-up present. Random colon biopsies:  Colonic mucosa with mild chronic nonspecific inflammation in the lamina propria and slightly increased intraepithelial lymphocytes suggestive of early lymphocytic colitis. Assessment and Plan:  Bernarda Georges 61 y.o. female for follow up after undergoing upper and lower endoscopy. Patient with ongoing intermittent dysphagia, mainly with pills and solids.   Upper endoscopy with dilation performed in

## 2019-05-20 NOTE — LETTER
[facility]  [address] [address]  Phone: [phone]  Fax: [phone]    Haylie Hearn, APRN - CNP    May 20, 2019     Dr. Blake Money  [address] [facility], [address]    Patient: Mary Jane Andrew   MR Number: [id number]   YOB: 1960   Date of Visit: 5/20/2019     Dear Dr. Grover Marmolejo:    I am referring my patient, Evangelina Watkins, to you for esophageal manometry. She  has a past medical history of Back pain, Depression, DM (diabetes mellitus) ([address] DVT (deep venous thrombosis) ([address] Elevated troponin, Factor V Leiden carrier ([address] GERD (gastroesophageal reflux disease), HTN (hypertension), HTN (hypertension), Hyperlipidemia, Neuropathy of both feet, NSTEMI (non-ST elevated myocardial infarction) ([address] Obesity, Osteoarthritis, Pulmonary embolism ([address] Renal failure, Right inguinal hernia, SOB (shortness of breath), Systemic lupus erythematosus ([address] Umbilical hernia, and Vasomotor flushing. Her  has a past surgical history that includes Appendectomy; eye surgery (1995); Upper gastrointestinal endoscopy (12/16/13); Hysterectomy; Colonoscopy; Colonoscopy (12/16/13); Colonoscopy (08/2017); pr cystourethroscopy (N/A, 9/25/2017); Knee arthroscopy (Left); repair umbilical WLEE,9+I/W,EXJBU (N/A, 11/15/2017); laparoscopy (N/A, 11/15/2017); hernia repair (Right, 11/15/2017); and Muscle biopsy (Right, 3/14/2019). She  reports that she quit smoking about 13 years ago. Her smoking use included cigarettes. She has a 12.50 pack-year smoking history. She has never used smokeless tobacco. She reports that she drinks about 0.6 oz of alcohol per week. She reports that she does not use drugs.     She has a current medication list which includes the following prescription(s): cholestyramine, methotrexate, gabapentin, metaxalone, prednisone, furosemide, carvedilol, rivaroxaban, triamcinolone, omeprazole, citalopram, esomeprazole, levetiracetam, metformin, lidocaine, hydroxyzine, clobetasol, hydrocortisone, diclofenac sodium, hydroxychloroquine, amitriptyline, albuterol sulfate hfa, accu-chek advantage diabetes, blood glucose test strips, lancets, vitamin d, potassium chloride, na sulfate-k sulfate-mg sulf, cyanocobalamin, folic acid, and gabapentin. She is allergic to sulfa antibiotics; ciprofloxacin hcl; nsaids; and statins. I appreciate your assistance in her care and look forward to your findings and recommendations.     Sincerely,  BUTCH Charles - CNP

## 2019-05-30 ENCOUNTER — HOSPITAL ENCOUNTER (OUTPATIENT)
Dept: LAB | Age: 59
Discharge: HOME | End: 2019-05-30
Payer: COMMERCIAL

## 2019-05-30 DIAGNOSIS — R74.8 ELEVATED CK: ICD-10-CM

## 2019-05-30 DIAGNOSIS — E55.9 VITAMIN D DEFICIENCY: ICD-10-CM

## 2019-05-30 DIAGNOSIS — D72.829 LEUKOCYTOSIS, UNSPECIFIED TYPE: ICD-10-CM

## 2019-05-30 DIAGNOSIS — E83.51 HYPOCALCEMIA: ICD-10-CM

## 2019-05-30 DIAGNOSIS — E11.9 TYPE 2 DIABETES MELLITUS WITHOUT COMPLICATION, WITHOUT LONG-TERM CURRENT USE OF INSULIN (HCC): Chronic | ICD-10-CM

## 2019-05-30 DIAGNOSIS — E87.5 HYPERKALEMIA: ICD-10-CM

## 2019-05-30 DIAGNOSIS — E78.5 DYSLIPIDEMIA: Chronic | ICD-10-CM

## 2019-05-30 DIAGNOSIS — Z11.4 SCREENING FOR HIV (HUMAN IMMUNODEFICIENCY VIRUS): ICD-10-CM

## 2019-05-30 LAB
ALBUMIN SERPL-MCNC: 4.1 G/DL (ref 3.5–4.6)
ALP BLD-CCNC: 51 U/L (ref 40–130)
ALT SERPL-CCNC: 33 U/L (ref 0–33)
ANION GAP SERPL CALCULATED.3IONS-SCNC: 17 MEQ/L (ref 9–15)
AST SERPL-CCNC: 23 U/L (ref 0–35)
BASOPHILS ABSOLUTE: 0 K/UL (ref 0–0.2)
BASOPHILS RELATIVE PERCENT: 0.6 %
BILIRUB SERPL-MCNC: 0.5 MG/DL (ref 0.2–0.7)
BUN BLDV-MCNC: 25 MG/DL (ref 6–20)
CALCIUM SERPL-MCNC: 8.8 MG/DL (ref 8.5–9.9)
CHLORIDE BLD-SCNC: 96 MEQ/L (ref 95–107)
CHOLESTEROL, TOTAL: 199 MG/DL (ref 0–199)
CO2: 26 MEQ/L (ref 20–31)
CREAT SERPL-MCNC: 0.96 MG/DL (ref 0.5–0.9)
CREATININE URINE: 183.4 MG/DL
EOSINOPHILS ABSOLUTE: 0.1 K/UL (ref 0–0.7)
EOSINOPHILS RELATIVE PERCENT: 0.9 %
GFR AFRICAN AMERICAN: >60
GFR NON-AFRICAN AMERICAN: 59.5
GLOBULIN: 2.2 G/DL (ref 2.3–3.5)
GLUCOSE BLD-MCNC: 91 MG/DL (ref 70–99)
HBA1C MFR BLD: 7.8 % (ref 4.8–5.9)
HCT VFR BLD CALC: 34.4 % (ref 37–47)
HDLC SERPL-MCNC: 66 MG/DL (ref 40–59)
HEMOGLOBIN: 11.8 G/DL (ref 12–16)
LDL CHOLESTEROL CALCULATED: 81 MG/DL (ref 0–129)
LYMPHOCYTES ABSOLUTE: 3.5 K/UL (ref 1–4.8)
LYMPHOCYTES RELATIVE PERCENT: 48.7 %
MCH RBC QN AUTO: 33.2 PG (ref 27–31.3)
MCHC RBC AUTO-ENTMCNC: 34.2 % (ref 33–37)
MCV RBC AUTO: 96.9 FL (ref 82–100)
MICROALBUMIN UR-MCNC: <1.2 MG/DL
MICROALBUMIN/CREAT UR-RTO: NORMAL MG/G (ref 0–30)
MONOCYTES ABSOLUTE: 0.2 K/UL (ref 0.2–0.8)
MONOCYTES RELATIVE PERCENT: 2.4 %
NEUTROPHILS ABSOLUTE: 3.4 K/UL (ref 1.4–6.5)
NEUTROPHILS RELATIVE PERCENT: 47.4 %
PDW BLD-RTO: 18.8 % (ref 11.5–14.5)
PLATELET # BLD: 238 K/UL (ref 130–400)
POTASSIUM SERPL-SCNC: 4 MEQ/L (ref 3.4–4.9)
RBC # BLD: 3.55 M/UL (ref 4.2–5.4)
SODIUM BLD-SCNC: 139 MEQ/L (ref 135–144)
TOTAL CK: 106 U/L (ref 0–170)
TOTAL PROTEIN: 6.3 G/DL (ref 6.3–8)
TRIGL SERPL-MCNC: 258 MG/DL (ref 0–150)
VITAMIN D 25-HYDROXY: 49.1 NG/ML (ref 30–100)
WBC # BLD: 7.2 K/UL (ref 4.8–10.8)

## 2019-05-30 PROCEDURE — 36415 COLL VENOUS BLD VENIPUNCTURE: CPT

## 2019-05-30 PROCEDURE — 80061 LIPID PANEL: CPT

## 2019-05-30 PROCEDURE — 83036 HEMOGLOBIN GLYCOSYLATED A1C: CPT

## 2019-05-30 PROCEDURE — 82306 VITAMIN D 25 HYDROXY: CPT

## 2019-05-30 PROCEDURE — 87389 HIV-1 AG W/HIV-1&-2 AB AG IA: CPT

## 2019-05-30 PROCEDURE — 85025 COMPLETE CBC W/AUTO DIFF WBC: CPT

## 2019-05-30 PROCEDURE — 82550 ASSAY OF CK (CPK): CPT

## 2019-05-30 PROCEDURE — 80053 COMPREHEN METABOLIC PANEL: CPT

## 2019-05-30 PROCEDURE — 82570 ASSAY OF URINE CREATININE: CPT

## 2019-05-30 PROCEDURE — 82043 UR ALBUMIN QUANTITATIVE: CPT

## 2019-05-30 NOTE — RESULT ENCOUNTER NOTE
PICC ready for use Please notify Diongeremias Monets De Ocakareen that I have received her lab results. Does she have a follow up scheduled with me?  If not, please have her schedule in the next month or so/

## 2019-05-31 LAB — HIV 1,2 COMBO ANTIGEN/ANTIBODY: NEGATIVE

## 2019-06-09 PROBLEM — E72.11 HYPERHOMOCYSTEINEMIA (HCC): Status: ACTIVE | Noted: 2019-06-09

## 2019-06-13 ENCOUNTER — OFFICE VISIT (OUTPATIENT)
Dept: PHYSICAL MEDICINE AND REHAB | Age: 59
End: 2019-06-13
Payer: COMMERCIAL

## 2019-06-13 VITALS
HEIGHT: 63 IN | SYSTOLIC BLOOD PRESSURE: 120 MMHG | DIASTOLIC BLOOD PRESSURE: 80 MMHG | WEIGHT: 203 LBS | BODY MASS INDEX: 35.97 KG/M2

## 2019-06-13 DIAGNOSIS — M54.41 CHRONIC BILATERAL LOW BACK PAIN WITH BILATERAL SCIATICA: ICD-10-CM

## 2019-06-13 DIAGNOSIS — Z79.899 LONG-TERM USE OF HIGH-RISK MEDICATION: ICD-10-CM

## 2019-06-13 DIAGNOSIS — D84.9 IMMUNOSUPPRESSED STATUS (HCC): ICD-10-CM

## 2019-06-13 DIAGNOSIS — G89.29 CHRONIC MIDLINE LOW BACK PAIN WITH RIGHT-SIDED SCIATICA: ICD-10-CM

## 2019-06-13 DIAGNOSIS — M54.41 CHRONIC MIDLINE LOW BACK PAIN WITH RIGHT-SIDED SCIATICA: ICD-10-CM

## 2019-06-13 DIAGNOSIS — Z79.899 HIGH RISK MEDICATION USE: ICD-10-CM

## 2019-06-13 DIAGNOSIS — L93.0 DISCOID LUPUS ERYTHEMATOSUS: Chronic | ICD-10-CM

## 2019-06-13 DIAGNOSIS — M54.42 CHRONIC BILATERAL LOW BACK PAIN WITH BILATERAL SCIATICA: ICD-10-CM

## 2019-06-13 DIAGNOSIS — G89.29 CHRONIC BILATERAL THORACIC BACK PAIN: ICD-10-CM

## 2019-06-13 DIAGNOSIS — E55.9 VITAMIN D DEFICIENCY: ICD-10-CM

## 2019-06-13 DIAGNOSIS — M79.7 FIBROMYALGIA MUSCLE PAIN: ICD-10-CM

## 2019-06-13 DIAGNOSIS — M54.41 CHRONIC RIGHT-SIDED LOW BACK PAIN WITH RIGHT-SIDED SCIATICA: ICD-10-CM

## 2019-06-13 DIAGNOSIS — F13.90 BENZODIAZEPINE MISUSE: ICD-10-CM

## 2019-06-13 DIAGNOSIS — M15.9 GENERALIZED OA: ICD-10-CM

## 2019-06-13 DIAGNOSIS — G89.29 CHRONIC RIGHT-SIDED LOW BACK PAIN WITH RIGHT-SIDED SCIATICA: ICD-10-CM

## 2019-06-13 DIAGNOSIS — M54.6 CHRONIC BILATERAL THORACIC BACK PAIN: ICD-10-CM

## 2019-06-13 DIAGNOSIS — D68.51 FACTOR 5 LEIDEN MUTATION, HETEROZYGOUS (HCC): ICD-10-CM

## 2019-06-13 DIAGNOSIS — E72.11 HYPERHOMOCYSTEINEMIA (HCC): ICD-10-CM

## 2019-06-13 DIAGNOSIS — M79.10 MYALGIA: ICD-10-CM

## 2019-06-13 DIAGNOSIS — G89.29 CHRONIC BILATERAL LOW BACK PAIN WITH BILATERAL SCIATICA: ICD-10-CM

## 2019-06-13 DIAGNOSIS — M53.3 SI (SACROILIAC) PAIN: Primary | ICD-10-CM

## 2019-06-13 PROBLEM — I77.6 SMALL VESSEL VASCULITIS (HCC): Status: ACTIVE | Noted: 2019-05-21

## 2019-06-13 PROCEDURE — 99215 OFFICE O/P EST HI 40 MIN: CPT | Performed by: PHYSICAL MEDICINE & REHABILITATION

## 2019-06-13 RX ORDER — LIDOCAINE 50 MG/G
1 PATCH TOPICAL DAILY
Qty: 30 PATCH | Refills: 2 | Status: SHIPPED | OUTPATIENT
Start: 2019-06-13 | End: 2019-08-22 | Stop reason: SDUPTHER

## 2019-06-13 RX ORDER — CYCLOBENZAPRINE HCL 10 MG
10 TABLET ORAL 2 TIMES DAILY PRN
Qty: 60 TABLET | Refills: 1 | Status: SHIPPED | OUTPATIENT
Start: 2019-06-13 | End: 2019-08-22 | Stop reason: SDUPTHER

## 2019-06-13 ASSESSMENT — ENCOUNTER SYMPTOMS
BOWEL INCONTINENCE: 0
CHEST TIGHTNESS: 0
SORE THROAT: 0
APNEA: 0
SHORTNESS OF BREATH: 1
BACK PAIN: 1
VOMITING: 0
ABDOMINAL PAIN: 0
COLOR CHANGE: 0
CONSTIPATION: 1
EYES NEGATIVE: 1
COUGH: 0
NAUSEA: 0
DIARRHEA: 1
WHEEZING: 0

## 2019-06-13 NOTE — PROGRESS NOTES
constantly. The problem is unchanged. The pain is present in the lumbar spine. The quality of the pain is described as aching. Radiates to: Occasional down bilateral legs. The pain is at a severity of 9/10. The pain is severe. The pain is the same all the time. The symptoms are aggravated by bending, twisting, coughing and position. Stiffness is present in the morning. Associated symptoms include leg pain and weakness. Pertinent negatives include no abdominal pain, bladder incontinence, bowel incontinence, chest pain, dysuria, fever, headaches, numbness, paresis, paresthesias, perianal numbness, tingling or weight loss. Risk factors include obesity, menopause, lack of exercise and sedentary lifestyle. She has tried heat, analgesics, muscle relaxant, ice, NSAIDs, walking and home exercises (Caudal Blocks, TP injections, Tramadol and Flexeril, ) for the symptoms. The treatment provided moderate relief. Foot Pain    This is a chronic problem. The current episode started more than 1 year ago. The problem occurs constantly. The problem has been unchanged. Pertinent negatives include no fever, numbness or tingling. The symptoms are aggravated by bending and walking. She has tried acetaminophen, rest, heat, relaxation, oral narcotics and lying down for the symptoms. The treatment provided moderate relief.        Past Medical History:   Diagnosis Date    Back pain     Depression     DM (diabetes mellitus) ([facility] [address] 11/25/2014    DVT (deep venous thrombosis) ([facility])     Elevated troponin 5/25/2016    Factor V Leiden carrier [facility])     history of DVT, on Xarelto    GERD (gastroesophageal reflux disease)     HTN (hypertension)     HTN (hypertension)     Hyperlipidemia     Neuropathy of both feet 8/22/2016    NSTEMI (non-ST elevated myocardial infarction) ([facility] [address]     Obesity     Osteoarthritis     Pulmonary embolism ([facility] [address]     Renal failure 6/29/2018    Right inguinal hernia 11/9/2017    SOB (shortness of breath) None   Relationships    Social connections:     Talks on phone: None     Gets together: None     Attends [employer] service: None     Active member of club or organization: None     Attends meetings of clubs or organizations: None     Relationship status: None    Intimate partner violence:     Fear of current or ex partner: None     Emotionally abused: None     Physically abused: None     Forced sexual activity: None   Other Topics Concern    None   Social History Narrative    None     Family History   Problem Relation Age of Onset    Substance Abuse Brother     High Blood Pressure Mother     High Cholesterol Mother     Arthritis Mother     High Blood Pressure Father     High Cholesterol Father     Clotting Disorder Father         clot to intestines    Arthritis Father     Substance Abuse Brother     Substance Abuse Brother     Stroke Paternal Uncle 36    Cystic Fibrosis Daughter        Allergies   Allergen Reactions    Sulfa Antibiotics Rash    Ciprofloxacin Hcl Swelling     facial, tongue swelling    Nsaids Other (See Comments)     Bleed risk dt Xaralto    Statins Other (See Comments)     Liver enzyme elevation       Review of Systems   Constitutional: Positive for activity change and fatigue. Negative for appetite change, chills, fever, unexpected weight change and weight loss. HENT: Negative for congestion, postnasal drip and sore throat. Eyes: Negative. Respiratory: Positive for shortness of breath. Negative for apnea, cough, chest tightness and wheezing. Cardiovascular: Positive for leg swelling. Negative for chest pain and palpitations. Gastrointestinal: Positive for constipation and diarrhea. Negative for abdominal pain, bowel incontinence, nausea and vomiting. Endocrine: Negative. Genitourinary: Negative. Negative for bladder incontinence and dysuria. Musculoskeletal: Positive for arthralgias, back pain, joint swelling and myalgias.  Negative for gait problem, neck pain and neck stiffness. Skin: Negative for color change, pallor, rash and wound. Allergic/Immunologic: Positive for immunocompromised state. Neurological: Positive for weakness. Negative for dizziness, tingling, light-headedness, numbness, headaches and paresthesias. Hematological: Negative for adenopathy. Bruises/bleeds easily. Psychiatric/Behavioral: Positive for dysphoric mood and sleep disturbance. Negative for suicidal ideas. The patient is nervous/anxious. All other systems reviewed and are negative. Objective    Vitals:    06/13/19 1026   BP: 120/80   Site: Left Upper Arm   Position: Sitting   Cuff Size: Large Adult   Weight: 203 lb (92.1 kg)   Height: 5' 3\" (1.6 m)     Pain Score: EIGHT (Without medication)       Physical Exam   Constitutional: She is oriented to person, place, and time. Vital signs are normal. She appears well-developed and well-nourished. Non-toxic appearance. She does not have a sickly appearance. She does not appear ill. No distress. HENT:   Head: Normocephalic and atraumatic. Right Ear: Hearing normal.   Left Ear: Hearing normal.   Nose: Nose normal.   Mouth/Throat: Oropharynx is clear and moist and mucous membranes are normal. No oral lesions. Normal dentition. No oropharyngeal exudate. No signs of toxic erosions. Eyes: Pupils are equal, round, and reactive to light. Conjunctivae and EOM are normal. Right eye exhibits no chemosis, no discharge and no exudate. Left eye exhibits no chemosis, no discharge and no exudate. No scleral icterus. Neck: Normal range of motion. Neck supple. No JVD present. No neck rigidity. No tracheal deviation and no edema present. No thyromegaly present. Cardiovascular: Intact distal pulses. Exam reveals no decreased pulses. Pulmonary/Chest: Effort normal. No accessory muscle usage. No apnea, no tachypnea and no bradypnea. No respiratory distress. She has decreased breath sounds. She has no wheezes.  She exhibits no tenderness. Dry cough   Abdominal: Soft. Bowel sounds are normal. She exhibits no distension and no mass. There is no tenderness. There is no rebound and no guarding. Musculoskeletal: She exhibits tenderness. She exhibits no edema. Right shoulder: Normal.        Left shoulder: Normal.        Right elbow: Normal.       Left elbow: Normal.        Right wrist: Normal.        Left wrist: Normal.        Right hip: Normal.        Left hip: Normal.        Right knee: Normal.        Left knee: Normal.        Right ankle: She exhibits decreased range of motion and swelling. Achilles tendon normal.        Left ankle: She exhibits decreased range of motion and swelling. Tenderness. Achilles tendon normal.        Cervical back: She exhibits tenderness and pain. Thoracic back: She exhibits tenderness and pain. Lumbar back: She exhibits decreased range of motion, tenderness, bony tenderness and pain. She exhibits no swelling, no edema, no deformity, no laceration and normal pulse. Back:         Right upper arm: Normal.        Left upper arm: Normal.        Right forearm: She exhibits tenderness and bony tenderness. Left forearm: She exhibits tenderness and bony tenderness. Right hand: Normal.        Left hand: Normal.        Right upper leg: Normal.        Left upper leg: Normal.        Right lower leg: Normal.        Left lower leg: Normal.        Legs:       Right foot: There is decreased range of motion, tenderness and bony tenderness. Left foot: There is decreased range of motion, tenderness and bony tenderness. Tender areas are indicated by numbered spot         Lymphadenopathy:     She has no cervical adenopathy. Neurological: She is alert and oriented to person, place, and time. She displays abnormal reflex. She displays no atrophy and no tremor. A sensory deficit is present. No cranial nerve deficit. She exhibits normal muscle tone.  Coordination normal. She displays no Babinski's sign on the right side. She displays no Babinski's sign on the left side. Skin: Skin is warm, dry and intact. No abrasion, no bruising, no ecchymosis, no laceration, no petechiae and no rash noted. Rash is not macular, not pustular and not urticarial. She is not diaphoretic. No cyanosis or erythema. No pallor. Nails show no clubbing. Psychiatric: She has a normal mood and affect. Her behavior is normal. Judgment and thought content normal. Her mood appears not anxious. Her affect is not angry, not blunt, not labile and not inappropriate. Her speech is not rapid and/or pressured, not delayed, not tangential and not slurred. She is not agitated, not aggressive, not hyperactive, not slowed, not withdrawn, not actively hallucinating and not combative. Thought content is not paranoid and not delusional. Cognition and memory are normal. Cognition and memory are not impaired. She does not express impulsivity or inappropriate judgment. She does not exhibit a depressed mood. She expresses no homicidal and no suicidal ideation. She expresses no suicidal plans and no homicidal plans. She is communicative. She exhibits normal recent memory and normal remote memory. She is attentive. Vitals reviewed. Ortho Exam  Neurologic Exam     Mental Status   Oriented to person, place, and time. Speech: not slurred   Level of consciousness: alert  Knowledge: good. Able to name object. Able to read. Able to repeat. Able to write. Normal comprehension. Cranial Nerves     CN III, IV, VI   Pupils are equal, round, and reactive to light.   Extraocular motions are normal.           After a thorough review and discussion of the previous medical records, patient comprehensive medical, surgical, and family and social history, Review of Systems, their OARRS, their Screener and Opioid Assessment for Patients with Pain (SOAPP®-R), recent diagnostics, and symptomatic results to previous treatment, it is my impression that the patients is suffering with progressive and severe:     Diagnosis Orders   1. SI (sacroiliac) pain  lidocaine (LIDODERM) 5 %    cyclobenzaprine (FLEXERIL) 10 MG tablet   2. Vitamin D deficiency     3. High risk medication use - 12/08/17 OARRS PM&R, 02/07/18 OARRS PM&R, 02/22/17 Med Contract PM&R     4. Discoid lupus erythematosus     5. Chronic right-sided low back pain with right-sided sciatica     6. Generalized OA  cyclobenzaprine (FLEXERIL) 10 MG tablet    AR NJX DX/THER SBST INTRLMNR LMBR/SAC W/IMG GDN   7. Chronic bilateral low back pain with bilateral sciatica     8. Factor 5 Leiden mutation, heterozygous ([facility] [address]     9. Hyperhomocysteinemia ([address]     10. Long-term use of high-risk medication     11. Benzodiazepine misuse ([address]     12. Chronic midline low back pain with right-sided sciatica  lidocaine (LIDODERM) 5 %   13. Chronic bilateral thoracic back pain  lidocaine (LIDODERM) 5 %   14. Fibromyalgia muscle pain  lidocaine (LIDODERM) 5 %   15.  Myalgia  AR INJECT TRIGGER POINTS, 3 OR GREATER    AR INJECT TRIGGER POINTS, 3 OR GREATER       I am also concerned by lifestyle and mood issues including:    Past Medical History:   Diagnosis Date    Back pain     Depression     DM (diabetes mellitus) ([address] 11/25/2014    DVT [facility] venous thrombosis[facility]     Elevated troponin 5/25/2016    Factor V Leiden carrier ([address]     history of DVT, on Xarelto    GERD (gastroesophageal reflux disease)     HTN (hypertension)     HTN (hypertension)     Hyperlipidemia     Neuropathy of both feet 8/22/2016    NSTEMI (non-ST elevated myocardial infarction) ([address]     Obesity     Osteoarthritis     Pulmonary embolism ([address]     Renal failure 6/29/2018    Right inguinal hernia 11/9/2017    SOB (shortness of breath) 4/19/2017    Systemic lupus erythematosus ([address] 9/0/4045    Umbilical hernia 88/6/8736    Vasomotor flushing     on hormone replacement therapy           Given their medication, chronic pain and lifestyle and medications they are at risk for :    Falls, constipation, addiction  Loss of livelyhood due to severe pain, debility, weight gain and  vitamin D deficiency    The patient was educated regarding proper diet, fitness routine, and regulatory restrictions concerning pain medications. Previous notes, comprehensive past medical, surgical, family history, and diagnostics were reviewed. Patient education and councelling were provided regarding off label use,treatment options and medication and injection risks. Current and old OARRS ([place] Automated Prescription Reporting System) records reviewed, all refills reviewed since last visit,  Behavioral agreement/FELICITAS regulations   and Toxicology screen was reviewed with patient and is up to date. There are no current red flags. They are making good progress regarding pain relief, they are performing at a functional level regarding activities of daily living, family interactions and psychological functioning, they're not having any adverse effects or side effects from the current medications, and I see no findings of aberrant drug taking or addiction related behaviors. The patient is aware that they have a chronic pain condition and they may require opiates dosing for life. All efforts will be made to wean to the lowest effective dose. Other therapies for pain have not been effective including nonopiate medications. Injections and exercises are only partially effective. A Rx for Narcan was offered to help prevent accidental overdose. RX Monitoring 6/13/2019   Attestation -   Periodic Controlled Substance Monitoring Possible medication side effects, risk of tolerance/dependence & alternative treatments discussed. ;No signs of potential drug abuse or diversion identified. ;Assessed functional status. ;Obtaining appropriate analgesic effect of treatment.    Chronic Pain > 50 MEDD Re-evaluated the status of the patient's underlying condition causing pain.;Considered consultation with a specialist.;Obtained or confirmed \"Consent for Opioid Use\" on file. Chronic Pain > 80 MEDD -       Periodic Controlled Substance Monitoring: Possible medication side effects, risk of tolerance/dependence & alternative treatments discussed., No signs of potential drug abuse or diversion identified. , Assessed functional status., Obtaining appropriate analgesic effect of treatment. (Nat Chávez, DO)       Patient is currently taking:       I am having Durham Stapleton maintain her vitamin D, ACCU-CHEK ADVANTAGE DIABETES, blood glucose test strips, Lancets, albuterol sulfate HFA, amitriptyline, hydroxychloroquine, diclofenac sodium, hydrocortisone, clobetasol, hydrOXYzine, levETIRAcetam, gabapentin, cyanocobalamin, folic acid, esomeprazole, triamcinolone, omeprazole, citalopram, Na Sulfate-K Sulfate-Mg Sulf, rivaroxaban, furosemide, carvedilol, methotrexate, gabapentin, metaxalone, predniSONE, cholestyramine, metFORMIN, lidocaine, and cyclobenzaprine. I also recommend the following Medications:    Orders Placed This Encounter   Medications    lidocaine (LIDODERM) 5 %     Sig: Place 1 patch onto the skin daily 12 hours on, 12 hours off. Dispense:  30 patch     Refill:  2    cyclobenzaprine (FLEXERIL) 10 MG tablet     Sig: Take 1 tablet by mouth 2 times daily as needed for Muscle spasms     Dispense:  60 tablet     Refill:  1        -which helps with pain and function. Otherwise, continue the current pain medications that I have prescibed. Radiologic:   Old films reviewed  ,  CT SCAN OF THE THORAX WITH CONTRAST/PE PROTOCOL/ CTA CHEST       COMPARISON: CHEST RADIOGRAPH JUNE 29, 2018. CTA CHEST, AUGUST 18, 2014.       REASON FOR EXAMINATION:  FRACTURE 5 DISORDER. RECEIVING ANTICOAGULATION. RECENT PLANE FLIGHT.  RULE OUT PE        TECHNIQUE: Helical CTA was performed through the chest utilizing 100 cc of Isovue 370 intravenous contrast.  Images were obtained with bolus tracking in order to opacify the pulmonary arteries.  Thick section coronal MIP 3D reconstructions were performed    on a separate workstation.       FINDINGS:       No intraluminal filling defects, pulmonary arterial tree.       Cardiac size normal. No pericardial effusion. Thoracic aorta normal in course and caliber.       Bilateral emphysematous change. Mild dependent subsegmental atelectatic change at lung bases. No nodules, masses, pleural effusions.       No hilar, mediastinal, or axillary lymph node enlargement.       Limited imaging upper abdomen shows adrenal glands without anomaly. Small hiatal hernia.       No osteoblastic, no osteolytic lesions. Mild diffuse narrowing thoracic spine with small diffuse anterior osteophytes.                   Impression           No CT evidence pulmonary embolism. I discussed results with patients. see Follow up plans below  For any new studies. Care Everywhere Updates:  requested and reviewed. [facility] 6/6/19:\"Patient here requesting 1st IV rituxan. No dental work planned. Not taking antibiotics. No signs or symptoms of infection. Taking prednisone 20mg every other day. Has not missed other rheum meds. Speaking better. Dysphagia not as bad. No new falls. Still using walker. More oral sores. Reports pain 7/10. Minimal AM stiffness. Blurry vision since starting steroids. Has not had eye exam for 1year. Overall mildly uncomfortable but happy with rheum care. No falls/fx/trauma/illness/oral sores/rash/hairloss/jaw pain/dysphagia/epistaxis/hemoptysis since last visit. No adverse effects with meds. No other complaints. Patient denies fever, chills, cp, dyspnea, nausea, vomiting, night sweats, scalp tenderness, visual changes, ha, bowel/bladder changes, weight changes or other complaints. \"           new issues noted. Electrodiagnostic:  Previous studies requested,     I discussed results with patient.       See follow-up plans for new studies.         Labs:  Previous labs reviewed     Lab Results   Component Value Date     05/30/2019    K 4.0 05/30/2019    K 4.0 11/19/2018    CL 96 05/30/2019    CO2 26 05/30/2019    BUN 25 05/30/2019    CREATININE 0.96 05/30/2019    CALCIUM 8.8 05/30/2019    LABALBU 4.1 05/30/2019    LABALBU 4.2 03/13/2012    BILITOT 0.5 05/30/2019    ALKPHOS 51 05/30/2019    AST 23 05/30/2019    ALT 33 05/30/2019     Lab Results   Component Value Date    WBC 7.2 05/30/2019    RBC 3.55 05/30/2019    RBC 4.77 03/13/2012    HGB 11.8 05/30/2019    HCT 34.4 05/30/2019    MCV 96.9 05/30/2019    MCH 33.2 05/30/2019    MCHC 34.2 05/30/2019    RDW 18.8 05/30/2019     05/30/2019    MPV 8.2 07/31/2015       Lab Results   Component Value Date    LABAMPH Neg 11/17/2018    BARBSCNU Neg 11/17/2018    LABBENZ Neg 11/17/2018    LABBENZ NotDTCD 10/11/2012    CANSU Neg 11/17/2018    COCAIMETSCRU Neg 11/17/2018    PHENCYCLIDINESCREENURINE Neg 66/83/2041    TRICYCLIC POSITIVE 26/84/3099    DSCOMMENT see below 11/17/2018       Lab Results   Component Value Date    CODEINE Not Detected 12/19/2016    MORPHINE Not Detected 12/19/2016    Steven Lapidus Not Detected 12/19/2016    OXYCODONE Not Detected 12/19/2016    NOROXYCODONE Not Detected 12/19/2016    NOROXYMU Not Detected 12/19/2016    HYDRCO Not Detected 12/19/2016    NORHYDU Not Detected 12/19/2016    HYDROMO Not Detected 12/19/2016    Loralie Notch Not Detected 12/19/2016    Marine Beaufort Not Detected 12/19/2016    FENTA Not Detected 12/19/2016    NORFENT Not Detected 12/19/2016    MEPERIDINE Not Detected 12/19/2016    TAPENU Not Detected 12/19/2016    TAPOSULFUR Not Detected 12/19/2016    METHADONE Not Detected 12/19/2016    LABPROP Not Detected 12/19/2016    TRAM Present 12/19/2016    AMPH Not Detected 12/19/2016    METHAMP Not Detected 12/19/2016    MDMA Not Detected 12/19/2016    ECMDA Not Detected 12/19/2016       Lab Results   Component Value Date    METPHEN Not Detected 12/19/2016 PHENTERMINE Not Detected 12/19/2016    BENZOYL Not Detected 12/19/2016    ALPRAZ Not Detected 12/19/2016    ALPHAOHALPRA Not Detected 12/19/2016    CLONAZEPAM Not Detected 12/19/2016    Maco Gauss Not Detected 12/19/2016    DIAZEP Not Detected 12/19/2016    DEBORA Not Detected 12/19/2016    OXAZ Not Detected 12/19/2016    Odell Ruths Not Detected 12/19/2016    LORAZEPAM Not Detected 12/19/2016    MIDAZOLAM Not Detected 12/19/2016    ZOLPIDEM Not Detected 12/19/2016    TEO Not Detected 12/19/2016    ETG See Note 12/19/2016    MARIJMET Not Detected 12/19/2016    PCP Not Detected 12/19/2016    PAINMGTDRUGP See Below 12/19/2016    EERPAINMGTPA See Note 12/19/2016    LABCREA 183.4 05/30/2019         , I discussed results with patient. See follow-up plans for new studies. Therapies:  HEP-gentle stretching and relaxation techniques-demonstrated with patient-they are to do them twice a day. They are also advised to make the following lifestyle changes:  Goals       Limit Carbs to 2-3/meal maximum      Exercise 3x per week (30 min per time)      Piriformis stretches and abd strengthening       HEMOGLOBIN A1C < 6.5      SOAPP- R GOAL LESS THAN 9      12/19/16 SCORE: 15-MODERATE RISK   02/22/17 score: 22-High risk   05/15/17 score: 26-high risk  07/12/17 score: 22-high risk  10/02/17 score: 15-moderate risk  12/11/17 score: 11-moderate risk  02/08/18 score: 14-moderate risk  04/18/18 score: 15-moderate risk  8/9/18 score: 10 low risk  10/11/18 score: 22- high risk  12/14/18 score:17-moderate risk  3/27/19 score: 12 moderate risk  6/13/19 score: 24 high risk       Weight < 160 lb (72.576 kg)           Injections or Epidurals:  Injection options were discussed. After a complete review of the medical record,OARRS, a comprehensive physical exam, and discussion with the patient I have determined that the patient would benefit from a series of 2 caudal epidural steroid injections using a C-arm and contrast as appropriate.

## 2019-06-24 ENCOUNTER — PROCEDURE VISIT (OUTPATIENT)
Dept: PHYSICAL MEDICINE AND REHAB | Age: 59
End: 2019-06-24
Payer: COMMERCIAL

## 2019-06-24 DIAGNOSIS — M79.7 FIBROMYALGIA: Primary | ICD-10-CM

## 2019-06-24 DIAGNOSIS — M79.10 MYALGIA: ICD-10-CM

## 2019-06-24 PROCEDURE — 20553 NJX 1/MLT TRIGGER POINTS 3/>: CPT | Performed by: PHYSICAL MEDICINE & REHABILITATION

## 2019-06-25 RX ORDER — LIDOCAINE HYDROCHLORIDE 10 MG/ML
16 INJECTION, SOLUTION INFILTRATION; PERINEURAL ONCE
Status: DISCONTINUED | OUTPATIENT
Start: 2019-06-24 | End: 2021-01-01 | Stop reason: ALTCHOICE

## 2019-06-25 NOTE — PROGRESS NOTES
Patient here for trigger point injections. Patient taken to exam room and seated on draped locking stool with area to be injected exposed. Area marked and cleansed with alcohol. Areas to be injected with 16cc 1% Lidocaine to be administered by physician.

## 2019-07-08 ENCOUNTER — PROCEDURE VISIT (OUTPATIENT)
Dept: PHYSICAL MEDICINE AND REHAB | Age: 59
End: 2019-07-08
Payer: COMMERCIAL

## 2019-07-08 DIAGNOSIS — M79.10 MYALGIA: ICD-10-CM

## 2019-07-08 DIAGNOSIS — M79.7 FIBROMYALGIA: Primary | ICD-10-CM

## 2019-07-08 PROCEDURE — 20553 NJX 1/MLT TRIGGER POINTS 3/>: CPT | Performed by: PHYSICAL MEDICINE & REHABILITATION

## 2019-07-08 RX ORDER — LIDOCAINE HYDROCHLORIDE 10 MG/ML
16 INJECTION, SOLUTION INFILTRATION; PERINEURAL ONCE
Status: COMPLETED | OUTPATIENT
Start: 2019-07-08 | End: 2019-07-08

## 2019-07-08 RX ADMIN — LIDOCAINE HYDROCHLORIDE 16 ML: 10 INJECTION, SOLUTION INFILTRATION; PERINEURAL at 11:56

## 2019-07-15 ENCOUNTER — OFFICE VISIT (OUTPATIENT)
Dept: FAMILY MEDICINE CLINIC | Age: 59
End: 2019-07-15
Payer: COMMERCIAL

## 2019-07-15 VITALS
BODY MASS INDEX: 36.32 KG/M2 | OXYGEN SATURATION: 96 % | DIASTOLIC BLOOD PRESSURE: 80 MMHG | RESPIRATION RATE: 16 BRPM | SYSTOLIC BLOOD PRESSURE: 120 MMHG | HEIGHT: 63 IN | HEART RATE: 101 BPM | TEMPERATURE: 96.8 F | WEIGHT: 205 LBS

## 2019-07-15 DIAGNOSIS — D68.51 FACTOR 5 LEIDEN MUTATION, HETEROZYGOUS (HCC): ICD-10-CM

## 2019-07-15 DIAGNOSIS — L93.0 DISCOID LUPUS ERYTHEMATOSUS: ICD-10-CM

## 2019-07-15 DIAGNOSIS — I10 ESSENTIAL HYPERTENSION: ICD-10-CM

## 2019-07-15 DIAGNOSIS — Z12.31 ENCOUNTER FOR SCREENING MAMMOGRAM FOR BREAST CANCER: ICD-10-CM

## 2019-07-15 DIAGNOSIS — E55.9 VITAMIN D DEFICIENCY: ICD-10-CM

## 2019-07-15 DIAGNOSIS — R94.4 DECREASED GFR: ICD-10-CM

## 2019-07-15 DIAGNOSIS — M33.22 POLYMYOSITIS WITH MYOPATHY (HCC): ICD-10-CM

## 2019-07-15 DIAGNOSIS — E11.9 TYPE 2 DIABETES MELLITUS WITHOUT COMPLICATION, WITHOUT LONG-TERM CURRENT USE OF INSULIN (HCC): Primary | Chronic | ICD-10-CM

## 2019-07-15 DIAGNOSIS — E78.5 DYSLIPIDEMIA: ICD-10-CM

## 2019-07-15 DIAGNOSIS — K21.9 GASTROESOPHAGEAL REFLUX DISEASE, ESOPHAGITIS PRESENCE NOT SPECIFIED: ICD-10-CM

## 2019-07-15 PROCEDURE — 99214 OFFICE O/P EST MOD 30 MIN: CPT | Performed by: NURSE PRACTITIONER

## 2019-07-15 RX ORDER — OMEPRAZOLE 40 MG/1
40 CAPSULE, DELAYED RELEASE ORAL DAILY
Qty: 30 CAPSULE | Refills: 3 | Status: SHIPPED | OUTPATIENT
Start: 2019-07-15 | End: 2019-08-28

## 2019-07-15 ASSESSMENT — PATIENT HEALTH QUESTIONNAIRE - PHQ9
2. FEELING DOWN, DEPRESSED OR HOPELESS: 1
SUM OF ALL RESPONSES TO PHQ9 QUESTIONS 1 & 2: 2
SUM OF ALL RESPONSES TO PHQ QUESTIONS 1-9: 2
SUM OF ALL RESPONSES TO PHQ QUESTIONS 1-9: 2
1. LITTLE INTEREST OR PLEASURE IN DOING THINGS: 1

## 2019-07-22 ENCOUNTER — PROCEDURE VISIT (OUTPATIENT)
Dept: PHYSICAL MEDICINE AND REHAB | Age: 59
End: 2019-07-22
Payer: COMMERCIAL

## 2019-07-22 DIAGNOSIS — M79.7 FIBROMYALGIA: Primary | ICD-10-CM

## 2019-07-22 DIAGNOSIS — M51.36 DDD (DEGENERATIVE DISC DISEASE), LUMBAR: ICD-10-CM

## 2019-07-22 DIAGNOSIS — M79.10 MYALGIA: ICD-10-CM

## 2019-07-22 PROCEDURE — 20553 NJX 1/MLT TRIGGER POINTS 3/>: CPT | Performed by: PHYSICAL MEDICINE & REHABILITATION

## 2019-07-22 RX ORDER — LIDOCAINE HYDROCHLORIDE 10 MG/ML
16 INJECTION, SOLUTION INFILTRATION; PERINEURAL ONCE
Status: COMPLETED | OUTPATIENT
Start: 2019-07-22 | End: 2019-07-22

## 2019-07-22 RX ORDER — DIAZEPAM 5 MG/1
TABLET ORAL
Qty: 4 TABLET | Refills: 0 | Status: SHIPPED | OUTPATIENT
Start: 2019-07-22 | End: 2019-08-26

## 2019-07-22 RX ADMIN — LIDOCAINE HYDROCHLORIDE 16 ML: 10 INJECTION, SOLUTION INFILTRATION; PERINEURAL at 10:53

## 2019-07-30 ENCOUNTER — TELEPHONE (OUTPATIENT)
Dept: FAMILY MEDICINE CLINIC | Age: 59
End: 2019-07-30

## 2019-07-30 DIAGNOSIS — N64.4 BREAST PAIN, LEFT: Primary | ICD-10-CM

## 2019-07-31 ENCOUNTER — TELEPHONE (OUTPATIENT)
Dept: FAMILY MEDICINE CLINIC | Age: 59
End: 2019-07-31

## 2019-07-31 DIAGNOSIS — N64.4 BREAST PAIN, LEFT: Primary | ICD-10-CM

## 2019-08-01 ENCOUNTER — HOSPITAL ENCOUNTER (OUTPATIENT)
Dept: WOMENS IMAGING | Age: 59
Discharge: HOME | End: 2019-08-03
Payer: COMMERCIAL

## 2019-08-01 ENCOUNTER — HOSPITAL ENCOUNTER (OUTPATIENT)
Dept: ULTRASOUND IMAGING | Age: 59
Discharge: HOME | End: 2019-08-03
Payer: COMMERCIAL

## 2019-08-01 DIAGNOSIS — N64.4 BREAST PAIN, LEFT: ICD-10-CM

## 2019-08-01 DIAGNOSIS — R92.8 ABNORMAL MAMMOGRAM: ICD-10-CM

## 2019-08-01 PROCEDURE — 76642 ULTRASOUND BREAST LIMITED: CPT

## 2019-08-01 PROCEDURE — 77066 DX MAMMO INCL CAD BI: CPT

## 2019-08-05 ENCOUNTER — PREP FOR PROCEDURE (OUTPATIENT)
Dept: PHYSICAL MEDICINE AND REHAB | Age: 59
End: 2019-08-05

## 2019-08-06 ENCOUNTER — HOSPITAL ENCOUNTER (OUTPATIENT)
Dept: INTERVENTIONAL RADIOLOGY/VASCULAR | Age: 59
Discharge: HOME | End: 2019-08-08
Payer: COMMERCIAL

## 2019-08-06 VITALS
RESPIRATION RATE: 14 BRPM | DIASTOLIC BLOOD PRESSURE: 82 MMHG | HEART RATE: 97 BPM | SYSTOLIC BLOOD PRESSURE: 135 MMHG | OXYGEN SATURATION: 97 %

## 2019-08-06 DIAGNOSIS — M51.36 LUMBAR DEGENERATIVE DISC DISEASE: ICD-10-CM

## 2019-08-06 RX ORDER — METHYLPREDNISOLONE ACETATE 40 MG/ML
40 INJECTION, SUSPENSION INTRA-ARTICULAR; INTRALESIONAL; INTRAMUSCULAR; SOFT TISSUE ONCE
Status: CANCELLED | OUTPATIENT
Start: 2019-08-06 | End: 2019-08-06

## 2019-08-06 RX ORDER — METHYLPREDNISOLONE ACETATE 40 MG/ML
40 INJECTION, SUSPENSION INTRA-ARTICULAR; INTRALESIONAL; INTRAMUSCULAR; SOFT TISSUE ONCE
Status: DISCONTINUED | OUTPATIENT
Start: 2019-08-06 | End: 2019-08-09 | Stop reason: HOSPADM

## 2019-08-06 RX ORDER — IOPAMIDOL 408 MG/ML
3 INJECTION, SOLUTION INTRATHECAL
Status: CANCELLED | OUTPATIENT
Start: 2019-08-06

## 2019-08-06 RX ORDER — LIDOCAINE HYDROCHLORIDE 5 MG/ML
50 INJECTION, SOLUTION INFILTRATION; INTRAVENOUS
Status: ACTIVE | OUTPATIENT
Start: 2019-08-06 | End: 2019-08-06

## 2019-08-06 RX ORDER — LIDOCAINE HYDROCHLORIDE 5 MG/ML
50 INJECTION, SOLUTION INFILTRATION; INTRAVENOUS
Status: CANCELLED | OUTPATIENT
Start: 2019-08-06 | End: 2019-08-06

## 2019-08-06 NOTE — PROGRESS NOTES
Pt dressed and left ambulatory with walker assistance to waiting area to meet her . She understands that she will reschedule this procedure with Dr Harris Valentino office.

## 2019-08-06 NOTE — SEDATION DOCUMENTATION
Procedure  Cancelled due to patient taking Versa Richar yesterday. Procedure cancelled per Dr. Alberta Fowler.

## 2019-08-13 ENCOUNTER — HOSPITAL ENCOUNTER (OUTPATIENT)
Dept: INTERVENTIONAL RADIOLOGY/VASCULAR | Age: 59
Discharge: HOME | End: 2019-08-15
Payer: COMMERCIAL

## 2019-08-13 VITALS
RESPIRATION RATE: 16 BRPM | DIASTOLIC BLOOD PRESSURE: 81 MMHG | OXYGEN SATURATION: 98 % | SYSTOLIC BLOOD PRESSURE: 121 MMHG | TEMPERATURE: 97.1 F | HEART RATE: 70 BPM

## 2019-08-13 DIAGNOSIS — M51.36 DDD (DEGENERATIVE DISC DISEASE), LUMBAR: ICD-10-CM

## 2019-08-13 PROCEDURE — 2500000003 HC RX 250 WO HCPCS: Performed by: PHYSICAL MEDICINE & REHABILITATION

## 2019-08-13 PROCEDURE — 62323 NJX INTERLAMINAR LMBR/SAC: CPT | Performed by: PHYSICAL MEDICINE & REHABILITATION

## 2019-08-13 PROCEDURE — 6360000002 HC RX W HCPCS: Performed by: PHYSICAL MEDICINE & REHABILITATION

## 2019-08-13 PROCEDURE — 2709999900 IR NERVE BLOCK PROCEDURE

## 2019-08-13 PROCEDURE — 6360000004 HC RX CONTRAST MEDICATION: Performed by: PHYSICAL MEDICINE & REHABILITATION

## 2019-08-13 RX ORDER — LIDOCAINE HYDROCHLORIDE 5 MG/ML
50 INJECTION, SOLUTION INFILTRATION; INTRAVENOUS
Status: COMPLETED | OUTPATIENT
Start: 2019-08-13 | End: 2019-08-13

## 2019-08-13 RX ORDER — METHYLPREDNISOLONE ACETATE 40 MG/ML
40 INJECTION, SUSPENSION INTRA-ARTICULAR; INTRALESIONAL; INTRAMUSCULAR; SOFT TISSUE ONCE
Status: COMPLETED | OUTPATIENT
Start: 2019-08-13 | End: 2019-08-13

## 2019-08-13 RX ORDER — LIDOCAINE HYDROCHLORIDE 5 MG/ML
50 INJECTION, SOLUTION INFILTRATION; INTRAVENOUS
Status: DISPENSED | OUTPATIENT
Start: 2019-08-13 | End: 2019-08-13

## 2019-08-13 RX ORDER — METHYLPREDNISOLONE ACETATE 40 MG/ML
40 INJECTION, SUSPENSION INTRA-ARTICULAR; INTRALESIONAL; INTRAMUSCULAR; SOFT TISSUE ONCE
Status: CANCELLED | OUTPATIENT
Start: 2019-08-13 | End: 2019-08-13

## 2019-08-13 RX ORDER — IOPAMIDOL 408 MG/ML
3 INJECTION, SOLUTION INTRATHECAL
Status: CANCELLED | OUTPATIENT
Start: 2019-08-13

## 2019-08-13 RX ORDER — LIDOCAINE HYDROCHLORIDE 5 MG/ML
50 INJECTION, SOLUTION INFILTRATION; INTRAVENOUS
Status: CANCELLED | OUTPATIENT
Start: 2019-08-13 | End: 2019-08-13

## 2019-08-13 RX ADMIN — IOPAMIDOL 3 ML: 408 INJECTION, SOLUTION INTRATHECAL at 11:31

## 2019-08-13 RX ADMIN — LIDOCAINE HYDROCHLORIDE 18 ML: 5 INJECTION, SOLUTION INFILTRATION at 11:29

## 2019-08-13 RX ADMIN — METHYLPREDNISOLONE ACETATE 40 MG: 40 INJECTION, SUSPENSION INTRA-ARTICULAR; INTRALESIONAL; INTRAMUSCULAR; SOFT TISSUE at 11:32

## 2019-08-13 ASSESSMENT — PAIN - FUNCTIONAL ASSESSMENT
PAIN_FUNCTIONAL_ASSESSMENT: PREVENTS OR INTERFERES SOME ACTIVE ACTIVITIES AND ADLS
PAIN_FUNCTIONAL_ASSESSMENT: 0-10

## 2019-08-13 NOTE — PROGRESS NOTES
Returned from block procedure; states that she is numb at the lower center back. She dressed independently. Dr Phipps Prom told pt not to resume xarelto until tomorrow. Discharge instructions reviewed and pt verbalizes understanding.

## 2019-08-13 NOTE — PROGRESS NOTES
Arrived via walker for caudal epidural block. PT TOOK LAST XARELTO ON Saturday AUGUST 10th. At 11:15AM, pt taken to [facility] for nerve block procedure.

## 2019-08-13 NOTE — SEDATION DOCUMENTATION
Patient assisted to table in prone position with  little help. Vital signs monitor available. Reassurance offered to patient.

## 2019-08-16 ENCOUNTER — TELEPHONE (OUTPATIENT)
Dept: GASTROENTEROLOGY | Age: 59
End: 2019-08-16

## 2019-08-16 NOTE — TELEPHONE ENCOUNTER
Spoke to patient about normal esophageal manometry results. She will be coming in next week for a follow up and to discuss her symptoms.

## 2019-08-22 ENCOUNTER — OFFICE VISIT (OUTPATIENT)
Dept: PHYSICAL MEDICINE AND REHAB | Age: 59
End: 2019-08-22
Payer: COMMERCIAL

## 2019-08-22 VITALS
HEIGHT: 62 IN | WEIGHT: 202 LBS | SYSTOLIC BLOOD PRESSURE: 122 MMHG | DIASTOLIC BLOOD PRESSURE: 88 MMHG | BODY MASS INDEX: 37.17 KG/M2

## 2019-08-22 DIAGNOSIS — M33.22 POLYMYOSITIS WITH MYOPATHY (HCC): ICD-10-CM

## 2019-08-22 DIAGNOSIS — M79.7 FIBROMYALGIA MUSCLE PAIN: ICD-10-CM

## 2019-08-22 DIAGNOSIS — M15.9 GENERALIZED OA: ICD-10-CM

## 2019-08-22 DIAGNOSIS — Z79.899 HIGH RISK MEDICATION USE: Chronic | ICD-10-CM

## 2019-08-22 DIAGNOSIS — M54.6 CHRONIC BILATERAL THORACIC BACK PAIN: ICD-10-CM

## 2019-08-22 DIAGNOSIS — G89.29 CHRONIC BILATERAL THORACIC BACK PAIN: ICD-10-CM

## 2019-08-22 DIAGNOSIS — G89.29 CHRONIC MIDLINE LOW BACK PAIN WITH RIGHT-SIDED SCIATICA: ICD-10-CM

## 2019-08-22 DIAGNOSIS — M54.40 BILATERAL LOW BACK PAIN WITH SCIATICA, SCIATICA LATERALITY UNSPECIFIED, UNSPECIFIED CHRONICITY: ICD-10-CM

## 2019-08-22 DIAGNOSIS — F32.0 CURRENT MILD EPISODE OF MAJOR DEPRESSIVE DISORDER WITHOUT PRIOR EPISODE (HCC): ICD-10-CM

## 2019-08-22 DIAGNOSIS — E24.2 CUSHINGOID SIDE EFFECT OF STEROIDS (HCC): ICD-10-CM

## 2019-08-22 DIAGNOSIS — R76.8 POSITIVE ANA (ANTINUCLEAR ANTIBODY): ICD-10-CM

## 2019-08-22 DIAGNOSIS — M53.3 SI (SACROILIAC) PAIN: Primary | ICD-10-CM

## 2019-08-22 DIAGNOSIS — M54.41 CHRONIC MIDLINE LOW BACK PAIN WITH RIGHT-SIDED SCIATICA: ICD-10-CM

## 2019-08-22 DIAGNOSIS — Z79.899 HIGH RISK MEDICATION USE: ICD-10-CM

## 2019-08-22 DIAGNOSIS — M25.532 WRIST PAIN, LEFT: ICD-10-CM

## 2019-08-22 DIAGNOSIS — M79.89 RIGHT LEG SWELLING: ICD-10-CM

## 2019-08-22 DIAGNOSIS — M81.8 OTHER OSTEOPOROSIS WITHOUT CURRENT PATHOLOGICAL FRACTURE: ICD-10-CM

## 2019-08-22 PROCEDURE — 99214 OFFICE O/P EST MOD 30 MIN: CPT | Performed by: PHYSICAL MEDICINE & REHABILITATION

## 2019-08-22 RX ORDER — LIDOCAINE 50 MG/G
1 PATCH TOPICAL DAILY
Qty: 30 PATCH | Refills: 2 | Status: SHIPPED | OUTPATIENT
Start: 2019-08-22 | End: 2019-10-23 | Stop reason: SDUPTHER

## 2019-08-22 RX ORDER — CYCLOBENZAPRINE HCL 10 MG
10 TABLET ORAL 2 TIMES DAILY PRN
Qty: 60 TABLET | Refills: 1 | Status: SHIPPED | OUTPATIENT
Start: 2019-08-22 | End: 2019-10-23 | Stop reason: SDUPTHER

## 2019-08-22 ASSESSMENT — ENCOUNTER SYMPTOMS
CONSTIPATION: 0
DIARRHEA: 0
BACK PAIN: 1
EYE PAIN: 0
NAUSEA: 0
SORE THROAT: 0
VOMITING: 0
ABDOMINAL PAIN: 0
BOWEL INCONTINENCE: 0
PHOTOPHOBIA: 0
APNEA: 0
CHEST TIGHTNESS: 0
COLOR CHANGE: 0
SHORTNESS OF BREATH: 1
COUGH: 0
WHEEZING: 0

## 2019-08-22 NOTE — PROGRESS NOTES
made to wean to the lowest effective dose. Other therapies for pain have not been effective including nonopiate medications. Injections and exercises are only partially effective. A Rx for Narcan was offered to help prevent accidental overdose. RX Monitoring 8/22/2019   Attestation -   Periodic Controlled Substance Monitoring Possible medication side effects, risk of tolerance/dependence & alternative treatments discussed. ;No signs of potential drug abuse or diversion identified. ;Assessed functional status. ;Obtaining appropriate analgesic effect of treatment. Chronic Pain > 50 MEDD Re-evaluated the status of the patient's underlying condition causing pain. ;Considered consultation with a specialist.;Obtained or confirmed \"Consent for Opioid Use\" on file. Chronic Pain > 80 MEDD -       Periodic Controlled Substance Monitoring: Possible medication side effects, risk of tolerance/dependence & alternative treatments discussed., No signs of potential drug abuse or diversion identified. , Assessed functional status., Obtaining appropriate analgesic effect of treatment. (Frantz Calzada, DO)       Patient is currently taking:       I am having Mandeepjoshjanusz Dumont maintain her vitamin D, ACCU-CHEK ADVANTAGE DIABETES, blood glucose test strips, Lancets, albuterol sulfate HFA, amitriptyline, hydroxychloroquine, diclofenac sodium, hydrocortisone, hydrOXYzine, levETIRAcetam, gabapentin, cyanocobalamin, folic acid, esomeprazole, triamcinolone, citalopram, Na Sulfate-K Sulfate-Mg Sulf, rivaroxaban, furosemide, carvedilol, methotrexate, metaxalone, predniSONE, cholestyramine, metFORMIN, omeprazole, diazepam, lidocaine, cyclobenzaprine, and traMADol-acetaminophen. We will continue to administer lidocaine. I also recommend the following Medications:    Orders Placed This Encounter   Medications    lidocaine (LIDODERM) 5 %     Sig: Place 1 patch onto the skin daily 12 hours on, 12 hours off.      Dispense:  30

## 2019-08-25 DIAGNOSIS — F32.A DEPRESSION, UNSPECIFIED DEPRESSION TYPE: ICD-10-CM

## 2019-08-26 RX ORDER — CITALOPRAM 40 MG/1
40 TABLET ORAL DAILY
Qty: 90 TABLET | Refills: 1 | Status: SHIPPED | OUTPATIENT
Start: 2019-08-26 | End: 2020-02-03 | Stop reason: SDUPTHER

## 2019-08-27 RX ORDER — GABAPENTIN 400 MG/1
400 CAPSULE ORAL 3 TIMES DAILY
Qty: 270 CAPSULE | Refills: 0 | Status: SHIPPED | OUTPATIENT
Start: 2019-08-27 | End: 2020-02-18 | Stop reason: SDUPTHER

## 2019-08-27 NOTE — TELEPHONE ENCOUNTER
Patient requesting medication refill. Please approve or deny this request.    Rx requested:  Requested Prescriptions     Pending Prescriptions Disp Refills    gabapentin (NEURONTIN) 400 MG capsule 270 capsule 0     Sig: Take 1 capsule by mouth 3 times daily for 45 days.          Last Office Visit:   7/15/2019      Next Visit Date:  Future Appointments   Date Time Provider Delmer Zhao   8/28/2019 10:00 AM Bertha Hernandez APRN - [facility]   10/14/2019  7:10 AM BUTCH Cronin - [facility]   10/23/2019 11:00 AM DO [facility] EMERGENCY [facility] [place]   5/7/2020  8:00 AM DO Carol Tyler Ra

## 2019-08-28 ENCOUNTER — TELEPHONE (OUTPATIENT)
Dept: GASTROENTEROLOGY | Age: 59
End: 2019-08-28

## 2019-08-28 ENCOUNTER — OFFICE VISIT (OUTPATIENT)
Dept: GASTROENTEROLOGY | Age: 59
End: 2019-08-28
Payer: COMMERCIAL

## 2019-08-28 VITALS
HEART RATE: 91 BPM | HEIGHT: 62 IN | BODY MASS INDEX: 37.69 KG/M2 | RESPIRATION RATE: 20 BRPM | OXYGEN SATURATION: 92 % | WEIGHT: 204.8 LBS

## 2019-08-28 DIAGNOSIS — K21.9 GASTROESOPHAGEAL REFLUX DISEASE WITHOUT ESOPHAGITIS: Primary | ICD-10-CM

## 2019-08-28 DIAGNOSIS — R13.10 DYSPHAGIA, UNSPECIFIED TYPE: ICD-10-CM

## 2019-08-28 PROCEDURE — 99213 OFFICE O/P EST LOW 20 MIN: CPT | Performed by: NURSE PRACTITIONER

## 2019-08-28 RX ORDER — LANSOPRAZOLE 30 MG/1
30 CAPSULE, DELAYED RELEASE ORAL DAILY
Qty: 90 CAPSULE | Refills: 1 | Status: SHIPPED | OUTPATIENT
Start: 2019-08-28 | End: 2021-01-01 | Stop reason: ALTCHOICE

## 2019-08-28 RX ORDER — RANITIDINE 300 MG/1
300 TABLET ORAL NIGHTLY
Qty: 90 TABLET | Refills: 1 | Status: SHIPPED | OUTPATIENT
Start: 2019-08-28 | End: 2019-11-25 | Stop reason: ALTCHOICE

## 2019-08-28 NOTE — PROGRESS NOTES
Gastroenterology Clinic Follow up Visit    Cedrick Mcgee  [id number]  Chief Complaint   Patient presents with    Follow-up     Background:59 y.o. female last seen in GI clinic on 5/20/19 with intermittent dysphagia to solids and pills. Symptoms not relieved with esophageal dilation or PPI. Patient referred to [facility] for esophageal manometry. Balsalazide discontinued. Questran added to help with loose stools along with dietary fiber. Interval change: Patient presents to the GI clinic to go over in detail esophageal manometry results. Normal Findings discussed at length with the patient. Patient reports still experiences acid reflux despite taking omeprazole 30 minutes before breakfast.  He reports intermittent dysphagia with foods like [employer] fries, bread, and small pills. She denies regurgitation or odynophagia. He denies unintentional weight loss or loss of appetite. Diet mainly consists of fast food and processed food. Does not eat a diet high in fiber. No complaints of nausea or vomiting. She  denies abdominal pain. Takes Questran as needed, this helps with loose stools. No complaints of melena or hematochezia. Denies fever or chills. Denies chest pain, shortness of breath, or palpitations. Not keen on returning to [facility] for dysmotility clinic. Reports her symptoms have improved since April, 2019. Review of Systems   All other systems reviewed and are negative. Past medical history, past surgical history, medication list, social and familyhistory reviewed    Pulse 91, resp. rate 20, height 5' 2\" (1.575 m), weight 204 lb 12.8 oz (92.9 kg), SpO2 92 %, not currently breastfeeding. Physical Exam   Constitutional: She is oriented to person, place, and time. She appears well-developed and well-nourished. No distress. HENT:   Head: Normocephalic and atraumatic. Eyes: Pupils are equal, round, and reactive to light. Conjunctivae and EOM are normal. No scleral icterus. Neck: Normal range of motion. Neck supple. Cardiovascular: Normal rate, regular rhythm and normal heart sounds. No murmur heard. Pulmonary/Chest: Effort normal and breath sounds normal. No respiratory distress. She has no wheezes. She has no rales. She exhibits no tenderness. Abdominal: Soft. Bowel sounds are normal. She exhibits no distension and no mass. There is no tenderness. There is no rebound and no guarding. Central obesity   Musculoskeletal: Normal range of motion. Lymphadenopathy:     She has no cervical adenopathy. Neurological: She is alert and oriented to person, place, and time. Skin: Skin is warm and dry. No rash noted. She is not diaphoretic. No erythema. No pallor. Psychiatric: She has a normal mood and affect. Her behavior is normal. Judgment and thought content normal.   Vitals reviewed. Laboratory, Pathology, Radiology reviewed in detail with relevantimportant investigations summarized below:    No results for input(s): WBC, HGB, HCT, MCV, PLT in the last 720 hours. Lab Results   Component Value Date    ALT 33 05/30/2019    AST 23 05/30/2019    ALKPHOS 51 05/30/2019    BILITOT 0.5 05/30/2019   Endoscopic investigations:  Colonoscopy 4/17/19- normal colonic mucosa, diminutive ascending colon polyp, small internal hemorrhoids. EGD Dilation with 20 mm SG 4/2019- Normal esophageal mucosa, nonobstructing Schatzki's ring, 3 cm hiatal hernia. Pathology:  Mid esophageal biopsies:  Squamous  esophageal mucosa with minimal inflammation. Negative for intestinal-type goblet cells and fungal organisms. No glandular close-up present. Random colon biopsies:  Colonic mucosa with mild chronic nonspecific inflammation in the lamina propria and slightly increased intraepithelial lymphocytes suggestive of early lymphocytic colitis      Assessment and Plan:  Sujit Rios 61 y.o. female with GERD. Will discontinue omeprazole.   Will add lansoprazole 30 mg daily 15 to 30 minutes before

## 2019-09-05 ENCOUNTER — TELEPHONE (OUTPATIENT)
Dept: FAMILY MEDICINE CLINIC | Age: 59
End: 2019-09-05

## 2019-09-24 DIAGNOSIS — Z86.711 HISTORY OF PULMONARY THROMBOSIS: ICD-10-CM

## 2019-10-10 ENCOUNTER — HOSPITAL ENCOUNTER (OUTPATIENT)
Dept: LAB | Age: 59
Discharge: HOME | End: 2019-10-10
Payer: COMMERCIAL

## 2019-10-10 DIAGNOSIS — E78.5 DYSLIPIDEMIA: ICD-10-CM

## 2019-10-10 DIAGNOSIS — I10 ESSENTIAL HYPERTENSION: ICD-10-CM

## 2019-10-10 DIAGNOSIS — E55.9 VITAMIN D DEFICIENCY: ICD-10-CM

## 2019-10-10 DIAGNOSIS — E11.9 TYPE 2 DIABETES MELLITUS WITHOUT COMPLICATION, WITHOUT LONG-TERM CURRENT USE OF INSULIN (HCC): Chronic | ICD-10-CM

## 2019-10-10 LAB
ALBUMIN SERPL-MCNC: 4.2 G/DL (ref 3.5–4.6)
ALP BLD-CCNC: 57 U/L (ref 40–130)
ALT SERPL-CCNC: 28 U/L (ref 0–33)
ANION GAP SERPL CALCULATED.3IONS-SCNC: 13 MEQ/L (ref 9–15)
AST SERPL-CCNC: 22 U/L (ref 0–35)
BASOPHILS ABSOLUTE: 0.1 K/UL (ref 0–0.2)
BASOPHILS RELATIVE PERCENT: 0.8 %
BILIRUB SERPL-MCNC: 0.3 MG/DL (ref 0.2–0.7)
BUN BLDV-MCNC: 22 MG/DL (ref 6–20)
CALCIUM SERPL-MCNC: 9.1 MG/DL (ref 8.5–9.9)
CHLORIDE BLD-SCNC: 97 MEQ/L (ref 95–107)
CHOLESTEROL, TOTAL: 173 MG/DL (ref 0–199)
CO2: 29 MEQ/L (ref 20–31)
CREAT SERPL-MCNC: 1.04 MG/DL (ref 0.5–0.9)
CREATININE URINE: 150.3 MG/DL
EOSINOPHILS ABSOLUTE: 0.2 K/UL (ref 0–0.7)
EOSINOPHILS RELATIVE PERCENT: 2.7 %
GFR AFRICAN AMERICAN: >60
GFR NON-AFRICAN AMERICAN: 54.1
GLOBULIN: 2 G/DL (ref 2.3–3.5)
GLUCOSE BLD-MCNC: 108 MG/DL (ref 70–99)
HBA1C MFR BLD: 6.9 % (ref 4.8–5.9)
HCT VFR BLD CALC: 36.3 % (ref 37–47)
HDLC SERPL-MCNC: 71 MG/DL (ref 40–59)
HEMOGLOBIN: 11.8 G/DL (ref 12–16)
LDL CHOLESTEROL CALCULATED: 61 MG/DL (ref 0–129)
LYMPHOCYTES ABSOLUTE: 3 K/UL (ref 1–4.8)
LYMPHOCYTES RELATIVE PERCENT: 41.1 %
MCH RBC QN AUTO: 33.1 PG (ref 27–31.3)
MCHC RBC AUTO-ENTMCNC: 32.6 % (ref 33–37)
MCV RBC AUTO: 101.6 FL (ref 82–100)
MICROALBUMIN UR-MCNC: 1.3 MG/DL
MICROALBUMIN/CREAT UR-RTO: 8.6 MG/G (ref 0–30)
MONOCYTES ABSOLUTE: 0.4 K/UL (ref 0.2–0.8)
MONOCYTES RELATIVE PERCENT: 5.5 %
NEUTROPHILS ABSOLUTE: 3.6 K/UL (ref 1.4–6.5)
NEUTROPHILS RELATIVE PERCENT: 49.9 %
PDW BLD-RTO: 15.2 % (ref 11.5–14.5)
PLATELET # BLD: 266 K/UL (ref 130–400)
POTASSIUM SERPL-SCNC: 4.3 MEQ/L (ref 3.4–4.9)
RBC # BLD: 3.57 M/UL (ref 4.2–5.4)
SODIUM BLD-SCNC: 139 MEQ/L (ref 135–144)
TOTAL PROTEIN: 6.2 G/DL (ref 6.3–8)
TRIGL SERPL-MCNC: 206 MG/DL (ref 0–150)
VITAMIN D 25-HYDROXY: 66.4 NG/ML (ref 30–100)
WBC # BLD: 7.2 K/UL (ref 4.8–10.8)

## 2019-10-10 PROCEDURE — 85025 COMPLETE CBC W/AUTO DIFF WBC: CPT

## 2019-10-10 PROCEDURE — 82043 UR ALBUMIN QUANTITATIVE: CPT

## 2019-10-10 PROCEDURE — 83036 HEMOGLOBIN GLYCOSYLATED A1C: CPT

## 2019-10-10 PROCEDURE — 36415 COLL VENOUS BLD VENIPUNCTURE: CPT

## 2019-10-10 PROCEDURE — 80053 COMPREHEN METABOLIC PANEL: CPT

## 2019-10-10 PROCEDURE — 82306 VITAMIN D 25 HYDROXY: CPT

## 2019-10-10 PROCEDURE — 80061 LIPID PANEL: CPT

## 2019-10-10 PROCEDURE — 82570 ASSAY OF URINE CREATININE: CPT

## 2019-10-14 ENCOUNTER — OFFICE VISIT (OUTPATIENT)
Dept: FAMILY MEDICINE CLINIC | Age: 59
End: 2019-10-14
Payer: COMMERCIAL

## 2019-10-14 VITALS
HEIGHT: 62 IN | HEART RATE: 98 BPM | DIASTOLIC BLOOD PRESSURE: 78 MMHG | RESPIRATION RATE: 16 BRPM | OXYGEN SATURATION: 98 % | TEMPERATURE: 97.9 F | WEIGHT: 208 LBS | BODY MASS INDEX: 38.28 KG/M2 | SYSTOLIC BLOOD PRESSURE: 118 MMHG

## 2019-10-14 DIAGNOSIS — R20.2 PARESTHESIA OF BOTH FEET: ICD-10-CM

## 2019-10-14 DIAGNOSIS — F32.0 CURRENT MILD EPISODE OF MAJOR DEPRESSIVE DISORDER WITHOUT PRIOR EPISODE (HCC): ICD-10-CM

## 2019-10-14 DIAGNOSIS — E78.5 DYSLIPIDEMIA: ICD-10-CM

## 2019-10-14 DIAGNOSIS — I10 ESSENTIAL HYPERTENSION: ICD-10-CM

## 2019-10-14 DIAGNOSIS — R71.8 ELEVATED MCV: ICD-10-CM

## 2019-10-14 DIAGNOSIS — N18.30 CKD STAGE 3 SECONDARY TO DIABETES (HCC): ICD-10-CM

## 2019-10-14 DIAGNOSIS — G57.93 NEUROPATHY OF BOTH FEET: ICD-10-CM

## 2019-10-14 DIAGNOSIS — M62.81 MUSCLE WEAKNESS (GENERALIZED): ICD-10-CM

## 2019-10-14 DIAGNOSIS — M33.22 POLYMYOSITIS WITH MYOPATHY (HCC): ICD-10-CM

## 2019-10-14 DIAGNOSIS — L93.0 DISCOID LUPUS ERYTHEMATOSUS: ICD-10-CM

## 2019-10-14 DIAGNOSIS — K21.9 GASTROESOPHAGEAL REFLUX DISEASE WITHOUT ESOPHAGITIS: ICD-10-CM

## 2019-10-14 DIAGNOSIS — Z23 NEED FOR VACCINATION FOR STREP PNEUMONIAE: ICD-10-CM

## 2019-10-14 DIAGNOSIS — D68.51 FACTOR V LEIDEN CARRIER (HCC): ICD-10-CM

## 2019-10-14 DIAGNOSIS — M79.7 FIBROMYALGIA MUSCLE PAIN: ICD-10-CM

## 2019-10-14 DIAGNOSIS — E11.22 CKD STAGE 3 SECONDARY TO DIABETES (HCC): ICD-10-CM

## 2019-10-14 DIAGNOSIS — E11.9 TYPE 2 DIABETES MELLITUS WITHOUT COMPLICATION, WITHOUT LONG-TERM CURRENT USE OF INSULIN (HCC): Primary | ICD-10-CM

## 2019-10-14 DIAGNOSIS — D84.9 IMMUNOSUPPRESSED STATUS (HCC): ICD-10-CM

## 2019-10-14 DIAGNOSIS — E55.9 VITAMIN D DEFICIENCY: ICD-10-CM

## 2019-10-14 DIAGNOSIS — L29.9 GENERALIZED PRURITUS: ICD-10-CM

## 2019-10-14 PROCEDURE — 90670 PCV13 VACCINE IM: CPT | Performed by: NURSE PRACTITIONER

## 2019-10-14 PROCEDURE — 90471 IMMUNIZATION ADMIN: CPT | Performed by: NURSE PRACTITIONER

## 2019-10-14 PROCEDURE — 99214 OFFICE O/P EST MOD 30 MIN: CPT | Performed by: NURSE PRACTITIONER

## 2019-10-14 RX ORDER — HYDROXYZINE PAMOATE 25 MG/1
25 CAPSULE ORAL 4 TIMES DAILY PRN
Qty: 270 CAPSULE | Refills: 1 | Status: SHIPPED | OUTPATIENT
Start: 2019-10-14 | End: 2020-02-03 | Stop reason: SDUPTHER

## 2019-10-14 ASSESSMENT — PATIENT HEALTH QUESTIONNAIRE - PHQ9
SUM OF ALL RESPONSES TO PHQ QUESTIONS 1-9: 2
1. LITTLE INTEREST OR PLEASURE IN DOING THINGS: 1
2. FEELING DOWN, DEPRESSED OR HOPELESS: 1
SUM OF ALL RESPONSES TO PHQ QUESTIONS 1-9: 2
SUM OF ALL RESPONSES TO PHQ9 QUESTIONS 1 & 2: 2

## 2019-10-23 ENCOUNTER — OFFICE VISIT (OUTPATIENT)
Dept: PHYSICAL MEDICINE AND REHAB | Age: 59
End: 2019-10-23
Payer: COMMERCIAL

## 2019-10-23 VITALS
DIASTOLIC BLOOD PRESSURE: 76 MMHG | SYSTOLIC BLOOD PRESSURE: 94 MMHG | HEIGHT: 63 IN | WEIGHT: 199 LBS | BODY MASS INDEX: 35.26 KG/M2

## 2019-10-23 DIAGNOSIS — G89.29 CHRONIC BILATERAL THORACIC BACK PAIN: ICD-10-CM

## 2019-10-23 DIAGNOSIS — G89.29 CHRONIC MIDLINE LOW BACK PAIN WITH RIGHT-SIDED SCIATICA: ICD-10-CM

## 2019-10-23 DIAGNOSIS — R10.9 CHRONIC ABDOMINAL PAIN: Chronic | ICD-10-CM

## 2019-10-23 DIAGNOSIS — M54.41 CHRONIC MIDLINE LOW BACK PAIN WITH RIGHT-SIDED SCIATICA: ICD-10-CM

## 2019-10-23 DIAGNOSIS — E55.9 VITAMIN D DEFICIENCY: ICD-10-CM

## 2019-10-23 DIAGNOSIS — G89.29 CHRONIC ABDOMINAL PAIN: Chronic | ICD-10-CM

## 2019-10-23 DIAGNOSIS — M54.6 CHRONIC BILATERAL THORACIC BACK PAIN: ICD-10-CM

## 2019-10-23 DIAGNOSIS — L93.0 LUPUS ERYTHEMATOSUS, UNSPECIFIED FORM: Chronic | ICD-10-CM

## 2019-10-23 DIAGNOSIS — Z79.899 HIGH RISK MEDICATION USE: ICD-10-CM

## 2019-10-23 DIAGNOSIS — M15.9 GENERALIZED OA: ICD-10-CM

## 2019-10-23 DIAGNOSIS — Z79.899 HIGH RISK MEDICATION USE: Chronic | ICD-10-CM

## 2019-10-23 DIAGNOSIS — G89.29 CHRONIC BILATERAL LOW BACK PAIN WITH SCIATICA, SCIATICA LATERALITY UNSPECIFIED: Primary | ICD-10-CM

## 2019-10-23 DIAGNOSIS — M79.7 FIBROMYALGIA MUSCLE PAIN: ICD-10-CM

## 2019-10-23 DIAGNOSIS — M53.3 SI (SACROILIAC) PAIN: ICD-10-CM

## 2019-10-23 DIAGNOSIS — M54.40 CHRONIC BILATERAL LOW BACK PAIN WITH SCIATICA, SCIATICA LATERALITY UNSPECIFIED: Primary | ICD-10-CM

## 2019-10-23 PROCEDURE — 99214 OFFICE O/P EST MOD 30 MIN: CPT | Performed by: PHYSICAL MEDICINE & REHABILITATION

## 2019-10-23 RX ORDER — LIDOCAINE 50 MG/G
1 PATCH TOPICAL DAILY
Qty: 30 PATCH | Refills: 2 | Status: SHIPPED | OUTPATIENT
Start: 2019-10-23 | End: 2020-01-03 | Stop reason: SDUPTHER

## 2019-10-23 RX ORDER — CYCLOBENZAPRINE HCL 10 MG
10 TABLET ORAL 2 TIMES DAILY PRN
Qty: 60 TABLET | Refills: 1 | Status: SHIPPED | OUTPATIENT
Start: 2019-10-23 | End: 2020-01-03 | Stop reason: SDUPTHER

## 2019-10-23 ASSESSMENT — ENCOUNTER SYMPTOMS
VOMITING: 0
DIARRHEA: 0
NAUSEA: 0
SHORTNESS OF BREATH: 1
BOWEL INCONTINENCE: 0
ABDOMINAL PAIN: 0
WHEEZING: 0
SORE THROAT: 0
APNEA: 0
CHEST TIGHTNESS: 0
PHOTOPHOBIA: 0
CONSTIPATION: 0
COLOR CHANGE: 0
EYE PAIN: 0
BACK PAIN: 1
COUGH: 0

## 2019-11-04 RX ORDER — AMITRIPTYLINE HYDROCHLORIDE 25 MG/1
TABLET, FILM COATED ORAL
Qty: 180 TABLET | Refills: 3 | Status: SHIPPED | OUTPATIENT
Start: 2019-11-04 | End: 2020-06-17 | Stop reason: SDUPTHER

## 2019-11-08 DIAGNOSIS — K21.9 GASTROESOPHAGEAL REFLUX DISEASE, ESOPHAGITIS PRESENCE NOT SPECIFIED: ICD-10-CM

## 2019-11-08 RX ORDER — OMEPRAZOLE 40 MG/1
40 CAPSULE, DELAYED RELEASE ORAL DAILY
Qty: 90 CAPSULE | Refills: 3 | Status: SHIPPED | OUTPATIENT
Start: 2019-11-08 | End: 2020-05-01 | Stop reason: ALTCHOICE

## 2019-11-25 ENCOUNTER — TELEPHONE (OUTPATIENT)
Dept: GASTROENTEROLOGY | Age: 59
End: 2019-11-25

## 2019-11-25 RX ORDER — FAMOTIDINE 40 MG/1
20 TABLET, FILM COATED ORAL 2 TIMES DAILY PRN
Qty: 30 TABLET | Refills: 3 | Status: SHIPPED
Start: 2019-11-25 | End: 2020-05-01 | Stop reason: ALTCHOICE

## 2019-12-18 ENCOUNTER — OFFICE VISIT (OUTPATIENT)
Dept: NEUROLOGY | Age: 59
End: 2019-12-18
Payer: COMMERCIAL

## 2019-12-18 VITALS
HEART RATE: 106 BPM | DIASTOLIC BLOOD PRESSURE: 90 MMHG | BODY MASS INDEX: 35.7 KG/M2 | HEIGHT: 62 IN | WEIGHT: 194 LBS | SYSTOLIC BLOOD PRESSURE: 118 MMHG

## 2019-12-18 DIAGNOSIS — M54.16 LUMBAR RADICULAR PAIN: ICD-10-CM

## 2019-12-18 DIAGNOSIS — G45.9: ICD-10-CM

## 2019-12-18 DIAGNOSIS — R26.0 ATAXIC GAIT: ICD-10-CM

## 2019-12-18 DIAGNOSIS — G60.0 HEREDITARY SENSORY-MOTOR NEUROPATHY, TYPE I: ICD-10-CM

## 2019-12-18 DIAGNOSIS — G25.0 BENIGN ESSENTIAL TREMOR: ICD-10-CM

## 2019-12-18 PROCEDURE — 99214 OFFICE O/P EST MOD 30 MIN: CPT | Performed by: PSYCHIATRY & NEUROLOGY

## 2019-12-18 RX ORDER — ALLOPURINOL 300 MG/1
TABLET ORAL
COMMUNITY
Start: 2019-11-12

## 2019-12-18 RX ORDER — AMITRIPTYLINE HYDROCHLORIDE 25 MG/1
50 TABLET, FILM COATED ORAL NIGHTLY
Qty: 180 TABLET | Refills: 1 | Status: SHIPPED | OUTPATIENT
Start: 2019-12-18 | End: 2020-02-03 | Stop reason: CLARIF

## 2019-12-18 RX ORDER — LEUCOVORIN CALCIUM 5 MG/1
TABLET ORAL
COMMUNITY
Start: 2019-12-09

## 2019-12-18 RX ORDER — GABAPENTIN 400 MG/1
CAPSULE ORAL
COMMUNITY
Start: 2019-12-13 | End: 2020-02-03

## 2019-12-18 RX ORDER — LANOLIN ALCOHOL/MO/W.PET/CERES
CREAM (GRAM) TOPICAL
COMMUNITY
Start: 2019-12-12 | End: 2020-05-01

## 2019-12-18 RX ORDER — BALSALAZIDE DISODIUM 750 MG/1
CAPSULE ORAL
COMMUNITY
Start: 2019-10-21 | End: 2021-01-01 | Stop reason: ALTCHOICE

## 2019-12-18 RX ORDER — CYCLOBENZAPRINE HCL 10 MG
TABLET ORAL
COMMUNITY
Start: 2019-12-17 | End: 2020-02-03

## 2019-12-18 ASSESSMENT — ENCOUNTER SYMPTOMS
VOMITING: 0
TROUBLE SWALLOWING: 0
NAUSEA: 0
PHOTOPHOBIA: 0
CHOKING: 0
SHORTNESS OF BREATH: 0
BACK PAIN: 0

## 2020-01-01 ENCOUNTER — OFFICE VISIT (OUTPATIENT)
Dept: NEUROLOGY | Age: 60
End: 2020-01-01
Payer: COMMERCIAL

## 2020-01-01 VITALS
BODY MASS INDEX: 34.6 KG/M2 | HEART RATE: 94 BPM | DIASTOLIC BLOOD PRESSURE: 81 MMHG | SYSTOLIC BLOOD PRESSURE: 122 MMHG | TEMPERATURE: 97.1 F | WEIGHT: 195.3 LBS

## 2020-01-01 PROCEDURE — 99213 OFFICE O/P EST LOW 20 MIN: CPT | Performed by: PSYCHIATRY & NEUROLOGY

## 2020-01-01 ASSESSMENT — ENCOUNTER SYMPTOMS
VOMITING: 0
BACK PAIN: 0
NAUSEA: 0
TROUBLE SWALLOWING: 0
CHOKING: 0
PHOTOPHOBIA: 0
COLOR CHANGE: 0
SHORTNESS OF BREATH: 0

## 2020-01-03 ENCOUNTER — OFFICE VISIT (OUTPATIENT)
Dept: PHYSICAL MEDICINE AND REHAB | Age: 60
End: 2020-01-03
Payer: COMMERCIAL

## 2020-01-03 VITALS
DIASTOLIC BLOOD PRESSURE: 78 MMHG | WEIGHT: 186 LBS | HEIGHT: 62 IN | BODY MASS INDEX: 34.23 KG/M2 | SYSTOLIC BLOOD PRESSURE: 126 MMHG

## 2020-01-03 PROCEDURE — 99214 OFFICE O/P EST MOD 30 MIN: CPT | Performed by: PHYSICAL MEDICINE & REHABILITATION

## 2020-01-03 RX ORDER — LIDOCAINE 50 MG/G
1 PATCH TOPICAL DAILY
Qty: 30 PATCH | Refills: 1 | Status: SHIPPED | OUTPATIENT
Start: 2020-01-03 | End: 2020-03-03

## 2020-01-03 RX ORDER — CYCLOBENZAPRINE HCL 10 MG
10 TABLET ORAL 2 TIMES DAILY PRN
Qty: 60 TABLET | Refills: 1 | Status: SHIPPED | OUTPATIENT
Start: 2020-01-20 | End: 2020-02-19

## 2020-01-03 SDOH — HEALTH STABILITY: MENTAL HEALTH
STRESS IS WHEN SOMEONE FEELS TENSE, NERVOUS, ANXIOUS, OR CAN'T SLEEP AT NIGHT BECAUSE THEIR MIND IS TROUBLED. HOW STRESSED ARE YOU?: ONLY A LITTLE

## 2020-01-03 SDOH — SOCIAL STABILITY: SOCIAL NETWORK: ARE YOU MARRIED, WIDOWED, DIVORCED, SEPARATED, NEVER MARRIED, OR LIVING WITH A PARTNER?: MARRIED

## 2020-01-03 SDOH — SOCIAL STABILITY: SOCIAL INSECURITY: WITHIN THE LAST YEAR, HAVE YOU BEEN AFRAID OF YOUR PARTNER OR EX-PARTNER?: NO

## 2020-01-03 SDOH — ECONOMIC STABILITY: FOOD INSECURITY: WITHIN THE PAST 12 MONTHS, THE FOOD YOU BOUGHT JUST DIDN'T LAST AND YOU DIDN'T HAVE MONEY TO GET MORE.: NEVER TRUE

## 2020-01-03 SDOH — ECONOMIC STABILITY: TRANSPORTATION INSECURITY
IN THE PAST 12 MONTHS, HAS THE LACK OF TRANSPORTATION KEPT YOU FROM MEDICAL APPOINTMENTS OR FROM GETTING MEDICATIONS?: NO

## 2020-01-03 SDOH — SOCIAL STABILITY: SOCIAL INSECURITY
WITHIN THE LAST YEAR, HAVE YOU BEEN KICKED, HIT, SLAPPED, OR OTHERWISE PHYSICALLY HURT BY YOUR PARTNER OR EX-PARTNER?: NO

## 2020-01-03 SDOH — SOCIAL STABILITY: SOCIAL NETWORK
DO YOU BELONG TO ANY CLUBS OR ORGANIZATIONS SUCH AS CHURCH GROUPS UNIONS, FRATERNAL OR ATHLETIC GROUPS, OR SCHOOL GROUPS?: NO

## 2020-01-03 SDOH — HEALTH STABILITY: PHYSICAL HEALTH: ON AVERAGE, HOW MANY MINUTES DO YOU ENGAGE IN EXERCISE AT THIS LEVEL?: 0 MIN

## 2020-01-03 SDOH — SOCIAL STABILITY: SOCIAL NETWORK
IN A TYPICAL WEEK, HOW MANY TIMES DO YOU TALK ON THE PHONE WITH FAMILY, FRIENDS, OR NEIGHBORS?: MORE THAN THREE TIMES A WEEK

## 2020-01-03 SDOH — ECONOMIC STABILITY: TRANSPORTATION INSECURITY
IN THE PAST 12 MONTHS, HAS LACK OF TRANSPORTATION KEPT YOU FROM MEETINGS, WORK, OR FROM GETTING THINGS NEEDED FOR DAILY LIVING?: NO

## 2020-01-03 SDOH — ECONOMIC STABILITY: FOOD INSECURITY: WITHIN THE PAST 12 MONTHS, YOU WORRIED THAT YOUR FOOD WOULD RUN OUT BEFORE YOU GOT MONEY TO BUY MORE.: NEVER TRUE

## 2020-01-03 SDOH — HEALTH STABILITY: PHYSICAL HEALTH: ON AVERAGE, HOW MANY DAYS PER WEEK DO YOU ENGAGE IN MODERATE TO STRENUOUS EXERCISE (LIKE A BRISK WALK)?: 0 DAYS

## 2020-01-03 SDOH — SOCIAL STABILITY: SOCIAL NETWORK: HOW OFTEN DO YOU ATTENT MEETINGS OF THE CLUB OR ORGANIZATION YOU BELONG TO?: NEVER

## 2020-01-03 SDOH — SOCIAL STABILITY: SOCIAL INSECURITY: WITHIN THE LAST YEAR, HAVE YOU BEEN HUMILIATED OR EMOTIONALLY ABUSED IN OTHER WAYS BY YOUR PARTNER OR EX-PARTNER?: NO

## 2020-01-03 SDOH — SOCIAL STABILITY: SOCIAL NETWORK: HOW OFTEN DO YOU GET TOGETHER WITH FRIENDS OR RELATIVES?: MORE THAN THREE TIMES A WEEK

## 2020-01-03 SDOH — SOCIAL STABILITY: SOCIAL INSECURITY
WITHIN THE LAST YEAR, HAVE TO BEEN RAPED OR FORCED TO HAVE ANY KIND OF SEXUAL ACTIVITY BY YOUR PARTNER OR EX-PARTNER?: NO

## 2020-01-03 SDOH — ECONOMIC STABILITY: INCOME INSECURITY: HOW HARD IS IT FOR YOU TO PAY FOR THE VERY BASICS LIKE FOOD, HOUSING, MEDICAL CARE, AND HEATING?: NOT HARD AT ALL

## 2020-01-03 SDOH — SOCIAL STABILITY: SOCIAL NETWORK: HOW OFTEN DO YOU ATTEND CHURCH OR RELIGIOUS SERVICES?: 1 TO 4 TIMES PER YEAR

## 2020-01-03 ASSESSMENT — ENCOUNTER SYMPTOMS
COUGH: 0
APNEA: 0
NAUSEA: 0
EYE PAIN: 0
BOWEL INCONTINENCE: 0
CHEST TIGHTNESS: 0
PHOTOPHOBIA: 0
VOMITING: 0
WHEEZING: 0
COLOR CHANGE: 0
SORE THROAT: 0
ABDOMINAL PAIN: 0
DIARRHEA: 0
BACK PAIN: 1
SHORTNESS OF BREATH: 1
CONSTIPATION: 0

## 2020-01-03 NOTE — PROGRESS NOTES
Subjective  Chemo Byrne, 61 y.o. female presents today with:       Back Pain lower     Neck Pain everyday     Medication Refill Tramadol, Flexeril and Lidoderm patches. pt compliant with meds. She rarely takes the Ultracet  She has no signs of overuse or abuse however her Soaper score is rather high we discussed relaxation techniques and stress management. Seems like this score is getting better however. She is able to be more functional on the medication a little to have positive interactions with friends and family and perform light housework. IV rituxan at [facility] for RA he is trying to wean off of the prednisone--now down to alterating 10/7.5 mg. They are going very slowly on her dose she still has significant cushingoid side effects as well as osteoporosis. to resume Prolia infusions in February--for osteoporosis with Dr Noel Abernathy   Now Arpit Harris for myopathy emg reviewed at [facility]-- Steroids are not helping she is now getting infusion therapy. Trailing Plaquenil with Dr Noel Abernathy. She thinks that that type but also probably helping a little bit but doesn't seem to have great pain control with it. Her goal is to come off the prednisone and she is getting twice yearly infusions with Prolia. She remains on Xarelto because of her factor V Leiden clotting. Caudal blocks helped quite a bit however the logistics of which are difficult because of her Xarelto for the factor V Leiden as well as the fact that we are trying to limit her cortisone exposure. She recently had a caudal block which helped her pain quite a bit. It is complicated by the risk factor going off the Xarelto for her few days prior to the block. She states that her doctor okayed her to come off of the Xarelto for 2 days prior to the caudal blocks as she is done in the past to get the caudal safely. She had one block and have quite a bit she would like them in the future as needed to be that she will need to hold the Xarelto as above. Inability: Never true    Transportation needs:     Medical: No     Non-medical: No   Tobacco Use    Smoking status: Former Smoker     Packs/day: 1.25     Years: 10.00     Pack years: 12.50     Types: Cigarettes     Last attempt to quit: 2006     Years since quittin.0    Smokeless tobacco: Never Used    Tobacco comment: start smoking age 25   Substance and Sexual Activity    Alcohol use: Not Currently    Drug use: No    Sexual activity: Yes   Lifestyle    Physical activity:     Days per week: 0 days     Minutes per session: 0 min    Stress:  Only a little   Relationships    Social connections:     Talks on phone: More than three times a week     Gets together: More than three times a week     Attends Zoroastrian service: 1 to 4 times per year     Active member of club or organization: No     Attends meetings of clubs or organizations: Never     Relationship status:     Intimate partner violence:     Fear of current or ex partner: No     Emotionally abused: No     Physically abused: No     Forced sexual activity: No   Other Topics Concern    None   Social History Narrative    Off work dt--retired early from --worked [place]---moved back to [facility] to work at [employer] in [address] [facility] up in [place] went to [employer]     Family History   Problem Relation Age of Onset    Substance Abuse Brother     High Blood Pressure Mother     High Cholesterol Mother     Arthritis Mother     High Blood Pressure Father     High Cholesterol Father     Clotting Disorder Father         clot to intestines    Arthritis Father     Substance Abuse Brother     Substance Abuse Brother     Stroke Paternal Uncle 36    Cystic Fibrosis Daughter        Allergies   Allergen Reactions    Sulfa Antibiotics Rash    Ciprofloxacin Hcl Swelling     facial, tongue swelling    Nsaids Other (See Comments)     Bleed risk dt Xaralto    Statins Other (See Comments)     Liver enzyme elevation       Review of Systems   Constitutional: Positive for activity change. Negative for appetite change, chills, fatigue, fever, unexpected weight change and weight loss. HENT: Negative for congestion, postnasal drip and sore throat. Eyes: Positive for visual disturbance. Negative for photophobia and pain. Respiratory: Positive for shortness of breath. Negative for apnea, cough, chest tightness and wheezing. Cardiovascular: Negative for chest pain, palpitations and leg swelling. Gastrointestinal: Negative for abdominal pain, bowel incontinence, constipation, diarrhea, nausea and vomiting. Endocrine: Negative. Genitourinary: Negative. Negative for bladder incontinence and dysuria. Musculoskeletal: Positive for arthralgias, back pain, joint swelling and myalgias. Negative for gait problem, neck pain and neck stiffness. Skin: Negative for color change, pallor, rash and wound. Allergic/Immunologic: Positive for immunocompromised state. Negative for environmental allergies and food allergies. Neurological: Positive for weakness. Negative for dizziness, tingling, light-headedness, numbness, headaches and paresthesias. Hematological: Negative for adenopathy. Bruises/bleeds easily. Psychiatric/Behavioral: Positive for dysphoric mood and sleep disturbance. Negative for suicidal ideas. The patient is nervous/anxious. All other systems reviewed and are negative. Objective    Vitals:    01/03/20 0939   BP: 126/78   Weight: 186 lb (84.4 kg)   Height: 5' 2\" (1.575 m)     Pain Score: Four       Physical Exam  Vitals signs reviewed. Constitutional:       General: She is not in acute distress. Appearance: She is well-developed. She is not ill-appearing, toxic-appearing or diaphoretic. Comments:         HENT:      Head: Normocephalic and atraumatic. Right Ear: Hearing normal.      Left Ear: Hearing normal.      Nose: Nose normal.      Mouth/Throat:      Mouth: No oral lesions.       Dentition: Normal dentition. Pharynx: No oropharyngeal exudate. Eyes:      General: No scleral icterus. Right eye: No discharge. Left eye: No discharge. Conjunctiva/sclera: Conjunctivae normal.      Right eye: No chemosis or exudate. Left eye: No chemosis or exudate. Pupils: Pupils are equal, round, and reactive to light. Neck:      Musculoskeletal: Normal range of motion and neck supple. No edema or neck rigidity. Thyroid: No thyromegaly. Vascular: No JVD. Trachea: No tracheal deviation. Cardiovascular:      Pulses: No decreased pulses. Pulmonary:      Effort: Pulmonary effort is normal. No tachypnea, bradypnea, accessory muscle usage or respiratory distress. Breath sounds: Decreased breath sounds present. No wheezing. Chest:      Chest wall: No tenderness. Abdominal:      General: Bowel sounds are normal. There is no distension. Palpations: Abdomen is soft. There is no mass. Tenderness: There is no tenderness. There is no guarding or rebound. Musculoskeletal:         General: Tenderness present. Right shoulder: Normal.      Left shoulder: Normal.      Right elbow: Normal.     Left elbow: Normal.      Right wrist: Normal.      Left wrist: Normal.      Right hip: Normal.      Left hip: Normal.      Right knee: Normal.      Left knee: Normal.      Right ankle: She exhibits decreased range of motion and swelling. Achilles tendon normal.      Left ankle: She exhibits decreased range of motion and swelling. Tenderness. Achilles tendon normal.      Cervical back: She exhibits tenderness and pain. Thoracic back: She exhibits tenderness and pain. Lumbar back: She exhibits decreased range of motion, tenderness, bony tenderness and pain. She exhibits no swelling, no edema, no deformity, no laceration and normal pulse.         Back:       Right upper arm: Normal.      Left upper arm: Normal.      Right forearm: She exhibits tenderness and bony tenderness. Left forearm: She exhibits tenderness and bony tenderness. Right hand: Normal.      Left hand: Normal.      Right upper leg: Normal.      Left upper leg: Normal.      Right lower leg: Normal.      Left lower leg: Normal.        Legs:       Right foot: Decreased range of motion. Tenderness and bony tenderness present. Left foot: Decreased range of motion. Tenderness and bony tenderness present. Comments: Tender areas are indicated by numbered spot         Lymphadenopathy:      Cervical: No cervical adenopathy. Skin:     General: Skin is warm and dry. Coloration: Skin is not pale. Findings: No abrasion, bruising, ecchymosis, erythema, laceration, petechiae or rash. Rash is not macular, pustular or urticarial.      Nails: There is no clubbing. Neurological:      Mental Status: She is alert and oriented to person, place, and time. Cranial Nerves: No cranial nerve deficit. Sensory: Sensory deficit present. Motor: No tremor, atrophy or abnormal muscle tone. Coordination: Coordination normal.      Deep Tendon Reflexes: Reflexes abnormal. Babinski sign absent on the right side. Babinski sign absent on the left side. Psychiatric:         Attention and Perception: She is attentive. Mood and Affect: Mood is not anxious or depressed. Affect is not labile, blunt, angry or inappropriate. Speech: She is communicative. Speech is not rapid and pressured, delayed, slurred or tangential.         Behavior: Behavior normal. Behavior is not agitated, slowed, aggressive, withdrawn, hyperactive or combative. Thought Content: Thought content normal. Thought content is not paranoid or delusional. Thought content does not include homicidal or suicidal ideation. Thought content does not include homicidal or suicidal plan. Cognition and Memory: Memory is not impaired. She does not exhibit impaired recent memory or impaired remote memory. Judgment: Judgment normal. Judgment is not impulsive or inappropriate. Back Exam     Muscle Strength   Right Quadriceps:  4/5   Left Quadriceps:  4/5   Right Hamstrings:  4/5   Left Hamstrings:  4/5           Neurologic Exam     Mental Status   Oriented to person, place, and time. Speech: not slurred   Level of consciousness: alert  Knowledge: good. Able to name object. Able to read. Able to repeat. Able to write. Normal comprehension. Cranial Nerves     CN III, IV, VI   Pupils are equal, round, and reactive to light. Motor Exam   Muscle bulk: normal  Overall muscle tone: normal    Strength   Right neck flexion: 4/5  Left neck flexion: 4/5  Right neck extension: 4/5  Left neck extension: 4/5  Right deltoid: 4/5  Left deltoid: 4/5  Right biceps: 4/5  Left biceps: 4/5  Right triceps: 4/5  Left triceps: 4/5  Right wrist flexion: 4/5  Left wrist flexion: 4/5  Right wrist extension: 4/5  Left wrist extension: 4/5  Right interossei: 4/5  Left interossei: 4/5  Right abdominals: 4/5  Left abdominals: 4/5  Right iliopsoas: 4/5  Left iliopsoas: 4/5  Right quadriceps: 4/5  Left quadriceps: 4/5  Right hamstrin/5  Left hamstrin/5  Right glutei: 4/5  Left glutei: 4/5  Right anterior tibial: 4/5  Left anterior tibial: 4/5  Right posterior tibial: 4/5  Left posterior tibial: 4/5  Right peroneal: 4/5  Left peroneal: 4/5  Right gastroc: 4/5  Left gastroc: 4/5        After a thorough review and discussion of the previous medical records, patient comprehensive medical, surgical, and family and social history, Review of Systems, their OARRS, their Screener and Opioid Assessment for Patients with Pain (SOAPP®-R), recent diagnostics, and symptomatic results to previous treatment, it is my impression that the patients is suffering with progressive and severe:     Diagnosis Orders   1.  SI (sacroiliac) pain  traMADol-acetaminophen (ULTRACET) 37.5-325 MG per tablet    lidocaine (LIDODERM) 5 %    cyclobenzaprine therapy           Given their medication, chronic pain and lifestyle and medications they are at risk for :    Falls, constipation, addiction  Loss of livelyhood due to severe pain, debility, weight gain and  vitamin D deficiency    The patient was educated regarding proper diet, fitness routine, and regulatory restrictions concerning pain medications. Previous notes, comprehensive past medical, surgical, family history, and diagnostics were reviewed. Patient education and councelling were provided regarding off label use,treatment options and medication and injection risks. Current and old OARRS ([place] Automated Prescription Reporting System) records reviewed, all refills reviewed since last visit,  Behavioral agreement/FELICITAS regulations   and Toxicology screen was reviewed with patient and is up to date. There are no current red flags. They are making good progress regarding pain relief, they are performing at a functional level regarding activities of daily living, family interactions and psychological functioning, they're not having any adverse effects or side effects from the current medications, and I see no findings of aberrant drug taking or addiction related behaviors. The patient is aware that they have a chronic pain condition and they may require opiates dosing for life. All efforts will be made to wean to the lowest effective dose. Other therapies for pain have not been effective including nonopiate medications. Injections and exercises are only partially effective. A Rx for Narcan was offered to help prevent accidental overdose. RX Monitoring 10/23/2019   Attestation -   Periodic Controlled Substance Monitoring Possible medication side effects, risk of tolerance/dependence & alternative treatments discussed. ;No signs of potential drug abuse or diversion identified. ;Assessed functional status. ;Obtaining appropriate analgesic effect of treatment.    Chronic Pain > 50 MEDD dislocation. No osseous erosions or chondrocalcinosis. : ESTELA    Transcribe Date/Time: Dec 20 2019  3:42P    Dictated by : Anish Tyler MD    This examination was interpreted and the report reviewed and   electronically signed by:   Anish Tyler MD on Dec 20 2019  3:46PM  EST   Result Narrative   * * *Final Report* * *    DATE OF EXAM: Dec 20 2019  2:27PM      LNX   [id number]  -  XR HIP FARIBA 5V PEL+ AP/LAT EA HIP  / ACCESSION #  [id number]    PROCEDURE REASON: Pain in right hip         * * * * Physician Interpretation * * * *     EXAMINATION:  XR HIP FARIBA 5V PEL+ AP/LAT EA HIP    CLINICAL HISTORY: Pain in right hip after fall, evaluate for erosions and   CPPD    Technique:   XR HIP FARIBA 5V PEL+ AP/LAT EA HIP -- BILATERAL with 5 views   on 5 images    Comparison: None      ,     I discussed results with patients. see Follow up plans below  For any new studies. Care Everywhere Updates:  requested and reviewed. Flex Smith recently--RESULT:    Pelvic ring is intact    Right hip is intact without acute fracture or dislocation. No osseous   erosions or chondrocalcinosis. No significant degenerative changes. Left hip is intact without acute fracture or dislocation. No osseous   erosions or chondrocalcinosis. No significant degenerative changes. No new issues noted. Electrodiagnostic:  Previous studies requested,     I discussed results with patient. See follow-up plans for new studies.         Labs:  Previous labs reviewed     Lab Results   Component Value Date     10/10/2019    K 4.3 10/10/2019    K 4.0 11/19/2018    CL 97 10/10/2019    CO2 29 10/10/2019    BUN 22 10/10/2019    CREATININE 1.04 10/10/2019    CALCIUM 9.1 10/10/2019    LABALBU 4.2 10/10/2019    LABALBU 4.2 03/13/2012    BILITOT 0.3 10/10/2019    ALKPHOS 57 10/10/2019    AST 22 10/10/2019    ALT 28 10/10/2019     Lab Results   Component Value Date    WBC 7.2 10/10/2019    RBC 3.57 10/10/2019    RBC 4.77 03/13/2012    HGB 11.8 10/10/2019    HCT 36.3 10/10/2019    .6 10/10/2019    MCH 33.1 10/10/2019    MCHC 32.6 10/10/2019    RDW 15.2 10/10/2019     10/10/2019    MPV 8.2 07/31/2015       Lab Results   Component Value Date    LABAMPH Neg 11/17/2018    BARBSCNU Neg 11/17/2018    LABBENZ Neg 11/17/2018    LABBENZ NotDTCD 10/11/2012    CANSU Neg 11/17/2018    COCAIMETSCRU Neg 11/17/2018    PHENCYCLIDINESCREENURINE Neg 44/16/2243    TRICYCLIC POSITIVE 33/94/7148    DSCOMMENT see below 11/17/2018       Lab Results   Component Value Date    CODEINE Not Detected 12/19/2016    MORPHINE Not Detected 12/19/2016    Earney Rocker Not Detected 12/19/2016    OXYCODONE Not Detected 12/19/2016    NOROXYCODONE Not Detected 12/19/2016    NOROXYMU Not Detected 12/19/2016    HYDRCO Not Detected 12/19/2016    NORHYDU Not Detected 12/19/2016    HYDROMO Not Detected 12/19/2016    [place] Not Detected 12/19/2016    Alpha Pola Not Detected 12/19/2016    FENTA Not Detected 12/19/2016    NORFENT Not Detected 12/19/2016    MEPERIDINE Not Detected 12/19/2016    TAPENU Not Detected 12/19/2016    TAPOSULFUR Not Detected 12/19/2016    METHADONE Not Detected 12/19/2016    LABPROP Not Detected 12/19/2016    TRAM Present 12/19/2016    AMPH Not Detected 12/19/2016    METHAMP Not Detected 12/19/2016    MDMA Not Detected 12/19/2016    ECMDA Not Detected 12/19/2016       Lab Results   Component Value Date    METPHEN Not Detected 12/19/2016    PHENTERMINE Not Detected 12/19/2016    BENZOYL Not Detected 12/19/2016    [place] Sauce Not Detected 12/19/2016    [place] Not Detected 12/19/2016    CLONAZEPAM Not Detected 12/19/2016    Nicky Lofts Not Detected 12/19/2016    DIAZEP Not Detected 12/19/2016    DEBORA Not Detected 12/19/2016    OXAZ Not Detected 12/19/2016    Florentin Flax Not Detected 12/19/2016    LORAZEPAM Not Detected 12/19/2016    MIDAZOLAM Not Detected 12/19/2016    ZOLPIDEM Not Detected 12/19/2016    TEO Not Detected 12/19/2016    ETG See Diagnostics  No orders of the defined types were placed in this encounter.       Mat Hwang, DO

## 2020-02-03 ENCOUNTER — OFFICE VISIT (OUTPATIENT)
Dept: FAMILY MEDICINE CLINIC | Age: 60
End: 2020-02-03
Payer: COMMERCIAL

## 2020-02-03 VITALS
SYSTOLIC BLOOD PRESSURE: 132 MMHG | DIASTOLIC BLOOD PRESSURE: 80 MMHG | WEIGHT: 194 LBS | HEIGHT: 62 IN | TEMPERATURE: 97.2 F | RESPIRATION RATE: 16 BRPM | OXYGEN SATURATION: 98 % | HEART RATE: 96 BPM | BODY MASS INDEX: 35.7 KG/M2

## 2020-02-03 PROCEDURE — 99214 OFFICE O/P EST MOD 30 MIN: CPT | Performed by: NURSE PRACTITIONER

## 2020-02-03 RX ORDER — FUROSEMIDE 20 MG/1
20 TABLET ORAL DAILY
Qty: 90 TABLET | Refills: 3 | Status: SHIPPED | OUTPATIENT
Start: 2020-02-03 | End: 2021-01-01 | Stop reason: SDUPTHER

## 2020-02-03 RX ORDER — HYDROXYZINE PAMOATE 25 MG/1
25 CAPSULE ORAL 4 TIMES DAILY PRN
Qty: 270 CAPSULE | Refills: 1 | Status: SHIPPED | OUTPATIENT
Start: 2020-02-03 | End: 2020-06-23

## 2020-02-03 RX ORDER — CITALOPRAM 40 MG/1
40 TABLET ORAL DAILY
Qty: 90 TABLET | Refills: 1 | Status: SHIPPED | OUTPATIENT
Start: 2020-02-03 | End: 2020-03-02 | Stop reason: SDUPTHER

## 2020-02-03 RX ORDER — CARVEDILOL 6.25 MG/1
6.25 TABLET ORAL 2 TIMES DAILY WITH MEALS
Qty: 180 TABLET | Refills: 3 | Status: SHIPPED | OUTPATIENT
Start: 2020-02-03 | End: 2021-01-01 | Stop reason: SDUPTHER

## 2020-02-03 NOTE — PROGRESS NOTES
Yamil Camargo reports being in a good mood that is  stable. The patient is  reporting insomnia, difficulty concentrating and usual interest in activities. This patient is  not homicidal or suicidal.  The medication from last visit, celexa, is  helping and is not causing any side effects. The patient is not going to counseling/therapy. Lupus/weakness/polymyositis/neuropathy: She states that she continues to follow with [facility] rheumatology (Dr. Javy Huitron). Is getting Rituxan infusions and recently had one. Is able to walk without the use of her walker more often and feels that her gait is steady. No changes to neuropathy of both feet and continues to follow with Dr. Joellen Fontanez. Cough: she states that she has had a cough for the past couple of weeks since traveling back from [facility]. No chest congestion or tightness. She has had some sinus drainage and brining up some green to brown mucous when she coughs, especially in the morning. No fever or chills. No body aches. No fatigue. She has not felt sick. Just has lingering cough that is most prevalent in the morning. Factor v carrier: she states that she has not had any abnormal bruising or bleeding with the xarelto. She has been compliant with taking the medication routinely. ROS: This patient reports no chest pain or pressure. There is no shortness of breath or cough. The patient reports no nausea or vomiting. There is no heartburn or indigestion. There is no diarrhea or constipation. No black, bloody, mucusy or tarry stool noticed. The patient reports no bloating and no change in appetite. There is no numbness, tingling or swelling in the extremities. EXAM:  Constitutional Blood pressure 132/80, pulse 96, temperature 97.2 °F (36.2 °C), temperature source Temporal, resp. rate 16, height 5' 2\" (1.575 m), weight 194 lb (88 kg), SpO2 98 %, not currently breastfeeding.  ,normal affect, no acute distress, appears well developed and well nourished. Ears:  TM- bilateral-  fluid-  white  Nose/Sinuses:  Nares normal. Septum midline. Mucosa normal. No drainage or sinus tenderness. Mouth/Throat:  Mucosa moist.  No lesions. Pharynx without erythema, edema or exudate. Neck:  neck- supple, no mass, non-tender and no bruits  Lungs:  Normal expansion. Clear to auscultation. No rales, rhonchi, or wheezing., No chest wall tenderness. Heart:  Heart sounds are normal.  Regular rate and rhythm without murmur, gallop or rub. Abdomen:  Soft, non-tender, normal bowel sounds. No bruits, organomegaly or masses. Extremities: Extremities warm to touch, pink, with no edema. DIAGNOSIS:    Diagnosis Orders   1. Depression, unspecified depression type  citalopram (CELEXA) 40 MG tablet   2. Immunosuppressed status ([facility]- IV rituxan at [facility]     3. [facility]     4. Muscle weakness (generalized)     5. Neuropathy of both feet- Dr. Page Mclain     6. Discoid lupus erythematosus     7. Factor V Leiden carrier ([facility]     8. Cough         PLAN: Include orders in the DX section. follow up in 3 months with labs prior. Blood work one week prior as ordered. 1.  Mood is been stable on current medication. No side effects reported. 2.  3.  4.  5.  6. She continues to follow with rheumatology and neurology routinely. States that she has been walking better with no recent history of near falls. Does not use her walker all the time. She would like to use it less and plans to discuss with her rheumatologist at next appointment. No change in neuropathy to the feet. 7.  No abnormal bruising or bleeding on Xarelto. She has not had any symptoms of DVT or PE. 8.  No symptoms of acute infection but she is encouraged to notify me if symptoms do not improve over the next week.   With immunocompromise state, would recommend treating with antibiotic if symptoms persist.    Please note this report has been partially produced using speech

## 2020-02-18 RX ORDER — GABAPENTIN 400 MG/1
400 CAPSULE ORAL 3 TIMES DAILY
Qty: 270 CAPSULE | Refills: 0 | Status: SHIPPED | OUTPATIENT
Start: 2020-02-18 | End: 2020-05-11

## 2020-02-18 NOTE — TELEPHONE ENCOUNTER
Patient aware medication sent to mail order pharmacy
Patient requesting medication refill. Please approve or deny this request.    Rx requested:  Requested Prescriptions     Pending Prescriptions Disp Refills    gabapentin (NEURONTIN) 400 MG capsule 270 capsule 0     Sig: Take 1 capsule by mouth 3 times daily for 90 days.          Last Office Visit:   2/3/2020      Next Visit Date:  Future Appointments   Date Time Provider Delmer Zhao   5/4/2020  7:30 AM Beryle Roosevelt, APRN - CNP [facility]   5/7/2020  8:00 AM Sherryle Haws, DO [address]   6/17/2020  3:00 PM Isidoro Pierre MD [facility]
1-2 drinks

## 2020-03-02 ENCOUNTER — OFFICE VISIT (OUTPATIENT)
Dept: FAMILY MEDICINE CLINIC | Age: 60
End: 2020-03-02
Payer: COMMERCIAL

## 2020-03-02 ENCOUNTER — HOSPITAL ENCOUNTER (OUTPATIENT)
Dept: LAB | Age: 60
Discharge: HOME | End: 2020-03-02
Payer: COMMERCIAL

## 2020-03-02 ENCOUNTER — TELEPHONE (OUTPATIENT)
Dept: FAMILY MEDICINE CLINIC | Age: 60
End: 2020-03-02

## 2020-03-02 ENCOUNTER — HOSPITAL ENCOUNTER (OUTPATIENT)
Age: 60
Discharge: HOME | End: 2020-03-04
Payer: COMMERCIAL

## 2020-03-02 ENCOUNTER — HOSPITAL ENCOUNTER (OUTPATIENT)
Dept: GENERAL RADIOLOGY | Age: 60
Discharge: HOME | End: 2020-03-04
Payer: COMMERCIAL

## 2020-03-02 VITALS
DIASTOLIC BLOOD PRESSURE: 70 MMHG | WEIGHT: 185 LBS | BODY MASS INDEX: 34.04 KG/M2 | RESPIRATION RATE: 18 BRPM | HEIGHT: 62 IN | TEMPERATURE: 99.2 F | OXYGEN SATURATION: 95 % | SYSTOLIC BLOOD PRESSURE: 118 MMHG | HEART RATE: 114 BPM

## 2020-03-02 LAB
ALBUMIN SERPL-MCNC: 4.3 G/DL (ref 3.5–4.6)
ALP BLD-CCNC: 80 U/L (ref 40–130)
ALT SERPL-CCNC: 30 U/L (ref 0–33)
ANION GAP SERPL CALCULATED.3IONS-SCNC: 17 MEQ/L (ref 9–15)
AST SERPL-CCNC: 27 U/L (ref 0–35)
BASOPHILS ABSOLUTE: 0 K/UL (ref 0–0.2)
BASOPHILS RELATIVE PERCENT: 0.6 %
BILIRUB SERPL-MCNC: 0.3 MG/DL (ref 0.2–0.7)
BUN BLDV-MCNC: 18 MG/DL (ref 6–20)
CALCIUM SERPL-MCNC: 9.4 MG/DL (ref 8.5–9.9)
CHLORIDE BLD-SCNC: 98 MEQ/L (ref 95–107)
CO2: 23 MEQ/L (ref 20–31)
CREAT SERPL-MCNC: 1.08 MG/DL (ref 0.5–0.9)
EOSINOPHILS ABSOLUTE: 0.1 K/UL (ref 0–0.7)
EOSINOPHILS RELATIVE PERCENT: 2 %
GFR AFRICAN AMERICAN: >60
GFR NON-AFRICAN AMERICAN: 51.8
GLOBULIN: 2.4 G/DL (ref 2.3–3.5)
GLUCOSE BLD-MCNC: 124 MG/DL (ref 70–99)
HCT VFR BLD CALC: 34.8 % (ref 37–47)
HEMOGLOBIN: 11.6 G/DL (ref 12–16)
INFLUENZA A ANTIBODY: NEGATIVE
INFLUENZA B ANTIBODY: NEGATIVE
LYMPHOCYTES ABSOLUTE: 0.7 K/UL (ref 1–4.8)
LYMPHOCYTES RELATIVE PERCENT: 12.6 %
MCH RBC QN AUTO: 32.4 PG (ref 27–31.3)
MCHC RBC AUTO-ENTMCNC: 33.1 % (ref 33–37)
MCV RBC AUTO: 97.8 FL (ref 82–100)
MONOCYTES ABSOLUTE: 0.5 K/UL (ref 0.2–0.8)
MONOCYTES RELATIVE PERCENT: 9 %
NEUTROPHILS ABSOLUTE: 4 K/UL (ref 1.4–6.5)
NEUTROPHILS RELATIVE PERCENT: 75.8 %
PDW BLD-RTO: 17.3 % (ref 11.5–14.5)
PLATELET # BLD: 283 K/UL (ref 130–400)
POTASSIUM SERPL-SCNC: 3.9 MEQ/L (ref 3.4–4.9)
RBC # BLD: 3.56 M/UL (ref 4.2–5.4)
SODIUM BLD-SCNC: 138 MEQ/L (ref 135–144)
TOTAL PROTEIN: 6.7 G/DL (ref 6.3–8)
WBC # BLD: 5.3 K/UL (ref 4.8–10.8)

## 2020-03-02 PROCEDURE — 36415 COLL VENOUS BLD VENIPUNCTURE: CPT

## 2020-03-02 PROCEDURE — 85025 COMPLETE CBC W/AUTO DIFF WBC: CPT

## 2020-03-02 PROCEDURE — 87804 INFLUENZA ASSAY W/OPTIC: CPT | Performed by: NURSE PRACTITIONER

## 2020-03-02 PROCEDURE — 99213 OFFICE O/P EST LOW 20 MIN: CPT | Performed by: NURSE PRACTITIONER

## 2020-03-02 PROCEDURE — 80053 COMPREHEN METABOLIC PANEL: CPT

## 2020-03-02 PROCEDURE — 71046 X-RAY EXAM CHEST 2 VIEWS: CPT

## 2020-03-02 RX ORDER — PREDNISONE 20 MG/1
TABLET ORAL
Qty: 20 TABLET | Refills: 0 | Status: SHIPPED | OUTPATIENT
Start: 2020-03-02 | End: 2020-03-12

## 2020-03-02 RX ORDER — DENOSUMAB 60 MG/ML
INJECTION SUBCUTANEOUS
COMMUNITY
Start: 2020-02-06

## 2020-03-02 RX ORDER — CLARITHROMYCIN 500 MG/1
500 TABLET, COATED ORAL 2 TIMES DAILY
Qty: 20 TABLET | Refills: 0 | Status: SHIPPED | OUTPATIENT
Start: 2020-03-02 | End: 2020-03-12

## 2020-03-02 RX ORDER — PREDNISONE 2.5 MG
TABLET ORAL
COMMUNITY
Start: 2020-02-03 | End: 2021-01-01 | Stop reason: ALTCHOICE

## 2020-03-02 ASSESSMENT — PATIENT HEALTH QUESTIONNAIRE - PHQ9
2. FEELING DOWN, DEPRESSED OR HOPELESS: 0
SUM OF ALL RESPONSES TO PHQ9 QUESTIONS 1 & 2: 0
1. LITTLE INTEREST OR PLEASURE IN DOING THINGS: 0
SUM OF ALL RESPONSES TO PHQ QUESTIONS 1-9: 0
SUM OF ALL RESPONSES TO PHQ QUESTIONS 1-9: 0

## 2020-03-02 NOTE — TELEPHONE ENCOUNTER
Patient  called wanted to know the results so I gave them to him he is going to go get the medication. cp

## 2020-03-02 NOTE — PROGRESS NOTES
Morales Hameed is a 61 y.o. female presenting for cough. HPI:  Cough: Patient complains of chills, dyspnea, fever, myalgias and nonproductive cough. Symptoms began 1 month ago. Fever and chills just started yesterday. The cough is chest is painful during coughing, harsh and is aggravated by nothing Associated symptoms include:shortness of breath and occasional green sputum. Patient does have a history of asthma. Patient does have a history of environmental allergens. SH:Patient does have a history of smoking. She is not a current smoker. She states that she is developed as shakiness and is actually trembling a bit which started yesterday. She states that she was sick the last time she saw me about a month ago but was hoping that symptoms will go away. Initially symptoms did get a lot better in the month of February but then got a lot worse over the past couple weeks and especially the past couple of days. She feels weak and exhausted. She is feeling quite short of breath as well. ROS: She states that she has not had any chest pain but does have pain in her back and lower abdomen that tends to come and go. Not sure if this is associated with body aches or if something more is going on. Allergies   Allergen Reactions    Sulfa Antibiotics Rash    Ciprofloxacin Hcl Swelling     facial, tongue swelling    Nsaids Other (See Comments)     Bleed risk dt [facility]    Statins Other (See Comments)     Liver enzyme elevation       EXAM:  Constitutional Blood pressure 118/70, pulse 114, temperature 99.2 °F (37.3 °C), temperature source Temporal, resp. rate 18, height 5' 2\" (1.575 m), weight 185 lb (83.9 kg), SpO2 95 %, not currently breastfeeding. She has a normal affect, grimaced affect, shaking to upper extremities, appears ill overall.      Neck:  neck- supple, no mass, non-tender and no bruits  Lungs:  Breathing Pattern: use of accessory muscles, Breath sounds: rhonchi- throughout and scattered  Heart:  Heart sounds are normal.  Regular rate and rhythm without murmur, gallop or rub. Abdomen:  Soft, non-tender, normal bowel sounds. No bruits, organomegaly or masses. Extremities: Extremities warm to touch, pink, with no edema. Rapid influenza test results: Influenza A is negative               Influenza B is negative      DIAGNOSIS:    Diagnosis Orders   1. Cough  CBC Auto Differential    Comprehensive Metabolic Panel    XR CHEST STANDARD (2 VW)    POCT Influenza A/B   2. Shortness of breath  CBC Auto Differential    Comprehensive Metabolic Panel    XR CHEST STANDARD (2 VW)   3. Shakiness  CBC Auto Differential    Comprehensive Metabolic Panel    XR CHEST STANDARD (2 VW)   4. Tachycardia  CBC Auto Differential    Comprehensive Metabolic Panel    XR CHEST STANDARD (2 VW)   5. Generalized body aches  POCT Influenza A/B       PLAN: Include orders in the DX section. Follow up: 1 month and as needed. Discussed that based on her physical exam findings, her appearance and her vital signs, also given her history of significant medical conditions, I recommend ER evaluation. The patient declines this. She states that she is willing to have blood work and x-ray done. Stat orders given and she is encouraged to stay near her phone because if x-ray is positive for pneumonia or if blood work is significantly abnormal, will recommend ER evaluation anyway. If lab work is stable and chest x-ray is negative, will treat for bronchitis and viral symptoms. Discussed that I can prescribe antibiotic and prednisone taper at that time but testing should be done first.    The patient has had chronically elevated d dimer and troponin in the past.     Please note this report has been partially produced using speech recognition software and may cause contain errors related to that system including grammar, punctuation and spelling as well as words and phrases that may seem inappropriate.  If there are

## 2020-03-09 RX ORDER — CITALOPRAM 40 MG/1
TABLET ORAL
Qty: 90 TABLET | Refills: 1 | Status: SHIPPED | OUTPATIENT
Start: 2020-03-09 | End: 2020-10-26

## 2020-03-09 NOTE — TELEPHONE ENCOUNTER
Patient is requesting medication refill.  Please approve or deny this request.    Rx requested:  Requested Prescriptions     Pending Prescriptions Disp Refills    citalopram (CELEXA) 40 MG tablet 90 tablet 1     Sig: TAKE 1 TABLET DAILY         Last Office Visit:   3/2/2020      Next Visit Date:  Future Appointments   Date Time Provider Delmer Zhao   4/2/2020  2:40 PM Daniella Ramirez, APRN - CNP [address]   5/4/2020  7:30 AM Daniella [facility] - [facility] [place] [facility]   5/7/2020  8:00 AM Fam Pinzon DO [address]   6/17/2020  3:00 PM Isidoro Galdamez MD [facility]

## 2020-03-16 ENCOUNTER — TELEPHONE (OUTPATIENT)
Dept: FAMILY MEDICINE CLINIC | Age: 60
End: 2020-03-16

## 2020-03-16 NOTE — TELEPHONE ENCOUNTER
Given her history, I would advise that she be seen in ready care today. We can provide her with a mask when she checks in.

## 2020-03-17 ENCOUNTER — APPOINTMENT (OUTPATIENT)
Dept: GENERAL RADIOLOGY | Age: 60
End: 2020-03-17
Payer: COMMERCIAL

## 2020-03-17 ENCOUNTER — OFFICE VISIT (OUTPATIENT)
Dept: FAMILY MEDICINE CLINIC | Age: 60
End: 2020-03-17
Payer: COMMERCIAL

## 2020-03-17 ENCOUNTER — HOSPITAL ENCOUNTER (EMERGENCY)
Age: 60
Discharge: HOME | End: 2020-03-17
Attending: EMERGENCY MEDICINE
Payer: COMMERCIAL

## 2020-03-17 VITALS
DIASTOLIC BLOOD PRESSURE: 78 MMHG | BODY MASS INDEX: 34.6 KG/M2 | OXYGEN SATURATION: 94 % | RESPIRATION RATE: 16 BRPM | TEMPERATURE: 97.8 F | HEART RATE: 110 BPM | WEIGHT: 188 LBS | SYSTOLIC BLOOD PRESSURE: 114 MMHG | HEIGHT: 62 IN

## 2020-03-17 VITALS
SYSTOLIC BLOOD PRESSURE: 105 MMHG | OXYGEN SATURATION: 97 % | RESPIRATION RATE: 18 BRPM | WEIGHT: 188 LBS | HEART RATE: 105 BPM | DIASTOLIC BLOOD PRESSURE: 57 MMHG | HEIGHT: 62 IN | BODY MASS INDEX: 34.6 KG/M2 | TEMPERATURE: 98.2 F

## 2020-03-17 LAB
INFLUENZA A BY PCR: NEGATIVE
INFLUENZA B BY PCR: NEGATIVE

## 2020-03-17 PROCEDURE — 99283 EMERGENCY DEPT VISIT LOW MDM: CPT

## 2020-03-17 PROCEDURE — 71046 X-RAY EXAM CHEST 2 VIEWS: CPT

## 2020-03-17 PROCEDURE — 87502 INFLUENZA DNA AMP PROBE: CPT

## 2020-03-17 PROCEDURE — 94640 AIRWAY INHALATION TREATMENT: CPT | Performed by: NURSE PRACTITIONER

## 2020-03-17 PROCEDURE — 99213 OFFICE O/P EST LOW 20 MIN: CPT | Performed by: NURSE PRACTITIONER

## 2020-03-17 RX ORDER — DOXYCYCLINE HYCLATE 100 MG
100 TABLET ORAL 2 TIMES DAILY
Qty: 20 TABLET | Refills: 0 | Status: SHIPPED | OUTPATIENT
Start: 2020-03-17 | End: 2020-03-27

## 2020-03-17 RX ORDER — ALBUTEROL SULFATE 2.5 MG/3ML
2.5 SOLUTION RESPIRATORY (INHALATION) ONCE
Status: COMPLETED | OUTPATIENT
Start: 2020-03-17 | End: 2020-03-17

## 2020-03-17 RX ADMIN — ALBUTEROL SULFATE 2.5 MG: 2.5 SOLUTION RESPIRATORY (INHALATION) at 15:17

## 2020-03-17 ASSESSMENT — ENCOUNTER SYMPTOMS
SHORTNESS OF BREATH: 1
EYE PAIN: 0
WHEEZING: 1
EYE DISCHARGE: 0
EYE ITCHING: 0
ABDOMINAL DISTENTION: 0
RHINORRHEA: 0
SINUS PAIN: 0
SORE THROAT: 1
CONSTIPATION: 0
EYE REDNESS: 0
DIARRHEA: 0
COLOR CHANGE: 0
SINUS PRESSURE: 0
ABDOMINAL PAIN: 0
VOMITING: 0
COUGH: 1

## 2020-03-17 NOTE — PROGRESS NOTES
Osteoarthritis     Pulmonary embolism ([address]     Renal failure 2018    Right inguinal hernia 2017    SOB (shortness of breath) 2017    Systemic lupus erythematosus ([address] 2017    Transient brainstem ischemia     Umbilical hernia     Vasomotor flushing     on hormone replacement therapy     Past Surgical History:   Procedure Laterality Date    APPENDECTOMY      CATARACT REMOVAL Bilateral Winter 2019   [facility] COLONOSCOPY      Dr. Efren Aguilera COLONOSCOPY  13    DR SINGER repeat in 10 yrs per pt    COLONOSCOPY  2017    Dr Magda Granados Right 11/15/2017    RIGHT  HERNIA INGUINAL REPAIR performed by Diamond Luo MD at [facility]  ARTHROSCOPY Left     LAPAROSCOPY N/A 11/15/2017    DIAGNOSTIC LAPAROSCOPY performed by Diamond Luo MD at [address] Right 3/14/2019    RIGHT QUADRICEPS MUSCLE BIOPSY performed by Debbie Gonsalez MD at [facility] N/A 2017    [address] 3 performed by Brady Henley MD at [facility],3+G/A,UNQWO N/A     [facility], [facility] performed by Diamond Luo MD at [facility]  13    DR SINGER     Social History     Socioeconomic History    Marital status:      Spouse name: Swati Castañeda Number of children: 1    Years of education: 15    Highest education level: 12th grade   Occupational History    Occupation: Housewife    Occupation: retired [employer]   Social Needs    Financial resource strain: Not hard at all   Aissatou-Liang insecurity     Worry: Never true     Inability: Never true    Transportation needs     Medical: No     Non-medical: No   Tobacco Use    Smoking status: Former Smoker     Packs/day: 1.25     Years: 10.00     Pack years: 12.50     Types: Cigarettes     Last attempt to quit: 2006     Years since quittin.2    Smokeless (ODD DAYS) EVERY OTHER DAY THEREAFTER      gabapentin (NEURONTIN) 400 MG capsule Take 1 capsule by mouth 3 times daily for 90 days. 270 capsule 0    furosemide (LASIX) 20 MG tablet Take 1 tablet by mouth daily 90 tablet 3    carvedilol (COREG) 6.25 MG tablet Take 1 tablet by mouth 2 times daily (with meals) 180 tablet 3    hydrOXYzine (VISTARIL) 25 MG capsule Take 1 capsule by mouth 4 times daily as needed for Itching 270 capsule 1    triamcinolone (KENALOG) 0.1 % ointment Apply twice daily to affected areas of rash twice a day x 2 weeks then once a day x 2 weeks 60 g 1    allopurinol (ZYLOPRIM) 300 MG tablet Take half tab daily x 2weeks, then full tab daily thereafter.  balsalazide (COLAZAL) 750 MG capsule       vitamin B-12 (CYANOCOBALAMIN) 1000 MCG tablet TAKE 1 TABLET BY MOUTH ONCE DAILY      leucovorin calcium (WELLCOVORIN) 5 MG tablet TAKE 1 TO 2 TABLETS BY MOUTH ONCE DAILY THE DAY AFTER METHOTREXATE      metFORMIN (GLUCOPHAGE) 500 MG tablet TAKE 1 TABLET THREE TIMES A  tablet 4    famotidine (PEPCID) 40 MG tablet Take 0.5 tablets by mouth 2 times daily as needed (Herat burn/Acid reflux) 30 tablet 3    omeprazole (PRILOSEC) 40 MG delayed release capsule Take 1 capsule by mouth daily 90 capsule 3    amitriptyline (ELAVIL) 25 MG tablet 2 tablets PO at bedtime 180 tablet 3    rivaroxaban (XARELTO) 20 MG TABS tablet TAKE 1 TABLET DAILY 90 tablet 4    lansoprazole (PREVACID) 30 MG delayed release capsule Take 1 capsule by mouth daily 90 capsule 1    cholestyramine (QUESTRAN) 4 g packet Take 1 packet by mouth 2 times daily 90 packet 3    methotrexate (RHEUMATREX) 2.5 MG chemo tablet TAKE 6 TABS ONCE A WEEK with food. No alcohol. Hold if on antibiotics or ill.       metaxalone (SKELAXIN) 800 MG tablet Take 800 mg by mouth as needed   1    cyanocobalamin (CVS VITAMIN B12) 1000 MCG tablet Take 1 tablet by mouth daily 90 tablet 3    folic acid (FOLVITE) 1 MG tablet Take 1 tablet by mouth daily 90 tablet 3    levETIRAcetam (KEPPRA) 250 MG tablet Take 1 tablet by mouth 2 times daily 60 tablet 1    hydrocortisone 1 % cream Apply topically 2 times daily. 1 Tube 1    diclofenac sodium (VOLTAREN) 1 % GEL Apply 4 g topically 4 times daily 300 g 3    hydroxychloroquine (PLAQUENIL) 200 MG tablet Take 200 mg by mouth 2 times daily       albuterol sulfate  (90 BASE) MCG/ACT inhaler Inhale 2 puffs into the lungs every 6 hours as needed for Wheezing 1 Inhaler 3    Blood Glucose Monitoring Suppl (ACCU-CHEK ADVANTAGE DIABETES) KIT Diabetic monitoring kit(any brand), with supplies for testing. Test fasting once daily. Dia.00 1 kit 0    Glucose Blood (BLOOD GLUCOSE TEST STRIPS) STRP Test once daily 100 strip 4    Lancets MISC Testing q day 100 each 3    Cholecalciferol (VITAMIN D) 2000 UNITS CAPS capsule Take 1 capsule by mouth daily        Current Facility-Administered Medications   Medication Dose Route Frequency Provider Last Rate Last Dose    lidocaine 1 % injection 16 mL  16 mL Other Once Geo Shea,          PMH, Surgical Hx, Family Hx, and Social Hx reviewed and updated. Health Maintenance reviewed. Objective  Vitals:    20 1443   BP: 114/78   Site: Left Upper Arm   Position: Sitting   Cuff Size: Small Adult   Pulse: 110   Resp: 16   Temp: 97.8 °F (36.6 °C)   SpO2: 94%   Weight: 188 lb (85.3 kg)   Height: 5' 2\" (1.575 m)     BP Readings from Last 3 Encounters:   20 114/78   20 118/70   20 132/80     Wt Readings from Last 3 Encounters:   20 188 lb (85.3 kg)   20 185 lb (83.9 kg)   20 194 lb (88 kg)     Physical Exam  HENT:      Right Ear: Tympanic membrane and ear canal normal.      Left Ear: Tympanic membrane and ear canal normal.      Nose: Nose normal.   Eyes:      Extraocular Movements: Extraocular movements intact. Pupils: Pupils are equal, round, and reactive to light. Neck:      Musculoskeletal: Normal range of motion. Cardiovascular:      Rate and Rhythm: Tachycardia present. Pulses: Normal pulses. Pulmonary:      Effort: Pulmonary effort is normal. No respiratory distress. Breath sounds: No stridor. Wheezing (scattered ) present. Genitourinary:     Comments: Deferred   Musculoskeletal:      Right lower leg: No edema. Left lower leg: No edema. Skin:     General: Skin is warm and dry. Neurological:      Mental Status: She is alert and oriented to person, place, and time. Gait: Gait abnormal (uses walker). Psychiatric:         Mood and Affect: Mood normal.       Assessment & Plan    Diagnosis Orders   1. Wheezing  albuterol (PROVENTIL) nebulizer solution 2.5 mg   2. SOB (shortness of breath)     patient with hx of DVT/PE on Xarelto takes in the AM not the biggest meal of the day could possibly change effectiveness ? No change of symptoms after albuterol   HR elevated and pulse ox 94%  Discussed with fail of outpatient antibiotic and steroids it would be in patient's best interest to go to the ED for further evaluation of SOB/cough/wheezing with no known hx of COPD   patient and  agreeable  She declined wheelchair transport to ED  No orders of the defined types were placed in this encounter. Orders Placed This Encounter   Medications    albuterol (PROVENTIL) nebulizer solution 2.5 mg     There are no discontinued medications. No follow-ups on file. Reviewed with the patient: current clinical status,medications, activities and diet. Side effects, adverse effects of the medication prescribed today, as well as treatment plan/ rationale and result expectations have been discussed with the patient who expresses understanding and desires to proceed. Close follow up to evaluate treatment results and for coordination of care. I have reviewed the patient's medical history in detail and updated the computerized patient record.     BUTCH Meléndez CNP        Attending Supervising Elaine Howard  The patient met the criteria for indirect supervision. I reviewed the findings and plans of the Nurse Practitioner and agree as documented in her note .     Electronically signed by Karen Acuna DO on 3/19/20 at 11:23 AM EDT

## 2020-03-17 NOTE — ED PROVIDER NOTES
---------------------------------    IMPRESSION  1. Bronchitis        DISPOSITION  Disposition: Discharge to home  Patient condition is good        NOTE: This report was transcribed using voice recognition software.  Every effort was made to ensure accuracy; however, inadvertent computerized transcription errors may be present          Usama Nielsen MD  03/17/20 8200

## 2020-03-25 PROBLEM — E78.5 DYSLIPIDEMIA: Status: RESOLVED | Noted: 2020-03-25 | Resolved: 2020-03-24

## 2020-03-26 ENCOUNTER — VIRTUAL VISIT (OUTPATIENT)
Dept: FAMILY MEDICINE CLINIC | Age: 60
End: 2020-03-26
Payer: COMMERCIAL

## 2020-03-26 PROCEDURE — 99213 OFFICE O/P EST LOW 20 MIN: CPT | Performed by: NURSE PRACTITIONER

## 2020-03-26 RX ORDER — AZITHROMYCIN 250 MG/1
TABLET, FILM COATED ORAL
Qty: 6 TABLET | Refills: 0 | Status: SHIPPED | OUTPATIENT
Start: 2020-03-26 | End: 2020-04-05

## 2020-03-26 RX ORDER — BENZONATATE 100 MG/1
100 CAPSULE ORAL 2 TIMES DAILY PRN
Qty: 20 CAPSULE | Refills: 0 | Status: SHIPPED | OUTPATIENT
Start: 2020-03-26 | End: 2020-05-01 | Stop reason: ALTCHOICE

## 2020-03-26 NOTE — PROGRESS NOTES
3/26/2020    TELEHEALTH EVALUATION -- Audio/Visual (During LIRXW-48 public health emergency)    Due to [place] 19 outbreak, patient's office visit was converted to a virtual visit. Patient was contacted and agreed to proceed with a virtual visit via [employer] me  The risks and benefits of converting to a virtual visit were discussed in light of the current infectious disease epidemic. Patient also understood that insurance coverage and co-pays are up to their individual insurance plans. HPI:    Morales Hameed (:  1960) has requested an audio/video evaluation for the following concern(s):  Recent ER visit for bronchitis. She states that she continues to struggle with a cough during the day and even at night which has been keeping her up. She had a chest x-ray done on  that showed possible COPD but no other acute findings. She has questions regarding this. She denies any wheezing or significant shortness of breath. Feels that her chest is congested but her cough has been nonproductive. The only time she was really able to cough anything up was after a breathing treatment that was given to her in the ER. She has been on 2 rounds of antibiotic and a high-dose prednisone taper. She is taking medication for chronic acid reflux with no symptoms reported today. She has not had any recent fever or chills. No chest pain or chest pressure. No heart palpitations. Review of Systems    The patient reports no nausea or vomiting. There is no heartburn or indigestion. There is no diarrhea or constipation. No black, bloody, mucusy or tarry stool noticed. The patient reports no bloating and no change in appetite. There is no numbness, tingling or swelling in the extremities. Prior to Visit Medications    Medication Sig Taking?  Authorizing Provider   albuterol (PROVENTIL) (5 MG/ML) 0.5% nebulizer solution Take 1 mL by nebulization 4 times daily as needed for Wheezing Yes Geri Antonio capsule Take 1 capsule by mouth daily   Historical Provider, MD       Social History     Tobacco Use    Smoking status: Former Smoker     Packs/day: 1.25     Years: 10.00     Pack years: 12.50     Types: Cigarettes     Last attempt to quit: 2006     Years since quittin.2    Smokeless tobacco: Never Used    Tobacco comment: start smoking age 25   Substance Use Topics    Alcohol use: Not Currently    Drug use: No        Family History   Problem Relation Age of Onset    Substance Abuse Brother     High Blood Pressure Mother     High Cholesterol Mother     Arthritis Mother     High Blood Pressure Father     High Cholesterol Father     Clotting Disorder Father         clot to intestines    Arthritis Father     Substance Abuse Brother     Substance Abuse Brother     Stroke Paternal Uncle 36    Cystic Fibrosis Daughter        PHYSICAL EXAMINATION:  [ INSTRUCTIONS:  \"[x]\" Indicates a positive item  \"[]\" Indicates a negative item  -- DELETE ALL ITEMS NOT EXAMINED]  [x] Alert  [x] Oriented to person/place/time    [x] No apparent distress  [] Toxic appearing    [] Face flushed appearing [x] Sclera clear  [] Lips are cyanotic      [x] Breathing appears normal  [] Appears tachypneic      [] Rash on visible skin    [x] Cranial Nerves II-XII grossly intact    [x] Motor grossly intact in visible upper extremities    [] Motor grossly intact in visible lower extremities    [x] Normal Mood  [] Anxious appearing    [] Depressed appearing  [] Confused appearing      [] Poor short term memory  [] Poor long term memory    [] OTHER:      Due to this being a TeleHealth encounter, evaluation of the following organ systems is limited: Vitals/Constitutional/EENT/Resp/CV/GI//MS/Neuro/Skin/Heme-Lymph-Imm. Diagnosis Orders   1. Cough  XR CHEST STANDARD (2 VW)    benzonatate (TESSALON) 100 MG capsule    azithromycin (ZITHROMAX) 250 MG tablet   2.  Mild intermittent reactive airway disease with acute exacerbation albuterol (PROVENTIL) (5 MG/ML) 0.5% nebulizer solution   3. Acute bronchitis, unspecified organism  XR CHEST STANDARD (2 VW)     1.  2.  3.  Recommend option of additional antibiotic, medication for cough and nebulizer solution. She does have access to a nebulizer. Have symptoms or not improved by next week I do recommend a current chest x-ray. At that time would also recommend starting something like Singulair for potential seasonal allergies and PFT would be ordered. Discussed that chest x-ray cannot diagnose COPD but can suggest findings that correlate with it. Pulmonary function testing would be required for official diagnosis. She is aware of need to go back to emergency room if symptoms become severe. No follow-ups on file. An  electronic signature was used to authenticate this note. Please note this report has been partially produced using speech recognition software and may cause contain errors related to that system including grammar, punctuation and spelling as well as words and phrases that may seem inappropriate. If there are questions or concerns please feel free to contact me to clarify. --BUTCH Ahn - CNP on 3/26/2020 at 1:36 PM        Pursuant to the emergency declaration under the [facility] waiver authority and the [employer] General Act, this Virtual  Visit was conducted, with patient's consent, to reduce the patient's risk of exposure to COVID-19 and provide continuity of care for an established patient. Services were provided through a video synchronous discussion virtually to substitute for in-person clinic visit.

## 2020-03-27 ENCOUNTER — TELEPHONE (OUTPATIENT)
Dept: FAMILY MEDICINE CLINIC | Age: 60
End: 2020-03-27

## 2020-03-27 RX ORDER — ALBUTEROL SULFATE 0.63 MG/3ML
1 SOLUTION RESPIRATORY (INHALATION) EVERY 6 HOURS PRN
Qty: 270 ML | Refills: 3 | Status: SHIPPED | OUTPATIENT
Start: 2020-03-27

## 2020-03-30 ENCOUNTER — TELEPHONE (OUTPATIENT)
Dept: FAMILY MEDICINE CLINIC | Age: 60
End: 2020-03-30

## 2020-04-06 NOTE — TELEPHONE ENCOUNTER
Patient is requesting medication refill.  Please approve or deny this request.    Rx requested:  Requested Prescriptions     Pending Prescriptions Disp Refills    rivaroxaban (XARELTO) 20 MG TABS tablet 90 tablet 4     Sig: TAKE 1 TABLET DAILY         Last Office Visit:   3/2/2020      Next Visit Date:  Future Appointments   Date Time Provider Delmer Zhao   5/4/2020  7:30 AM BUTCH Nickerson - [place] [facility] [place] [facility]   5/7/2020  8:00 AM DO Carol Delaney   6/17/2020  3:00 PM Isidoro Rodarte Arm, MD [facility]

## 2020-05-01 ENCOUNTER — HOSPITAL ENCOUNTER (OUTPATIENT)
Dept: LAB | Age: 60
Discharge: HOME | End: 2020-05-01
Payer: COMMERCIAL

## 2020-05-01 LAB
CHOLESTEROL, TOTAL: 181 MG/DL (ref 0–199)
HBA1C MFR BLD: 6.8 % (ref 4.8–5.9)
HDLC SERPL-MCNC: 73 MG/DL (ref 40–59)
LDL CHOLESTEROL CALCULATED: 74 MG/DL (ref 0–129)
REASON FOR REJECTION: NORMAL
REJECTED TEST: NORMAL
TRIGL SERPL-MCNC: 171 MG/DL (ref 0–150)
VITAMIN B-12: 734 PG/ML (ref 232–1245)
VITAMIN D 25-HYDROXY: 48.6 NG/ML (ref 30–100)

## 2020-05-01 PROCEDURE — 82306 VITAMIN D 25 HYDROXY: CPT

## 2020-05-01 PROCEDURE — 84425 ASSAY OF VITAMIN B-1: CPT

## 2020-05-01 PROCEDURE — 80061 LIPID PANEL: CPT

## 2020-05-01 PROCEDURE — 82607 VITAMIN B-12: CPT

## 2020-05-01 PROCEDURE — 83036 HEMOGLOBIN GLYCOSYLATED A1C: CPT

## 2020-05-01 PROCEDURE — 36415 COLL VENOUS BLD VENIPUNCTURE: CPT

## 2020-05-01 RX ORDER — CYCLOBENZAPRINE HCL 10 MG
10 TABLET ORAL 2 TIMES DAILY PRN
COMMUNITY
Start: 2020-03-30 | End: 2021-01-01 | Stop reason: ALTCHOICE

## 2020-05-01 RX ORDER — OMEPRAZOLE 40 MG/1
40 CAPSULE, DELAYED RELEASE ORAL DAILY
COMMUNITY
End: 2021-01-01 | Stop reason: ALTCHOICE

## 2020-05-01 RX ORDER — TRAMADOL HYDROCHLORIDE 50 MG/1
50 TABLET ORAL 2 TIMES DAILY PRN
COMMUNITY
End: 2021-01-01 | Stop reason: ALTCHOICE

## 2020-05-01 RX ORDER — NITROFURANTOIN 25; 75 MG/1; MG/1
100 CAPSULE ORAL PRN
COMMUNITY
End: 2021-01-01 | Stop reason: ALTCHOICE

## 2020-05-04 ENCOUNTER — VIRTUAL VISIT (OUTPATIENT)
Dept: FAMILY MEDICINE CLINIC | Age: 60
End: 2020-05-04
Payer: COMMERCIAL

## 2020-05-04 LAB — VITAMIN B1 WHOLE BLOOD: 170 NMOL/L (ref 70–180)

## 2020-05-04 PROCEDURE — 99214 OFFICE O/P EST MOD 30 MIN: CPT | Performed by: NURSE PRACTITIONER

## 2020-05-04 NOTE — PROGRESS NOTES
2020    TELEHEALTH EVALUATION -- Audio/Visual ([facility])    Due to COVID 19 outbreak, patient's office visit was converted to a virtual visit. Patient was contacted and agreed to proceed with a virtual visit via [employer]. me  The risks and benefits of converting to a virtual visit were discussed in light of the current infectious disease epidemic. Patient also understood that insurance coverage and co-pays are up to their individual insurance plans. Watson Schlatter is a 61 y.o. female being evaluated by a Virtual Visit (video visit) encounter to address concerns as mentioned above. A caregiver was present when appropriate. Due to this being a TeleHealth encounter (During LGJBB-46 public health emergency), evaluation of the following organ systems was limited: Vitals/Constitutional/EENT/Resp/CV/GI//MS/Neuro/Skin/Heme-Lymph-Imm. Pursuant to the emergency declaration under the [address] [place] [address] and the [employer] Act, this Virtual Visit was conducted with patient's (and/or legal guardian's) consent, to reduce the patient's risk of exposure to COVID-19 and provide necessary medical care. The patient (and/or legal guardian) has also been advised to contact this office for worsening conditions or problems, and seek emergency medical treatment and/or call 911 if deemed necessary. Patient identification was verified at the start of the visit: Yes    Total time spent for this encounter: Not billed by time    Services were provided through a video synchronous discussion virtually to substitute for in-person clinic visit. Patient and provider were located at their individual homes. The patient is talking with me virtually from her home and I am located at my office in [facility].        HPI:    Watson Schlatter (:  1960) has requested an audio/video evaluation ONCE DAILY THE DAY AFTER METHOTREXATE  Historical Provider, MD   metFORMIN (GLUCOPHAGE) 500 MG tablet TAKE 1 TABLET THREE TIMES A DAY  BUTCH Gardner CNP   amitriptyline (ELAVIL) 25 MG tablet 2 tablets PO at bedtime  Bethanie Baez MD   lansoprazole (PREVACID) 30 MG delayed release capsule Take 1 capsule by mouth daily  Rideragapito SchreiberBUTCH CNP   cholestyramine (QUESTRAN) 4 g packet Take 1 packet by mouth 2 times daily  BUTCH Lindsey CNP   methotrexate (RHEUMATREX) 2.5 MG chemo tablet TAKE 6 TABS ONCE A WEEK with food. No alcohol. Hold if on antibiotics or ill. Historical Provider, MD   cyanocobalamin (CVS VITAMIN B12) 1000 MCG tablet Take 1 tablet by mouth daily  Barbra El MD   folic acid (FOLVITE) 1 MG tablet Take 1 tablet by mouth daily  Barbra El MD   levETIRAcetam (KEPPRA) 250 MG tablet Take 1 tablet by mouth 2 times daily  Bethanie Baez MD   hydrocortisone 1 % cream Apply topically 2 times daily. BUTCH Machado CNP   diclofenac sodium (VOLTAREN) 1 % GEL Apply 4 g topically 4 times daily  Maria Elena Cid DO   hydroxychloroquine (PLAQUENIL) 200 MG tablet Take 200 mg by mouth 2 times daily   Historical Provider, MD   albuterol sulfate  (90 BASE) MCG/ACT inhaler Inhale 2 puffs into the lungs every 6 hours as needed for Wheezing  BUTCH Machado CNP   Blood Glucose Monitoring Suppl (ACCU-CHEK ADVANTAGE DIABETES) KIT Diabetic monitoring kit(any brand), with supplies for testing. Test fasting once daily.  Dia.00  HAILEE Fam   Glucose Blood (BLOOD GLUCOSE TEST STRIPS) STRP Test once daily  HAILEE Fam   Lancets MISC Testing q day  HAILEE Fam   Cholecalciferol (VITAMIN D) 2000 UNITS CAPS capsule Take 2 capsules by mouth daily   Historical Provider, MD       Social History     Tobacco Use    Smoking status: Former Smoker     Packs/day: 1.25     Years: 10.00     Pack years: 12.50     Types: Cigarettes

## 2020-05-05 ENCOUNTER — VIRTUAL VISIT (OUTPATIENT)
Dept: CARDIOLOGY CLINIC | Age: 60
End: 2020-05-05
Payer: COMMERCIAL

## 2020-05-05 PROCEDURE — 99213 OFFICE O/P EST LOW 20 MIN: CPT | Performed by: INTERNAL MEDICINE

## 2020-05-05 ASSESSMENT — ENCOUNTER SYMPTOMS
ABDOMINAL PAIN: 0
ALLERGIC/IMMUNOLOGIC NEGATIVE: 1
GASTROINTESTINAL NEGATIVE: 1
EYES NEGATIVE: 1
SHORTNESS OF BREATH: 1
VOMITING: 0
NAUSEA: 0
WHEEZING: 0

## 2020-05-05 NOTE — PROGRESS NOTES
5/5/2020    TELEHEALTH EVALUATION -- Audio/Visual (During HKQFS-94 public health emergency)    Due to COVID 19 outbreak, patient's office visit was converted to a virtual visit. Patient was contacted and agreed to proceed with a virtual visit via Doxy. me  The risks and benefits of converting to a virtual visit were discussed in light of the current infectious disease epidemic. Patient also understood that insurance coverage and co-pays are up to their individual insurance plans. HPI:    6-30-18:     Patient is a 62 y. o. female who presents with a chief complaint of Sob. Patient is followed on a regular basis by Dr. Gray Kellogg, APRN - Zaria Kavon has been expressing shortness of breath of 2 days onset associated with left arm tingling.  She also has abdominal pain at the hernia repair incisional site. Altaf Guevara has a history of pulmonary embolism 2 years ago which she is on Alcides Kayla has been expressing diarrhea as well.  She was noted to have acute renal failure with a creatinine of 1.6 now improved to 1.09.  Her cardiac enzyme was elevated at 1.4 and trending down.  She denies any chest pain chest pressure heaviness.  Patient had a normal myocardial perfusion stresses in 2016 with breast attenuation artifact.  2-D echocardiogram in 2000 seen was also essentially normal.        7-31-18: Patient presents for initial medical evaluation. Patient is followed on a regular basis by Dr. Gray Kellogg, BUTCH - CNP. S/p hospitalization for SOB, NSTEMI. S/p ECHO with EF of 30%, grade I DD, mild AI, no PFO. S/p LHC with normal coronaries, EF Of 30%. Has hx of DVT/PE, factor V leiden defiency, on life long [address] [place] [address] [employer]. No bleeding issues. Her BP has been low in cardiac rehab. meds adjusted. Pt denies chest pain,   nausea, vomiting, diarrhea, constipation, motor weakness, insomnia, weight loss, syncope, dizziness, lightheadedness, palpitations, PND, orthopnea, or claudication. . BP and hr are good. No LE discoloration or 6/29/2018    Right inguinal hernia 11/9/2017    SOB (shortness of breath) 4/19/2017    Systemic lupus erythematosus ([address] 9/8/2017    Transient brainstem ischemia     Umbilical hernia 56/5/1050    Vasomotor flushing     on hormone replacement therapy   ,   Past Surgical History:   Procedure Laterality Date    APPENDECTOMY      CARDIAC CATHETERIZATION  06/2018    CATARACT REMOVAL Bilateral Winter 2019   [facility] COLONOSCOPY      Dr. Jaxson Pichardo COLONOSCOPY  12/16/13    DR SINGER repeat in 10 yrs per pt    COLONOSCOPY  08/2017    Dr Sajan Feng Right 11/15/2017    RIGHT  HERNIA INGUINAL REPAIR performed by Renuka Celaya MD at [address] [place], [place] [address]Mg ARTHROSCOPY Left     LAPAROSCOPY N/A 11/15/2017    DIAGNOSTIC LAPAROSCOPY performed by Renuka Celaya MD at [address] Right 3/14/2019    RIGHT QUADRICEPS MUSCLE BIOPSY performed by Marina Vaca MD at [facility] N/A 9/25/2017    [address] [facility] 3 performed by Veto Barth MD at [facility],7+M/T,DBBBP N/A 77/75/1701    HERNIA UMBILICAL REPAIR, [facility] performed by Renuka Celaya MD at [place] [address]  12/16/13    DR Rubi Paulson   ,   Family History   Problem Relation Age of Onset    Substance Abuse Brother     High Blood Pressure Mother     High Cholesterol Mother     Arthritis Mother     High Blood Pressure Father     High Cholesterol Father     Clotting Disorder Father         clot to intestines    Arthritis Father     Substance Abuse Brother     Substance Abuse Brother     Stroke Paternal Uncle 36    Cystic Fibrosis Daughter        PHYSICAL EXAMINATION:  [ INSTRUCTIONS:  \"[x]\" Indicates a positive item  \"[]\" Indicates a negative item  -- DELETE ALL ITEMS NOT EXAMINED]  [x] Alert  [x] Oriented to person/place/time    [x] No apparent distress  [] Toxic appearing    [] Face flushed appearing [x] to monitor muscle and liver enzymes, BUN, CR, and electrolytes.     Details of medical condition explained and patient was warned about adverse consequences of uncontrolled medical conditions and possible side effects of prescribed medications. No follow-ups on file. An  electronic signature was used to authenticate this note. --Davis Nances,  on 5/5/2020 at 12:49 PM  9}    Pursuant to the emergency declaration under the [address] [place] [address] waiver authority and the [employer] Act, this Virtual  Visit was conducted, with patient's consent, to reduce the patient's risk of exposure to COVID-19 and provide continuity of care for an established patient. Services were provided through a video synchronous discussion virtually to substitute for in-person clinic visit. Total Virtual Visit time spent: 10 min. Please note this report has been partially produced using speech recognition software and may cause contain errors related to that system including grammar, punctuation and spelling as well as words and phrases that may seem inappropriate. If there are questions or concerns please feel free to contact me to clarify.

## 2020-05-11 RX ORDER — GABAPENTIN 400 MG/1
CAPSULE ORAL
Qty: 270 CAPSULE | Refills: 2 | Status: SHIPPED | OUTPATIENT
Start: 2020-05-11 | End: 2021-01-01

## 2020-05-11 NOTE — TELEPHONE ENCOUNTER
pharmacy requesting medication refill.  Please approve or deny this request.    Rx requested:  Requested Prescriptions     Pending Prescriptions Disp Refills    gabapentin (NEURONTIN) 400 MG capsule [Pharmacy Med Name: GABAPENTIN CAPS 400MG] 270 capsule 3     Sig: TAKE 1 [address]         Last Office Visit:   3/2/2020      Next Visit Date:  Future Appointments   Date Time Provider Delmer Zhao   6/17/2020  3:00 PM MD Fabián James   8/3/2020 11:10 AM BUTCH Emmanuel - CNP [address]   11/6/2020 11:00 AM Fam Barrett, DO [address]

## 2020-05-13 ENCOUNTER — PROCEDURE VISIT (OUTPATIENT)
Dept: PHYSICAL MEDICINE AND REHAB | Age: 60
End: 2020-05-13
Payer: COMMERCIAL

## 2020-05-13 PROCEDURE — 20553 NJX 1/MLT TRIGGER POINTS 3/>: CPT | Performed by: PHYSICAL MEDICINE & REHABILITATION

## 2020-05-13 RX ORDER — LIDOCAINE HYDROCHLORIDE 10 MG/ML
24 INJECTION, SOLUTION INFILTRATION; PERINEURAL ONCE
Status: COMPLETED | OUTPATIENT
Start: 2020-05-13 | End: 2020-05-13

## 2020-05-13 RX ADMIN — LIDOCAINE HYDROCHLORIDE 24 ML: 10 INJECTION, SOLUTION INFILTRATION; PERINEURAL at 10:28

## 2020-05-20 RX ORDER — LEVETIRACETAM 250 MG/1
250 TABLET ORAL 2 TIMES DAILY
Qty: 180 TABLET | Refills: 3 | Status: SHIPPED | OUTPATIENT
Start: 2020-05-20 | End: 2021-01-01 | Stop reason: SDUPTHER

## 2020-05-20 NOTE — TELEPHONE ENCOUNTER
Patients pharmacy is requesting medication refill for 90 day.  Please approve or deny this request.    Rx requested:  Requested Prescriptions     Pending Prescriptions Disp Refills    levETIRAcetam (KEPPRA) 250 MG tablet 180 tablet 3     Sig: Take 1 tablet by mouth 2 times daily         Last Office Visit:   12/18/2019      Next Visit Date:  Future Appointments   Date Time Provider Delmer Zhao   6/17/2020  3:00 PM MD Fabián Sánchez   8/3/2020 11:10 AM BUTCH Calvo - CNP [address]   11/6/2020 11:00 AM Fam Vail, DO [address]

## 2020-05-28 ENCOUNTER — VIRTUAL VISIT (OUTPATIENT)
Dept: PHYSICAL MEDICINE AND REHAB | Age: 60
End: 2020-05-28
Payer: COMMERCIAL

## 2020-05-28 VITALS — BODY MASS INDEX: 33.66 KG/M2 | WEIGHT: 190 LBS | HEIGHT: 63 IN

## 2020-05-28 PROCEDURE — 99214 OFFICE O/P EST MOD 30 MIN: CPT | Performed by: PHYSICAL MEDICINE & REHABILITATION

## 2020-05-28 RX ORDER — LIDOCAINE 50 MG/G
1 PATCH TOPICAL DAILY
Qty: 90 PATCH | Refills: 0 | Status: SHIPPED | OUTPATIENT
Start: 2020-05-28 | End: 2021-01-01 | Stop reason: SDUPTHER

## 2020-05-28 RX ORDER — CYCLOBENZAPRINE HCL 10 MG
10 TABLET ORAL 2 TIMES DAILY PRN
Qty: 60 TABLET | Refills: 1 | Status: SHIPPED | OUTPATIENT
Start: 2020-05-28 | End: 2020-06-27

## 2020-05-28 RX ORDER — TRAMADOL HYDROCHLORIDE 50 MG/1
50 TABLET ORAL EVERY 8 HOURS PRN
Qty: 15 TABLET | Refills: 0 | Status: SHIPPED | OUTPATIENT
Start: 2020-05-28 | End: 2020-06-17

## 2020-05-28 ASSESSMENT — ENCOUNTER SYMPTOMS
BACK PAIN: 1
EYE PAIN: 0
PHOTOPHOBIA: 0
APNEA: 0
CHEST TIGHTNESS: 0
ABDOMINAL PAIN: 0
SORE THROAT: 0
CONSTIPATION: 0
VOMITING: 0
SHORTNESS OF BREATH: 1
COLOR CHANGE: 0
BOWEL INCONTINENCE: 0
NAUSEA: 0
DIARRHEA: 0
WHEEZING: 0
COUGH: 0

## 2020-05-28 NOTE — PROGRESS NOTES
TELEHEALTH EVALUATION -- Audio/Visual (During MIIRE-41 public health emergency)    Due to COVID 19 outbreak, patient's office visit was converted to a virtual visit. Patient was contacted and agreed to proceed with a virtual visit via [place]. me  The risks and benefits of converting to a virtual visit were discussed in light of the current infectious disease epidemic. Patient also understood that insurance coverage and co-pays are up to their individual insurance plans. Subjective       This patient has requested an audio/video evaluation for the following concern(s):    HPI:  Back Pain      Neck Pain 5-13 TP 60% reduction in pain still lasting     Medication Refill          She rarely takes the Ultracet  She still has no signs of overuse or abuse however her Soaper score is rather high we discussed relaxation techniques and stress management. Seems like this score is getting better however. She is able to be more functional on the medication a little to have positive interactions with friends and family and perform light housework. IV rituxan at cc for RA he is trying to wean off of the prednisone--now down to alterating 10/7.5 mg. They are going very slowly on her dose she still has significant cushingoid side effects as well as osteoporosis. Now Charissa Andrew for myopathy emg reviewed [facility]-- Steroids are not helping she is now getting infusion therapy. [place] with Dr Monty Jennings. She thinks that that type but also probably helping a little bit but doesn't seem to have great pain control with it. Her goal is to come off the prednisone and she is getting twice yearly infusions with Prolia. She remains on Xarelto because of her factor V Leiden clotting. Caudal blocks helped quite a bit however the logistics of which are difficult because of her Xarelto for the factor V Leiden as well as the fact that we are trying to limit her cortisone exposure.     She recently had a caudal block which helped performed by Milly Caro MD at [facility],5+Y/R,KWJ N/A     HERNIA UMBILICAL REPAIR, [place] performed by Corina Ohara MD at [address] [place] [address]  13    DR SINGER       Social History     Socioeconomic History    Marital status:      Spouse name: Lana Joseph Number of children: 1    Years of education: 15    Highest education level: 12th grade   Occupational History    Occupation: Housewife    Occupation: retired [employer]   Social Needs    Financial resource strain: Not hard at all   [place] insecurity     Worry: Never true     Inability: Never true   [employer] needs     Medical: No     Non-medical: No   Tobacco Use    Smoking status: Former Smoker     Packs/day: 1.25     Years: 10.00     Pack years: 12.50     Types: Cigarettes     Last attempt to quit: 2006     Years since quittin.4    Smokeless tobacco: Never Used    Tobacco comment: start smoking age 25   Substance and Sexual Activity    Alcohol use: Not Currently    Drug use: No    Sexual activity: Yes   Lifestyle    Physical activity     Days per week: 0 days     Minutes per session: 0 min    Stress:  Only a little   Relationships    Social connections     Talks on phone: More than three times a week     Gets together: More than three times a week     Attends Confucianist service: 1 to 4 times per year     Active member of club or organization: No     Attends meetings of clubs or organizations: Never     Relationship status:     Intimate partner violence     Fear of current or ex partner: No     Emotionally abused: No     Physically abused: No     Forced sexual activity: No   Other Topics Concern    None   Social History Narrative    Off work dt--retired early from --worked [employer]---moved back to [place] to work at [employer] in [address] up in [place] went to [employer]       Family History   Problem Relation Age of Onset    muscle pain     3. SI (sacroiliac) pain  traMADol (ULTRAM) 50 MG tablet   4. Lupus erythematosus, unspecified form  traMADol (ULTRAM) 50 MG tablet   5. Current mild episode of major depressive disorder without prior episode ([facility]     6. Generalized OA  traMADol (ULTRAM) 50 MG tablet   7. Positive DESTINEE (antinuclear antibody) Dr. Luisa Johnson  traMADol (ULTRAM) 50 MG tablet   8. DDD (degenerative disc disease), lumbar  traMADol (ULTRAM) 50 MG tablet       I am also concerned by lifestyle and mood issues including:    Past Medical History:   Diagnosis Date    Ataxic gait     Back pain     Benign essential tremor     Depression     DM (diabetes mellitus) ([facility] 11/25/2014    DVT (deep venous thrombosis) ([facility])     Elevated troponin 5/25/2016    Factor V Leiden carrier ([facility]     history of DVT, on Xarelto    GERD (gastroesophageal reflux disease)     Hereditary sensory-motor neuropathy, type I     HTN (hypertension)     HTN (hypertension)     Hyperlipidemia     Lumbar radicular pain     Neuropathy of both feet 8/22/2016    NSTEMI (non-ST elevated myocardial infarction) ([facility]     Obesity     Osteoarthritis     Pulmonary embolism ([facility]     Renal failure 6/29/2018    Right inguinal hernia 11/9/2017    SOB (shortness of breath) 4/19/2017    Systemic lupus erythematosus ([facility] 9/8/2017    Transient brainstem ischemia     Umbilical hernia 10/2/0569    Vasomotor flushing     on hormone replacement therapy           Given their medication, chronic pain and lifestyle and medications they are at risk for :    Falls, constipation, addiction  Loss of livelyhood due to severe pain, debility, weight gain and  vitamin D deficiency    The patient was educated regarding proper diet, fitness routine, and regulatory restrictions concerning pain medications. Previous notes, comprehensive past medical, surgical, family history, and diagnostics were reviewed.    Patient education and councelling were provided regarding

## 2020-06-03 ENCOUNTER — TELEPHONE (OUTPATIENT)
Dept: PHYSICAL MEDICINE AND REHAB | Age: 60
End: 2020-06-03

## 2020-06-03 RX ORDER — METHOCARBAMOL 500 MG/1
500 TABLET, FILM COATED ORAL EVERY 12 HOURS PRN
Qty: 60 TABLET | Refills: 0 | Status: SHIPPED | OUTPATIENT
Start: 2020-06-03 | End: 2020-09-15 | Stop reason: SDUPTHER

## 2020-06-17 ENCOUNTER — OFFICE VISIT (OUTPATIENT)
Dept: NEUROLOGY | Age: 60
End: 2020-06-17
Payer: COMMERCIAL

## 2020-06-17 VITALS
WEIGHT: 197 LBS | HEART RATE: 82 BPM | SYSTOLIC BLOOD PRESSURE: 127 MMHG | BODY MASS INDEX: 34.9 KG/M2 | DIASTOLIC BLOOD PRESSURE: 64 MMHG

## 2020-06-17 PROBLEM — J40 BRONCHITIS: Status: ACTIVE | Noted: 2020-06-17

## 2020-06-17 PROBLEM — R05.9 COUGH: Status: ACTIVE | Noted: 2020-06-17

## 2020-06-17 PROBLEM — R00.0 TACHYCARDIA: Status: ACTIVE | Noted: 2020-06-17

## 2020-06-17 PROBLEM — R06.00 DYSPNEA: Status: ACTIVE | Noted: 2017-04-19

## 2020-06-17 PROBLEM — R25.1 TREMOR: Status: ACTIVE | Noted: 2018-11-18

## 2020-06-17 PROCEDURE — 99214 OFFICE O/P EST MOD 30 MIN: CPT | Performed by: PSYCHIATRY & NEUROLOGY

## 2020-06-17 RX ORDER — AMITRIPTYLINE HYDROCHLORIDE 25 MG/1
TABLET, FILM COATED ORAL
Qty: 180 TABLET | Refills: 3 | Status: SHIPPED | OUTPATIENT
Start: 2020-06-17 | End: 2021-01-01

## 2020-06-17 RX ORDER — OMEPRAZOLE 20 MG/1
20 CAPSULE, DELAYED RELEASE ORAL
Qty: 90 CAPSULE | Refills: 1 | Status: SHIPPED | OUTPATIENT
Start: 2020-06-17 | End: 2020-01-01

## 2020-06-17 RX ORDER — AMITRIPTYLINE HYDROCHLORIDE 25 MG/1
TABLET, FILM COATED ORAL
COMMUNITY
Start: 2020-04-13 | End: 2021-01-01 | Stop reason: SDUPTHER

## 2020-06-17 ASSESSMENT — ENCOUNTER SYMPTOMS
PHOTOPHOBIA: 0
TROUBLE SWALLOWING: 0
COLOR CHANGE: 0
VOMITING: 0
CHOKING: 0
NAUSEA: 0
BACK PAIN: 0
SHORTNESS OF BREATH: 0

## 2020-06-17 NOTE — PROGRESS NOTES
Right inguinal hernia 2017    SOB (shortness of breath) 2017    Systemic lupus erythematosus ([address] 2017    Transient brainstem ischemia     Umbilical hernia     Vasomotor flushing     on hormone replacement therapy     Past Surgical History:   Procedure Laterality Date    APPENDECTOMY      CARDIAC CATHETERIZATION  2018    CATARACT REMOVAL Bilateral Winter 2019   [facility] COLONOSCOPY      Dr. Woo Code COLONOSCOPY  13    DR SINGER repeat in 10 yrs per pt    COLONOSCOPY  2017    Dr Khanna Lank Right 11/15/2017    RIGHT  HERNIA INGUINAL REPAIR performed by Harjeet Madison MD at [facility]  ARTHROSCOPY Left     LAPAROSCOPY N/A 11/15/2017    DIAGNOSTIC LAPAROSCOPY performed by Harjeet Madison MD at [address] Right 3/14/2019    RIGHT QUADRICEPS MUSCLE BIOPSY performed by Johnathan Casper MD at [facility] N/A 2017    [facility] 3 performed by Betsy Velarde MD at [facility],+G/S,JYCQT N/A     [facility], [facility] performed by Harjeet Madison MD at [facility]  13    DR SINGER     Social History     Socioeconomic History    Marital status:      Spouse name: Len Ruby Number of children: 1    Years of education: 15    Highest education level: 12th grade   Occupational History    Occupation: Housewife    Occupation: retired [employer]   Social Needs    Financial resource strain: Not hard at all   [place] insecurity     Worry: Never true     Inability: Never true    Transportation needs     Medical: No     Non-medical: No   Tobacco Use    Smoking status: Former Smoker     Packs/day: 1.25     Years: 10.00     Pack years: 12.50     Types: Cigarettes     Last attempt to quit: 2006     Years since quittin.4    Smokeless tobacco: Never Used    Tobacco comment: start smoking age 25   Substance and Sexual Activity    Alcohol use: Not Currently    Drug use: No    Sexual activity: Yes   Lifestyle    Physical activity     Days per week: 0 days     Minutes per session: 0 min    Stress:  Only a little   Relationships    Social connections     Talks on phone: More than three times a week     Gets together: More than three times a week     Attends Spiritism service: 1 to 4 times per year     Active member of club or organization: No     Attends meetings of clubs or organizations: Never     Relationship status:     Intimate partner violence     Fear of current or ex partner: No     Emotionally abused: No     Physically abused: No     Forced sexual activity: No   Other Topics Concern    Not on file   Social History Narrative    Off work dt--retired early from [employer]--worked [employer]---[place] work at [employer] in [address] up in [place] went to [employer]     Family History   Problem Relation Age of Onset    Substance Abuse Brother     High Blood Pressure Mother     High Cholesterol Mother     Arthritis Mother     High Blood Pressure Father     High Cholesterol Father     Clotting Disorder Father         clot to intestines    Arthritis Father     Substance Abuse Brother     Substance Abuse Brother     Stroke Paternal Uncle 36    Cystic Fibrosis Daughter      Allergies   Allergen Reactions    Sulfa Antibiotics Rash    Ciprofloxacin Hcl Swelling     facial, tongue swelling    Nsaids Other (See Comments)     Bleed risk dt Xaralto    Statins Other (See Comments)     Liver enzyme elevation       Current Outpatient Medications   Medication Sig Dispense Refill    amitriptyline (ELAVIL) 25 MG tablet       amitriptyline (ELAVIL) 25 MG tablet 2 tablets PO at bedtime 180 tablet 3    omeprazole (PRILOSEC) 20 MG delayed release capsule Take 1 capsule by mouth 2 times daily (before meals) 90 capsule 1    methocarbamol (ROBAXIN) 500 MG tablet fever.   HENT: Negative for ear pain, tinnitus and trouble swallowing. Eyes: Negative for photophobia and visual disturbance. Respiratory: Negative for choking and shortness of breath. Cardiovascular: Negative for chest pain and palpitations. Gastrointestinal: Negative for nausea and vomiting. Musculoskeletal: Negative for back pain, gait problem, joint swelling, myalgias, neck pain and neck stiffness. Skin: Negative for color change. Allergic/Immunologic: Negative for food allergies. Neurological: Negative for dizziness, tremors, seizures, syncope, facial asymmetry, speech difficulty, weakness, light-headedness, numbness and headaches. Psychiatric/Behavioral: Negative for behavioral problems, confusion, hallucinations and sleep disturbance. Objective:   /64 (Site: Left Upper Arm, Position: Sitting, Cuff Size: Large Adult)   Pulse 82   Wt 197 lb (89.4 kg)   LMP  (LMP Unknown)   BMI 34.90 kg/m²     Physical Exam  Vitals signs reviewed. Eyes:      Pupils: Pupils are equal, round, and reactive to light. Neck:      Musculoskeletal: Normal range of motion. Cardiovascular:      Rate and Rhythm: Normal rate and regular rhythm. Heart sounds: No murmur. Pulmonary:      Effort: Pulmonary effort is normal.      Breath sounds: Normal breath sounds. Abdominal:      General: Bowel sounds are normal.   Musculoskeletal: Normal range of motion. Skin:     General: Skin is warm. Neurological:      Mental Status: She is alert and oriented to person, place, and time. Cranial Nerves: No cranial nerve deficit. Sensory: No sensory deficit. Motor: No abnormal muscle tone. Coordination: Coordination normal.      Deep Tendon Reflexes: Reflexes are normal and symmetric. Babinski sign absent on the right side. Babinski sign absent on the left side.    Psychiatric:         Mood and Affect: Mood normal.       She is walking with a walker she is mildly hyperreflexic but not

## 2020-06-23 RX ORDER — HYDROXYZINE PAMOATE 25 MG/1
CAPSULE ORAL
Qty: 270 CAPSULE | Refills: 3 | Status: SHIPPED | OUTPATIENT
Start: 2020-06-23 | End: 2020-08-03 | Stop reason: SDUPTHER

## 2020-07-08 ENCOUNTER — PROCEDURE VISIT (OUTPATIENT)
Dept: PHYSICAL MEDICINE AND REHAB | Age: 60
End: 2020-07-08
Payer: COMMERCIAL

## 2020-07-08 PROCEDURE — 20553 NJX 1/MLT TRIGGER POINTS 3/>: CPT | Performed by: PHYSICAL MEDICINE & REHABILITATION

## 2020-07-08 RX ADMIN — LIDOCAINE HYDROCHLORIDE 24 ML: 10 INJECTION, SOLUTION INFILTRATION; PERINEURAL at 10:29

## 2020-07-09 RX ORDER — LIDOCAINE HYDROCHLORIDE 10 MG/ML
24 INJECTION, SOLUTION INFILTRATION; PERINEURAL ONCE
Status: COMPLETED | OUTPATIENT
Start: 2020-07-09 | End: 2020-07-08

## 2020-07-09 NOTE — TELEPHONE ENCOUNTER
Pharmacy is requesting medication refill.  Please approve or deny this request.    Rx requested:  Requested Prescriptions     Pending Prescriptions Disp Refills    triamcinolone (KENALOG) 0.1 % ointment [Pharmacy Med Name: TRIAMCINOLONE OINTMENT 30GM 0.1%] 60 g 12     Sig: APPLY TO THE AFFECTED AREAS OF RASH TWICE A DAY FOR 2 WEEKS THEN ONCE DAILY FOR 2 WEEKS         Last Office Visit:   3/2/2020  VV 5/4/2020      Next Visit Date:  Future Appointments   Date Time Provider Delmer Zhao   8/3/2020 11:10 AM Leandro Wray, APRN - CNP [facility]   8/17/2020 11:15 AM [employer], DO [address] [place]   11/6/2020 11:00 AM Fam Padgett, DO [address]   12/16/2020  3:00 PM MD Fabián Nieto

## 2020-07-09 NOTE — PROGRESS NOTES
Patient here for trigger point injections. Patient taken back to exam room, and placed on drape locking stool. Areas to be injected marked appropriately, and cleansed with alcohol. 24cc of 1% Lidocaine to be injected by provider.

## 2020-07-17 PROBLEM — R05.9 COUGH: Status: RESOLVED | Noted: 2020-06-17 | Resolved: 2020-07-17

## 2020-07-29 ENCOUNTER — HOSPITAL ENCOUNTER (OUTPATIENT)
Dept: LAB | Age: 60
Discharge: HOME | End: 2020-07-29
Payer: COMMERCIAL

## 2020-07-29 LAB
ALBUMIN SERPL-MCNC: 4.2 G/DL (ref 3.5–4.6)
ALP BLD-CCNC: 76 U/L (ref 40–130)
ALT SERPL-CCNC: 25 U/L (ref 0–33)
ANION GAP SERPL CALCULATED.3IONS-SCNC: 18 MEQ/L (ref 9–15)
AST SERPL-CCNC: 25 U/L (ref 0–35)
BASOPHILS ABSOLUTE: 0 K/UL (ref 0–0.2)
BASOPHILS RELATIVE PERCENT: 0.8 %
BILIRUB SERPL-MCNC: 0.3 MG/DL (ref 0.2–0.7)
BUN BLDV-MCNC: 33 MG/DL (ref 8–23)
CALCIUM SERPL-MCNC: 9.2 MG/DL (ref 8.5–9.9)
CHLORIDE BLD-SCNC: 97 MEQ/L (ref 95–107)
CHOLESTEROL, TOTAL: 185 MG/DL (ref 0–199)
CO2: 25 MEQ/L (ref 20–31)
CREAT SERPL-MCNC: 1.26 MG/DL (ref 0.5–0.9)
CREATININE URINE: 151.8 MG/DL
EOSINOPHILS ABSOLUTE: 0.2 K/UL (ref 0–0.7)
EOSINOPHILS RELATIVE PERCENT: 2.4 %
GFR AFRICAN AMERICAN: 52.4
GFR NON-AFRICAN AMERICAN: 43.3
GLOBULIN: 2.4 G/DL (ref 2.3–3.5)
GLUCOSE BLD-MCNC: 106 MG/DL (ref 70–99)
HBA1C MFR BLD: 6.3 % (ref 4.8–5.9)
HCT VFR BLD CALC: 35.7 % (ref 37–47)
HDLC SERPL-MCNC: 70 MG/DL (ref 40–59)
HEMOGLOBIN: 11.9 G/DL (ref 12–16)
LDL CHOLESTEROL CALCULATED: 80 MG/DL (ref 0–129)
LYMPHOCYTES ABSOLUTE: 1.3 K/UL (ref 1–4.8)
LYMPHOCYTES RELATIVE PERCENT: 20.8 %
MCH RBC QN AUTO: 32.4 PG (ref 27–31.3)
MCHC RBC AUTO-ENTMCNC: 33.3 % (ref 33–37)
MCV RBC AUTO: 97.3 FL (ref 82–100)
MICROALBUMIN UR-MCNC: <1.2 MG/DL
MICROALBUMIN/CREAT UR-RTO: NORMAL MG/G (ref 0–30)
MONOCYTES ABSOLUTE: 0.2 K/UL (ref 0.2–0.8)
MONOCYTES RELATIVE PERCENT: 3.4 %
NEUTROPHILS ABSOLUTE: 4.6 K/UL (ref 1.4–6.5)
NEUTROPHILS RELATIVE PERCENT: 72.6 %
PDW BLD-RTO: 15.4 % (ref 11.5–14.5)
PLATELET # BLD: 270 K/UL (ref 130–400)
POTASSIUM SERPL-SCNC: 4 MEQ/L (ref 3.4–4.9)
RBC # BLD: 3.67 M/UL (ref 4.2–5.4)
SODIUM BLD-SCNC: 140 MEQ/L (ref 135–144)
TOTAL PROTEIN: 6.6 G/DL (ref 6.3–8)
TRIGL SERPL-MCNC: 173 MG/DL (ref 0–150)
VITAMIN B-12: 540 PG/ML (ref 232–1245)
VITAMIN D 25-HYDROXY: 84 NG/ML (ref 30–100)
WBC # BLD: 6.3 K/UL (ref 4.8–10.8)

## 2020-07-29 PROCEDURE — 82570 ASSAY OF URINE CREATININE: CPT

## 2020-07-29 PROCEDURE — 85025 COMPLETE CBC W/AUTO DIFF WBC: CPT

## 2020-07-29 PROCEDURE — 80061 LIPID PANEL: CPT

## 2020-07-29 PROCEDURE — 82043 UR ALBUMIN QUANTITATIVE: CPT

## 2020-07-29 PROCEDURE — 80053 COMPREHEN METABOLIC PANEL: CPT

## 2020-07-29 PROCEDURE — 36415 COLL VENOUS BLD VENIPUNCTURE: CPT

## 2020-07-29 PROCEDURE — 82306 VITAMIN D 25 HYDROXY: CPT

## 2020-07-29 PROCEDURE — 83036 HEMOGLOBIN GLYCOSYLATED A1C: CPT

## 2020-07-29 PROCEDURE — 82607 VITAMIN B-12: CPT

## 2020-08-03 ENCOUNTER — VIRTUAL VISIT (OUTPATIENT)
Dept: FAMILY MEDICINE CLINIC | Age: 60
End: 2020-08-03
Payer: COMMERCIAL

## 2020-08-03 PROCEDURE — 99214 OFFICE O/P EST MOD 30 MIN: CPT | Performed by: NURSE PRACTITIONER

## 2020-08-03 RX ORDER — HYDROXYZINE PAMOATE 50 MG/1
CAPSULE ORAL
Qty: 270 CAPSULE | Refills: 2 | Status: SHIPPED | OUTPATIENT
Start: 2020-08-03 | End: 2021-01-01

## 2020-08-03 NOTE — PROGRESS NOTES
8/3/2020    TELEHEALTH EVALUATION -- Audio/Visual (During SDMBW-26 public health emergency)    Due to COVID 19 outbreak, patient's office visit was converted to a virtual visit. Patient was contacted and agreed to proceed with a virtual visit via [email or web address]  The risks and benefits of converting to a virtual visit were discussed in light of the current infectious disease epidemic. Patient also understood that insurance coverage and co-pays are up to their individual insurance plans. Cintia Harper is a 61 y.o. female being evaluated by a Virtual Visit (video visit) encounter to address concerns as mentioned above. A caregiver was present when appropriate. Due to this being a TeleHealth encounter (During WGUWF-15 public health emergency), evaluation of the following organ systems was limited: Vitals/Constitutional/EENT/Resp/CV/GI//MS/Neuro/Skin/Heme-Lymph-Imm. Pursuant to the emergency declaration under the [address] [place] [address] authority and the [employer] Act, this Virtual Visit was conducted with patient's (and/or legal guardian's) consent, to reduce the patient's risk of exposure to COVID-19 and provide necessary medical care. The patient (and/or legal guardian) has also been advised to contact this office for worsening conditions or problems, and seek emergency medical treatment and/or call 911 if deemed necessary. Patient identification was verified at the start of the visit: Yes    Total time spent for this encounter: Not billed by time    Services were provided through a video synchronous discussion virtually to substitute for in-person clinic visit. Patient and provider were located at their individual homes.    -The patient is talking with me virtually from her home and I am located at my office in [facility].        HPI:    Cintia Harper (:  1960) has requested an audio/video evaluation for the following concern(s):    Diabetes: sugars range  with one episode at 300. Now weighs 184. She states that she has been on a diet and eating much healthier lately. Is feeling good overall. No low sugar episodes reported. She states that with her last refill she was not able to get metformin from her pharmacy. She is not sure why. States that she had been taking it 3 times daily with no side effects that she was aware of. HTN/dyslipidemia: She continues to consume low-salt diet and has been trying to get more physical activity including swimming which is not hard on her joints. She has been taking her medication with no side effects. Lupus/polymyositis: She continues to follow routinely with rheumatology, neurology and pain management. States that she has not had any major change in her ability to ambulate. No recent change in medications. States overall her symptoms have been stable with no worsening. Itching/anxiety: She does admit to having some worsening anxiety symptoms lately. Has been taking hydroxyzine routinely for generalized itching that she tends to have when she is feeling anxious or worried. States that it has not really been effective and she is wondering if something else could be done. She denies any panic attack symptoms. She does not feel down or depressed. No thoughts of self-harm or harm to others. Review of Systems   This patient reports no chest pain or pressure. There is no shortness of breath or cough. The patient reports no nausea or vomiting. There is no heartburn or indigestion. There is no diarrhea or constipation. No black, bloody, mucusy or tarry stool noticed. The patient reports no bloating and no change in appetite. There is no flank pain. No hematuria or dysuria. Prior to Visit Medications    Medication Sig Taking?  Authorizing Provider   SITagliptin (JANUVIA) 50 MG tablet Take 1 tablet by mouth daily Yes BUTCH Ontiveros - CNP   hydrOXYzine (VISTARIL) 50 MG capsule TAKE 1 CAPSULE FOUR TIMES A DAY AS NEEDED FOR ITCHING OR ANXIETY Yes BUTCH Gardner CNP   triamcinolone (KENALOG) 0.1 % ointment APPLY TO THE AFFECTED AREAS OF RASH TWICE A DAY FOR 2 WEEKS THEN ONCE DAILY FOR 2 WEEKS  BUTCH Gardner CNP   amitriptyline (ELAVIL) 25 MG tablet   Historical Provider, MD   amitriptyline (ELAVIL) 25 MG tablet 2 tablets PO at bedtime  Yousif Prabhakar MD   omeprazole (PRILOSEC) 20 MG delayed release capsule Take 1 capsule by mouth 2 times daily (before meals)  Yousif Prabhakar MD   lidocaine (LIDODERM) 5 % Place 1 patch onto the skin daily 12 hours on, 12 hours off. Maria Elena Cid DO   levETIRAcetam (KEPPRA) 250 MG tablet Take 1 tablet by mouth 2 times daily  Isidoro Berman MD   gabapentin (NEURONTIN) 400 MG capsule TAKE 1 CAPSULE THREE TIMES A DAY  BUTCH Gardner CNP   cyclobenzaprine (FLEXERIL) 10 MG tablet Take 10 mg by mouth 2 times daily as needed  Historical Provider, MD   nitrofurantoin, macrocrystal-monohydrate, (MACROBID) 100 MG capsule Take 100 mg by mouth as needed  Historical Provider, MD   traMADol (ULTRAM) 50 MG tablet Take 50 mg by mouth 2 times daily as needed for Pain.   Historical Provider, MD   omeprazole (PRILOSEC) 40 MG delayed release capsule Take 40 mg by mouth daily  Historical Provider, MD   rivaroxaban (XARELTO) 20 MG TABS tablet TAKE 1 TABLET DAILY  BUTCH Gardner CNP   albuterol (ACCUNEB) 0.63 MG/3ML nebulizer solution Take 3 mLs by nebulization every 6 hours as needed for Wheezing  [employer] BUTCH Mabry CNP   albuterol (PROVENTIL) (5 MG/ML) 0.5% nebulizer solution Take 1 mL by nebulization 4 times daily as needed for Wheezing  [employer] BUTCH Mabry CNP   citalopram (CELEXA) 40 MG tablet TAKE 1 TABLET DAILY  Brooklynn D Riedy, APRN - CNP   PROLIA 60 MG/ML SOSY SC injection   Historical Provider, MD   predniSONE (DELTASONE) 2.5 MG tablet TAKE 3 TABLETS BY MOUTH DAILY FOR 2 WEEKS THEN ALTERNATE 3 TABLETS (EVEN DAYS) AND 2 TABLETS (ODD DAYS) EVERY OTHER DAY THEREAFTER  Historical Provider, MD   furosemide (LASIX) 20 MG tablet Take 1 tablet by mouth daily  BUTCH Nichols CNP   carvedilol (COREG) 6.25 MG tablet Take 1 tablet by mouth 2 times daily (with meals)  BUTCH Nichols CNP   allopurinol (ZYLOPRIM) 300 MG tablet Take half tab daily x 2weeks, then full tab daily thereafter. Historical Provider, MD   balsalazide (COLAZAL) 750 MG capsule   Historical Provider, MD   leucovorin calcium (WELLCOVORIN) 5 MG tablet TAKE 1 TO 2 TABLETS BY MOUTH ONCE DAILY THE DAY AFTER METHOTREXATE  Historical Provider, MD   lansoprazole (PREVACID) 30 MG delayed release capsule Take 1 capsule by mouth daily  BUTCH Noel CNP   cholestyramine (QUESTRAN) 4 g packet Take 1 packet by mouth 2 times daily  Diamond MoodyteBUTCH goff CNP   methotrexate (RHEUMATREX) 2.5 MG chemo tablet TAKE 6 TABS ONCE A WEEK with food. No alcohol. Hold if on antibiotics or ill. Historical Provider, MD   cyanocobalamin ([employer] VITAMIN B12) 1000 MCG tablet Take 1 tablet by mouth daily  Sancho Oliva MD   folic acid (FOLVITE) 1 MG tablet Take 1 tablet by mouth daily  Sancho Oliva MD   hydrocortisone 1 % cream Apply topically 2 times daily. BUTCH Nichols CNP   diclofenac sodium (VOLTAREN) 1 % GEL Apply 4 g topically 4 times daily  Maria Elena Cid DO   hydroxychloroquine (PLAQUENIL) 200 MG tablet Take 200 mg by mouth 2 times daily   Historical Provider, MD   albuterol sulfate  (90 BASE) MCG/ACT inhaler Inhale 2 puffs into the lungs every 6 hours as needed for Wheezing  BUTCH Nichols CNP   Blood Glucose Monitoring Suppl (ACCU-CHEK ADVANTAGE DIABETES) KIT Diabetic monitoring kit(any brand), with supplies for testing. Test fasting once daily.  Dia.[id number]  HAILEE Garcia   Glucose Blood (BLOOD GLUCOSE TEST STRIPS) STRP Test once daily  HAILEE Garcia   Lancets MISC Testing q day  HAILEE Thomas   Cholecalciferol (VITAMIN D) 2000 UNITS CAPS capsule Take 2 capsules by mouth daily   Historical Provider, MD       Social History     Tobacco Use    Smoking status: Former Smoker     Packs/day: 1.25     Years: 10.00     Pack years: 12.50     Types: Cigarettes     Last attempt to quit: 2006     Years since quittin.5    Smokeless tobacco: Never Used    Tobacco comment: start smoking age 25   Substance Use Topics    Alcohol use: Not Currently    Drug use: No        PHYSICAL EXAMINATION:  [ INSTRUCTIONS:  \"[x]\" Indicates a positive item  \"[]\" Indicates a negative item  -- DELETE ALL ITEMS NOT EXAMINED]  [x] Alert  [] Oriented to person/place/time    [x] No apparent distress  [] Toxic appearing    [] Face flushed appearing [] Sclera clear  [] Lips are cyanotic      [x] Breathing appears normal  [] Appears tachypneic      [] Rash on visible skin    [x] Cranial Nerves II-XII grossly intact    [x] Motor grossly intact in visible upper extremities    [] Motor grossly intact in visible lower extremities    [x] Normal Mood  [] Anxious appearing    [] Depressed appearing  [] Confused appearing      [] Poor short term memory  [] Poor long term memory    [] OTHER:      Due to this being a TeleHealth encounter, evaluation of the following organ systems is limited: Vitals/Constitutional/EENT/Resp/CV/GI//MS/Neuro/Skin/Heme-Lymph-Imm. ASSESSMENT/PLAN:   Diagnosis Orders   1. Type 2 diabetes mellitus without complication, without long-term current use of insulin (HCC)  SITagliptin (JANUVIA) 50 MG tablet    CBC Auto Differential    Comprehensive Metabolic Panel    Lipid Panel    Hemoglobin A1C    Microalbumin / Creatinine Urine Ratio   2. Essential hypertension     3. Dyslipidemia     4. CKD stage 3 secondary to diabetes ([facility] [address]     5. Immunosuppressed status ([facility] [address]- IV rituxan at [facility]     6. Discoid lupus erythematosus     7. Polymyositis with myopathy ([facility] [address]     8.  Neuropathy of both feet- Dr. Yohana Diaz     9. Factor V Leiden carrier ([facility] [address]     10. Vitamin D deficiency  Vitamin D 25 Hydroxy   11. Current mild episode of major depressive disorder without prior episode ([facility] [address]     12. Generalized pruritus  hydrOXYzine (VISTARIL) 50 MG capsule   13. Anxiety  hydrOXYzine (VISTARIL) 50 MG capsule   14. Encounter for screening mammogram for breast cancer  [facility] DIGITAL SCREEN W OR WO CAD BILATERAL         Return in about 3 months (around 11/3/2020) for diabetes- vv or level 2.     1.  2.  3.  Blood work is stable overall with improving hemoglobin A1c.  Kidney function slightly decreased from last check and with current recall issue with metformin, will change to Januvia. Patient verbalized understanding. Recheck blood work in 3 months. 5.  6.  7.  8.  9. She continues to follow routinely with rheumatology, neurology and pain management. No recent change in medication. No abnormal bruising or bleeding on Xarelto. Symptoms have been at baseline. 10.  Vitamin D is stable on current supplementation. Continue the same. 11.  12.  13.  Will increase the dose of hydroxyzine to see if itching and generalized anxiety improves. If not, may want to add on BuSpar to help. Can also consider increased dose of Elavil. 14.  Discussed recommendation for mammogram in order printed. Please note this report has been partially produced using speech recognition software and may cause contain errors related to that system including grammar, punctuation and spelling as well as words and phrases that may seem inappropriate. If there are questions or concerns please feel free to contact me to clarify. An  electronic signature was used to authenticate this note.     --BUTCH Jaime - CNP on 8/3/2020 at 11:37 AM        Pursuant to the emergency declaration under the [address] [address] waiver authority and the [employer] and [employer]

## 2020-08-11 ENCOUNTER — HOSPITAL ENCOUNTER (OUTPATIENT)
Dept: WOMENS IMAGING | Age: 60
Discharge: HOME | End: 2020-08-13
Payer: COMMERCIAL

## 2020-08-11 ENCOUNTER — TELEPHONE (OUTPATIENT)
Dept: FAMILY MEDICINE CLINIC | Age: 60
End: 2020-08-11

## 2020-08-11 PROCEDURE — 77067 SCR MAMMO BI INCL CAD: CPT

## 2020-08-28 ENCOUNTER — HOSPITAL ENCOUNTER (OUTPATIENT)
Dept: WOMENS IMAGING | Age: 60
Discharge: HOME | End: 2020-08-30
Payer: COMMERCIAL

## 2020-08-28 ENCOUNTER — HOSPITAL ENCOUNTER (OUTPATIENT)
Dept: ULTRASOUND IMAGING | Age: 60
End: 2020-08-28
Payer: COMMERCIAL

## 2020-08-28 PROCEDURE — 77065 DX MAMMO INCL CAD UNI: CPT

## 2020-09-15 ENCOUNTER — VIRTUAL VISIT (OUTPATIENT)
Dept: PHYSICAL MEDICINE AND REHAB | Age: 60
End: 2020-09-15
Payer: COMMERCIAL

## 2020-09-15 PROCEDURE — 99214 OFFICE O/P EST MOD 30 MIN: CPT | Performed by: PHYSICAL MEDICINE & REHABILITATION

## 2020-09-15 RX ORDER — METHOCARBAMOL 500 MG/1
500 TABLET, FILM COATED ORAL EVERY 12 HOURS PRN
Qty: 60 TABLET | Refills: 0 | Status: SHIPPED | OUTPATIENT
Start: 2020-09-15 | End: 2020-10-27 | Stop reason: SDUPTHER

## 2020-09-15 ASSESSMENT — ENCOUNTER SYMPTOMS
BOWEL INCONTINENCE: 0
WHEEZING: 0
ABDOMINAL PAIN: 0
VOMITING: 0
SHORTNESS OF BREATH: 1
EYE PAIN: 0
DIARRHEA: 0
APNEA: 0
CHEST TIGHTNESS: 0
BACK PAIN: 1
SORE THROAT: 0
COLOR CHANGE: 0
CONSTIPATION: 0
NAUSEA: 0
PHOTOPHOBIA: 0
COUGH: 0

## 2020-09-15 NOTE — PROGRESS NOTES
TELEHEALTH EVALUATION -- Audio/Visual (During XSVMA-02 public health emergency)    Due to COVID 19 outbreak, patient's office visit was converted to a virtual visit. Patient was contacted and agreed to proceed with a virtual visit via [email or web address] me  The risks and benefits of converting to a virtual visit were discussed in light of the current infectious disease epidemic. Patient also understood that insurance coverage and co-pays are up to their individual insurance plans. Subjective       This patient has requested an audio/video evaluation for the following concern(s):    HPI:  Back Pain (Trigger point injections 7/8/20 with 30% reduction in pain lasting 1-2 weeks)   Neck Pain    Medication Refill (Tramadol, Flexeril, Robaxin, Lidoderm refill today)   Pain (Pain Score: 6- With medication)         She rarely takes the Ultracet  She still has no signs of overuse or abuse however her Soaper score is rather high we discussed relaxation techniques and stress management. Seems like this score is getting better however. She is able to be more functional on the medication a little to have positive interactions with friends and family and perform light housework. IV rituxan at [facility] for RA he is trying to wean off of the prednisone--now down to alterating 10/7.5 mg. They are going very slowly on her dose she still has significant cushingoid side effects as well as osteoporosis. Now Ryan Serum for myopathy emg reviewed at [facility]-- Steroids are not helping she is now getting infusion therapy. Trailing Plaqueniwaldo with Dr Ayse Puckett. She thinks that that type but also probably helping a little bit but doesn't seem to have great pain control with it. Her goal is to come off the prednisone and she is getting twice yearly infusions with Prolia. She remains on Xarelto because of her factor V Leiden clotting.   Caudal blocks helped quite a bit however the logistics of which are difficult because of her Xarelto for the factor V tingling or weight loss. Risk factors include obesity, menopause, lack of exercise and sedentary lifestyle. She has tried heat, analgesics, muscle relaxant, ice, NSAIDs, walking and home exercises (Caudal Blocks, TP injections, Tramadol and Flexeril, ) for the symptoms. The treatment provided moderate relief. Neck Pain    Associated symptoms include leg pain and weakness. Pertinent negatives include no chest pain, fever, headaches, numbness, paresis, photophobia, tingling or weight loss. Foot Pain    This is a chronic problem. The current episode started more than 1 year ago. The problem occurs constantly. The problem has been unchanged. Pertinent negatives include no fever, numbness or tingling. The symptoms are aggravated by bending and walking. She has tried acetaminophen, rest, heat, relaxation, oral narcotics and lying down for the symptoms. The treatment provided moderate relief.              Past Medical History:   Diagnosis Date    Ataxic gait     Back pain     Benign essential tremor     Depression     DM (diabetes mellitus) ([address] 11/25/2014    DVT (deep venous thrombosis) (HCC)     Elevated troponin 5/25/2016    Factor V Leiden carrier ([address]     history of DVT, on Xarelto    GERD (gastroesophageal reflux disease)     Hereditary sensory-motor neuropathy, type I     HTN (hypertension)     HTN (hypertension)     Hyperlipidemia     Lumbar radicular pain     Neuropathy of both feet 8/22/2016    NSTEMI (non-ST elevated myocardial infarction) ([address]     Obesity     Osteoarthritis     Pulmonary embolism ([address]     Renal failure 6/29/2018    Right inguinal hernia 11/9/2017    SOB (shortness of breath) 4/19/2017    Systemic lupus erythematosus ([address] 9/8/2017    Transient brainstem ischemia     Umbilical hernia 53/2/0097    Vasomotor flushing     on hormone replacement therapy       Past Surgical History:   Procedure Laterality Date    APPENDECTOMY      CARDIAC CATHETERIZATION  06/2018  CATARACT REMOVAL Bilateral Winter 2019    COLONOSCOPY      Dr. Joellen Santos COLONOSCOPY  13    DR SINGER repeat in 10 yrs per pt    COLONOSCOPY  2017    Dr Carlos Gunn Right 11/15/2017    RIGHT  HERNIA INGUINAL REPAIR performed by Anisha Polanco MD at [address] [facility] [address]  ARTHROSCOPY Left     LAPAROSCOPY N/A 11/15/2017    DIAGNOSTIC LAPAROSCOPY performed by Anisha Polanco MD at [address] Right 3/14/2019    RIGHT QUADRICEPS MUSCLE BIOPSY performed by Swathi Mayo MD at [address] [facility] [address] N/A 2017    [address] [facility] performed by Ulices Baez MD at [facility],5+M/J,OHUZJ N/A     HERNIA UMBILICAL REPAIR, [place] performed by nAisha Polanco MD at [address] [facility]  13    DR SINGER       Social History     Socioeconomic History    Marital status:      Spouse name: Michael Huffman Number of children: 1    Years of education: 12    Highest education level: 12th grade   Occupational History    Occupation: Housewife    Occupation: retired [employer]   Social Needs    Financial resource strain: Not hard at all   [place] Workwear insecurity     Worry: Never true     Inability: Never true   BIXI needs     Medical: No     Non-medical: No   Tobacco Use    Smoking status: Former Smoker     Packs/day: 1.25     Years: 10.00     Pack years: 12.50     Types: Cigarettes     Last attempt to quit: 2006     Years since quittin.7    Smokeless tobacco: Never Used    Tobacco comment: start smoking age 25   Substance and Sexual Activity    Alcohol use: Not Currently    Drug use: No    Sexual activity: Yes   Lifestyle    Physical activity     Days per week: 0 days     Minutes per session: 0 min    Stress:  Only a little   Relationships    Social connections     Talks on phone: More than three times a week     Gets together: More than three times a week     Attends Orthodoxy service: 1 to 4 times per year     Active member of club or organization: No     Attends meetings of clubs or organizations: Never     Relationship status:     Intimate partner violence     Fear of current or ex partner: No     Emotionally abused: No     Physically abused: No     Forced sexual activity: No   Other Topics Concern    None   Social History Narrative    Off work dt--retired early from --worked [place]---moved back to [place] to work at [employer] in [address] [place] in [place] went to [employer]       Family History   Problem Relation Age of Onset    Substance Abuse Brother     High Blood Pressure Mother     High Cholesterol Mother     Arthritis Mother     High Blood Pressure Father     High Cholesterol Father     Clotting Disorder Father         clot to intestines    Arthritis Father     Substance Abuse Brother     Substance Abuse Brother     Stroke Paternal Uncle 36    Cystic Fibrosis Daughter        Allergies   Allergen Reactions    Sulfa Antibiotics Rash    Ciprofloxacin Hcl Swelling     facial, tongue swelling    Nsaids Other (See Comments)     Bleed risk dt Xaralto    Statins Other (See Comments)     Liver enzyme elevation       Review of Systems   Constitutional: Positive for activity change. Negative for appetite change, chills, fatigue, fever, unexpected weight change and weight loss. HENT: Negative for congestion, postnasal drip and sore throat. Eyes: Positive for visual disturbance. Negative for photophobia and pain. Respiratory: Positive for shortness of breath. Negative for apnea, cough, chest tightness and wheezing. Cardiovascular: Negative for chest pain, palpitations and leg swelling. Gastrointestinal: Negative for abdominal pain, bowel incontinence, constipation, diarrhea, nausea and vomiting. Endocrine: Negative. Genitourinary: Negative. Negative for bladder incontinence and dysuria. and  vitamin D deficiency    The patient was educated regarding proper diet, fitness routine, and regulatory restrictions concerning pain medications. Previous notes, comprehensive past medical, surgical, family history, and diagnostics were reviewed. Patient education and councelling were provided regarding off label use,treatment options and medication and injection risks. Current and old OARRS ([place] Automated Prescription Reporting System) records reviewed, all refills reviewed since last visit,  Behavioral agreement/FELICITAS regulations   and Toxicology screen was reviewed with patient and is up to date. There are no current red flags. They are making good progress regarding pain relief, they are performing at a functional level regarding activities of daily living, family interactions and psychological functioning, they're not having any adverse effects or side effects from the current medications, and I see no findings of aberrant drug taking or addiction related behaviors. The patient is aware that they have a chronic pain condition and they may require opiates dosing for life. All efforts will be made to wean to the lowest effective dose. Other therapies for pain have not been effective including nonopiate medications. Injections and exercises are only partially effective. A Rx for Narcan was offered to help prevent accidental overdose. RX Monitoring 10/23/2019   Attestation -   Periodic Controlled Substance Monitoring Possible medication side effects, risk of tolerance/dependence & alternative treatments discussed. ;No signs of potential drug abuse or diversion identified. ;Assessed functional status. ;Obtaining appropriate analgesic effect of treatment. Chronic Pain > 50 MEDD Re-evaluated the status of the patient's underlying condition causing pain. ;Considered consultation with a specialist.;Obtained or confirmed \"Consent for Opioid Use\" on file.    Chronic Pain > 80 MEDD - Patient is currently taking:       I am having Meghan Fall maintain her vitamin D, Accu-Chek Advantage Diabetes, blood glucose test strips, Lancets, albuterol sulfate HFA, hydroxychloroquine, diclofenac sodium, hydrocortisone, cyanocobalamin, folic acid, methotrexate, cholestyramine, lansoprazole, allopurinol, balsalazide, leucovorin calcium, furosemide, carvedilol, Prolia, predniSONE, citalopram, albuterol, albuterol, rivaroxaban, cyclobenzaprine, nitrofurantoin (macrocrystal-monohydrate), traMADol, omeprazole, gabapentin, levETIRAcetam, amitriptyline, amitriptyline, omeprazole, triamcinolone, SITagliptin, hydrOXYzine, and methocarbamol. We will continue to administer lidocaine. I also recommend the following Medications:    Orders Placed This Encounter   Medications    methocarbamol (ROBAXIN) 500 MG tablet     Sig: Take 1 tablet by mouth every 12 hours as needed (spasms) Generic equivalent acceptable, unless otherwise noted. Dispense:  60 tablet     Refill:  0        -which helps with pain and function. Otherwise, continue the current pain medications that I have prescibed. Radiologic:   Old films reviewed       DIAGNOSIS: shortness of breath         COMPARISON: March 2, 2020         TECHNIQUE: PA and lateral chest         FINDINGS: Biapical pleural thickening is seen. There is coarsening of the interstitium. Increased AP diameter and flattening of the diaphragm are also noted. Lungs contain no focal infiltrate, pleural effusion, consolidation or pneumothorax. The cardiac    silhouette is not enlarged. Calcifications are seen in the thoracic aorta. There are degenerative changes in the dorsal spine.           Impression    No acute cardiopulmonary process, findings are suggestive of COPD      ,     I discussed results with patients. see Follow up plans below  For any new studies. Care Everywhere Updates:  requested and reviewed.     No new issues Detected 12/19/2016    ECMDA Not Detected 12/19/2016       Lab Results   Component Value Date    METPHEN Not Detected 12/19/2016    PHENTERMINE Not Detected 12/19/2016    BENZOYL Not Detected 12/19/2016    Finis Kristen Not Detected 12/19/2016    ALPHAOHALPRA Not Detected 12/19/2016    CLONAZEPAM Not Detected 12/19/2016    Tharon Alex Not Detected 12/19/2016    DIAZEP Not Detected 12/19/2016    DEBORA Not Detected 12/19/2016    OXAZ Not Detected 12/19/2016    Debra Caba Not Detected 12/19/2016    LORAZEPAM Not Detected 12/19/2016    MIDAZOLAM Not Detected 12/19/2016    ZOLPIDEM Not Detected 12/19/2016    TEO Not Detected 12/19/2016    ETG See Note 12/19/2016    MARIJMET Not Detected 12/19/2016    PCP Not Detected 12/19/2016    PAINMGTDRUGP See Below 12/19/2016    EERPAINMGTPA See Note 12/19/2016    LABCREA 151.8 07/29/2020         , I discussed results with patient. See follow-up plans for new studies. Therapies:  HEP-gentle stretching and relaxation techniques-demonstrated with patient-they are to do them twice a day. They are also advised to make the following lifestyle changes:  Goals       Limit Carbs to 2-3/meal maximum      Exercise 3x per week (30 min per time)      Piriformis stretches and abd strengthening       HEMOGLOBIN A1C < 6.5      SOAPP- R GOAL LESS THAN 9      12/19/16 SCORE: 15-MODERATE RISK   02/22/17 score: 22-High risk   05/15/17 score: 26-high risk  07/12/17 score: 22-high risk  10/02/17 score: 15-moderate risk  12/11/17 score: 11-moderate risk  02/08/18 score: 14-moderate risk  04/18/18 score: 15-moderate risk  8/9/18 score: 10 low risk  10/11/18 score: 22- high risk  12/14/18 score:17-moderate risk  3/27/19 score: 12 moderate risk  6/13/19 score: 24 high risk  8/22/19 score: 18- moderate risk   10/23/19 score: 18- moderate risk  01/03/20 score: 23- moderate risk       Weight < 160 lb (72.576 kg)           Injections or Epidurals:  Injection options were discussed.    Patient gave verbal consent to ordered injections. See follow-up plans for planned injections. Supplements:  Vitamin D with increased dosing during the rainy months,   Education was given on:   Dietary and Fitness--daily stretches and low carb diet-in chair Yoga when possible             Follow up with Primary Care Physician regarding their general medical needs. Stressed the importance of following up with PCP and specialists for his/her chronic diseases, health, CV, and cancer screening and continued care. Will follow disease activity/progression and adjust therapeutic regimen to disease activity and severity. Discussed medication dosage, usage, goals of therapy, and side effects. Available test results were reviewed -Discussed findings, impression and plan with patient. An additional 20 minutes were spent outside of the patient visit to review records. Additional time spent with the patient to discuss their questions. Additional time spent with the patient devoted to discussing treatment strategy, planning, and implementation. Patient understands above plan; questions asked and answered. Patient agrees to plan as noted above. F/U in 2-3months  At least 50% of the visit was involved in the discussion of the options for treatment. We discussed exercises, medication, interventional therapies and surgery. Healthy life style is essential with patient hard work to achieve the wellness. In addition; discussion with the patient and/or family about any of the diagnostic results, impressions and/or recommended diagnostic studies, prognosis, risks and benefits of treatment options, instructions for treatment and/or follow-up, importance of compliance with chosen treatment options, risk-factor reduction, and patient/family education.         They are to follow up in 2 months to review medication, efficacy of injections, pill counts, OARRS check, SOAPPR assessment, review diagnostics, to review previous and future treatment plans and assess appropriateness for continued therapy. New Diagnostics  Orders Placed This Encounter   Procedures    XR LUMBAR SPINE (2-3 VIEWS)    MRI LUMBAR SPINE W WO CONTRAST       Follow-up in 2-1/2 to 3 months for guarding the efficacy of current plan and future treatment. An  electronic signature was used to authenticate this note. -Dr Lisa Oglesby, DO       With MA assistance from:   Vale Jenkins MA     19}    Pursuant to the emergency declaration under the [facility], [facility] waiver authority and the [employer] and [employer] Act, this Virtual  Visit was conducted, with patient's consent, to reduce the patient's risk of exposure to COVID-19 and provide continuity of care for an established patient. Services were provided through a video synchronous discussion virtually to substitute for in-person clinic visit.

## 2020-10-02 ENCOUNTER — HOSPITAL ENCOUNTER (OUTPATIENT)
Dept: MRI IMAGING | Age: 60
Discharge: HOME | End: 2020-10-04
Payer: COMMERCIAL

## 2020-10-02 PROCEDURE — 72158 MRI LUMBAR SPINE W/O & W/DYE: CPT

## 2020-10-02 PROCEDURE — 6360000004 HC RX CONTRAST MEDICATION: Performed by: PHYSICAL MEDICINE & REHABILITATION

## 2020-10-02 PROCEDURE — A9579 GAD-BASE MR CONTRAST NOS,1ML: HCPCS | Performed by: PHYSICAL MEDICINE & REHABILITATION

## 2020-10-02 RX ORDER — SODIUM CHLORIDE 0.9 % (FLUSH) 0.9 %
10 SYRINGE (ML) INJECTION ONCE
Status: DISCONTINUED | OUTPATIENT
Start: 2020-10-02 | End: 2020-10-05 | Stop reason: HOSPADM

## 2020-10-02 RX ADMIN — GADOTERIDOL 20 ML: 279.3 INJECTION, SOLUTION INTRAVENOUS at 08:09

## 2020-10-27 ENCOUNTER — VIRTUAL VISIT (OUTPATIENT)
Dept: PHYSICAL MEDICINE AND REHAB | Age: 60
End: 2020-10-27
Payer: COMMERCIAL

## 2020-10-27 PROCEDURE — 99214 OFFICE O/P EST MOD 30 MIN: CPT | Performed by: PHYSICAL MEDICINE & REHABILITATION

## 2020-10-27 RX ORDER — METHOCARBAMOL 500 MG/1
500 TABLET, FILM COATED ORAL EVERY 12 HOURS PRN
Qty: 60 TABLET | Refills: 0 | Status: SHIPPED | OUTPATIENT
Start: 2020-10-27 | End: 2021-01-01 | Stop reason: SDUPTHER

## 2020-10-27 RX ORDER — CITALOPRAM 40 MG/1
TABLET ORAL
Qty: 90 TABLET | Refills: 0 | Status: SHIPPED | OUTPATIENT
Start: 2020-10-27 | End: 2021-01-01

## 2020-10-27 ASSESSMENT — ENCOUNTER SYMPTOMS
SHORTNESS OF BREATH: 1
APNEA: 0
NAUSEA: 0
SORE THROAT: 0
DIARRHEA: 0
PHOTOPHOBIA: 0
EYE PAIN: 0
COUGH: 0
ABDOMINAL PAIN: 0
CHEST TIGHTNESS: 0
BACK PAIN: 1
VOMITING: 0
COLOR CHANGE: 0
CONSTIPATION: 0
WHEEZING: 0
BOWEL INCONTINENCE: 0

## 2020-10-27 NOTE — PROGRESS NOTES
TELEHEALTH EVALUATION -- Audio/Visual (During WZXYF-54 public health emergency)    Due to COVID 19 outbreak, patient's office visit was converted to a virtual visit. Patient was contacted and agreed to proceed with a virtual visit via  [email or web address]   The risks and benefits of converting to a virtual visit were discussed in light of the current infectious disease epidemic. Patient also understood that insurance coverage and co-pays are up to their individual insurance plans. Subjective       This patient has requested an audio/video evaluation for the following concern(s):    HPI:    Back Pain    Neck Pain    Medication Refill (Tramadol, Lidoderm Patch, Robaxin refill today)   Pain (6- Without medication (Back) )           She rarely takes the Ultracet  She has no signs of overuse or abuse however her Soaper score is rather high we discussed relaxation techniques and stress management. Seems like this score is getting better however-  Her goal is to come off the prednisone and she is getting twice yearly infusions with Prolia. She remains on Xarelto because of her factor V Leiden clotting    She recently had a caudal block which helped her pain quite a bit. It is complicated by the risk factor going off the Xarelto for her few days prior to the block. She states that her doctor okayed her to come off of the Xarelto for 2 days prior to the caudal blocks as she is done in the past to get the caudal safely. She had one block and have quite a bit she would like them in the future as needed to be that she will need to hold the Xarelto as above. She has no signs of drug abuse or overuse during the Ultracet. She is more functional with it shows no signs of drug-seeking behaviors. She is able to do light housework and has good interactions with friends and family. Back Pain   This is a recurrent problem. The current episode started more than 1 year ago. The problem occurs constantly.  The problem is unchanged. The pain is present in the lumbar spine and thoracic spine. The quality of the pain is described as aching. The pain does not radiate (Occasional down bilateral legs). The pain is at a severity of 6/10. The pain is severe. The pain is the same all the time. The symptoms are aggravated by bending, twisting, coughing and position. Stiffness is present in the morning. Associated symptoms include leg pain and weakness. Pertinent negatives include no abdominal pain, bladder incontinence, bowel incontinence, chest pain, dysuria, fever, headaches, numbness, paresis, paresthesias, perianal numbness, tingling or weight loss. Risk factors include obesity, menopause, lack of exercise and sedentary lifestyle. She has tried heat, analgesics, muscle relaxant, ice, NSAIDs, walking and home exercises (Caudal Blocks, TP injections, Tramadol and Flexeril, ) for the symptoms. The treatment provided moderate relief. Foot Pain    This is a chronic problem. The current episode started more than 1 year ago. The problem occurs constantly. The problem has been unchanged. Pertinent negatives include no fever, numbness or tingling. The symptoms are aggravated by bending and walking. She has tried acetaminophen, rest, heat, relaxation, oral narcotics and lying down for the symptoms. The treatment provided moderate relief.              Past Medical History:   Diagnosis Date    Ataxic gait     Back pain     Benign essential tremor     Depression     DM [facility] 11/25/2014    [facility]     Elevated troponin 5/25/2016    Factor V Leiden carrier ([facility]     history of DVT, on Xarelto    GERD (gastroesophageal reflux disease)     Hereditary sensory-motor neuropathy, type I     HTN (hypertension)     HTN (hypertension)     Hyperlipidemia     Lumbar radicular pain     Neuropathy of both feet 8/22/2016    NSTEMI (non-ST elevated myocardial infarction) ([facility]     Obesity     Osteoarthritis     Pul[facility] embol[facility]     Renal failure 6/29/2018    Right inguinal hernia 11/9/2017    SOB (shortness of breath) 4/19/2017    Systemic lupus erythematosus ([facility] 9/8/2017    Transient brainstem ischemia     Umbilical hernia 33/4/6136    Vasomotor flushing     on hormone replacement therapy       Past Surgical History:   Procedure Laterality Date    APPENDECTOMY      CARDIAC CATHETERIZATION  06/2018    CATARACT REMOVAL Bilateral Winter 2019   Megha Butts COLONOSCOPY      Dr. Leonel Martinez COLONOSCOPY  12/16/13    DR SINGER repeat in 10 yrs per pt    COLONOSCOPY  08/2017    Dr Patti Calvillo Right 11/15/2017    RIGHT  HERNIA INGUINAL REPAIR [facility]     LAPAROSCOPY N/A 11/15/2017    DIAGNOSTIC LAPAROSCOPY performed by Miguel Guzman MD at [address] Right 3/14/2019    RIGHT QUADRICEPS MUSCLE BIOPSY performed by Kierra Ghosh MD at [facility] N/A 9/25/2017    [address] [address] performed by Farhad Murray MD at [facility],1+Q/I,MPNOK N/A 51/88/2222    HERNIA UMBILICAL REPAIR, [facility] [place] performed by Miguel Guzman MD at [address] [facility]  12/16/13    DR SINGER       Social History     Socioeconomic History    Marital status:      Spouse name: Vadim Mclean Number of children: 1    Years of education: 15    Highest education level: 12th grade   Occupational History    Occupation: Housewife    Occupation: retired [employer]   Social Needs    Financial resource strain: Not hard at all   Aissatou-Liang insecurity     Worry: Never true     Inability: Never true    Transportation needs     Medical: No     Non-medical: No   Tobacco Use    Smoking status: Former Smoker     Packs/day: 1.25     Years: 10.00     Pack years: 12.50     Types: Cigarettes     Last attempt to quit: 1/1/2006 Years since quittin.8    Smokeless tobacco: Never Used    Tobacco comment: start smoking age 25   Substance and Sexual Activity    Alcohol use: Not Currently    Drug use: No    Sexual activity: Yes   Lifestyle    Physical activity     Days per week: 0 days     Minutes per session: 0 min    Stress: Only a little   Relationships    Social connections     Talks on phone: More than three times a week     Gets together: More than three times a week     Attends Caodaism service: 1 to 4 times per year     Active member of club or organization: No     Attends meetings of clubs or organizations: Never     Relationship status:     Intimate partner violence     Fear of current or ex partner: No     Emotionally abused: No     Physically abused: No     Forced sexual activity: No   Other Topics Concern    None   Social History Narrative    Off work dt--retired early from [email or web address]--worked [employer]---moved back to [place] to work at [employer] in [address] up in [place] went to [employer]       Family History   Problem Relation Age of Onset    Substance Abuse Brother     High Blood Pressure Mother     High Cholesterol Mother     Arthritis Mother     High Blood Pressure Father     High Cholesterol Father     Clotting Disorder Father         clot to intestines    Arthritis Father     Substance Abuse Brother     Substance Abuse Brother     Stroke Paternal Uncle 36    Cystic Fibrosis Daughter        Allergies   Allergen Reactions    Sulfa Antibiotics Rash    Ciprofloxacin Hcl Swelling     facial, tongue swelling    Nsaids Other (See Comments)     Bleed risk dt Xaralto    Statins Other (See Comments)     Liver enzyme elevation       Review of Systems   Constitutional: Positive for activity change. Negative for appetite change, chills, fatigue, fever, unexpected weight change and weight loss. HENT: Negative for congestion, postnasal drip and sore throat. Eyes: Positive for visual disturbance. the lumbar spine    [x] Normal Mood  [x] Not anxious appearing    [x] Not depressed appearing  [x] Not confused appearing      [x]  Good short term memory  [x]  Good long term memory    [x]  Able to follow 2-3 step commands    Due to this being a TeleHealth encounter, evaluation of the following organ systems is limited: Vitals/Constitutional/EENT/Resp/CV/GI//MS/Neuro/Skin/Heme-Lymph-Imm. ASSESSMENT/PLAN:     After a thorough review and discussion of the previous medical records, patient comprehensive medical, surgical, and family and social history, Review of Systems, their OARRS, their Screener and Opioid Assessment for Patients with Pain (SOAPP®-R), recent diagnostics, and symptomatic results to previous treatment, it is my impression that the patients is suffering with progressive and severe:     Diagnosis Orders   1. Lupus erythematosus, unspecified form  traMADol-acetaminophen (ULTRACET) 37.5-325 MG per tablet   2. Fibromyalgia muscle pain     3. SI (sacroiliac) pain  traMADol-acetaminophen (ULTRACET) 37.5-325 MG per tablet   4. Vitamin D deficiency     5. Chronic low back pain with sciatica, sciatica laterality unspecified, unspecified back pain laterality  traMADol-acetaminophen (ULTRACET) 37.5-325 MG per tablet   6. High risk medication use  traMADol-acetaminophen (ULTRACET) 37.5-325 MG per tablet    OARRS and last refill reviewed. 2015 narcotic contract signed. 11/14/14 tox screening    7.  High risk medication use  OARRS PM&R 2/21/17, oarrs pm&r 5/13/17         I am also concerned by lifestyle and mood issues including:    Past Medical History:   Diagnosis Date    Ataxic gait     Back pain     Benign essential tremor     Depression     DM [facility]diabetes mellitus[facility] 11/25/2014    [facility]     Elevated troponin 5/25/2016    Factor V Leiden carrier ([facility]     history of DVT, on Xarelto    GERD (gastroesophageal reflux disease)     Hereditary sensory-motor neuropathy, type I     HTN (hypertension)     HTN (hypertension)     Hyperlipidemia     Lumbar radicular pain     Neuropathy of both feet 8/22/2016    NSTEMI (non-ST elevated [facility] infarction) [facility]     Obesity     Osteoarthritis     Pulmonary embolism ([facility]     Renal failure 6/29/2018    Right inguinal hernia 11/9/2017    SOB (shortness of breath) 4/19/2017    Systemic lupus erythematosus ([facility] 9/8/2017    Transient brainstem ischemia     Umbilical hernia 60/1/1006    Vasomotor flushing     on hormone replacement therapy           Given their medication, chronic pain and lifestyle and medications they are at risk for :    Falls, constipation, addiction  Loss of livelyhood due to severe pain, debility, weight gain and  vitamin D deficiency    The patient was educated regarding proper diet, fitness routine, and regulatory restrictions concerning pain medications. Previous notes, comprehensive past medical, surgical, family history, and diagnostics were reviewed. Patient education and councelling were provided regarding off label use,treatment options and medication and injection risks. Overall greater than 25 minutes spent in direct and indirect patient care. Current and old OARRS ([place] Automated Prescription Reporting System) records reviewed, all refills reviewed since last visit,  Behavioral agreement/FELICITAS regulations   and Toxicology screen was reviewed with patient and is up to date. There are no current red flags. They are making good progress regarding pain relief, they are performing at a functional level regarding activities of daily living, family interactions and psychological functioning, they're not having any adverse effects or side effects from the current medications, and I see no findings of aberrant drug taking or addiction related behaviors. The patient is aware that they have a chronic pain condition and they may require opiates dosing for life.   All efforts will be made to wean to the lowest effective dose. Other therapies for pain have not been effective including nonopiate medications. Injections and exercises are only partially effective. A Rx for Narcan was offered to help prevent accidental overdose. RX Monitoring 10/23/2019   Attestation -   Periodic Controlled Substance Monitoring Possible medication side effects, risk of tolerance/dependence & alternative treatments discussed. ;No signs of potential drug abuse or diversion identified. ;Assessed functional status. ;Obtaining appropriate analgesic effect of treatment. Chronic Pain > 50 MEDD Re-evaluated the status of the patient's underlying condition causing pain. ;Considered consultation with a specialist.;Obtained or confirmed \"Consent for Opioid Use\" on file. Chronic Pain > 80 MEDD -               Patient is currently taking:       I am having Danni Armstrong start on traMADol-acetaminophen. I am also having her maintain her vitamin D, Accu-Chek Advantage Diabetes, blood glucose test strips, Lancets, albuterol sulfate HFA, hydroxychloroquine, diclofenac sodium, hydrocortisone, cyanocobalamin, folic acid, methotrexate, cholestyramine, lansoprazole, allopurinol, balsalazide, leucovorin calcium, furosemide, carvedilol, Prolia, predniSONE, albuterol, albuterol, rivaroxaban, cyclobenzaprine, nitrofurantoin (macrocrystal-monohydrate), traMADol, omeprazole, gabapentin, levETIRAcetam, amitriptyline, amitriptyline, omeprazole, triamcinolone, SITagliptin, hydrOXYzine, citalopram, and methocarbamol. We will continue to administer lidocaine. I also recommend the following Medications:    Orders Placed This Encounter   Medications    methocarbamol (ROBAXIN) 500 MG tablet     Sig: Take 1 tablet by mouth every 12 hours as needed (spasms) Generic equivalent acceptable, unless otherwise noted.      Dispense:  60 tablet     Refill:  0    traMADol-acetaminophen (ULTRACET) 37.5-325 MG per tablet     Sig: Take 1 tablet by mouth every 4 hours as needed for Pain (Maximum of 2.5 tablets daily. Do not get narcotic pain medication from any other providers.) for up to 30 days. Dispense:  75 tablet     Refill:  0     Reduce doses taken as pain becomes manageable        -which helps with pain and function. Otherwise, continue the current pain medications that I have prescibed. Radiologic:   Old films reviewed,     I discussed results with patients. see Follow up plans below  For any new studies. Care Everywhere Updates:  requested and reviewed. No new issues noted. Electrodiagnostic:  Previous studies requested,     I discussed results with patient. See follow-up plans for new studies.         Labs:  Previous labs reviewed     Lab Results   Component Value Date     07/29/2020    K 4.0 07/29/2020    K 4.0 11/19/2018    CL 97 07/29/2020    CO2 25 07/29/2020    BUN 33 07/29/2020    CREATININE 1.26 07/29/2020    CALCIUM 9.2 07/29/2020    LABALBU 4.2 07/29/2020    LABALBU 4.2 03/13/2012    BILITOT 0.3 07/29/2020    ALKPHOS 76 07/29/2020    AST 25 07/29/2020    ALT 25 07/29/2020     Lab Results   Component Value Date    WBC 6.3 07/29/2020    RBC 3.67 07/29/2020    RBC 4.77 03/13/2012    HGB 11.9 07/29/2020    HCT 35.7 07/29/2020    MCV 97.3 07/29/2020    MCH 32.4 07/29/2020    MCHC 33.3 07/29/2020    RDW 15.4 07/29/2020     07/29/2020    MPV 8.2 07/31/2015       Lab Results   Component Value Date    LABAMPH Neg 11/17/2018    BARBSCNU Neg 11/17/2018    LABBENZ Neg 11/17/2018    LABBENZ NotDTCD 10/11/2012    CANSU Neg 11/17/2018    COCAIMETSCRU Neg 11/17/2018    PHENCYCLIDINESCREENURINE Neg 63/90/5582    TRICYCLIC POSITIVE 86/17/5887    DSCOMMENT see below 11/17/2018       Lab Results   Component Value Date    CODEINE Not Detected 12/19/2016    MORPHINE Not Detected 12/19/2016    ACETYLMORPHI Not Detected 12/19/2016    OXYCODONE Not Detected 12/19/2016    NOROXYCODONE Not Detected 12/19/2016    NOROXYMU Not Detected 12/19/2016    HYDRCO Not Detected 12/19/2016    NORHYDU Not Detected 12/19/2016    HYDROMO Not Detected 12/19/2016    BUPREN Not Detected 12/19/2016    NORBUPRNOR Not Detected 12/19/2016    FENTA Not Detected 12/19/2016    NORFENT Not Detected 12/19/2016    MEPERIDINE Not Detected 12/19/2016    TAPENU Not Detected 12/19/2016    TAPOSULFUR Not Detected 12/19/2016    METHADONE Not Detected 12/19/2016    LABPROP Not Detected 12/19/2016    TRAM Present 12/19/2016    AMPH Not Detected 12/19/2016    METHAMP Not Detected 12/19/2016    MDMA Not Detected 12/19/2016    ECMDA Not Detected 12/19/2016       Lab Results   Component Value Date    METPHEN Not Detected 12/19/2016    PHENTERMINE Not Detected 12/19/2016    BENZOYL Not Detected 12/19/2016    Joellen Saroj Not Detected 12/19/2016    ALPHAOHALPRA Not Detected 12/19/2016    CLONAZEPAM Not Detected 12/19/2016    Bautista Keena Not Detected 12/19/2016    DIAZEP Not Detected 12/19/2016    DEBORA Not Detected 12/19/2016    OXAZ Not Detected 12/19/2016    Narcisa Duffel Not Detected 12/19/2016    LORAZEPAM Not Detected 12/19/2016    MIDAZOLAM Not Detected 12/19/2016    ZOLPIDEM Not Detected 12/19/2016    TEO Not Detected 12/19/2016    ETG See Note 12/19/2016    MARIJMET Not Detected 12/19/2016    PCP Not Detected 12/19/2016    PAINMGTDRUGP See Below 12/19/2016    EERPAINMGTPA See Note 12/19/2016    LABCREA 151.8 07/29/2020         , I discussed results with patient. See follow-up plans for new studies. Therapies:  HEP-gentle stretching and relaxation techniques-demonstrated with patient-they are to do them twice a day.   They are also advised to make the following lifestyle changes:  Goals       Limit Carbs to 2-3/meal maximum      Exercise 3x per week (30 min per time)      Piriformis stretches and abd strengthening       HEMOGLOBIN A1C < 6.5      SOAPP- R GOAL LESS THAN 9      12/19/16 SCORE: 15-MODERATE RISK   02/22/17 score: 22-High risk   05/15/17 score: 26-high risk  07/12/17 score: 22-high risk  10/02/17 score: 15-moderate risk  12/11/17 score: 11-moderate risk  02/08/18 score: 14-moderate risk  04/18/18 score: 15-moderate risk  8/9/18 score: 10 low risk  10/11/18 score: 22- high risk  12/14/18 score:17-moderate risk  3/27/19 score: 12 moderate risk  6/13/19 score: 24 high risk  8/22/19 score: 18- moderate risk   10/23/19 score: 18- moderate risk  01/03/20 score: 23- moderate risk       Weight < 160 lb (72.576 kg)           Injections or Epidurals:  Injection options were discussed. Monthly trigger point injections add vitamin B12 when able. Patient gave verbal consent to ordered injections. See follow-up plans for planned injections. Supplements:  Vitamin D with increased dosing during the rainy months, add co-Q10 for heart health. Education was given on:   Dietary and Fitness--daily stretches and low carb diet-in chair Yoga when possible         MRI lumbar without and with contrast spine         HISTORY: Low back pain and subjective lower extremity weakness. No history of prior surgery.         COMPARISON: 6/30/2018 CT of the lumbar spine.         TECHNIQUE: Sagittal T1, T2, and inversion recovery. Axial T1 and T2. Sagittal and axial T1 with fat suppression after the IV administration of 20 cc of gadolinium (ProHance). Sagittal T1 whole spine localizer. 3 plane T2 localizer.         FINDINGS:         For the purposes of enumerating the lumbar disc levels the lowest lumbar-type disc will be referred to as L5-S1. This is confirmed on the localizer and prior CT.         Conus medullaris is unremarkable. No abnormal intraspinal or vertebral osseous enhancement.         Vertebral body heights are maintained with no acute compression fractures. Moderate predominantly fatty marrow signal. No pathologic marrow replacement.         T10-11 disc level included on the sagittal images.  Very minimal bilateral paracentral and/or subarticular bulging and probable ligamentous hypertrophy. There does not appear to be significant canal narrowing on the sagittal images.         T11-12 and T12-L1 disc minimal degenerative anterior spurring, disc space narrowing and degenerative endplate signal changes. Posterior disc margin is normal. No canal or foraminal stenosis.         L1-2 (L1): Mild-moderate disc space narrowing with little desiccation. Anterior disc bulging and endplate spurring. Minimal posterior disc bulging. No mass effect on the thecal sac, canal or foraminal stenosis.         L2-3 (L2): Severe disc space narrowing, desiccation and type II (fat) endplate signal changes. Mild to moderate anterior spurring. Posterior there is mild diffuse bulge of the desiccated disc with a superimposed left subarticular inferior disc extrusion    extending from the disc continuously 1.5 cm below the endplate to the level of the mid L3 vertebral body. Extrusion measures approximately 6 x 4.8 mm in transverse dimension causing very mild mass effect on the thecal sac. After contrast administration    there is peripheral enhancement indicating scarring with a much smaller nonenhancing component measuring 4.7 x 2.6 x 4.8 mm. Enhancement is also between the disc level and the nonenhancing area raising the possibility of a sequestration/free fragment    with surrounding scarring. Disc causes narrowing of the L3 lateral recess with compression of the left L3 nerve root as it exits the thecal sac within the spinal canal (axial image #23).      No significant L2-3 foraminal stenosis.         L3-4 (L3): Mild disc space narrowing and desiccation more pronounced posteriorly. Diffuse bulge of the disc more pronounced in the right paracentral and subarticular location causing very mild narrowing of the right side of the spinal canal. Part of the    right paracentral disc extends just superior to the inferior L3 endplate. Mild ligamentous hypertrophy. No significant foraminal stenosis.      L4-5 (L4): Moderate disc space narrowing and minimal desiccation. Very mild grade 1 spondylosis the cysts secondary to moderate to severe bilateral facet arthrosis. Minimal pseudobulging of the disc along with ligamentous hypertrophy creates a canal at    the lower limits of normal in size. Trivial foraminal narrowing despite facet arthrosis and disc bulging.         L5-S1 (L5): Minimal disc space narrowing. No significant desiccation. Mild central bulging without significant mass effect on the thecal sac. Thecal sac is narrowed at this level due to epidural lipomatosis. . No foraminal stenosis. Mild predominately    superior right greater than left facet arthrosis.         Incidental note is made of moderate parasagittal subcutaneous fat edema superficial to the spinous processes. This is a nonspecific finding that may be associated with increased weight, female gender and/or advanced age.         Moderate cervical and mild to moderate thoracic degenerative disc disease on the localizer.         Susceptibility artifact in the pelvis due to prior surgery.                        Impression         Degenerative disc disease most pronounced and severe at L2-3 where there is a diffuse disc bulge with a superimposed inferior left subarticular disc extrusion/sequestration that narrows the left L3 lateral recess and compresses the L3 nerve root as it    exits the thecal sac within the spinal canal.         L4-5 grade 1 spondylolisthesis secondary to facet arthrosis. Canal at the lower limits of normal in size at this level detailed above.         L3-4 diffuse disc bulge more pronounced right paracentral-subarticular with a tiny superior protrusion. Very mild right canal narrowing.         L5-S1 epidural lipomatosis with marked thecal sac narrowing. Follow up with Primary Care Physician regarding their general medical needs.      Stressed the importance of following up with PCP and specialists for his/her chronic diseases, health, CV, and cancer screening and continued care. Will follow disease activity/progression and adjust therapeutic regimen to disease activity and severity. Discussed medication dosage, usage, goals of therapy, and side effects. Available test results were reviewed -Discussed findings, impression and plan with patient. An additional 10 minutes were spent outside of the patient visit to review records. Additional time spent with the patient to discuss their questions. Additional time spent with the patient devoted to discussing treatment strategy, planning, and implementation generalized musculoskeletal health plan. Patient understands above plan; questions asked and answered. Patient agrees to plan as noted above. F/U in 2-3months  At least 50% of the visit was involved in the discussion of the options for treatment. We discussed exercises, medication, interventional therapies and surgery. Healthy life style is essential with patient hard work to achieve the wellness. In addition; discussion with the patient and/or family about any of the diagnostic results, impressions and/or recommended diagnostic studies, prognosis, risks and benefits of treatment options, instructions for treatment and/or follow-up, importance of compliance with chosen treatment options, risk-factor reduction, and patient/family education. They are to follow up in 2 months to review medication, efficacy of injections, pill counts, OARRS check, SOAPPR assessment, review diagnostics, to review previous and future treatment plans and assess appropriateness for continued therapy. New Diagnostics  No orders of the defined types were placed in this encounter. Follow-up in 2-1/2 to 3 months for guarding the efficacy of current plan and future treatment. An  electronic signature was used to authenticate this note.     -Dr Jennifer Babcock DO       With MA assistance from:   Nissa Sanders 19}    Pursuant to the emergency declaration under the [facility] waiver authority and the [employer] and [employer] Act, this Virtual  Visit was conducted, with patient's consent, to reduce the patient's risk of exposure to COVID-19 and provide continuity of care for an established patient. Services were provided through a video synchronous discussion virtually to substitute for in-person clinic visit.

## 2020-11-05 ENCOUNTER — HOSPITAL ENCOUNTER (OUTPATIENT)
Dept: LAB | Age: 60
Discharge: HOME | End: 2020-11-05
Payer: COMMERCIAL

## 2020-11-05 LAB
ALBUMIN SERPL-MCNC: 3.8 G/DL (ref 3.5–4.6)
ALP BLD-CCNC: 74 U/L (ref 40–130)
ALT SERPL-CCNC: 30 U/L (ref 0–33)
ANION GAP SERPL CALCULATED.3IONS-SCNC: 11 MEQ/L (ref 9–15)
AST SERPL-CCNC: 26 U/L (ref 0–35)
BASOPHILS ABSOLUTE: 0 K/UL (ref 0–0.2)
BASOPHILS RELATIVE PERCENT: 0.9 %
BILIRUB SERPL-MCNC: 0.3 MG/DL (ref 0.2–0.7)
BUN BLDV-MCNC: 33 MG/DL (ref 8–23)
CALCIUM SERPL-MCNC: 9.2 MG/DL (ref 8.5–9.9)
CHLORIDE BLD-SCNC: 105 MEQ/L (ref 95–107)
CHOLESTEROL, TOTAL: 186 MG/DL (ref 0–199)
CO2: 25 MEQ/L (ref 20–31)
CREAT SERPL-MCNC: 1.39 MG/DL (ref 0.5–0.9)
CREATININE URINE: 145.8 MG/DL
EOSINOPHILS ABSOLUTE: 0.1 K/UL (ref 0–0.7)
EOSINOPHILS RELATIVE PERCENT: 1.6 %
GFR AFRICAN AMERICAN: 46.7
GFR NON-AFRICAN AMERICAN: 38.6
GLOBULIN: 1.9 G/DL (ref 2.3–3.5)
GLUCOSE BLD-MCNC: 152 MG/DL (ref 70–99)
HBA1C MFR BLD: 7.5 % (ref 4.8–5.9)
HCT VFR BLD CALC: 34.4 % (ref 37–47)
HDLC SERPL-MCNC: 61 MG/DL (ref 40–59)
HEMOGLOBIN: 11.1 G/DL (ref 12–16)
LDL CHOLESTEROL CALCULATED: 98 MG/DL (ref 0–129)
LYMPHOCYTES ABSOLUTE: 1.5 K/UL (ref 1–4.8)
LYMPHOCYTES RELATIVE PERCENT: 32 %
MCH RBC QN AUTO: 29.7 PG (ref 27–31.3)
MCHC RBC AUTO-ENTMCNC: 32.1 % (ref 33–37)
MCV RBC AUTO: 92.3 FL (ref 82–100)
MICROALBUMIN UR-MCNC: <1.2 MG/DL
MICROALBUMIN/CREAT UR-RTO: NORMAL MG/G (ref 0–30)
MONOCYTES ABSOLUTE: 0.4 K/UL (ref 0.2–0.8)
MONOCYTES RELATIVE PERCENT: 9 %
NEUTROPHILS ABSOLUTE: 2.7 K/UL (ref 1.4–6.5)
NEUTROPHILS RELATIVE PERCENT: 56.5 %
PDW BLD-RTO: 14.1 % (ref 11.5–14.5)
PLATELET # BLD: 254 K/UL (ref 130–400)
POTASSIUM SERPL-SCNC: 3.9 MEQ/L (ref 3.4–4.9)
RBC # BLD: 3.73 M/UL (ref 4.2–5.4)
SODIUM BLD-SCNC: 141 MEQ/L (ref 135–144)
TOTAL PROTEIN: 5.7 G/DL (ref 6.3–8)
TRIGL SERPL-MCNC: 135 MG/DL (ref 0–150)
VITAMIN D 25-HYDROXY: 78.6 NG/ML (ref 30–100)
WBC # BLD: 4.8 K/UL (ref 4.8–10.8)

## 2020-11-05 PROCEDURE — 85025 COMPLETE CBC W/AUTO DIFF WBC: CPT

## 2020-11-05 PROCEDURE — 80061 LIPID PANEL: CPT

## 2020-11-05 PROCEDURE — 82306 VITAMIN D 25 HYDROXY: CPT

## 2020-11-05 PROCEDURE — 83036 HEMOGLOBIN GLYCOSYLATED A1C: CPT

## 2020-11-05 PROCEDURE — 82043 UR ALBUMIN QUANTITATIVE: CPT

## 2020-11-05 PROCEDURE — 82570 ASSAY OF URINE CREATININE: CPT

## 2020-11-05 PROCEDURE — 36415 COLL VENOUS BLD VENIPUNCTURE: CPT

## 2020-11-05 PROCEDURE — 80053 COMPREHEN METABOLIC PANEL: CPT

## 2020-11-06 ENCOUNTER — VIRTUAL VISIT (OUTPATIENT)
Dept: FAMILY MEDICINE CLINIC | Age: 60
End: 2020-11-06
Payer: COMMERCIAL

## 2020-11-06 PROCEDURE — 3051F HG A1C>EQUAL 7.0%<8.0%: CPT | Performed by: NURSE PRACTITIONER

## 2020-11-06 PROCEDURE — 99214 OFFICE O/P EST MOD 30 MIN: CPT | Performed by: NURSE PRACTITIONER

## 2020-11-06 RX ORDER — ORAL SEMAGLUTIDE 3 MG/1
1 TABLET ORAL DAILY
Qty: 30 TABLET | Refills: 1 | Status: SHIPPED | OUTPATIENT
Start: 2020-11-06 | End: 2021-01-01 | Stop reason: SDUPTHER

## 2020-11-06 NOTE — PROGRESS NOTES
2020    TELEHEALTH EVALUATION -- Audio/Visual (During  public health emergency)    Due to COVID 19 outbreak, patient's office visit was converted to a virtual visit. Patient was contacted and agreed to proceed with a virtual visit via D[employer]. me  The risks and benefits of converting to a virtual visit were discussed in light of the current infectious disease epidemic. Patient also understood that insurance coverage and co-pays are up to their individual insurance plans. Dejah Hurtado is a 61 y.o. female being evaluated by a Virtual Visit (video visit) encounter to address concerns as mentioned above. A caregiver was present when appropriate. Due to this being a TeleHealth encounter (During  public health emergency), evaluation of the following organ systems was limited: Vitals/Constitutional/EENT/Resp/CV/GI//MS/Neuro/Skin/Heme-Lymph-Imm. Pursuant to the emergency declaration under the [address] [place] [address] and the [employer] and [employer] Act, this Virtual Visit was conducted with patient's (and/or legal guardian's) consent, to reduce the patient's risk of exposure to COVID-19 and provide necessary medical care. The patient (and/or legal guardian) has also been advised to contact this office for worsening conditions or problems, and seek emergency medical treatment and/or call 911 if deemed necessary. Patient identification was verified at the start of the visit: Yes    Total time spent for this encounter: Not billed by time    Services were provided through a video synchronous discussion virtually to substitute for in-person clinic visit. Patient and provider were located at their individual homes. The patient is talking with me virtually from her home and I am located at my office in [facility].        HPI:    Dejah Hurtado (:  1960) has requested an audio/video evaluation for the following concern(s):    Diabetes/Dyslipidemia/HTN: She states that her blood pressures have been running good as far as she knows. She continues to eat a healthy diet overall with very little carbohydrates and low salt. She is not getting much physical activity due to her physical health. She has not had any low glucose episodes that she is noted. She has been taking medication as prescribed with no known side effects. Lupus/polymyositis, immunosuppressed status: She continues to follow routinely with rheumatology. Is taking routine steroid for autoimmune disorder. She states that she has not had any significant decline in her physical ability. She states that her  tells her that she tends to stutter at times when she is speaking but she has not noticed this issue. She has not had any increase in generalized pain throughout the body. States overall she has been doing okay. Review of Systems   This patient reports no chest pain or pressure. There is no shortness of breath or cough. The patient reports no nausea or vomiting. There is no heartburn or indigestion. There is no diarrhea or constipation. No black, bloody, mucusy or tarry stool noticed. The patient reports no bloating and no change in appetite. There is no numbness, tingling or swelling in the extremities. Prior to Visit Medications    Medication Sig Taking? Authorizing Provider   Semaglutide (RYBELSUS) 3 MG TABS Take 1 tablet by mouth daily Yes BUTCH Byrnes CNP   citalopram (CELEXA) 40 MG tablet TAKE 1 TABLET DAILY  BUTCH Byrnes CNP   methocarbamol (ROBAXIN) 500 MG tablet Take 1 tablet by mouth every 12 hours as needed (spasms) Generic equivalent acceptable, unless otherwise noted. Maria Elena Cid, DO   traMADol-acetaminophen (ULTRACET) 37.5-325 MG per tablet Take 1 tablet by mouth every 4 hours as needed for Pain (Maximum of 2.5 tablets daily.  Do not get narcotic pain medication from any other providers.) for up to 30 days. Maria Elena Cid DO   SITagliptin (JANUVIA) 50 MG tablet Take 1 tablet by mouth daily  Rea Opitz Riedy, APRN - CNP   hydrOXYzine (VISTARIL) 50 MG capsule TAKE 1 CAPSULE FOUR TIMES A DAY AS NEEDED FOR ITCHING OR ANXIETY  BUTCH Gardenr CNP   triamcinolone (KENALOG) 0.1 % ointment APPLY TO THE AFFECTED AREAS OF RASH TWICE A DAY FOR 2 WEEKS THEN ONCE DAILY FOR 2 WEEKS  BUTCH Gardner CNP   amitriptyline (ELAVIL) 25 MG tablet   Historical Provider, MD   amitriptyline (ELAVIL) 25 MG tablet 2 tablets PO at bedtime  Venkat Lux MD   omeprazole (PRILOSEC) 20 MG delayed release capsule Take 1 capsule by mouth 2 times daily (before meals)  Venkat Lux MD   levETIRAcetam (KEPPRA) 250 MG tablet Take 1 tablet by mouth 2 times daily  Isidoro Puckett MD   gabapentin (NEURONTIN) 400 MG capsule TAKE 1 CAPSULE THREE TIMES A DAY  BUTCH Gardner CNP   cyclobenzaprine (FLEXERIL) 10 MG tablet Take 10 mg by mouth 2 times daily as needed  Historical Provider, MD   nitrofurantoin, macrocrystal-monohydrate, (MACROBID) 100 MG capsule Take 100 mg by mouth as needed  Historical Provider, MD   traMADol (ULTRAM) 50 MG tablet Take 50 mg by mouth 2 times daily as needed for Pain.   Historical Provider, MD   omeprazole (PRILOSEC) 40 MG delayed release capsule Take 40 mg by mouth daily  Historical Provider, MD   rivaroxaban (XARELTO) 20 MG TABS tablet TAKE 1 TABLET DAILY  BUTCH Gardner CNP   albuterol (ACCUNEB) 0.63 MG/3ML nebulizer solution Take 3 mLs by nebulization every 6 hours as needed for Wheezing  Meridian Opitz Riedy, APRN - CNP   albuterol (PROVENTIL) (5 MG/ML) 0.5% nebulizer solution Take 1 mL by nebulization 4 times daily as needed for Wheezing  Rea Opitz Riedy, APRN - CNP   PROLIA 60 MG/ML SOSY SC injection   Historical Provider, MD   predniSONE (DELTASONE) 2.5 MG tablet TAKE 3 TABLETS BY MOUTH DAILY FOR 2 WEEKS THEN ALTERNATE 3 TABLETS (EVEN DAYS) AND 2 TABLETS (ODD DAYS) EVERY OTHER DAY THEREAFTER  Historical Provider, MD   furosemide (LASIX) 20 MG tablet Take 1 tablet by mouth daily  BUTCH Champagne CNP   carvedilol (COREG) 6.25 MG tablet Take 1 tablet by mouth 2 times daily (with meals)  BUTCH Champagne CNP   allopurinol (ZYLOPRIM) 300 MG tablet Take half tab daily x 2weeks, then full tab daily thereafter. Historical Provider, MD   balsalazide (COLAZAL) 750 MG capsule   Historical Provider, MD   leucovorin calcium (WELLCOVORIN) 5 MG tablet TAKE 1 TO 2 TABLETS BY MOUTH ONCE DAILY THE DAY AFTER METHOTREXATE  Historical Provider, MD   lansoprazole (PREVACID) 30 MG delayed release capsule Take 1 capsule by mouth daily  BUTCH Harris CNP   cholestyramine (QUESTRAN) 4 g packet Take 1 packet by mouth 2 times daily  Zondrnina Bacca SopteBUTCH goff CNP   methotrexate (RHEUMATREX) 2.5 MG chemo tablet TAKE 6 TABS ONCE A WEEK with food. No alcohol. Hold if on antibiotics or ill. Historical Provider, MD   cyanocobalamin ([employer] VITAMIN B12) 1000 MCG tablet Take 1 tablet by mouth daily  Erika Chan MD   folic acid (FOLVITE) 1 MG tablet Take 1 tablet by mouth daily  Erika Chan MD   hydrocortisone 1 % cream Apply topically 2 times daily. BUTCH Champagne CNP   diclofenac sodium (VOLTAREN) 1 % GEL Apply 4 g topically 4 times daily  Maria Elena Cid DO   hydroxychloroquine (PLAQUENIL) 200 MG tablet Take 200 mg by mouth 2 times daily   Historical Provider, MD   albuterol sulfate  (90 BASE) MCG/ACT inhaler Inhale 2 puffs into the lungs every 6 hours as needed for Wheezing  BUTCH Champagne CNP   Blood Glucose Monitoring Suppl (ACCU-CHEK ADVANTAGE DIABETES) KIT Diabetic monitoring kit(any brand), with supplies for testing. Test fasting once daily.  [email or web address]  HAILEE De León   Glucose Blood (BLOOD GLUCOSE TEST STRIPS) STRP Test once daily  HAILEE De León   Lancets MISC Testing q day  HAILEE Soriano Cholecalciferol (VITAMIN D) 2000 UNITS CAPS capsule Take 2 capsules by mouth daily   Historical Provider, MD       Social History     Tobacco Use    Smoking status: Former Smoker     Packs/day: 1.25     Years: 10.00     Pack years: 12.50     Types: Cigarettes     Last attempt to quit: 2006     Years since quittin.8    Smokeless tobacco: Never Used    Tobacco comment: start smoking age 25   Substance Use Topics    Alcohol use: Not Currently    Drug use: No        PHYSICAL EXAMINATION:  [ INSTRUCTIONS:  \"[x]\" Indicates a positive item  \"[]\" Indicates a negative item  -- DELETE ALL ITEMS NOT EXAMINED]  [x] Alert  [x] Oriented to person/place/time    [x] No apparent distress  [] Toxic appearing    [] Face flushed appearing [] Sclera clear  [] Lips are cyanotic      [x] Breathing appears normal  [] Appears tachypneic      [] Rash on visible skin    [x] Cranial Nerves II-XII grossly intact    [x] Motor grossly intact in visible upper extremities    [] Motor grossly intact in visible lower extremities    [x] Normal Mood  [] Anxious appearing    [] Depressed appearing  [] Confused appearing      [] Poor short term memory  [] Poor long term memory    [] OTHER:      Due to this being a TeleHealth encounter, evaluation of the following organ systems is limited: Vitals/Constitutional/EENT/Resp/CV/GI//MS/Neuro/Skin/Heme-Lymph-Imm. ASSESSMENT/PLAN:   Diagnosis Orders   1. Uncontrolled secondary diabetes mellitus with stage 2 CKD (GFR 60-89) ([facility])  CBC Auto Differential    Comprehensive Metabolic Panel    Lipid Panel    Hemoglobin A1C    Microalbumin / Creatinine Urine Ratio   2. Essential hypertension     3. Dyslipidemia     4. CKD stage 3 secondary to diabetes ([facility]     5. Vitamin D deficiency  Vitamin D 25 Hydroxy   6. Immunosuppressed status ([facility]- IV rituxan at [facility]     7. Discoid lupus erythematosus     8.  Polymyositis with myopathy ([facility]           Return in about 3 months (around 2021) for diabetes- doxy.     1. 2. 3. Glucose levels are elevated. Recommend adding rybelsus to medication regimen. Metformin was discontinued previously due to decreased kidney function. Recommend rechecking labs within 3 months. Discussed importance of glucose control for renal health. She is currently taking routine steroid for autoimmune disease. 4. Discussed recommendation to kidney specialist. GFR is steadily declining over the past year. She declines at this time and would like to recheck labs first. Prefers to avoid another specialist for now. Education given and will recheck GFR within 12 weeks. 5. Vitamin D has been stable on current supplementation. Continue the same. 6. 7. 8. Continue to follow with rheumatology routinely. Currently following with [facility]. Symptoms have been stable on current medication regimen. Please note this report has been partially produced using speech recognition software and may cause contain errors related to that system including grammar, punctuation and spelling as well as words and phrases that may seem inappropriate. If there are questions or concerns please feel free to contact me to clarify. An  electronic signature was used to authenticate this note. --BUTCH Hart - CNP on 11/6/2020 at 1:47 PM        Pursuant to the emergency declaration under the [facility] waiver authority and the [employer] Act, this Virtual  Visit was conducted, with patient's consent, to reduce the patient's risk of exposure to COVID-19 and provide continuity of care for an established patient. Services were provided through a video synchronous discussion virtually to substitute for in-person clinic visit.

## 2020-11-17 NOTE — TELEPHONE ENCOUNTER
This is the only medication of its kind. We could try an injectable medication instead. Let me know if she is ok with that.

## 2020-11-17 NOTE — TELEPHONE ENCOUNTER
Last virtual visit 11/6/2020    semaglutide (rybelsus)  tablets 3 mg    Are giving patient a sore throat. She would like to know if there is another medication you might substitute. Please Advise.   Thank Joshua Mcelroy

## 2020-12-14 PROBLEM — F51.01 PRIMARY INSOMNIA: Status: ACTIVE | Noted: 2020-01-01

## 2020-12-16 NOTE — PROGRESS NOTES
Subjective:      Patient ID: Chris Martin is a 61 y.o. female who presents today for:  Chief Complaint   Patient presents with    6 Month Follow-Up     Patient states thing rosalie chatterjee, she states she had a couple falls, she says from one of the falls her whole side was bruised. She states that the neuropathy has gotten very bad, very tingly like electric pulses running through them, says she tolerates the pain. She states that she has also been having heartburn a lot lately as well. HPI 58-year-old right-handed female with a history of TIA. Patient has gait ataxia. Patient history of lupus with a high DESTINEE and a diagnosed by me. Patient is on Keppra and we are not sure why she is on this but she is also on Xarelto which is for lupus. There appeared to be a suggestion of some generalized seizures listed. Patient has history of lupus and I diagnosed her. She had a significantly elevated double-stranded DNA antibodies. She is now followed by rheumatology. Patient is under pain management and is on Robaxin. She denies any falls injuries or trauma.         Past Medical History:   Diagnosis Date    Ataxic gait     Back pain     Benign essential tremor     Depression     DM (diabetes mellitus) ([address] 11/25/2014    DVT (deep venous thrombosis) (HCC)     Elevated troponin 5/25/2016    Factor V Leiden carrier ([address]     history of DVT, on Xarelto    GERD (gastroesophageal reflux disease)     Hereditary sensory-motor neuropathy, type I     HTN (hypertension)     HTN (hypertension)     Hyperlipidemia     Lumbar radicular pain     Neuropathy of both feet 8/22/2016    NSTEMI (non-ST elevated myocardial infarction) ([address]     Obesity     Osteoarthritis     Pulmonary embolism ([address]     Renal failure 6/29/2018    Right inguinal hernia 11/9/2017    SOB (shortness of breath) 4/19/2017    Systemic lupus erythematosus ([address] 9/8/2017    Transient brainstem ischemia  Umbilical hernia     Vasomotor flushing     on hormone replacement therapy     Past Surgical History:   Procedure Laterality Date    APPENDECTOMY      CARDIAC CATHETERIZATION  2018    CATARACT REMOVAL Bilateral Winter 2019   [facility] COLONOSCOPY      Dr. Prashant Romero COLONOSCOPY  13    DR SINGER repeat in 10 yrs per pt    COLONOSCOPY  2017    Dr Lidya Wills Right 11/15/2017    RIGHT  [facility]     LAPAROSCOPY N/A 11/15/2017    DIAGNOSTIC LAPAROSCOPY performed by Isabel Dunn MD at [address] Right 3/14/2019    RIGHT QUADRICEPS MUSCLE BIOPSY performed by Rasheed Martinez MD at [address] N/A 2017    [facility] performed by Justin Benavides MD at [facility],7+J/J,VKBKV N/A     HERNIA UMBILICAL REPAIR, [place] performed by Isabel Dunn MD at [address] [facility]  13    DR SINGER     Social History     Socioeconomic History    Marital status:      Spouse name: Reanna Ro Number of children: 1    Years of education: 12    Highest education level: 12th grade   Occupational History    Occupation: Housewife    Occupation: retired [employer]   Social Needs    Financial resource strain: Not hard at all   Tyro-Liang insecurity     Worry: Never true     Inability: Never true    Transportation needs     Medical: No     Non-medical: No   Tobacco Use    Smoking status: Former Smoker     Packs/day: 1.25     Years: 10.00     Pack years: 12.50     Types: Cigarettes     Quit date: 2006     Years since quittin.9    Smokeless tobacco: Never Used    Tobacco comment: start smoking age 25   Substance and Sexual Activity    Alcohol use: Not Currently    Drug use: No    Sexual activity: Yes   Lifestyle    Physical activity Days per week: 0 days     Minutes per session: 0 min    Stress:  Only a little   Relationships    Social connections     Talks on phone: More than three times a week     Gets together: More than three times a week     Attends [employer] service: 1 to 4 times per year     Active member of club or organization: No     Attends meetings of clubs or organizations: Never     Relationship status:     Intimate partner violence     Fear of current or ex partner: No     Emotionally abused: No     Physically abused: No     Forced sexual activity: No   Other Topics Concern    Not on file   Social History Narrative    Off work dt--retired early from [employer]--worked [employer]---moved back to [place] [place] to work at [employer] in [address] up in [place] went to [employer]     Family History   Problem Relation Age of Onset    Substance Abuse Brother     High Blood Pressure Mother     High Cholesterol Mother     Arthritis Mother     High Blood Pressure Father     High Cholesterol Father     Clotting Disorder Father         clot to intestines    Arthritis Father     Substance Abuse Brother     Substance Abuse Brother     Stroke Paternal Uncle 36    Cystic Fibrosis Daughter      Allergies   Allergen Reactions    Sulfa Antibiotics Rash    Ciprofloxacin Hcl Swelling     facial, tongue swelling    Nsaids Other (See Comments)     Bleed risk dt Xaralto    Statins Other (See Comments)     Liver enzyme elevation       Current Outpatient Medications   Medication Sig Dispense Refill    Semaglutide (RYBELSUS) 3 MG TABS Take 1 tablet by mouth daily 30 tablet 1    citalopram (CELEXA) 40 MG tablet TAKE 1 TABLET DAILY 90 tablet 0    SITagliptin (JANUVIA) 50 MG tablet Take 1 tablet by mouth daily 90 tablet 1    hydrOXYzine (VISTARIL) 50 MG capsule TAKE 1 CAPSULE FOUR TIMES A DAY AS NEEDED FOR ITCHING OR ANXIETY 270 capsule 2  triamcinolone (KENALOG) 0.1 % ointment APPLY TO THE AFFECTED AREAS OF RASH TWICE A DAY FOR 2 WEEKS THEN ONCE DAILY FOR 2 WEEKS 60 g 12    amitriptyline (ELAVIL) 25 MG tablet       levETIRAcetam (KEPPRA) 250 MG tablet Take 1 tablet by mouth 2 times daily 180 tablet 3    cyclobenzaprine (FLEXERIL) 10 MG tablet Take 10 mg by mouth 2 times daily as needed      nitrofurantoin, macrocrystal-monohydrate, (MACROBID) 100 MG capsule Take 100 mg by mouth as needed      traMADol (ULTRAM) 50 MG tablet Take 50 mg by mouth 2 times daily as needed for Pain.  omeprazole (PRILOSEC) 40 MG delayed release capsule Take 40 mg by mouth daily      rivaroxaban (XARELTO) 20 MG TABS tablet TAKE 1 TABLET DAILY 90 tablet 4    PROLIA 60 MG/ML SOSY SC injection       predniSONE (DELTASONE) 2.5 MG tablet TAKE 3 TABLETS BY MOUTH DAILY FOR 2 WEEKS THEN ALTERNATE 3 TABLETS (EVEN DAYS) AND 2 TABLETS (ODD DAYS) EVERY OTHER DAY THEREAFTER      furosemide (LASIX) 20 MG tablet Take 1 tablet by mouth daily 90 tablet 3    carvedilol (COREG) 6.25 MG tablet Take 1 tablet by mouth 2 times daily (with meals) 180 tablet 3    balsalazide (COLAZAL) 750 MG capsule       leucovorin calcium (WELLCOVORIN) 5 MG tablet TAKE 1 TO 2 TABLETS BY MOUTH ONCE DAILY THE DAY AFTER METHOTREXATE      lansoprazole (PREVACID) 30 MG delayed release capsule Take 1 capsule by mouth daily 90 capsule 1    cholestyramine (QUESTRAN) 4 g packet Take 1 packet by mouth 2 times daily 90 packet 3    methotrexate (RHEUMATREX) 2.5 MG chemo tablet TAKE 6 TABS ONCE A WEEK with food. No alcohol. Hold if on antibiotics or ill.  hydrocortisone 1 % cream Apply topically 2 times daily.  1 Tube 1    diclofenac sodium (VOLTAREN) 1 % GEL Apply 4 g topically 4 times daily 300 g 3    hydroxychloroquine (PLAQUENIL) 200 MG tablet Take 200 mg by mouth 2 times daily  Blood Glucose Monitoring Suppl (ACCU-CHEK ADVANTAGE DIABETES) KIT Diabetic monitoring kit(any brand), with supplies for testing. Test fasting once daily. [id number].[id number] 1 kit 0    Glucose Blood (BLOOD GLUCOSE TEST STRIPS) STRP Test once daily 100 strip 4    Lancets MISC Testing q day 100 each 3    amitriptyline (ELAVIL) 25 MG tablet 2 tablets PO at bedtime 180 tablet 3    gabapentin (NEURONTIN) 400 MG capsule TAKE 1 CAPSULE THREE TIMES A  capsule 2    albuterol (ACCUNEB) 0.63 MG/3ML nebulizer solution Take 3 mLs by nebulization every 6 hours as needed for Wheezing 270 mL 3    albuterol (PROVENTIL) (5 MG/ML) 0.5% nebulizer solution Take 1 mL by nebulization 4 times daily as needed for Wheezing 120 each 3    allopurinol (ZYLOPRIM) 300 MG tablet Take half tab daily x 2weeks, then full tab daily thereafter.  cyanocobalamin ([employer] VITAMIN B12) 1000 MCG tablet Take 1 tablet by mouth daily 90 tablet 3    folic acid (FOLVITE) 1 MG tablet Take 1 tablet by mouth daily 90 tablet 3    albuterol sulfate  (90 BASE) MCG/ACT inhaler Inhale 2 puffs into the lungs every 6 hours as needed for Wheezing 1 Inhaler 3    Cholecalciferol (VITAMIN D) 2000 UNITS CAPS capsule Take 2 capsules by mouth daily        Current Facility-Administered Medications   Medication Dose Route Frequency Provider Last Rate Last Admin    lidocaine 1 % injection 16 mL  16 mL Other Once Beauford Diss, DO             Review of Systems   Constitutional: Negative for fever. HENT: Negative for ear pain, tinnitus and trouble swallowing. Eyes: Negative for photophobia and visual disturbance. Respiratory: Negative for choking and shortness of breath. Cardiovascular: Negative for chest pain and palpitations. Gastrointestinal: Negative for nausea and vomiting. Musculoskeletal: Negative for back pain, gait problem, joint swelling, myalgias, neck pain and neck stiffness. Skin: Negative for color change.  11/05/2020    CO2 25 11/05/2020    BUN 33 11/05/2020    CREATININE 1.39 11/05/2020    GFRAA 46.7 11/05/2020    LABGLOM 38.6 11/05/2020    GLUCOSE 152 11/05/2020    GLUCOSE 139 03/13/2012    PROT 5.7 11/05/2020    LABALBU 3.8 11/05/2020    LABALBU 4.2 03/13/2012    CALCIUM 9.2 11/05/2020    BILITOT 0.3 11/05/2020    ALKPHOS 74 11/05/2020    AST 26 11/05/2020    ALT 30 11/05/2020     Lab Results   Component Value Date    PROTIME 12.0 07/02/2018    INR 1.1 07/02/2018     Lab Results   Component Value Date    TSH 1.410 10/19/2016    JTRNFGBC93 540 07/29/2020     Lab Results   Component Value Date    TRIG 135 11/05/2020    HDL 61 11/05/2020    LDLCALC 98 11/05/2020     Lab Results   Component Value Date    LABAMPH Neg 11/17/2018    BARBSCNU Neg 11/17/2018    LABBENZ Neg 11/17/2018    LABBENZ NotDTCD 10/11/2012    OPIATESCREENURINE Neg 11/17/2018    PHENCYCLIDINESCREENURINE Neg 11/17/2018    ETOH <10 11/16/2018     No results found for: LITHIUM, DILFRTOT, VALPROATE    Assessment:       Diagnosis Orders   1. Neuropathy of both feet- Dr. Aramis Marion     2. Weakness of both lower extremities     3. Dystonia     4. Essential tremor     Extremity neuropathy like related to underlying lupus. Patient was diagnosed with lupus by me with a very elevated double DS DNA titers. She is under treatment for now. Her main issues appears to be neck pain radicular pain lumbar pain and some minor tremors. None of these of concern at least at this time. We keep an eye on her to prevent any lupus induced neurological syndromes such as vasculopathy. She has not developed anything to concern this at this time. She is a mild cervical dystonia not bothersome and she has neuropathic findings she has a gait ataxia walks with a walker. This time we will review her in a few months and earlier if any concerns  In further is not any myoclonic jerks she is on Keppra. Plan:      No orders of the defined types were placed in this encounter. No orders of the defined types were placed in this encounter. Return in about 6 months (around 6/16/2021).       Tho Oliva MD

## 2021-01-01 ENCOUNTER — HOSPITAL ENCOUNTER (OUTPATIENT)
Dept: LAB | Age: 61
Discharge: HOME | End: 2021-11-30
Payer: COMMERCIAL

## 2021-01-01 ENCOUNTER — APPOINTMENT (OUTPATIENT)
Dept: GENERAL RADIOLOGY | Age: 61
End: 2021-01-01
Payer: COMMERCIAL

## 2021-01-01 ENCOUNTER — PROCEDURE VISIT (OUTPATIENT)
Dept: PHYSICAL MEDICINE AND REHAB | Age: 61
End: 2021-01-01
Payer: COMMERCIAL

## 2021-01-01 ENCOUNTER — VIRTUAL VISIT (OUTPATIENT)
Dept: PHYSICAL MEDICINE AND REHAB | Age: 61
End: 2021-01-01
Payer: COMMERCIAL

## 2021-01-01 ENCOUNTER — OFFICE VISIT (OUTPATIENT)
Dept: NEUROLOGY | Age: 61
End: 2021-01-01
Payer: COMMERCIAL

## 2021-01-01 ENCOUNTER — APPOINTMENT (OUTPATIENT)
Dept: CT IMAGING | Age: 61
DRG: 682 | End: 2021-01-01
Payer: COMMERCIAL

## 2021-01-01 ENCOUNTER — OFFICE VISIT (OUTPATIENT)
Dept: ENDOCRINOLOGY | Age: 61
End: 2021-01-01
Payer: COMMERCIAL

## 2021-01-01 ENCOUNTER — HOSPITAL ENCOUNTER (EMERGENCY)
Age: 61
Discharge: ANOTHER HOSPITAL | End: 2021-12-08
Attending: EMERGENCY MEDICINE
Payer: COMMERCIAL

## 2021-01-01 ENCOUNTER — OFFICE VISIT (OUTPATIENT)
Dept: CARDIOLOGY CLINIC | Age: 61
End: 2021-01-01
Payer: COMMERCIAL

## 2021-01-01 ENCOUNTER — OFFICE VISIT (OUTPATIENT)
Dept: FAMILY MEDICINE CLINIC | Age: 61
End: 2021-01-01
Payer: COMMERCIAL

## 2021-01-01 ENCOUNTER — CARE COORDINATION (OUTPATIENT)
Dept: CASE MANAGEMENT | Age: 61
End: 2021-01-01

## 2021-01-01 ENCOUNTER — HOSPITAL ENCOUNTER (OUTPATIENT)
Dept: GENERAL RADIOLOGY | Age: 61
Discharge: HOME | End: 2021-09-03
Payer: COMMERCIAL

## 2021-01-01 ENCOUNTER — TELEPHONE (OUTPATIENT)
Dept: ENDOCRINOLOGY | Age: 61
End: 2021-01-01

## 2021-01-01 ENCOUNTER — TELEPHONE (OUTPATIENT)
Dept: FAMILY MEDICINE CLINIC | Age: 61
End: 2021-01-01

## 2021-01-01 ENCOUNTER — HOSPITAL ENCOUNTER (OUTPATIENT)
Dept: LAB | Age: 61
Discharge: HOME | DRG: 682 | End: 2021-08-10
Payer: COMMERCIAL

## 2021-01-01 ENCOUNTER — HOSPITAL ENCOUNTER (OUTPATIENT)
Dept: LAB | Age: 61
Discharge: HOME | End: 2021-03-08
Payer: COMMERCIAL

## 2021-01-01 ENCOUNTER — HOSPITAL ENCOUNTER (OUTPATIENT)
Dept: LAB | Age: 61
Discharge: HOME | End: 2021-09-14
Payer: COMMERCIAL

## 2021-01-01 ENCOUNTER — HOSPITAL ENCOUNTER (OUTPATIENT)
Dept: LAB | Age: 61
Discharge: HOME | End: 2021-07-08
Payer: COMMERCIAL

## 2021-01-01 ENCOUNTER — HOSPITAL ENCOUNTER (OUTPATIENT)
Dept: LAB | Age: 61
Discharge: HOME | End: 2021-09-13
Payer: COMMERCIAL

## 2021-01-01 ENCOUNTER — HOSPITAL ENCOUNTER (INPATIENT)
Age: 61
LOS: 4 days | DRG: 208 | End: 2021-12-12
Attending: INTERNAL MEDICINE | Admitting: INTERNAL MEDICINE
Payer: COMMERCIAL

## 2021-01-01 ENCOUNTER — APPOINTMENT (OUTPATIENT)
Dept: CT IMAGING | Age: 61
End: 2021-01-01
Payer: COMMERCIAL

## 2021-01-01 ENCOUNTER — APPOINTMENT (OUTPATIENT)
Dept: INTERVENTIONAL RADIOLOGY/VASCULAR | Age: 61
DRG: 208 | End: 2021-01-01
Attending: INTERNAL MEDICINE
Payer: COMMERCIAL

## 2021-01-01 ENCOUNTER — HOSPITAL ENCOUNTER (OUTPATIENT)
Dept: LAB | Age: 61
Discharge: HOME | End: 2021-09-27
Payer: COMMERCIAL

## 2021-01-01 ENCOUNTER — HOSPITAL ENCOUNTER (INPATIENT)
Age: 61
LOS: 4 days | Discharge: HOME HEALTH | DRG: 682 | End: 2021-08-16
Attending: EMERGENCY MEDICINE | Admitting: INTERNAL MEDICINE
Payer: COMMERCIAL

## 2021-01-01 ENCOUNTER — APPOINTMENT (OUTPATIENT)
Dept: GENERAL RADIOLOGY | Age: 61
DRG: 208 | End: 2021-01-01
Attending: INTERNAL MEDICINE
Payer: COMMERCIAL

## 2021-01-01 ENCOUNTER — APPOINTMENT (OUTPATIENT)
Dept: GENERAL RADIOLOGY | Age: 61
DRG: 682 | End: 2021-01-01
Payer: COMMERCIAL

## 2021-01-01 ENCOUNTER — HOSPITAL ENCOUNTER (OUTPATIENT)
Age: 61
Discharge: HOME | End: 2021-09-03
Payer: COMMERCIAL

## 2021-01-01 ENCOUNTER — VIRTUAL VISIT (OUTPATIENT)
Dept: FAMILY MEDICINE CLINIC | Age: 61
End: 2021-01-01
Payer: COMMERCIAL

## 2021-01-01 ENCOUNTER — OFFICE VISIT (OUTPATIENT)
Dept: UROLOGY | Age: 61
End: 2021-01-01
Payer: COMMERCIAL

## 2021-01-01 ENCOUNTER — HOSPITAL ENCOUNTER (OUTPATIENT)
Dept: LAB | Age: 61
Discharge: HOME | End: 2021-09-01
Payer: COMMERCIAL

## 2021-01-01 ENCOUNTER — HOSPITAL ENCOUNTER (OUTPATIENT)
Dept: LAB | Age: 61
Discharge: HOME | End: 2021-05-07
Payer: COMMERCIAL

## 2021-01-01 ENCOUNTER — HOSPITAL ENCOUNTER (OUTPATIENT)
Dept: LAB | Age: 61
Discharge: HOME | End: 2021-02-04
Payer: COMMERCIAL

## 2021-01-01 ENCOUNTER — HOSPITAL ENCOUNTER (OUTPATIENT)
Dept: LAB | Age: 61
Discharge: HOME | End: 2021-10-25
Payer: COMMERCIAL

## 2021-01-01 VITALS
BODY MASS INDEX: 33.45 KG/M2 | DIASTOLIC BLOOD PRESSURE: 77 MMHG | TEMPERATURE: 97.7 F | RESPIRATION RATE: 18 BRPM | HEIGHT: 63 IN | OXYGEN SATURATION: 96 % | HEART RATE: 98 BPM | WEIGHT: 188.8 LBS | SYSTOLIC BLOOD PRESSURE: 130 MMHG

## 2021-01-01 VITALS
WEIGHT: 188.3 LBS | RESPIRATION RATE: 34 BRPM | TEMPERATURE: 97 F | HEIGHT: 63 IN | DIASTOLIC BLOOD PRESSURE: 33 MMHG | HEART RATE: 39 BPM | OXYGEN SATURATION: 95 % | SYSTOLIC BLOOD PRESSURE: 100 MMHG | BODY MASS INDEX: 33.36 KG/M2

## 2021-01-01 VITALS
HEART RATE: 87 BPM | HEIGHT: 63 IN | OXYGEN SATURATION: 95 % | TEMPERATURE: 98.2 F | SYSTOLIC BLOOD PRESSURE: 110 MMHG | BODY MASS INDEX: 35.79 KG/M2 | WEIGHT: 202 LBS | DIASTOLIC BLOOD PRESSURE: 70 MMHG

## 2021-01-01 VITALS
HEART RATE: 103 BPM | SYSTOLIC BLOOD PRESSURE: 100 MMHG | WEIGHT: 186 LBS | HEIGHT: 63 IN | OXYGEN SATURATION: 95 % | DIASTOLIC BLOOD PRESSURE: 72 MMHG | TEMPERATURE: 98.1 F | BODY MASS INDEX: 32.96 KG/M2

## 2021-01-01 VITALS
OXYGEN SATURATION: 97 % | RESPIRATION RATE: 16 BRPM | HEART RATE: 94 BPM | DIASTOLIC BLOOD PRESSURE: 70 MMHG | WEIGHT: 200.4 LBS | SYSTOLIC BLOOD PRESSURE: 104 MMHG | BODY MASS INDEX: 35.5 KG/M2

## 2021-01-01 VITALS
TEMPERATURE: 97.8 F | OXYGEN SATURATION: 94 % | DIASTOLIC BLOOD PRESSURE: 60 MMHG | WEIGHT: 191 LBS | BODY MASS INDEX: 33.84 KG/M2 | HEIGHT: 63 IN | SYSTOLIC BLOOD PRESSURE: 110 MMHG | HEART RATE: 93 BPM | RESPIRATION RATE: 16 BRPM

## 2021-01-01 VITALS
DIASTOLIC BLOOD PRESSURE: 76 MMHG | HEIGHT: 63 IN | BODY MASS INDEX: 32.96 KG/M2 | HEART RATE: 94 BPM | WEIGHT: 186 LBS | SYSTOLIC BLOOD PRESSURE: 102 MMHG

## 2021-01-01 VITALS
DIASTOLIC BLOOD PRESSURE: 60 MMHG | SYSTOLIC BLOOD PRESSURE: 100 MMHG | BODY MASS INDEX: 35.08 KG/M2 | OXYGEN SATURATION: 97 % | WEIGHT: 198 LBS | HEIGHT: 63 IN | TEMPERATURE: 98.4 F | HEART RATE: 88 BPM | RESPIRATION RATE: 16 BRPM

## 2021-01-01 VITALS
HEART RATE: 91 BPM | DIASTOLIC BLOOD PRESSURE: 81 MMHG | SYSTOLIC BLOOD PRESSURE: 111 MMHG | BODY MASS INDEX: 35.26 KG/M2 | WEIGHT: 199 LBS | HEIGHT: 63 IN

## 2021-01-01 VITALS
DIASTOLIC BLOOD PRESSURE: 68 MMHG | HEART RATE: 86 BPM | SYSTOLIC BLOOD PRESSURE: 138 MMHG | WEIGHT: 175 LBS | RESPIRATION RATE: 24 BRPM | TEMPERATURE: 99.5 F | OXYGEN SATURATION: 95 % | BODY MASS INDEX: 31 KG/M2

## 2021-01-01 VITALS
BODY MASS INDEX: 33.83 KG/M2 | SYSTOLIC BLOOD PRESSURE: 130 MMHG | HEART RATE: 92 BPM | DIASTOLIC BLOOD PRESSURE: 74 MMHG | WEIGHT: 191 LBS

## 2021-01-01 VITALS
DIASTOLIC BLOOD PRESSURE: 78 MMHG | HEART RATE: 91 BPM | OXYGEN SATURATION: 97 % | WEIGHT: 190 LBS | SYSTOLIC BLOOD PRESSURE: 125 MMHG | HEIGHT: 63 IN | BODY MASS INDEX: 33.66 KG/M2

## 2021-01-01 DIAGNOSIS — M79.10 MYALGIA: ICD-10-CM

## 2021-01-01 DIAGNOSIS — Z79.899 HIGH RISK MEDICATION USE: ICD-10-CM

## 2021-01-01 DIAGNOSIS — M54.42 CHRONIC BILATERAL LOW BACK PAIN WITH BILATERAL SCIATICA: ICD-10-CM

## 2021-01-01 DIAGNOSIS — G57.93 NEUROPATHY OF BOTH FEET: ICD-10-CM

## 2021-01-01 DIAGNOSIS — M79.7 FIBROMYALGIA: Primary | ICD-10-CM

## 2021-01-01 DIAGNOSIS — D68.51 FACTOR V LEIDEN CARRIER (HCC): ICD-10-CM

## 2021-01-01 DIAGNOSIS — M53.3 SI (SACROILIAC) PAIN: ICD-10-CM

## 2021-01-01 DIAGNOSIS — U07.1 COVID-19: ICD-10-CM

## 2021-01-01 DIAGNOSIS — E11.65 UNCONTROLLED TYPE 2 DIABETES MELLITUS WITH HYPERGLYCEMIA (HCC): ICD-10-CM

## 2021-01-01 DIAGNOSIS — R30.0 DYSURIA: Primary | ICD-10-CM

## 2021-01-01 DIAGNOSIS — M54.40 CHRONIC MIDLINE LOW BACK PAIN WITH SCIATICA, SCIATICA LATERALITY UNSPECIFIED: Primary | ICD-10-CM

## 2021-01-01 DIAGNOSIS — N39.0 RECURRENT UTI: Primary | ICD-10-CM

## 2021-01-01 DIAGNOSIS — G89.29 CHRONIC MIDLINE LOW BACK PAIN WITH SCIATICA, SCIATICA LATERALITY UNSPECIFIED: ICD-10-CM

## 2021-01-01 DIAGNOSIS — L93.0 DISCOID LUPUS ERYTHEMATOSUS: ICD-10-CM

## 2021-01-01 DIAGNOSIS — R30.0 DYSURIA: ICD-10-CM

## 2021-01-01 DIAGNOSIS — M33.22 POLYMYOSITIS WITH MYOPATHY (HCC): ICD-10-CM

## 2021-01-01 DIAGNOSIS — F41.9 ANXIETY: ICD-10-CM

## 2021-01-01 DIAGNOSIS — G57.93 NEUROPATHY OF BOTH FEET: Primary | ICD-10-CM

## 2021-01-01 DIAGNOSIS — G89.29 CHRONIC LOW BACK PAIN WITH SCIATICA, SCIATICA LATERALITY UNSPECIFIED, UNSPECIFIED BACK PAIN LATERALITY: ICD-10-CM

## 2021-01-01 DIAGNOSIS — E11.65 UNCONTROLLED TYPE 2 DIABETES MELLITUS WITH HYPERGLYCEMIA (HCC): Primary | ICD-10-CM

## 2021-01-01 DIAGNOSIS — R26.0 ATAXIC GAIT: ICD-10-CM

## 2021-01-01 DIAGNOSIS — M62.81 MUSCLE WEAKNESS (GENERALIZED): ICD-10-CM

## 2021-01-01 DIAGNOSIS — J12.82 PNEUMONIA DUE TO COVID-19 VIRUS: Primary | ICD-10-CM

## 2021-01-01 DIAGNOSIS — E55.9 VITAMIN D DEFICIENCY: ICD-10-CM

## 2021-01-01 DIAGNOSIS — F32.0 CURRENT MILD EPISODE OF MAJOR DEPRESSIVE DISORDER, UNSPECIFIED WHETHER RECURRENT (HCC): ICD-10-CM

## 2021-01-01 DIAGNOSIS — D68.51 FACTOR 5 LEIDEN MUTATION, HETEROZYGOUS (HCC): ICD-10-CM

## 2021-01-01 DIAGNOSIS — M54.50 LOW BACK PAIN, UNSPECIFIED BACK PAIN LATERALITY, UNSPECIFIED CHRONICITY, UNSPECIFIED WHETHER SCIATICA PRESENT: ICD-10-CM

## 2021-01-01 DIAGNOSIS — M25.552 BILATERAL HIP PAIN: ICD-10-CM

## 2021-01-01 DIAGNOSIS — N18.30 CKD STAGE 3 SECONDARY TO DIABETES (HCC): Primary | ICD-10-CM

## 2021-01-01 DIAGNOSIS — B37.2 SKIN YEAST INFECTION: ICD-10-CM

## 2021-01-01 DIAGNOSIS — M79.7 FIBROMYALGIA MUSCLE PAIN: ICD-10-CM

## 2021-01-01 DIAGNOSIS — L93.0 LUPUS ERYTHEMATOSUS, UNSPECIFIED FORM: Chronic | ICD-10-CM

## 2021-01-01 DIAGNOSIS — M54.40 CHRONIC LOW BACK PAIN WITH SCIATICA, SCIATICA LATERALITY UNSPECIFIED, UNSPECIFIED BACK PAIN LATERALITY: Primary | ICD-10-CM

## 2021-01-01 DIAGNOSIS — Z79.899 HIGH RISK MEDICATION USE: Chronic | ICD-10-CM

## 2021-01-01 DIAGNOSIS — M79.10 MYALGIA: Primary | ICD-10-CM

## 2021-01-01 DIAGNOSIS — E78.5 DYSLIPIDEMIA: ICD-10-CM

## 2021-01-01 DIAGNOSIS — N39.0 RECURRENT UTI: ICD-10-CM

## 2021-01-01 DIAGNOSIS — M53.3 SI (SACROILIAC) PAIN: Primary | ICD-10-CM

## 2021-01-01 DIAGNOSIS — E11.22 CKD STAGE 3 SECONDARY TO DIABETES (HCC): ICD-10-CM

## 2021-01-01 DIAGNOSIS — B34.9 VIRAL ILLNESS: Primary | ICD-10-CM

## 2021-01-01 DIAGNOSIS — I78.1 SPIDER VEINS: ICD-10-CM

## 2021-01-01 DIAGNOSIS — N30.00 ACUTE CYSTITIS WITHOUT HEMATURIA: ICD-10-CM

## 2021-01-01 DIAGNOSIS — M54.41 CHRONIC BILATERAL LOW BACK PAIN WITH BILATERAL SCIATICA: ICD-10-CM

## 2021-01-01 DIAGNOSIS — N30.00 ACUTE CYSTITIS WITHOUT HEMATURIA: Primary | ICD-10-CM

## 2021-01-01 DIAGNOSIS — R10.32 LLQ ABDOMINAL PAIN: ICD-10-CM

## 2021-01-01 DIAGNOSIS — Z86.718 HISTORY OF DEEP VEIN THROMBOSIS (DVT) OF LOWER EXTREMITY: ICD-10-CM

## 2021-01-01 DIAGNOSIS — M15.9 GENERALIZED OA: ICD-10-CM

## 2021-01-01 DIAGNOSIS — J96.01 ACUTE RESPIRATORY FAILURE WITH HYPOXIA (HCC): ICD-10-CM

## 2021-01-01 DIAGNOSIS — G89.29 CHRONIC LOW BACK PAIN WITH SCIATICA, SCIATICA LATERALITY UNSPECIFIED, UNSPECIFIED BACK PAIN LATERALITY: Primary | ICD-10-CM

## 2021-01-01 DIAGNOSIS — R29.6 FREQUENT FALLS: ICD-10-CM

## 2021-01-01 DIAGNOSIS — B37.9 YEAST INFECTION: ICD-10-CM

## 2021-01-01 DIAGNOSIS — M54.40 CHRONIC MIDLINE LOW BACK PAIN WITH SCIATICA, SCIATICA LATERALITY UNSPECIFIED: ICD-10-CM

## 2021-01-01 DIAGNOSIS — I82.4Z9 DEEP VEIN THROMBOSIS (DVT) OF DISTAL VEIN OF LOWER EXTREMITY, UNSPECIFIED CHRONICITY, UNSPECIFIED LATERALITY (HCC): Primary | ICD-10-CM

## 2021-01-01 DIAGNOSIS — B36.9 FUNGAL DERMATITIS: ICD-10-CM

## 2021-01-01 DIAGNOSIS — L40.9 SCALP PSORIASIS: ICD-10-CM

## 2021-01-01 DIAGNOSIS — M79.642 PAIN IN BOTH HANDS: ICD-10-CM

## 2021-01-01 DIAGNOSIS — R41.3 SHORT-TERM MEMORY LOSS: ICD-10-CM

## 2021-01-01 DIAGNOSIS — R68.89 COLD INTOLERANCE: ICD-10-CM

## 2021-01-01 DIAGNOSIS — E11.69 TYPE 2 DIABETES MELLITUS WITH OTHER SPECIFIED COMPLICATION, WITHOUT LONG-TERM CURRENT USE OF INSULIN (HCC): Primary | ICD-10-CM

## 2021-01-01 DIAGNOSIS — R25.1 TREMOR: ICD-10-CM

## 2021-01-01 DIAGNOSIS — R00.0 TACHYCARDIA: ICD-10-CM

## 2021-01-01 DIAGNOSIS — G24.9 DYSTONIA: ICD-10-CM

## 2021-01-01 DIAGNOSIS — N18.30 CKD STAGE 3 SECONDARY TO DIABETES (HCC): ICD-10-CM

## 2021-01-01 DIAGNOSIS — F51.01 PRIMARY INSOMNIA: ICD-10-CM

## 2021-01-01 DIAGNOSIS — L93.0 DISCOID LUPUS ERYTHEMATOSUS: Chronic | ICD-10-CM

## 2021-01-01 DIAGNOSIS — M79.641 PAIN IN BOTH HANDS: ICD-10-CM

## 2021-01-01 DIAGNOSIS — I10 ESSENTIAL HYPERTENSION: ICD-10-CM

## 2021-01-01 DIAGNOSIS — M54.16 LUMBAR RADICULAR PAIN: Primary | ICD-10-CM

## 2021-01-01 DIAGNOSIS — L29.9 PRURITUS: ICD-10-CM

## 2021-01-01 DIAGNOSIS — R56.9 SEIZURE (HCC): ICD-10-CM

## 2021-01-01 DIAGNOSIS — E83.52 HYPERCALCEMIA: Primary | ICD-10-CM

## 2021-01-01 DIAGNOSIS — E83.52 HYPERCALCEMIA: ICD-10-CM

## 2021-01-01 DIAGNOSIS — B37.9 YEAST INFECTION: Primary | ICD-10-CM

## 2021-01-01 DIAGNOSIS — M54.2 NECK PAIN: ICD-10-CM

## 2021-01-01 DIAGNOSIS — U07.1 PNEUMONIA DUE TO COVID-19 VIRUS: Primary | ICD-10-CM

## 2021-01-01 DIAGNOSIS — E11.22 CKD STAGE 3 SECONDARY TO DIABETES (HCC): Primary | ICD-10-CM

## 2021-01-01 DIAGNOSIS — M25.551 BILATERAL HIP PAIN: ICD-10-CM

## 2021-01-01 DIAGNOSIS — B37.31 VAGINAL YEAST INFECTION: Primary | ICD-10-CM

## 2021-01-01 DIAGNOSIS — F32.A DEPRESSION, UNSPECIFIED DEPRESSION TYPE: ICD-10-CM

## 2021-01-01 DIAGNOSIS — Z79.899 LONG-TERM USE OF PLAQUENIL: ICD-10-CM

## 2021-01-01 DIAGNOSIS — E11.9 TYPE 2 DIABETES MELLITUS WITHOUT COMPLICATION, WITHOUT LONG-TERM CURRENT USE OF INSULIN (HCC): ICD-10-CM

## 2021-01-01 DIAGNOSIS — I26.99 OTHER PULMONARY EMBOLISM WITHOUT ACUTE COR PULMONALE, UNSPECIFIED CHRONICITY (HCC): ICD-10-CM

## 2021-01-01 DIAGNOSIS — D84.9 IMMUNOSUPPRESSED STATUS (HCC): ICD-10-CM

## 2021-01-01 DIAGNOSIS — I21.4 NSTEMI (NON-ST ELEVATED MYOCARDIAL INFARCTION) (HCC): ICD-10-CM

## 2021-01-01 DIAGNOSIS — L29.9 GENERALIZED PRURITUS: ICD-10-CM

## 2021-01-01 DIAGNOSIS — R73.9 HYPERGLYCEMIA: ICD-10-CM

## 2021-01-01 DIAGNOSIS — G89.29 CHRONIC BILATERAL LOW BACK PAIN WITH BILATERAL SCIATICA: ICD-10-CM

## 2021-01-01 DIAGNOSIS — M54.40 CHRONIC LOW BACK PAIN WITH SCIATICA, SCIATICA LATERALITY UNSPECIFIED, UNSPECIFIED BACK PAIN LATERALITY: ICD-10-CM

## 2021-01-01 DIAGNOSIS — R47.81 SLURRED SPEECH: ICD-10-CM

## 2021-01-01 DIAGNOSIS — N28.9 RENAL INSUFFICIENCY: ICD-10-CM

## 2021-01-01 DIAGNOSIS — L93.2 OTHER LOCAL LUPUS ERYTHEMATOSUS: Chronic | ICD-10-CM

## 2021-01-01 DIAGNOSIS — G89.29 CHRONIC MIDLINE LOW BACK PAIN WITH SCIATICA, SCIATICA LATERALITY UNSPECIFIED: Primary | ICD-10-CM

## 2021-01-01 DIAGNOSIS — R29.898 WEAKNESS OF BOTH LOWER EXTREMITIES: ICD-10-CM

## 2021-01-01 DIAGNOSIS — M54.9 UPPER BACK PAIN: ICD-10-CM

## 2021-01-01 DIAGNOSIS — R82.90 ABNORMAL URINALYSIS: ICD-10-CM

## 2021-01-01 LAB
ALBUMIN SERPL-MCNC: 1.7 G/DL (ref 3.5–4.6)
ALBUMIN SERPL-MCNC: 2.5 G/DL (ref 3.5–4.6)
ALBUMIN SERPL-MCNC: 3.1 G/DL (ref 3.5–4.6)
ALBUMIN SERPL-MCNC: 3.1 G/DL (ref 3.5–4.6)
ALBUMIN SERPL-MCNC: 3.3 G/DL (ref 3.5–4.6)
ALBUMIN SERPL-MCNC: 3.4 G/DL (ref 3.5–4.6)
ALBUMIN SERPL-MCNC: 3.5 G/DL (ref 3.5–4.6)
ALBUMIN SERPL-MCNC: 3.5 G/DL (ref 3.5–4.6)
ALBUMIN SERPL-MCNC: 3.61 G/DL (ref 3.75–5.01)
ALBUMIN SERPL-MCNC: 3.65 G/DL (ref 3.75–5.01)
ALBUMIN SERPL-MCNC: 3.8 G/DL (ref 3.5–4.6)
ALBUMIN SERPL-MCNC: 3.9 G/DL (ref 3.5–4.6)
ALBUMIN SERPL-MCNC: 4 G/DL (ref 3.5–4.6)
ALBUMIN SERPL-MCNC: 4.1 G/DL (ref 3.5–4.6)
ALBUMIN SERPL-MCNC: 4.1 G/DL (ref 3.5–4.6)
ALBUMIN SERPL-MCNC: 4.2 G/DL (ref 3.5–4.6)
ALP BLD-CCNC: 102 U/L (ref 40–130)
ALP BLD-CCNC: 105 U/L (ref 40–130)
ALP BLD-CCNC: 131 U/L (ref 40–130)
ALP BLD-CCNC: 132 U/L (ref 40–130)
ALP BLD-CCNC: 136 U/L (ref 40–130)
ALP BLD-CCNC: 145 U/L (ref 40–130)
ALP BLD-CCNC: 148 U/L (ref 40–130)
ALP BLD-CCNC: 162 U/L (ref 40–130)
ALP BLD-CCNC: 164 U/L (ref 40–130)
ALP BLD-CCNC: 75 U/L (ref 40–130)
ALP BLD-CCNC: 76 U/L (ref 40–130)
ALP BLD-CCNC: 80 U/L (ref 40–130)
ALP BLD-CCNC: 80 U/L (ref 40–130)
ALP BLD-CCNC: 88 U/L (ref 40–130)
ALP BLD-CCNC: 94 U/L (ref 40–130)
ALPHA-1-GLOBULIN: 0.27 G/DL (ref 0.19–0.46)
ALPHA-1-GLOBULIN: 0.34 G/DL (ref 0.19–0.46)
ALPHA-2-GLOBULIN: 0.82 G/DL (ref 0.48–1.05)
ALPHA-2-GLOBULIN: 1.07 G/DL (ref 0.48–1.05)
ALT SERPL-CCNC: 108 U/L (ref 0–33)
ALT SERPL-CCNC: 113 U/L (ref 0–33)
ALT SERPL-CCNC: 130 U/L (ref 0–33)
ALT SERPL-CCNC: 26 U/L (ref 0–33)
ALT SERPL-CCNC: 27 U/L (ref 0–33)
ALT SERPL-CCNC: 27 U/L (ref 0–33)
ALT SERPL-CCNC: 32 U/L (ref 0–33)
ALT SERPL-CCNC: 32 U/L (ref 0–33)
ALT SERPL-CCNC: 33 U/L (ref 0–33)
ALT SERPL-CCNC: 34 U/L (ref 0–33)
ALT SERPL-CCNC: 35 U/L (ref 0–33)
ALT SERPL-CCNC: 37 U/L (ref 0–33)
ALT SERPL-CCNC: 38 U/L (ref 0–33)
ALT SERPL-CCNC: 42 U/L (ref 0–33)
ALT SERPL-CCNC: 49 U/L (ref 0–33)
AMPHETAMINE SCREEN, URINE: NORMAL
ANION GAP SERPL CALCULATED.3IONS-SCNC: 10 MEQ/L (ref 9–15)
ANION GAP SERPL CALCULATED.3IONS-SCNC: 10 MEQ/L (ref 9–15)
ANION GAP SERPL CALCULATED.3IONS-SCNC: 11 MEQ/L (ref 9–15)
ANION GAP SERPL CALCULATED.3IONS-SCNC: 11 MEQ/L (ref 9–15)
ANION GAP SERPL CALCULATED.3IONS-SCNC: 12 MEQ/L (ref 9–15)
ANION GAP SERPL CALCULATED.3IONS-SCNC: 13 MEQ/L (ref 9–15)
ANION GAP SERPL CALCULATED.3IONS-SCNC: 14 MEQ/L (ref 9–15)
ANION GAP SERPL CALCULATED.3IONS-SCNC: 15 MEQ/L (ref 9–15)
ANION GAP SERPL CALCULATED.3IONS-SCNC: 16 MEQ/L (ref 9–15)
ANION GAP SERPL CALCULATED.3IONS-SCNC: 16 MEQ/L (ref 9–15)
ANION GAP SERPL CALCULATED.3IONS-SCNC: 17 MEQ/L (ref 9–15)
ANION GAP SERPL CALCULATED.3IONS-SCNC: 17 MEQ/L (ref 9–15)
ANION GAP SERPL CALCULATED.3IONS-SCNC: 18 MEQ/L (ref 9–15)
ANION GAP SERPL CALCULATED.3IONS-SCNC: 19 MEQ/L (ref 9–15)
ANISOCYTOSIS: ABNORMAL
ANISOCYTOSIS: ABNORMAL
AST SERPL-CCNC: 124 U/L (ref 0–35)
AST SERPL-CCNC: 189 U/L (ref 0–35)
AST SERPL-CCNC: 191 U/L (ref 0–35)
AST SERPL-CCNC: 21 U/L (ref 0–35)
AST SERPL-CCNC: 22 U/L (ref 0–35)
AST SERPL-CCNC: 23 U/L (ref 0–35)
AST SERPL-CCNC: 23 U/L (ref 0–35)
AST SERPL-CCNC: 26 U/L (ref 0–35)
AST SERPL-CCNC: 29 U/L (ref 0–35)
AST SERPL-CCNC: 30 U/L (ref 0–35)
AST SERPL-CCNC: 34 U/L (ref 0–35)
AST SERPL-CCNC: 38 U/L (ref 0–35)
AST SERPL-CCNC: 66 U/L (ref 0–35)
AST SERPL-CCNC: 69 U/L (ref 0–35)
AST SERPL-CCNC: 77 U/L (ref 0–35)
BACTERIA: ABNORMAL /HPF
BACTERIA: NEGATIVE /HPF
BANDED NEUTROPHILS RELATIVE PERCENT: 49 % (ref 5–11)
BARBITURATE SCREEN URINE: NORMAL
BASE EXCESS ARTERIAL: -5 (ref -3–3)
BASE EXCESS ARTERIAL: -6 (ref -3–3)
BASE EXCESS ARTERIAL: 0 (ref -3–3)
BASE EXCESS ARTERIAL: 0 (ref -3–3)
BASE EXCESS ARTERIAL: 1 (ref -3–3)
BASE EXCESS ARTERIAL: 2 (ref -3–3)
BASE EXCESS ARTERIAL: 2 (ref -3–3)
BASE EXCESS ARTERIAL: 3 (ref -3–3)
BASE EXCESS ARTERIAL: 3 (ref -3–3)
BASOPHILS ABSOLUTE: 0 K/UL (ref 0–0.1)
BASOPHILS ABSOLUTE: 0 K/UL (ref 0–0.1)
BASOPHILS ABSOLUTE: 0 K/UL (ref 0–0.2)
BASOPHILS ABSOLUTE: 0.1 K/UL (ref 0–0.1)
BASOPHILS ABSOLUTE: 0.1 K/UL (ref 0–0.2)
BASOPHILS RELATIVE PERCENT: 0 % (ref 0.1–1.2)
BASOPHILS RELATIVE PERCENT: 0.1 %
BASOPHILS RELATIVE PERCENT: 0.1 %
BASOPHILS RELATIVE PERCENT: 0.2 %
BASOPHILS RELATIVE PERCENT: 0.3 %
BASOPHILS RELATIVE PERCENT: 0.4 % (ref 0.1–1.2)
BASOPHILS RELATIVE PERCENT: 0.8 %
BASOPHILS RELATIVE PERCENT: 0.9 %
BASOPHILS RELATIVE PERCENT: 1 % (ref 0.1–1.2)
BASOPHILS RELATIVE PERCENT: 1 % (ref 0.1–1.2)
BASOPHILS RELATIVE PERCENT: 1.1 % (ref 0.1–1.2)
BASOPHILS RELATIVE PERCENT: 1.1 % (ref 0.1–1.2)
BENZODIAZEPINE SCREEN, URINE: NORMAL
BETA GLOBULIN: 0.66 G/DL (ref 0.48–1.1)
BETA GLOBULIN: 0.72 G/DL (ref 0.48–1.1)
BILIRUB SERPL-MCNC: 0.3 MG/DL (ref 0.2–0.7)
BILIRUB SERPL-MCNC: 0.4 MG/DL (ref 0.2–0.7)
BILIRUB SERPL-MCNC: 0.5 MG/DL (ref 0.2–0.7)
BILIRUB SERPL-MCNC: 0.5 MG/DL (ref 0.2–0.7)
BILIRUB SERPL-MCNC: 0.6 MG/DL (ref 0.2–0.7)
BILIRUB SERPL-MCNC: 0.6 MG/DL (ref 0.2–0.7)
BILIRUB SERPL-MCNC: <0.2 MG/DL (ref 0.2–0.7)
BILIRUBIN URINE: NEGATIVE
BILIRUBIN, POC: ABNORMAL
BLOOD CULTURE, ROUTINE: NORMAL
BLOOD URINE, POC: ABNORMAL
BLOOD, URINE: NEGATIVE
BLOOD, URINE: NORMAL
BUN BLDV-MCNC: 10 MG/DL (ref 8–23)
BUN BLDV-MCNC: 15 MG/DL (ref 8–23)
BUN BLDV-MCNC: 18 MG/DL (ref 8–23)
BUN BLDV-MCNC: 20 MG/DL (ref 8–23)
BUN BLDV-MCNC: 20 MG/DL (ref 8–23)
BUN BLDV-MCNC: 21 MG/DL (ref 8–23)
BUN BLDV-MCNC: 22 MG/DL (ref 8–23)
BUN BLDV-MCNC: 24 MG/DL (ref 8–23)
BUN BLDV-MCNC: 25 MG/DL (ref 8–23)
BUN BLDV-MCNC: 25 MG/DL (ref 8–23)
BUN BLDV-MCNC: 30 MG/DL (ref 8–23)
BUN BLDV-MCNC: 31 MG/DL (ref 8–23)
BUN BLDV-MCNC: 32 MG/DL (ref 8–23)
BUN BLDV-MCNC: 32 MG/DL (ref 8–23)
BUN BLDV-MCNC: 34 MG/DL (ref 8–23)
BUN BLDV-MCNC: 35 MG/DL (ref 8–23)
BUN BLDV-MCNC: 36 MG/DL (ref 8–23)
BUN BLDV-MCNC: 47 MG/DL (ref 8–23)
BUN BLDV-MCNC: 48 MG/DL (ref 8–23)
BUN BLDV-MCNC: 50 MG/DL (ref 8–23)
CALCIUM IONIZED: 0.93 MMOL/L (ref 1.12–1.32)
CALCIUM IONIZED: 0.94 MMOL/L (ref 1.12–1.32)
CALCIUM IONIZED: 1.01 MMOL/L (ref 1.12–1.32)
CALCIUM IONIZED: 1.03 MMOL/L (ref 1.12–1.32)
CALCIUM IONIZED: 1.03 MMOL/L (ref 1.12–1.32)
CALCIUM IONIZED: 1.04 MMOL/L (ref 1.12–1.32)
CALCIUM IONIZED: 1.06 MMOL/L (ref 1.12–1.32)
CALCIUM IONIZED: 1.14 MMOL/L (ref 1.12–1.32)
CALCIUM IONIZED: 1.17 MMOL/L (ref 1.12–1.32)
CALCIUM SERPL-MCNC: 10.2 MG/DL (ref 8.5–9.9)
CALCIUM SERPL-MCNC: 10.2 MG/DL (ref 8.5–9.9)
CALCIUM SERPL-MCNC: 11.4 MG/DL (ref 8.5–9.9)
CALCIUM SERPL-MCNC: 12 MG/DL (ref 8.5–9.9)
CALCIUM SERPL-MCNC: 12.7 MG/DL (ref 8.5–9.9)
CALCIUM SERPL-MCNC: 14 MG/DL (ref 8.5–9.9)
CALCIUM SERPL-MCNC: 6.1 MG/DL (ref 8.5–9.9)
CALCIUM SERPL-MCNC: 6.4 MG/DL (ref 8.5–9.9)
CALCIUM SERPL-MCNC: 6.8 MG/DL (ref 8.5–9.9)
CALCIUM SERPL-MCNC: 8 MG/DL (ref 8.5–9.9)
CALCIUM SERPL-MCNC: 8.3 MG/DL (ref 8.5–9.9)
CALCIUM SERPL-MCNC: 8.6 MG/DL (ref 8.5–9.9)
CALCIUM SERPL-MCNC: 8.7 MG/DL (ref 8.5–9.9)
CALCIUM SERPL-MCNC: 8.7 MG/DL (ref 8.5–9.9)
CALCIUM SERPL-MCNC: 8.8 MG/DL (ref 8.5–9.9)
CALCIUM SERPL-MCNC: 8.8 MG/DL (ref 8.5–9.9)
CALCIUM SERPL-MCNC: 9 MG/DL (ref 8.5–9.9)
CALCIUM SERPL-MCNC: 9 MG/DL (ref 8.5–9.9)
CALCIUM SERPL-MCNC: 9.1 MG/DL (ref 8.5–9.9)
CALCIUM SERPL-MCNC: 9.1 MG/DL (ref 8.5–9.9)
CALCIUM SERPL-MCNC: 9.2 MG/DL (ref 8.5–9.9)
CALCIUM SERPL-MCNC: 9.5 MG/DL (ref 8.5–9.9)
CALCIUM SERPL-MCNC: 9.5 MG/DL (ref 8.5–9.9)
CALCIUM SERPL-MCNC: 9.9 MG/DL (ref 8.5–9.9)
CALCIUM URINE: 11.4 MG/DL
CANNABINOID SCREEN URINE: NORMAL
CHLORIDE BLD-SCNC: 100 MEQ/L (ref 95–107)
CHLORIDE BLD-SCNC: 101 MEQ/L (ref 95–107)
CHLORIDE BLD-SCNC: 101 MEQ/L (ref 95–107)
CHLORIDE BLD-SCNC: 102 MEQ/L (ref 95–107)
CHLORIDE BLD-SCNC: 103 MEQ/L (ref 95–107)
CHLORIDE BLD-SCNC: 103 MEQ/L (ref 95–107)
CHLORIDE BLD-SCNC: 104 MEQ/L (ref 95–107)
CHLORIDE BLD-SCNC: 106 MEQ/L (ref 95–107)
CHLORIDE BLD-SCNC: 107 MEQ/L (ref 95–107)
CHLORIDE BLD-SCNC: 107 MEQ/L (ref 95–107)
CHLORIDE BLD-SCNC: 111 MEQ/L (ref 95–107)
CHLORIDE BLD-SCNC: 89 MEQ/L (ref 95–107)
CHLORIDE BLD-SCNC: 92 MEQ/L (ref 95–107)
CHLORIDE BLD-SCNC: 94 MEQ/L (ref 95–107)
CHLORIDE BLD-SCNC: 95 MEQ/L (ref 95–107)
CHLORIDE BLD-SCNC: 96 MEQ/L (ref 95–107)
CHLORIDE BLD-SCNC: 98 MEQ/L (ref 95–107)
CHOLESTEROL, TOTAL: 185 MG/DL (ref 0–199)
CHOLESTEROL, TOTAL: 186 MG/DL (ref 0–199)
CHOLESTEROL, TOTAL: 189 MG/DL (ref 0–199)
CHOLESTEROL, TOTAL: 198 MG/DL (ref 0–199)
CHOLESTEROL, TOTAL: 208 MG/DL (ref 0–199)
CHP ED QC CHECK: NORMAL
CHP ED QC CHECK: NORMAL
CLARITY, POC: CLEAR
CLARITY: ABNORMAL
CLARITY: CLEAR
CLARITY: NORMAL
CO2: 16 MEQ/L (ref 20–31)
CO2: 19 MEQ/L (ref 20–31)
CO2: 20 MEQ/L (ref 20–31)
CO2: 21 MEQ/L (ref 20–31)
CO2: 21 MEQ/L (ref 20–31)
CO2: 22 MEQ/L (ref 20–31)
CO2: 22 MEQ/L (ref 20–31)
CO2: 23 MEQ/L (ref 20–31)
CO2: 24 MEQ/L (ref 20–31)
CO2: 24 MEQ/L (ref 20–31)
CO2: 25 MEQ/L (ref 20–31)
CO2: 25 MEQ/L (ref 20–31)
CO2: 26 MEQ/L (ref 20–31)
CO2: 26 MEQ/L (ref 20–31)
CO2: 27 MEQ/L (ref 20–31)
CO2: 28 MEQ/L (ref 20–31)
CO2: 28 MEQ/L (ref 20–31)
CO2: 29 MEQ/L (ref 20–31)
COCAINE METABOLITE SCREEN URINE: NORMAL
COLOR, POC: YELLOW
COLOR: NORMAL
COLOR: YELLOW
CREAT SERPL-MCNC: 0.88 MG/DL (ref 0.5–0.9)
CREAT SERPL-MCNC: 0.9 MG/DL (ref 0.5–0.9)
CREAT SERPL-MCNC: 1.03 MG/DL (ref 0.5–0.9)
CREAT SERPL-MCNC: 1.06 MG/DL (ref 0.5–0.9)
CREAT SERPL-MCNC: 1.12 MG/DL (ref 0.5–0.9)
CREAT SERPL-MCNC: 1.14 MG/DL (ref 0.5–0.9)
CREAT SERPL-MCNC: 1.16 MG/DL (ref 0.5–0.9)
CREAT SERPL-MCNC: 1.2 MG/DL (ref 0.5–0.9)
CREAT SERPL-MCNC: 1.21 MG/DL (ref 0.5–0.9)
CREAT SERPL-MCNC: 1.21 MG/DL (ref 0.5–0.9)
CREAT SERPL-MCNC: 1.26 MG/DL (ref 0.5–0.9)
CREAT SERPL-MCNC: 1.4 MG/DL (ref 0.5–0.9)
CREAT SERPL-MCNC: 1.5 MG/DL (ref 0.5–0.9)
CREAT SERPL-MCNC: 1.5 MG/DL (ref 0.5–0.9)
CREAT SERPL-MCNC: 1.61 MG/DL (ref 0.5–0.9)
CREAT SERPL-MCNC: 1.88 MG/DL (ref 0.5–0.9)
CREAT SERPL-MCNC: 2.01 MG/DL (ref 0.5–0.9)
CREAT SERPL-MCNC: 2.02 MG/DL (ref 0.5–0.9)
CREAT SERPL-MCNC: 2.18 MG/DL (ref 0.5–0.9)
CREAT SERPL-MCNC: 2.18 MG/DL (ref 0.5–0.9)
CREAT SERPL-MCNC: 2.33 MG/DL (ref 0.5–0.9)
CREAT SERPL-MCNC: 2.66 MG/DL (ref 0.5–0.9)
CREATININE URINE: 103.2 MG/DL
CREATININE URINE: 121.7 MG/DL
CREATININE URINE: 124.4 MG/DL
CREATININE URINE: 13.9 MG/DL
CREATININE URINE: 140.3 MG/DL
CREATININE URINE: 174 MG/DL
CREATININE URINE: 193.4 MG/DL
CREATININE URINE: 23.5 MG/DL
CREATININE URINE: 32.2 MG/DL
CRYSTALS, UA: ABNORMAL /HPF
CULTURE, BLOOD 2: NORMAL
D DIMER: 1.01 MG/L FEU (ref 0–0.5)
D DIMER: 1.91 MG/L FEU (ref 0–0.5)
D DIMER: 2.83 MG/L FEU (ref 0–0.5)
D DIMER: >20 MG/L FEU (ref 0–0.5)
EKG ATRIAL RATE: 89 BPM
EKG ATRIAL RATE: 92 BPM
EKG P AXIS: 0 DEGREES
EKG P AXIS: 39 DEGREES
EKG P-R INTERVAL: 120 MS
EKG P-R INTERVAL: 140 MS
EKG Q-T INTERVAL: 384 MS
EKG Q-T INTERVAL: 396 MS
EKG QRS DURATION: 118 MS
EKG QRS DURATION: 98 MS
EKG QTC CALCULATION (BAZETT): 467 MS
EKG QTC CALCULATION (BAZETT): 489 MS
EKG R AXIS: -29 DEGREES
EKG R AXIS: -31 DEGREES
EKG T AXIS: -9 DEGREES
EKG T AXIS: 45 DEGREES
EKG VENTRICULAR RATE: 89 BPM
EKG VENTRICULAR RATE: 92 BPM
EOSINOPHILS ABSOLUTE: 0 K/UL (ref 0–0.4)
EOSINOPHILS ABSOLUTE: 0 K/UL (ref 0–0.7)
EOSINOPHILS ABSOLUTE: 0.1 K/UL (ref 0–0.4)
EOSINOPHILS ABSOLUTE: 0.1 K/UL (ref 0–0.7)
EOSINOPHILS ABSOLUTE: 0.2 K/UL (ref 0–0.4)
EOSINOPHILS RELATIVE PERCENT: 0 %
EOSINOPHILS RELATIVE PERCENT: 0 % (ref 0.7–5.8)
EOSINOPHILS RELATIVE PERCENT: 0 % (ref 0.7–5.8)
EOSINOPHILS RELATIVE PERCENT: 0.1 %
EOSINOPHILS RELATIVE PERCENT: 0.5 %
EOSINOPHILS RELATIVE PERCENT: 0.6 % (ref 0.7–5.8)
EOSINOPHILS RELATIVE PERCENT: 0.7 % (ref 0.7–5.8)
EOSINOPHILS RELATIVE PERCENT: 0.8 %
EOSINOPHILS RELATIVE PERCENT: 0.8 % (ref 0.7–5.8)
EOSINOPHILS RELATIVE PERCENT: 1.3 %
EOSINOPHILS RELATIVE PERCENT: 1.4 %
EOSINOPHILS RELATIVE PERCENT: 1.8 %
EOSINOPHILS RELATIVE PERCENT: 2 %
EOSINOPHILS RELATIVE PERCENT: 2.4 % (ref 0.7–5.8)
EPITHELIAL CELLS, UA: >100 /HPF (ref 0–5)
EPITHELIAL CELLS, UA: ABNORMAL /HPF
EPITHELIAL CELLS, UA: ABNORMAL /HPF
EPITHELIAL CELLS, UA: ABNORMAL /HPF (ref 0–5)
EPITHELIAL CELLS, UA: NORMAL /HPF
ETHANOL PERCENT: NORMAL G/DL
ETHANOL: <10 MG/DL (ref 0–0.08)
GAMMA GLOBULIN: 0.41 G/DL (ref 0.62–1.51)
GAMMA GLOBULIN: 0.45 G/DL (ref 0.62–1.51)
GFR AFRICAN AMERICAN: 20
GFR AFRICAN AMERICAN: 22
GFR AFRICAN AMERICAN: 22
GFR AFRICAN AMERICAN: 23
GFR AFRICAN AMERICAN: 24
GFR AFRICAN AMERICAN: 25
GFR AFRICAN AMERICAN: 25
GFR AFRICAN AMERICAN: 25.7
GFR AFRICAN AMERICAN: 27.7
GFR AFRICAN AMERICAN: 27.7
GFR AFRICAN AMERICAN: 30.2
GFR AFRICAN AMERICAN: 30.4
GFR AFRICAN AMERICAN: 32.9
GFR AFRICAN AMERICAN: 39.3
GFR AFRICAN AMERICAN: 40
GFR AFRICAN AMERICAN: 42.7
GFR AFRICAN AMERICAN: 42.7
GFR AFRICAN AMERICAN: 46.2
GFR AFRICAN AMERICAN: 52.1
GFR AFRICAN AMERICAN: 52.2
GFR AFRICAN AMERICAN: 52.2
GFR AFRICAN AMERICAN: 54.7
GFR AFRICAN AMERICAN: 54.8
GFR AFRICAN AMERICAN: 55
GFR AFRICAN AMERICAN: 55.3
GFR AFRICAN AMERICAN: 57.5
GFR AFRICAN AMERICAN: 58.5
GFR AFRICAN AMERICAN: 59.7
GFR AFRICAN AMERICAN: >60
GFR NON-AFRICAN AMERICAN: 16
GFR NON-AFRICAN AMERICAN: 18
GFR NON-AFRICAN AMERICAN: 18.2
GFR NON-AFRICAN AMERICAN: 19
GFR NON-AFRICAN AMERICAN: 20
GFR NON-AFRICAN AMERICAN: 21.2
GFR NON-AFRICAN AMERICAN: 22.9
GFR NON-AFRICAN AMERICAN: 22.9
GFR NON-AFRICAN AMERICAN: 25
GFR NON-AFRICAN AMERICAN: 25.2
GFR NON-AFRICAN AMERICAN: 27.2
GFR NON-AFRICAN AMERICAN: 32.5
GFR NON-AFRICAN AMERICAN: 33
GFR NON-AFRICAN AMERICAN: 35.3
GFR NON-AFRICAN AMERICAN: 35.3
GFR NON-AFRICAN AMERICAN: 38.2
GFR NON-AFRICAN AMERICAN: 43.1
GFR NON-AFRICAN AMERICAN: 43.1
GFR NON-AFRICAN AMERICAN: 43.2
GFR NON-AFRICAN AMERICAN: 45.2
GFR NON-AFRICAN AMERICAN: 45.3
GFR NON-AFRICAN AMERICAN: 45.7
GFR NON-AFRICAN AMERICAN: 46
GFR NON-AFRICAN AMERICAN: 47.5
GFR NON-AFRICAN AMERICAN: 48.3
GFR NON-AFRICAN AMERICAN: 49.3
GFR NON-AFRICAN AMERICAN: 52.6
GFR NON-AFRICAN AMERICAN: 54.4
GFR NON-AFRICAN AMERICAN: 56
GFR NON-AFRICAN AMERICAN: >60
GFR NON-AFRICAN AMERICAN: >60
GLOBULIN: 2 G/DL (ref 2.3–3.5)
GLOBULIN: 2.1 G/DL (ref 2.3–3.5)
GLOBULIN: 2.2 G/DL (ref 2.3–3.5)
GLOBULIN: 2.3 G/DL (ref 2.3–3.5)
GLOBULIN: 2.4 G/DL (ref 2.3–3.5)
GLOBULIN: 2.5 G/DL (ref 2.3–3.5)
GLOBULIN: 2.5 G/DL (ref 2.3–3.5)
GLOBULIN: 2.6 G/DL (ref 2.3–3.5)
GLOBULIN: 2.7 G/DL (ref 2.3–3.5)
GLOBULIN: 2.7 G/DL (ref 2.3–3.5)
GLOBULIN: 2.8 G/DL (ref 2.3–3.5)
GLOBULIN: 2.9 G/DL (ref 2.3–3.5)
GLOBULIN: 3 G/DL (ref 2.3–3.5)
GLOBULIN: 3.4 G/DL (ref 2.3–3.5)
GLOBULIN: 3.5 G/DL (ref 2.3–3.5)
GLUCOSE BLD-MCNC: 100 MG/DL (ref 70–99)
GLUCOSE BLD-MCNC: 110 MG/DL (ref 60–115)
GLUCOSE BLD-MCNC: 111 MG/DL (ref 70–99)
GLUCOSE BLD-MCNC: 112 MG/DL (ref 60–115)
GLUCOSE BLD-MCNC: 114 MG/DL (ref 60–115)
GLUCOSE BLD-MCNC: 115 MG/DL (ref 60–115)
GLUCOSE BLD-MCNC: 116 MG/DL (ref 70–99)
GLUCOSE BLD-MCNC: 118 MG/DL (ref 60–115)
GLUCOSE BLD-MCNC: 119 MG/DL (ref 60–115)
GLUCOSE BLD-MCNC: 123 MG/DL (ref 60–115)
GLUCOSE BLD-MCNC: 124 MG/DL (ref 70–99)
GLUCOSE BLD-MCNC: 125 MG/DL
GLUCOSE BLD-MCNC: 129 MG/DL (ref 60–115)
GLUCOSE BLD-MCNC: 129 MG/DL (ref 60–115)
GLUCOSE BLD-MCNC: 130 MG/DL (ref 70–99)
GLUCOSE BLD-MCNC: 141 MG/DL (ref 60–115)
GLUCOSE BLD-MCNC: 141 MG/DL (ref 70–99)
GLUCOSE BLD-MCNC: 141 MG/DL (ref 70–99)
GLUCOSE BLD-MCNC: 143 MG/DL (ref 70–99)
GLUCOSE BLD-MCNC: 145 MG/DL (ref 70–99)
GLUCOSE BLD-MCNC: 146 MG/DL (ref 70–99)
GLUCOSE BLD-MCNC: 153 MG/DL (ref 70–99)
GLUCOSE BLD-MCNC: 154 MG/DL (ref 60–115)
GLUCOSE BLD-MCNC: 162 MG/DL (ref 70–99)
GLUCOSE BLD-MCNC: 170 MG/DL (ref 70–99)
GLUCOSE BLD-MCNC: 173 MG/DL (ref 60–115)
GLUCOSE BLD-MCNC: 175 MG/DL (ref 60–115)
GLUCOSE BLD-MCNC: 183 MG/DL (ref 60–115)
GLUCOSE BLD-MCNC: 187 MG/DL (ref 60–115)
GLUCOSE BLD-MCNC: 189 MG/DL (ref 60–115)
GLUCOSE BLD-MCNC: 197 MG/DL (ref 60–115)
GLUCOSE BLD-MCNC: 199 MG/DL (ref 60–115)
GLUCOSE BLD-MCNC: 212 MG/DL (ref 70–99)
GLUCOSE BLD-MCNC: 214 MG/DL (ref 60–115)
GLUCOSE BLD-MCNC: 215 MG/DL (ref 60–115)
GLUCOSE BLD-MCNC: 221 MG/DL (ref 60–115)
GLUCOSE BLD-MCNC: 221 MG/DL (ref 60–115)
GLUCOSE BLD-MCNC: 229 MG/DL (ref 60–115)
GLUCOSE BLD-MCNC: 235 MG/DL (ref 60–115)
GLUCOSE BLD-MCNC: 250 MG/DL (ref 60–115)
GLUCOSE BLD-MCNC: 257 MG/DL (ref 60–115)
GLUCOSE BLD-MCNC: 260 MG/DL (ref 60–115)
GLUCOSE BLD-MCNC: 261 MG/DL (ref 60–115)
GLUCOSE BLD-MCNC: 267 MG/DL (ref 70–99)
GLUCOSE BLD-MCNC: 271 MG/DL
GLUCOSE BLD-MCNC: 276 MG/DL (ref 60–115)
GLUCOSE BLD-MCNC: 278 MG/DL (ref 60–115)
GLUCOSE BLD-MCNC: 280 MG/DL (ref 60–115)
GLUCOSE BLD-MCNC: 280 MG/DL (ref 60–115)
GLUCOSE BLD-MCNC: 297 MG/DL (ref 70–99)
GLUCOSE BLD-MCNC: 300 MG/DL (ref 70–99)
GLUCOSE BLD-MCNC: 311 MG/DL (ref 60–115)
GLUCOSE BLD-MCNC: 313 MG/DL (ref 60–115)
GLUCOSE BLD-MCNC: 315 MG/DL (ref 60–115)
GLUCOSE BLD-MCNC: 316 MG/DL (ref 60–115)
GLUCOSE BLD-MCNC: 340 MG/DL (ref 60–115)
GLUCOSE BLD-MCNC: 344 MG/DL (ref 60–115)
GLUCOSE BLD-MCNC: 362 MG/DL (ref 60–115)
GLUCOSE BLD-MCNC: 363 MG/DL (ref 60–115)
GLUCOSE BLD-MCNC: 367 MG/DL (ref 60–115)
GLUCOSE BLD-MCNC: 385 MG/DL (ref 60–115)
GLUCOSE BLD-MCNC: 395 MG/DL (ref 60–115)
GLUCOSE BLD-MCNC: 400 MG/DL (ref 70–99)
GLUCOSE BLD-MCNC: 410 MG/DL (ref 60–115)
GLUCOSE BLD-MCNC: 519 MG/DL (ref 70–99)
GLUCOSE BLD-MCNC: 567 MG/DL (ref 60–115)
GLUCOSE BLD-MCNC: 57 MG/DL (ref 60–115)
GLUCOSE BLD-MCNC: 578 MG/DL (ref 60–115)
GLUCOSE BLD-MCNC: 58 MG/DL (ref 60–115)
GLUCOSE BLD-MCNC: 586 MG/DL (ref 60–115)
GLUCOSE BLD-MCNC: 592 MG/DL (ref 60–115)
GLUCOSE BLD-MCNC: 74 MG/DL (ref 70–99)
GLUCOSE BLD-MCNC: 75 MG/DL (ref 70–99)
GLUCOSE BLD-MCNC: 89 MG/DL (ref 70–99)
GLUCOSE BLD-MCNC: 90 MG/DL (ref 70–99)
GLUCOSE BLD-MCNC: 90 MG/DL (ref 70–99)
GLUCOSE BLD-MCNC: 97 MG/DL (ref 60–115)
GLUCOSE BLD-MCNC: 99 MG/DL (ref 60–115)
GLUCOSE URINE, POC: ABNORMAL
GLUCOSE URINE: 100 MG/DL
GLUCOSE URINE: 100 MG/DL
GLUCOSE URINE: NEGATIVE MG/DL
HAV IGM SER IA-ACNC: NORMAL
HBA1C MFR BLD: 7.1 % (ref 4.8–5.9)
HBA1C MFR BLD: 7.8 % (ref 4.8–5.9)
HBA1C MFR BLD: 7.9 % (ref 4.8–5.9)
HBA1C MFR BLD: 8 % (ref 4.8–5.9)
HBA1C MFR BLD: 8.4 % (ref 4.8–5.9)
HBA1C MFR BLD: 9.1 % (ref 4.8–5.9)
HBA1C MFR BLD: 9.4 %
HCO3 ARTERIAL: 19.4 MMOL/L (ref 21–29)
HCO3 ARTERIAL: 24.1 MMOL/L (ref 21–29)
HCO3 ARTERIAL: 24.2 MMOL/L (ref 21–29)
HCO3 ARTERIAL: 28.5 MMOL/L (ref 21–29)
HCO3 ARTERIAL: 28.7 MMOL/L (ref 21–29)
HCO3 ARTERIAL: 31.3 MMOL/L (ref 21–29)
HCO3 ARTERIAL: 31.6 MMOL/L (ref 21–29)
HCO3 ARTERIAL: 31.9 MMOL/L (ref 21–29)
HCO3 ARTERIAL: 33.5 MMOL/L (ref 21–29)
HCT VFR BLD CALC: 31.2 % (ref 37–47)
HCT VFR BLD CALC: 31.4 % (ref 37–47)
HCT VFR BLD CALC: 32.8 % (ref 37–47)
HCT VFR BLD CALC: 33.1 % (ref 37–47)
HCT VFR BLD CALC: 33.1 % (ref 37–47)
HCT VFR BLD CALC: 34.1 % (ref 37–47)
HCT VFR BLD CALC: 34.3 % (ref 37–47)
HCT VFR BLD CALC: 34.6 % (ref 37–47)
HCT VFR BLD CALC: 34.7 % (ref 37–47)
HCT VFR BLD CALC: 36.1 % (ref 37–47)
HCT VFR BLD CALC: 36.2 % (ref 37–47)
HCT VFR BLD CALC: 36.4 % (ref 37–47)
HCT VFR BLD CALC: 40 % (ref 37–47)
HCT VFR BLD CALC: 40.9 % (ref 37–47)
HCT VFR BLD CALC: 43.3 % (ref 37–47)
HDLC SERPL-MCNC: 62 MG/DL (ref 40–59)
HDLC SERPL-MCNC: 65 MG/DL (ref 40–59)
HDLC SERPL-MCNC: 71 MG/DL (ref 40–59)
HDLC SERPL-MCNC: 74 MG/DL (ref 40–59)
HDLC SERPL-MCNC: 75 MG/DL (ref 40–59)
HEMATOLOGY PATH CONSULT: YES
HEMOGLOBIN: 10.1 G/DL (ref 11.2–15.7)
HEMOGLOBIN: 10.3 G/DL (ref 11.2–15.7)
HEMOGLOBIN: 10.7 G/DL (ref 11.2–15.7)
HEMOGLOBIN: 10.7 G/DL (ref 12–16)
HEMOGLOBIN: 10.7 GM/DL (ref 12–16)
HEMOGLOBIN: 10.8 GM/DL (ref 12–16)
HEMOGLOBIN: 11.1 G/DL (ref 12–16)
HEMOGLOBIN: 11.3 G/DL (ref 12–16)
HEMOGLOBIN: 11.3 G/DL (ref 12–16)
HEMOGLOBIN: 11.4 G/DL (ref 11.2–15.7)
HEMOGLOBIN: 11.7 G/DL (ref 11.2–15.7)
HEMOGLOBIN: 11.7 G/DL (ref 12–16)
HEMOGLOBIN: 12.1 G/DL (ref 12–16)
HEMOGLOBIN: 12.7 G/DL (ref 12–16)
HEMOGLOBIN: 13.1 G/DL (ref 11.2–15.7)
HEMOGLOBIN: 13.1 GM/DL (ref 12–16)
HEMOGLOBIN: 13.5 GM/DL (ref 12–16)
HEMOGLOBIN: 14.1 G/DL (ref 12–16)
HEMOGLOBIN: 14.4 GM/DL (ref 12–16)
HEMOGLOBIN: 14.5 GM/DL (ref 12–16)
HEMOGLOBIN: 14.6 GM/DL (ref 12–16)
HEMOGLOBIN: 14.8 GM/DL (ref 12–16)
HEMOGLOBIN: 14.8 GM/DL (ref 12–16)
HEMOGLOBIN: 9.8 G/DL (ref 11.2–15.7)
HEPATITIS B CORE IGM ANTIBODY: NORMAL
HEPATITIS B SURFACE ANTIGEN INTERPRETATION: NORMAL
HEPATITIS C ANTIBODY INTERPRETATION: NORMAL
HEPATITIS INTERPRETATION:: NORMAL
HYALINE CASTS: ABNORMAL /HPF (ref 0–5)
HYPOCHROMIA: ABNORMAL
IGA: 107 MG/DL (ref 68–408)
IGG: 395 MG/DL (ref 768–1632)
IGM: 31 MG/DL (ref 35–263)
IMMATURE GRANULOCYTES #: 0 K/UL
IMMATURE GRANULOCYTES #: 0 K/UL
IMMATURE GRANULOCYTES #: 0.1 K/UL
IMMATURE GRANULOCYTES #: 0.2 K/UL
IMMATURE GRANULOCYTES %: 0.9 %
IMMATURE GRANULOCYTES %: 1.2 %
IMMATURE GRANULOCYTES %: 1.4 %
IMMATURE GRANULOCYTES %: 1.6 %
IMMATURE GRANULOCYTES %: 2.1 %
IMMATURE GRANULOCYTES %: 2.4 %
IMMUNOFIXATION REFLEX: ABNORMAL
INR BLD: 3.1
KAPPA FREE LIGHT CHAINS QNT: 30.64 MG/L (ref 3.3–19.4)
KAPPA/LAMBDA FREE LIGHT CHAIN RATIO: 1.77 (ref 0.26–1.65)
KETONES, POC: ABNORMAL
KETONES, URINE: NEGATIVE MG/DL
LACTATE: 0.93 MMOL/L (ref 0.4–2)
LACTATE: 0.98 MMOL/L (ref 0.4–2)
LACTATE: 1.06 MMOL/L (ref 0.4–2)
LACTATE: 1.34 MMOL/L (ref 0.4–2)
LACTATE: 1.35 MMOL/L (ref 0.4–2)
LACTATE: 1.56 MMOL/L (ref 0.4–2)
LACTATE: 2.08 MMOL/L (ref 0.4–2)
LACTATE: 4.18 MMOL/L (ref 0.4–2)
LACTATE: 5.01 MMOL/L (ref 0.4–2)
LACTIC ACID: 1.1 MMOL/L (ref 0.5–2.2)
LAMBDA FREE LIGHT CHAINS QNT: 17.27 MG/L (ref 5.71–26.3)
LDL CHOLESTEROL CALCULATED: 101 MG/DL (ref 0–129)
LDL CHOLESTEROL CALCULATED: 102 MG/DL (ref 0–129)
LDL CHOLESTEROL CALCULATED: 116 MG/DL (ref 0–129)
LDL CHOLESTEROL CALCULATED: 88 MG/DL (ref 0–129)
LDL CHOLESTEROL CALCULATED: 99 MG/DL (ref 0–129)
LEUKOCYTE EST, POC: ABNORMAL
LEUKOCYTE ESTERASE, URINE: ABNORMAL
LEUKOCYTE ESTERASE, URINE: NEGATIVE
LEUKOCYTE ESTERASE, URINE: NEGATIVE
LEUKOCYTE ESTERASE, URINE: NORMAL
LIPASE: 14 U/L (ref 12–95)
LYMPHOCYTES ABSOLUTE: 0.1 K/UL (ref 1–4.8)
LYMPHOCYTES ABSOLUTE: 0.6 K/UL (ref 1.2–3.7)
LYMPHOCYTES ABSOLUTE: 0.6 K/UL (ref 1.2–3.7)
LYMPHOCYTES ABSOLUTE: 0.6 K/UL (ref 1–4.8)
LYMPHOCYTES ABSOLUTE: 0.8 K/UL (ref 1–4.8)
LYMPHOCYTES ABSOLUTE: 1.2 K/UL (ref 1.2–3.7)
LYMPHOCYTES ABSOLUTE: 1.3 K/UL (ref 1–4.8)
LYMPHOCYTES ABSOLUTE: 1.5 K/UL (ref 1.2–3.7)
LYMPHOCYTES ABSOLUTE: 1.7 K/UL (ref 1.2–3.7)
LYMPHOCYTES ABSOLUTE: 1.7 K/UL (ref 1–4.8)
LYMPHOCYTES ABSOLUTE: 1.9 K/UL (ref 1.2–3.7)
LYMPHOCYTES ABSOLUTE: 2.3 K/UL (ref 1–4.8)
LYMPHOCYTES RELATIVE PERCENT: 12.6 %
LYMPHOCYTES RELATIVE PERCENT: 18.9 %
LYMPHOCYTES RELATIVE PERCENT: 20 %
LYMPHOCYTES RELATIVE PERCENT: 21.1 %
LYMPHOCYTES RELATIVE PERCENT: 22.5 %
LYMPHOCYTES RELATIVE PERCENT: 24.4 %
LYMPHOCYTES RELATIVE PERCENT: 24.7 %
LYMPHOCYTES RELATIVE PERCENT: 25 %
LYMPHOCYTES RELATIVE PERCENT: 25.5 %
LYMPHOCYTES RELATIVE PERCENT: 27.4 %
LYMPHOCYTES RELATIVE PERCENT: 30.4 %
LYMPHOCYTES RELATIVE PERCENT: 6 %
LYMPHOCYTES RELATIVE PERCENT: 6.7 %
LYMPHOCYTES RELATIVE PERCENT: 9.9 %
Lab: ABNORMAL
Lab: NORMAL
MAGNESIUM: 1.1 MG/DL (ref 1.7–2.4)
MAGNESIUM: 1.1 MG/DL (ref 1.7–2.4)
MAGNESIUM: 1.3 MG/DL (ref 1.7–2.4)
MAGNESIUM: 1.4 MG/DL (ref 1.7–2.4)
MAGNESIUM: 1.6 MG/DL (ref 1.7–2.4)
MAGNESIUM: 1.8 MG/DL (ref 1.7–2.4)
MCH RBC QN AUTO: 27.8 PG (ref 25.6–32.2)
MCH RBC QN AUTO: 28.2 PG (ref 25.6–32.2)
MCH RBC QN AUTO: 28.3 PG (ref 25.6–32.2)
MCH RBC QN AUTO: 28.5 PG (ref 25.6–32.2)
MCH RBC QN AUTO: 28.6 PG (ref 25.6–32.2)
MCH RBC QN AUTO: 28.6 PG (ref 27–31.3)
MCH RBC QN AUTO: 29.3 PG (ref 27–31.3)
MCH RBC QN AUTO: 29.5 PG (ref 25.6–32.2)
MCH RBC QN AUTO: 29.6 PG (ref 27–31.3)
MCH RBC QN AUTO: 29.7 PG (ref 27–31.3)
MCH RBC QN AUTO: 30.1 PG (ref 25.6–32.2)
MCH RBC QN AUTO: 30.1 PG (ref 27–31.3)
MCH RBC QN AUTO: 30.5 PG (ref 27–31.3)
MCH RBC QN AUTO: 30.6 PG (ref 27–31.3)
MCH RBC QN AUTO: 32.1 PG (ref 27–31.3)
MCHC RBC AUTO-ENTMCNC: 31.1 % (ref 32.2–35.5)
MCHC RBC AUTO-ENTMCNC: 31.4 % (ref 32.2–35.5)
MCHC RBC AUTO-ENTMCNC: 31.7 % (ref 33–37)
MCHC RBC AUTO-ENTMCNC: 32 % (ref 32.2–35.5)
MCHC RBC AUTO-ENTMCNC: 32.2 % (ref 32.2–35.5)
MCHC RBC AUTO-ENTMCNC: 32.2 % (ref 33–37)
MCHC RBC AUTO-ENTMCNC: 32.3 % (ref 33–37)
MCHC RBC AUTO-ENTMCNC: 32.4 % (ref 32.2–35.5)
MCHC RBC AUTO-ENTMCNC: 32.5 % (ref 33–37)
MCHC RBC AUTO-ENTMCNC: 32.6 % (ref 32.2–35.5)
MCHC RBC AUTO-ENTMCNC: 32.6 % (ref 33–37)
MCHC RBC AUTO-ENTMCNC: 32.6 % (ref 33–37)
MCHC RBC AUTO-ENTMCNC: 32.9 % (ref 32.2–35.5)
MCHC RBC AUTO-ENTMCNC: 32.9 % (ref 33–37)
MCHC RBC AUTO-ENTMCNC: 33.4 % (ref 33–37)
MCV RBC AUTO: 87.7 FL (ref 79.4–94.8)
MCV RBC AUTO: 88.1 FL (ref 79.4–94.8)
MCV RBC AUTO: 88.5 FL (ref 79.4–94.8)
MCV RBC AUTO: 88.9 FL (ref 82–100)
MCV RBC AUTO: 89.5 FL (ref 79.4–94.8)
MCV RBC AUTO: 89.8 FL (ref 82–100)
MCV RBC AUTO: 90.2 FL (ref 79.4–94.8)
MCV RBC AUTO: 90.9 FL (ref 79.4–94.8)
MCV RBC AUTO: 91.5 FL (ref 82–100)
MCV RBC AUTO: 91.6 FL (ref 79.4–94.8)
MCV RBC AUTO: 92.9 FL (ref 82–100)
MCV RBC AUTO: 93.4 FL (ref 82–100)
MCV RBC AUTO: 93.5 FL (ref 82–100)
MCV RBC AUTO: 93.7 FL (ref 82–100)
MCV RBC AUTO: 96.1 FL (ref 82–100)
METAMYELOCYTES RELATIVE PERCENT: 3 %
MICROALBUMIN UR-MCNC: 2.3 MG/DL
MICROALBUMIN UR-MCNC: 3.8 MG/DL
MICROALBUMIN UR-MCNC: <1.2 MG/DL
MICROALBUMIN/CREAT UR-RTO: 11.9 MG/G (ref 0–30)
MICROALBUMIN/CREAT UR-RTO: 36.8 MG/G (ref 0–30)
MICROALBUMIN/CREAT UR-RTO: NORMAL MG/G (ref 0–30)
MICROCYTES: ABNORMAL
MISCELLANEOUS LAB TEST ORDER: NORMAL
MONOCYTES ABSOLUTE: 0 K/UL (ref 0.2–0.8)
MONOCYTES ABSOLUTE: 0.2 K/UL (ref 0.2–0.9)
MONOCYTES ABSOLUTE: 0.2 K/UL (ref 0.2–0.9)
MONOCYTES ABSOLUTE: 0.3 K/UL (ref 0.2–0.8)
MONOCYTES ABSOLUTE: 0.3 K/UL (ref 0.2–0.8)
MONOCYTES ABSOLUTE: 0.4 K/UL (ref 0.2–0.8)
MONOCYTES ABSOLUTE: 0.4 K/UL (ref 0.2–0.8)
MONOCYTES ABSOLUTE: 0.5 K/UL (ref 0.2–0.8)
MONOCYTES ABSOLUTE: 0.5 K/UL (ref 0.2–0.9)
MONOCYTES ABSOLUTE: 0.6 K/UL (ref 0.2–0.8)
MONOCYTES ABSOLUTE: 0.6 K/UL (ref 0.2–0.9)
MONOCYTES ABSOLUTE: 0.6 K/UL (ref 0.2–0.9)
MONOCYTES ABSOLUTE: 0.7 K/UL (ref 0.2–0.8)
MONOCYTES ABSOLUTE: 0.7 K/UL (ref 0.2–0.9)
MONOCYTES RELATIVE PERCENT: 1 %
MONOCYTES RELATIVE PERCENT: 10.1 % (ref 4.7–12.5)
MONOCYTES RELATIVE PERCENT: 11.2 % (ref 4.7–12.5)
MONOCYTES RELATIVE PERCENT: 2.4 %
MONOCYTES RELATIVE PERCENT: 4.1 % (ref 4.7–12.5)
MONOCYTES RELATIVE PERCENT: 4.5 %
MONOCYTES RELATIVE PERCENT: 5 %
MONOCYTES RELATIVE PERCENT: 6 %
MONOCYTES RELATIVE PERCENT: 6.9 %
MONOCYTES RELATIVE PERCENT: 8.3 % (ref 4.7–12.5)
MONOCYTES RELATIVE PERCENT: 8.3 % (ref 4.7–12.5)
MONOCYTES RELATIVE PERCENT: 8.9 %
MONOCYTES RELATIVE PERCENT: 9.3 % (ref 4.7–12.5)
MONOCYTES RELATIVE PERCENT: 9.4 %
NEUTROPHILS ABSOLUTE: 1.6 K/UL (ref 1.6–6.1)
NEUTROPHILS ABSOLUTE: 10.1 K/UL (ref 1.4–6.5)
NEUTROPHILS ABSOLUTE: 2.1 K/UL (ref 1.4–6.5)
NEUTROPHILS ABSOLUTE: 3.3 K/UL (ref 1.6–6.1)
NEUTROPHILS ABSOLUTE: 3.8 K/UL (ref 1.6–6.1)
NEUTROPHILS ABSOLUTE: 3.9 K/UL (ref 1.6–6.1)
NEUTROPHILS ABSOLUTE: 3.9 K/UL (ref 1.6–6.1)
NEUTROPHILS ABSOLUTE: 4.1 K/UL (ref 1.6–6.1)
NEUTROPHILS ABSOLUTE: 4.5 K/UL (ref 1.4–6.5)
NEUTROPHILS ABSOLUTE: 4.6 K/UL (ref 1.4–6.5)
NEUTROPHILS ABSOLUTE: 4.7 K/UL (ref 1.4–6.5)
NEUTROPHILS ABSOLUTE: 5.4 K/UL (ref 1.4–6.5)
NEUTROPHILS ABSOLUTE: 5.5 K/UL (ref 1.4–6.5)
NEUTROPHILS ABSOLUTE: 6.9 K/UL (ref 1.4–6.5)
NEUTROPHILS RELATIVE PERCENT: 38 %
NEUTROPHILS RELATIVE PERCENT: 56.6 % (ref 34–71.1)
NEUTROPHILS RELATIVE PERCENT: 60.5 %
NEUTROPHILS RELATIVE PERCENT: 61.9 % (ref 34–71.1)
NEUTROPHILS RELATIVE PERCENT: 62.6 % (ref 34–71.1)
NEUTROPHILS RELATIVE PERCENT: 63.7 % (ref 34–71.1)
NEUTROPHILS RELATIVE PERCENT: 65.1 % (ref 34–71.1)
NEUTROPHILS RELATIVE PERCENT: 67.3 %
NEUTROPHILS RELATIVE PERCENT: 68.5 %
NEUTROPHILS RELATIVE PERCENT: 71 %
NEUTROPHILS RELATIVE PERCENT: 76.2 %
NEUTROPHILS RELATIVE PERCENT: 82.4 % (ref 34–71.1)
NEUTROPHILS RELATIVE PERCENT: 84 %
NEUTROPHILS RELATIVE PERCENT: 90.2 %
NITRITE, POC: ABNORMAL
NITRITE, URINE: NEGATIVE
NUCLEATED RED BLOOD CELLS: 2 /100 WBC
O2 SAT, ARTERIAL: 78 % (ref 93–100)
O2 SAT, ARTERIAL: 89 % (ref 93–100)
O2 SAT, ARTERIAL: 92 % (ref 93–100)
O2 SAT, ARTERIAL: 93 % (ref 93–100)
O2 SAT, ARTERIAL: 94 % (ref 93–100)
O2 SAT, ARTERIAL: 95 % (ref 93–100)
O2 SAT, ARTERIAL: 97 % (ref 93–100)
O2 SAT, ARTERIAL: 97 % (ref 93–100)
O2 SAT, ARTERIAL: 99 % (ref 93–100)
OPIATE SCREEN URINE: NORMAL
ORGANISM: ABNORMAL
ORGANISM: ABNORMAL
OVALOCYTES: ABNORMAL
PARATHYROID HORMONE INTACT: 13.8 PG/ML (ref 15–65)
PARATHYROID HORMONE INTACT: 45.8 PG/ML (ref 15–65)
PARATHYROID HORMONE INTACT: 64 PG/ML (ref 15–65)
PCO2 ARTERIAL: 141 MM HG (ref 35–45)
PCO2 ARTERIAL: 148 MM HG (ref 35–45)
PCO2 ARTERIAL: 30 MM HG (ref 35–45)
PCO2 ARTERIAL: 36 MM HG (ref 35–45)
PCO2 ARTERIAL: 55 MM HG (ref 35–45)
PCO2 ARTERIAL: 69 MM HG (ref 35–45)
PCO2 ARTERIAL: 85 MM HG (ref 35–45)
PCO2 ARTERIAL: 88 MM HG (ref 35–45)
PCO2 ARTERIAL: 98 MM HG (ref 35–45)
PDW BLD-RTO: 13.9 % (ref 11.7–14.4)
PDW BLD-RTO: 14 % (ref 11.7–14.4)
PDW BLD-RTO: 14.2 % (ref 11.7–14.4)
PDW BLD-RTO: 14.6 % (ref 11.7–14.4)
PDW BLD-RTO: 14.6 % (ref 11.7–14.4)
PDW BLD-RTO: 14.8 % (ref 11.5–14.5)
PDW BLD-RTO: 14.9 % (ref 11.5–14.5)
PDW BLD-RTO: 15.7 % (ref 11.5–14.5)
PDW BLD-RTO: 15.8 % (ref 11.5–14.5)
PDW BLD-RTO: 15.8 % (ref 11.5–14.5)
PDW BLD-RTO: 16.2 % (ref 11.5–14.5)
PDW BLD-RTO: 16.3 % (ref 11.5–14.5)
PDW BLD-RTO: 17.8 % (ref 11.5–14.5)
PERFORMED ON: ABNORMAL
PERFORMED ON: NORMAL
PERFORMING INSTRUMENT: ABNORMAL
PH ARTERIAL: 6.96 (ref 7.35–7.45)
PH ARTERIAL: 6.96 (ref 7.35–7.45)
PH ARTERIAL: 7.06 (ref 7.35–7.45)
PH ARTERIAL: 7.12 (ref 7.35–7.45)
PH ARTERIAL: 7.16 (ref 7.35–7.45)
PH ARTERIAL: 7.22 (ref 7.35–7.45)
PH ARTERIAL: 7.32 (ref 7.35–7.45)
PH ARTERIAL: 7.42 (ref 7.35–7.45)
PH ARTERIAL: 7.43 (ref 7.35–7.45)
PH UA: 5 (ref 5–9)
PH UA: 5.5 (ref 5–9)
PH UA: 5.5 (ref 5–9)
PH UA: 6 (ref 5–9)
PH UA: 6.5 (ref 5–9)
PH UA: 6.5 (ref 5–9)
PH UA: 7 (ref 5–9)
PH, POC: 6
PHENCYCLIDINE SCREEN URINE: NORMAL
PHOSPHORUS: 1.8 MG/DL (ref 2.3–4.8)
PHOSPHORUS: 2.2 MG/DL (ref 2.3–4.8)
PHOSPHORUS: 2.6 MG/DL (ref 2.3–4.8)
PHOSPHORUS: 2.8 MG/DL (ref 2.3–4.8)
PHOSPHORUS: 3.2 MG/DL (ref 2.3–4.8)
PHOSPHORUS: 3.4 MG/DL (ref 2.3–4.8)
PHOSPHORUS: 3.8 MG/DL (ref 2.3–4.8)
PLATELET # BLD: 121 K/UL (ref 130–400)
PLATELET # BLD: 173 K/UL (ref 182–369)
PLATELET # BLD: 182 K/UL (ref 130–400)
PLATELET # BLD: 193 K/UL (ref 182–369)
PLATELET # BLD: 211 K/UL (ref 130–400)
PLATELET # BLD: 225 K/UL (ref 182–369)
PLATELET # BLD: 238 K/UL (ref 182–369)
PLATELET # BLD: 243 K/UL (ref 182–369)
PLATELET # BLD: 245 K/UL (ref 130–400)
PLATELET # BLD: 250 K/UL (ref 130–400)
PLATELET # BLD: 259 K/UL (ref 130–400)
PLATELET # BLD: 268 K/UL (ref 182–369)
PLATELET # BLD: 280 K/UL (ref 182–369)
PLATELET # BLD: 298 K/UL (ref 130–400)
PLATELET # BLD: 312 K/UL (ref 130–400)
PLATELET SLIDE REVIEW: ABNORMAL
PLATELET SLIDE REVIEW: ADEQUATE
PLATELET SLIDE REVIEW: ADEQUATE
PLATELET SLIDE REVIEW: NORMAL
PO2 ARTERIAL: 108 MM HG (ref 75–108)
PO2 ARTERIAL: 148 MM HG (ref 75–108)
PO2 ARTERIAL: 148 MM HG (ref 75–108)
PO2 ARTERIAL: 153 MM HG (ref 75–108)
PO2 ARTERIAL: 41 MM HG (ref 75–108)
PO2 ARTERIAL: 62 MM HG (ref 75–108)
PO2 ARTERIAL: 69 MM HG (ref 75–108)
PO2 ARTERIAL: 85 MM HG (ref 75–108)
PO2 ARTERIAL: 99 MM HG (ref 75–108)
POC CHLORIDE: 100 MEQ/L (ref 99–110)
POC CHLORIDE: 101 MEQ/L (ref 99–110)
POC CHLORIDE: 101 MEQ/L (ref 99–110)
POC CHLORIDE: 109 MEQ/L (ref 99–110)
POC CHLORIDE: 110 MEQ/L (ref 99–110)
POC CHLORIDE: 113 MEQ/L (ref 99–110)
POC CHLORIDE: 94 MEQ/L (ref 99–110)
POC CHLORIDE: 94 MEQ/L (ref 99–110)
POC CHLORIDE: 95 MEQ/L (ref 99–110)
POC CREATININE: 1 MG/DL (ref 0.6–1.2)
POC CREATININE: 1.2 MG/DL (ref 0.6–1.2)
POC CREATININE: 1.6 MG/DL (ref 0.6–1.2)
POC CREATININE: 2.4 MG/DL (ref 0.6–1.2)
POC CREATININE: 2.4 MG/DL (ref 0.6–1.2)
POC CREATININE: 2.5 MG/DL (ref 0.6–1.2)
POC CREATININE: 2.6 MG/DL (ref 0.6–1.2)
POC CREATININE: 2.7 MG/DL (ref 0.6–1.2)
POC CREATININE: 2.9 MG/DL (ref 0.6–1.2)
POC FIO2: 100
POC FIO2: 100
POC FIO2: 70
POC FIO2: 70
POC FIO2: 90
POC HEMATOCRIT: 31 % (ref 36–48)
POC HEMATOCRIT: 32 % (ref 36–48)
POC HEMATOCRIT: 39 % (ref 36–48)
POC HEMATOCRIT: 40 % (ref 36–48)
POC HEMATOCRIT: 42 % (ref 36–48)
POC HEMATOCRIT: 43 % (ref 36–48)
POC HEMATOCRIT: 43 % (ref 36–48)
POC HEMATOCRIT: 44 % (ref 36–48)
POC HEMATOCRIT: 44 % (ref 36–48)
POC POTASSIUM: 2.8 MEQ/L (ref 3.5–5.1)
POC POTASSIUM: 3 MEQ/L (ref 3.5–5.1)
POC POTASSIUM: 4.7 MEQ/L (ref 3.5–5.1)
POC POTASSIUM: 5.1 MEQ/L (ref 3.5–5.1)
POC POTASSIUM: 5.2 MEQ/L (ref 3.5–5.1)
POC POTASSIUM: 5.5 MEQ/L (ref 3.5–5.1)
POC POTASSIUM: 5.6 MEQ/L (ref 3.5–5.1)
POC POTASSIUM: 6.3 MEQ/L (ref 3.5–5.1)
POC POTASSIUM: 6.3 MEQ/L (ref 3.5–5.1)
POC SAMPLE TYPE: ABNORMAL
POC SODIUM: 135 MEQ/L (ref 136–145)
POC SODIUM: 138 MEQ/L (ref 136–145)
POC SODIUM: 139 MEQ/L (ref 136–145)
POC SODIUM: 140 MEQ/L (ref 136–145)
POC SODIUM: 142 MEQ/L (ref 136–145)
POC SODIUM: 142 MEQ/L (ref 136–145)
POC SODIUM: 144 MEQ/L (ref 136–145)
POIKILOCYTES: ABNORMAL
POTASSIUM REFLEX MAGNESIUM: 3.1 MEQ/L (ref 3.4–4.9)
POTASSIUM REFLEX MAGNESIUM: 3.6 MEQ/L (ref 3.4–4.9)
POTASSIUM REFLEX MAGNESIUM: 4.2 MEQ/L (ref 3.4–4.9)
POTASSIUM REFLEX MAGNESIUM: 5.2 MEQ/L (ref 3.4–4.9)
POTASSIUM REFLEX MAGNESIUM: 5.5 MEQ/L (ref 3.4–4.9)
POTASSIUM REFLEX MAGNESIUM: 5.8 MEQ/L (ref 3.4–4.9)
POTASSIUM SERPL-SCNC: 3 MEQ/L (ref 3.4–4.9)
POTASSIUM SERPL-SCNC: 3.1 MEQ/L (ref 3.4–4.9)
POTASSIUM SERPL-SCNC: 3.5 MEQ/L (ref 3.4–4.9)
POTASSIUM SERPL-SCNC: 3.8 MEQ/L (ref 3.4–4.9)
POTASSIUM SERPL-SCNC: 4 MEQ/L (ref 3.4–4.9)
POTASSIUM SERPL-SCNC: 4 MEQ/L (ref 3.4–4.9)
POTASSIUM SERPL-SCNC: 4.2 MEQ/L (ref 3.4–4.9)
POTASSIUM SERPL-SCNC: 4.3 MEQ/L (ref 3.4–4.9)
POTASSIUM SERPL-SCNC: 4.4 MEQ/L (ref 3.4–4.9)
POTASSIUM SERPL-SCNC: 4.6 MEQ/L (ref 3.4–4.9)
POTASSIUM SERPL-SCNC: 4.7 MEQ/L (ref 3.4–4.9)
POTASSIUM SERPL-SCNC: 5.5 MEQ/L (ref 3.4–4.9)
PROCALCITONIN: 0.41 NG/ML (ref 0–0.15)
PROCALCITONIN: 3.63 NG/ML (ref 0–0.15)
PROMYELOCYTES PERCENT: 2 %
PROTEIN ELECTROPHORESIS, SERUM: ABNORMAL
PROTEIN PROTEIN: 11 MG/DL
PROTEIN PROTEIN: 16 MG/DL
PROTEIN UA: ABNORMAL MG/DL
PROTEIN UA: ABNORMAL MG/DL
PROTEIN UA: NEGATIVE MG/DL
PROTEIN, POC: ABNORMAL
PROTEIN/CREAT RATIO: 0.1 ML/ML
PROTEIN/CREAT RATIO: 0.1 ML/ML
PROTEIN/CREAT RATIO: 0.1 ML/ML (ref 0–0.2)
PROTEIN/CREAT RATIO: 0.1 ML/ML (ref 0–0.2)
PROTHROMBIN TIME: 30.8 SEC (ref 12.3–14.9)
QC PASS/FAIL: ABNORMAL
RBC # BLD: 3.46 M/UL (ref 3.93–5.22)
RBC # BLD: 3.54 M/UL (ref 4.2–5.4)
RBC # BLD: 3.55 M/UL (ref 3.93–5.22)
RBC # BLD: 3.69 M/UL (ref 4.2–5.4)
RBC # BLD: 3.7 M/UL (ref 3.93–5.22)
RBC # BLD: 3.7 M/UL (ref 4.2–5.4)
RBC # BLD: 3.74 M/UL (ref 3.93–5.22)
RBC # BLD: 3.77 M/UL (ref 4.2–5.4)
RBC # BLD: 3.79 M/UL (ref 3.93–5.22)
RBC # BLD: 3.79 M/UL (ref 4.2–5.4)
RBC # BLD: 3.97 M/UL (ref 3.93–5.22)
RBC # BLD: 4.09 M/UL (ref 4.2–5.4)
RBC # BLD: 4.28 M/UL (ref 4.2–5.4)
RBC # BLD: 4.64 M/UL (ref 3.93–5.22)
RBC # BLD: 4.74 M/UL (ref 4.2–5.4)
RBC UA: ABNORMAL /HPF (ref 0–2)
RBC UA: ABNORMAL /HPF (ref 0–5)
RBC UA: NORMAL /HPF (ref 0–2)
REASON FOR REJECTION: NORMAL
REJECTED TEST: NORMAL
SARS-COV-2, NAAT: NOT DETECTED
SARS-COV-2, POC: DETECTED
SLIDE REVIEW: ABNORMAL
SODIUM BLD-SCNC: 132 MEQ/L (ref 135–144)
SODIUM BLD-SCNC: 135 MEQ/L (ref 135–144)
SODIUM BLD-SCNC: 137 MEQ/L (ref 135–144)
SODIUM BLD-SCNC: 138 MEQ/L (ref 135–144)
SODIUM BLD-SCNC: 139 MEQ/L (ref 135–144)
SODIUM BLD-SCNC: 140 MEQ/L (ref 135–144)
SODIUM BLD-SCNC: 141 MEQ/L (ref 135–144)
SODIUM BLD-SCNC: 142 MEQ/L (ref 135–144)
SODIUM BLD-SCNC: 142 MEQ/L (ref 135–144)
SODIUM BLD-SCNC: 143 MEQ/L (ref 135–144)
SODIUM BLD-SCNC: 144 MEQ/L (ref 135–144)
SPE/IFE INTERPRETATION: ABNORMAL
SPE/IFE INTERPRETATION: ABNORMAL
SPECIFIC GRAVITY UA: 1.01 (ref 1–1.03)
SPECIFIC GRAVITY UA: 1.02 (ref 1–1.03)
SPECIFIC GRAVITY UA: 1.03 (ref 1–1.03)
SPECIFIC GRAVITY UA: <=1.005 (ref 1–1.03)
SPECIFIC GRAVITY, POC: 1.03
T4 FREE: 1.03 NG/DL (ref 0.84–1.68)
TCO2 ARTERIAL: 20 (ref 22–29)
TCO2 ARTERIAL: 25 (ref 22–29)
TCO2 ARTERIAL: 27 (ref 22–29)
TCO2 ARTERIAL: 30 (ref 22–29)
TCO2 ARTERIAL: 31 (ref 22–29)
TCO2 ARTERIAL: 34 (ref 22–29)
TCO2 ARTERIAL: 35 (ref 22–29)
TCO2 ARTERIAL: 36 (ref 22–29)
TCO2 ARTERIAL: 38 (ref 22–29)
TEAR DROP CELLS: ABNORMAL
TEAR DROP CELLS: ABNORMAL
THYROID PEROXIDASE (TPO) ABS: <4 IU/ML (ref 0–25)
TOTAL CK: 143 U/L (ref 0–170)
TOTAL PROTEIN: 4.1 G/DL (ref 6.3–8)
TOTAL PROTEIN: 5.4 G/DL (ref 6.3–8)
TOTAL PROTEIN: 5.6 G/DL (ref 6.3–8)
TOTAL PROTEIN: 5.8 G/DL (ref 6.3–8.2)
TOTAL PROTEIN: 5.9 G/DL (ref 6.3–8)
TOTAL PROTEIN: 5.9 G/DL (ref 6.3–8)
TOTAL PROTEIN: 6 G/DL (ref 6.3–8)
TOTAL PROTEIN: 6 G/DL (ref 6.3–8)
TOTAL PROTEIN: 6.1 G/DL (ref 6.3–8)
TOTAL PROTEIN: 6.1 G/DL (ref 6.3–8)
TOTAL PROTEIN: 6.2 G/DL (ref 6.3–8)
TOTAL PROTEIN: 6.2 G/DL (ref 6.3–8)
TOTAL PROTEIN: 6.2 G/DL (ref 6.3–8.2)
TOTAL PROTEIN: 6.3 G/DL (ref 6.3–8)
TOTAL PROTEIN: 6.7 G/DL (ref 6.3–8)
TOTAL PROTEIN: 7.4 G/DL (ref 6.3–8)
TOTAL PROTEIN: 7.6 G/DL (ref 6.3–8)
TRIGL SERPL-MCNC: 104 MG/DL (ref 0–150)
TRIGL SERPL-MCNC: 129 MG/DL (ref 0–150)
TRIGL SERPL-MCNC: 160 MG/DL (ref 0–150)
TRIGL SERPL-MCNC: 78 MG/DL (ref 0–150)
TRIGL SERPL-MCNC: 92 MG/DL (ref 0–150)
TROPONIN: 0.01 NG/ML (ref 0–0.01)
TROPONIN: 0.02 NG/ML (ref 0–0.01)
TROPONIN: 0.02 NG/ML (ref 0–0.01)
TROPONIN: <0.01 NG/ML (ref 0–0.01)
TSH SERPL DL<=0.05 MIU/L-ACNC: 2.33 UIU/ML (ref 0.44–3.86)
URINE CULTURE, ROUTINE: ABNORMAL
URINE CULTURE, ROUTINE: ABNORMAL
URINE CULTURE, ROUTINE: NORMAL
URINE REFLEX TO CULTURE: NORMAL
URINE REFLEX TO CULTURE: NORMAL
URINE REFLEX TO CULTURE: YES
UROBILINOGEN, POC: ABNORMAL
UROBILINOGEN, URINE: 0.2 E.U./DL
VITAMIN D 1,25-DIHYDROXY: 27.5 PG/ML (ref 19.9–79.3)
VITAMIN D 25-HYDROXY: 65.8 NG/ML (ref 30–100)
VITAMIN D 25-HYDROXY: 70.4 NG/ML (ref 30–100)
VITAMIN D 25-HYDROXY: 76.4 NG/ML (ref 30–100)
VITAMIN D 25-HYDROXY: 77.8 NG/ML (ref 30–100)
VITAMIN D 25-HYDROXY: 95.6 NG/ML (ref 30–100)
WBC # BLD: 11.2 K/UL (ref 4.8–10.8)
WBC # BLD: 2.3 K/UL (ref 4.8–10.8)
WBC # BLD: 2.5 K/UL (ref 4–10)
WBC # BLD: 4.6 K/UL (ref 4–10)
WBC # BLD: 5.2 K/UL (ref 4–10)
WBC # BLD: 6.1 K/UL (ref 4–10)
WBC # BLD: 6.4 K/UL (ref 4.8–10.8)
WBC # BLD: 6.5 K/UL (ref 4.8–10.8)
WBC # BLD: 6.6 K/UL (ref 4–10)
WBC # BLD: 6.6 K/UL (ref 4–10)
WBC # BLD: 6.8 K/UL (ref 4.8–10.8)
WBC # BLD: 7 K/UL (ref 4–10)
WBC # BLD: 7.6 K/UL (ref 4.8–10.8)
WBC # BLD: 8.2 K/UL (ref 4.8–10.8)
WBC # BLD: 9 K/UL (ref 4.8–10.8)
WBC UA: ABNORMAL /HPF (ref 0–5)
WBC UA: NORMAL /HPF (ref 0–5)
WHOPPER PROMPT: NORMAL

## 2021-01-01 PROCEDURE — 87040 BLOOD CULTURE FOR BACTERIA: CPT

## 2021-01-01 PROCEDURE — 6370000000 HC RX 637 (ALT 250 FOR IP): Performed by: INTERNAL MEDICINE

## 2021-01-01 PROCEDURE — 93005 ELECTROCARDIOGRAM TRACING: CPT

## 2021-01-01 PROCEDURE — 82043 UR ALBUMIN QUANTITATIVE: CPT

## 2021-01-01 PROCEDURE — 83735 ASSAY OF MAGNESIUM: CPT

## 2021-01-01 PROCEDURE — 84145 PROCALCITONIN (PCT): CPT

## 2021-01-01 PROCEDURE — 85025 COMPLETE CBC W/AUTO DIFF WBC: CPT

## 2021-01-01 PROCEDURE — 2580000003 HC RX 258: Performed by: INTERNAL MEDICINE

## 2021-01-01 PROCEDURE — 85379 FIBRIN DEGRADATION QUANT: CPT

## 2021-01-01 PROCEDURE — 84100 ASSAY OF PHOSPHORUS: CPT

## 2021-01-01 PROCEDURE — 99214 OFFICE O/P EST MOD 30 MIN: CPT | Performed by: UROLOGY

## 2021-01-01 PROCEDURE — 2500000003 HC RX 250 WO HCPCS: Performed by: INTERNAL MEDICINE

## 2021-01-01 PROCEDURE — 82962 GLUCOSE BLOOD TEST: CPT | Performed by: PHYSICIAN ASSISTANT

## 2021-01-01 PROCEDURE — 80053 COMPREHEN METABOLIC PANEL: CPT

## 2021-01-01 PROCEDURE — 36415 COLL VENOUS BLD VENIPUNCTURE: CPT

## 2021-01-01 PROCEDURE — 99222 1ST HOSP IP/OBS MODERATE 55: CPT | Performed by: INTERNAL MEDICINE

## 2021-01-01 PROCEDURE — 2500000003 HC RX 250 WO HCPCS: Performed by: EMERGENCY MEDICINE

## 2021-01-01 PROCEDURE — 96366 THER/PROPH/DIAG IV INF ADDON: CPT

## 2021-01-01 PROCEDURE — 74176 CT ABD & PELVIS W/O CONTRAST: CPT

## 2021-01-01 PROCEDURE — 36600 WITHDRAWAL OF ARTERIAL BLOOD: CPT

## 2021-01-01 PROCEDURE — 83970 ASSAY OF PARATHORMONE: CPT

## 2021-01-01 PROCEDURE — 6360000002 HC RX W HCPCS: Performed by: INTERNAL MEDICINE

## 2021-01-01 PROCEDURE — 87086 URINE CULTURE/COLONY COUNT: CPT

## 2021-01-01 PROCEDURE — 83036 HEMOGLOBIN GLYCOSYLATED A1C: CPT

## 2021-01-01 PROCEDURE — 71045 X-RAY EXAM CHEST 1 VIEW: CPT

## 2021-01-01 PROCEDURE — 3052F HG A1C>EQUAL 8.0%<EQUAL 9.0%: CPT | Performed by: NURSE PRACTITIONER

## 2021-01-01 PROCEDURE — 84155 ASSAY OF PROTEIN SERUM: CPT

## 2021-01-01 PROCEDURE — 96367 TX/PROPH/DG ADDL SEQ IV INF: CPT

## 2021-01-01 PROCEDURE — 80069 RENAL FUNCTION PANEL: CPT

## 2021-01-01 PROCEDURE — XW033E5 INTRODUCTION OF REMDESIVIR ANTI-INFECTIVE INTO PERIPHERAL VEIN, PERCUTANEOUS APPROACH, NEW TECHNOLOGY GROUP 5: ICD-10-PCS | Performed by: INTERNAL MEDICINE

## 2021-01-01 PROCEDURE — 2580000003 HC RX 258: Performed by: NURSE PRACTITIONER

## 2021-01-01 PROCEDURE — 83605 ASSAY OF LACTIC ACID: CPT

## 2021-01-01 PROCEDURE — 2580000003 HC RX 258: Performed by: EMERGENCY MEDICINE

## 2021-01-01 PROCEDURE — 20553 NJX 1/MLT TRIGGER POINTS 3/>: CPT | Performed by: PHYSICAL MEDICINE & REHABILITATION

## 2021-01-01 PROCEDURE — 85014 HEMATOCRIT: CPT

## 2021-01-01 PROCEDURE — 72040 X-RAY EXAM NECK SPINE 2-3 VW: CPT

## 2021-01-01 PROCEDURE — 99214 OFFICE O/P EST MOD 30 MIN: CPT | Performed by: NURSE PRACTITIONER

## 2021-01-01 PROCEDURE — 82310 ASSAY OF CALCIUM: CPT

## 2021-01-01 PROCEDURE — 82550 ASSAY OF CK (CPK): CPT

## 2021-01-01 PROCEDURE — 85027 COMPLETE CBC AUTOMATED: CPT

## 2021-01-01 PROCEDURE — 82948 REAGENT STRIP/BLOOD GLUCOSE: CPT

## 2021-01-01 PROCEDURE — 99214 OFFICE O/P EST MOD 30 MIN: CPT | Performed by: PHYSICAL MEDICINE & REHABILITATION

## 2021-01-01 PROCEDURE — 87635 SARS-COV-2 COVID-19 AMP PRB: CPT

## 2021-01-01 PROCEDURE — 92950 HEART/LUNG RESUSCITATION CPR: CPT

## 2021-01-01 PROCEDURE — 2500000003 HC RX 250 WO HCPCS

## 2021-01-01 PROCEDURE — 6360000004 HC RX CONTRAST MEDICATION: Performed by: EMERGENCY MEDICINE

## 2021-01-01 PROCEDURE — 6360000002 HC RX W HCPCS: Performed by: EMERGENCY MEDICINE

## 2021-01-01 PROCEDURE — 99284 EMERGENCY DEPT VISIT MOD MDM: CPT

## 2021-01-01 PROCEDURE — 94660 CPAP INITIATION&MGMT: CPT

## 2021-01-01 PROCEDURE — 86334 IMMUNOFIX E-PHORESIS SERUM: CPT

## 2021-01-01 PROCEDURE — 81003 URINALYSIS AUTO W/O SCOPE: CPT | Performed by: NURSE PRACTITIONER

## 2021-01-01 PROCEDURE — 97166 OT EVAL MOD COMPLEX 45 MIN: CPT

## 2021-01-01 PROCEDURE — 99202 OFFICE O/P NEW SF 15 MIN: CPT | Performed by: PHYSICIAN ASSISTANT

## 2021-01-01 PROCEDURE — 96365 THER/PROPH/DIAG IV INF INIT: CPT

## 2021-01-01 PROCEDURE — 82306 VITAMIN D 25 HYDROXY: CPT

## 2021-01-01 PROCEDURE — 70450 CT HEAD/BRAIN W/O DYE: CPT

## 2021-01-01 PROCEDURE — 84156 ASSAY OF PROTEIN URINE: CPT

## 2021-01-01 PROCEDURE — 80061 LIPID PANEL: CPT

## 2021-01-01 PROCEDURE — 1210000000 HC MED SURG R&B

## 2021-01-01 PROCEDURE — 2709999900 IR PICC WO SQ PORT/PUMP > 5 YEARS

## 2021-01-01 PROCEDURE — 2500000003 HC RX 250 WO HCPCS: Performed by: NURSE PRACTITIONER

## 2021-01-01 PROCEDURE — 82570 ASSAY OF URINE CREATININE: CPT

## 2021-01-01 PROCEDURE — 71275 CT ANGIOGRAPHY CHEST: CPT

## 2021-01-01 PROCEDURE — 2000000000 HC ICU R&B

## 2021-01-01 PROCEDURE — 87186 SC STD MICRODIL/AGAR DIL: CPT

## 2021-01-01 PROCEDURE — 87426 SARSCOV CORONAVIRUS AG IA: CPT

## 2021-01-01 PROCEDURE — 82565 ASSAY OF CREATININE: CPT

## 2021-01-01 PROCEDURE — 82077 ASSAY SPEC XCP UR&BREATH IA: CPT

## 2021-01-01 PROCEDURE — 2580000003 HC RX 258

## 2021-01-01 PROCEDURE — 97116 GAIT TRAINING THERAPY: CPT

## 2021-01-01 PROCEDURE — 84132 ASSAY OF SERUM POTASSIUM: CPT

## 2021-01-01 PROCEDURE — 82803 BLOOD GASES ANY COMBINATION: CPT

## 2021-01-01 PROCEDURE — 81003 URINALYSIS AUTO W/O SCOPE: CPT

## 2021-01-01 PROCEDURE — 73501 X-RAY EXAM HIP UNI 1 VIEW: CPT

## 2021-01-01 PROCEDURE — 72072 X-RAY EXAM THORAC SPINE 3VWS: CPT

## 2021-01-01 PROCEDURE — 81001 URINALYSIS AUTO W/SCOPE: CPT

## 2021-01-01 PROCEDURE — 97530 THERAPEUTIC ACTIVITIES: CPT

## 2021-01-01 PROCEDURE — 99291 CRITICAL CARE FIRST HOUR: CPT | Performed by: INTERNAL MEDICINE

## 2021-01-01 PROCEDURE — 97110 THERAPEUTIC EXERCISES: CPT

## 2021-01-01 PROCEDURE — 99213 OFFICE O/P EST LOW 20 MIN: CPT | Performed by: PHYSICIAN ASSISTANT

## 2021-01-01 PROCEDURE — 82435 ASSAY OF BLOOD CHLORIDE: CPT

## 2021-01-01 PROCEDURE — 31500 INSERT EMERGENCY AIRWAY: CPT

## 2021-01-01 PROCEDURE — 96374 THER/PROPH/DIAG INJ IV PUSH: CPT

## 2021-01-01 PROCEDURE — 83690 ASSAY OF LIPASE: CPT

## 2021-01-01 PROCEDURE — 84484 ASSAY OF TROPONIN QUANT: CPT

## 2021-01-01 PROCEDURE — 96372 THER/PROPH/DIAG INJ SC/IM: CPT | Performed by: PHYSICAL MEDICINE & REHABILITATION

## 2021-01-01 PROCEDURE — 94003 VENT MGMT INPAT SUBQ DAY: CPT

## 2021-01-01 PROCEDURE — 84443 ASSAY THYROID STIM HORMONE: CPT

## 2021-01-01 PROCEDURE — 84439 ASSAY OF FREE THYROXINE: CPT

## 2021-01-01 PROCEDURE — 93010 ELECTROCARDIOGRAM REPORT: CPT | Performed by: INTERNAL MEDICINE

## 2021-01-01 PROCEDURE — 97140 MANUAL THERAPY 1/> REGIONS: CPT

## 2021-01-01 PROCEDURE — 80306 DRUG TEST PRSMV INSTRMNT: CPT

## 2021-01-01 PROCEDURE — C9113 INJ PANTOPRAZOLE SODIUM, VIA: HCPCS | Performed by: NURSE PRACTITIONER

## 2021-01-01 PROCEDURE — 83520 IMMUNOASSAY QUANT NOS NONAB: CPT

## 2021-01-01 PROCEDURE — 72100 X-RAY EXAM L-S SPINE 2/3 VWS: CPT

## 2021-01-01 PROCEDURE — 2700000000 HC OXYGEN THERAPY PER DAY

## 2021-01-01 PROCEDURE — XW0DXM6 INTRODUCTION OF BARICITINIB INTO MOUTH AND PHARYNX, EXTERNAL APPROACH, NEW TECHNOLOGY GROUP 6: ICD-10-PCS | Performed by: INTERNAL MEDICINE

## 2021-01-01 PROCEDURE — 82330 ASSAY OF CALCIUM: CPT

## 2021-01-01 PROCEDURE — 02HV33Z INSERTION OF INFUSION DEVICE INTO SUPERIOR VENA CAVA, PERCUTANEOUS APPROACH: ICD-10-PCS | Performed by: INTERNAL MEDICINE

## 2021-01-01 PROCEDURE — 94761 N-INVAS EAR/PLS OXIMETRY MLT: CPT

## 2021-01-01 PROCEDURE — 99213 OFFICE O/P EST LOW 20 MIN: CPT | Performed by: NURSE PRACTITIONER

## 2021-01-01 PROCEDURE — 94760 N-INVAS EAR/PLS OXIMETRY 1: CPT

## 2021-01-01 PROCEDURE — 84295 ASSAY OF SERUM SODIUM: CPT

## 2021-01-01 PROCEDURE — 99465 NB RESUSCITATION: CPT

## 2021-01-01 PROCEDURE — 82652 VIT D 1 25-DIHYDROXY: CPT

## 2021-01-01 PROCEDURE — 96375 TX/PRO/DX INJ NEW DRUG ADDON: CPT

## 2021-01-01 PROCEDURE — 6360000002 HC RX W HCPCS: Performed by: NURSE PRACTITIONER

## 2021-01-01 PROCEDURE — 87077 CULTURE AEROBIC IDENTIFY: CPT

## 2021-01-01 PROCEDURE — 99213 OFFICE O/P EST LOW 20 MIN: CPT

## 2021-01-01 PROCEDURE — 36620 INSERTION CATHETER ARTERY: CPT

## 2021-01-01 PROCEDURE — 99214 OFFICE O/P EST MOD 30 MIN: CPT | Performed by: PSYCHIATRY & NEUROLOGY

## 2021-01-01 PROCEDURE — 99232 SBSQ HOSP IP/OBS MODERATE 35: CPT | Performed by: INTERNAL MEDICINE

## 2021-01-01 PROCEDURE — 0BH18EZ INSERTION OF ENDOTRACHEAL AIRWAY INTO TRACHEA, VIA NATURAL OR ARTIFICIAL OPENING ENDOSCOPIC: ICD-10-PCS | Performed by: INTERNAL MEDICINE

## 2021-01-01 PROCEDURE — 99223 1ST HOSP IP/OBS HIGH 75: CPT | Performed by: INTERNAL MEDICINE

## 2021-01-01 PROCEDURE — 6370000000 HC RX 637 (ALT 250 FOR IP): Performed by: NURSE PRACTITIONER

## 2021-01-01 PROCEDURE — 97162 PT EVAL MOD COMPLEX 30 MIN: CPT

## 2021-01-01 PROCEDURE — 36573 INSJ PICC RS&I 5 YR+: CPT | Performed by: RADIOLOGY

## 2021-01-01 PROCEDURE — 84165 PROTEIN E-PHORESIS SERUM: CPT

## 2021-01-01 PROCEDURE — 37799 UNLISTED PX VASCULAR SURGERY: CPT

## 2021-01-01 PROCEDURE — 93000 ELECTROCARDIOGRAM COMPLETE: CPT | Performed by: INTERNAL MEDICINE

## 2021-01-01 PROCEDURE — 3051F HG A1C>EQUAL 7.0%<8.0%: CPT | Performed by: NURSE PRACTITIONER

## 2021-01-01 PROCEDURE — 83036 HEMOGLOBIN GLYCOSYLATED A1C: CPT | Performed by: PHYSICIAN ASSISTANT

## 2021-01-01 PROCEDURE — 85610 PROTHROMBIN TIME: CPT

## 2021-01-01 PROCEDURE — 82784 ASSAY IGA/IGD/IGG/IGM EACH: CPT

## 2021-01-01 PROCEDURE — 5A1945Z RESPIRATORY VENTILATION, 24-96 CONSECUTIVE HOURS: ICD-10-PCS | Performed by: INTERNAL MEDICINE

## 2021-01-01 PROCEDURE — 80048 BASIC METABOLIC PNL TOTAL CA: CPT

## 2021-01-01 PROCEDURE — 99214 OFFICE O/P EST MOD 30 MIN: CPT | Performed by: INTERNAL MEDICINE

## 2021-01-01 PROCEDURE — 86376 MICROSOMAL ANTIBODY EACH: CPT

## 2021-01-01 PROCEDURE — 94002 VENT MGMT INPAT INIT DAY: CPT

## 2021-01-01 PROCEDURE — 36573 INSJ PICC RS&I 5 YR+: CPT

## 2021-01-01 PROCEDURE — 36620 INSERTION CATHETER ARTERY: CPT | Performed by: INTERNAL MEDICINE

## 2021-01-01 PROCEDURE — 80074 ACUTE HEPATITIS PANEL: CPT

## 2021-01-01 RX ORDER — POTASSIUM CHLORIDE AND SODIUM CHLORIDE 900; 300 MG/100ML; MG/100ML
INJECTION, SOLUTION INTRAVENOUS CONTINUOUS
Status: DISCONTINUED | OUTPATIENT
Start: 2021-01-01 | End: 2021-01-01

## 2021-01-01 RX ORDER — FUROSEMIDE 10 MG/ML
80 INJECTION INTRAMUSCULAR; INTRAVENOUS ONCE
Status: DISCONTINUED | OUTPATIENT
Start: 2021-01-01 | End: 2021-01-01 | Stop reason: HOSPADM

## 2021-01-01 RX ORDER — POLYETHYLENE GLYCOL 3350 17 G/17G
17 POWDER, FOR SOLUTION ORAL DAILY PRN
Status: DISCONTINUED | OUTPATIENT
Start: 2021-01-01 | End: 2021-01-01 | Stop reason: HOSPADM

## 2021-01-01 RX ORDER — PREDNISONE 1 MG/1
2.5 TABLET ORAL DAILY
Status: DISCONTINUED | OUTPATIENT
Start: 2021-01-01 | End: 2021-01-01 | Stop reason: HOSPADM

## 2021-01-01 RX ORDER — LORAZEPAM 2 MG/ML
2 INJECTION INTRAMUSCULAR ONCE
Status: COMPLETED | OUTPATIENT
Start: 2021-01-01 | End: 2021-01-01

## 2021-01-01 RX ORDER — INSULIN GLARGINE 100 [IU]/ML
20 INJECTION, SOLUTION SUBCUTANEOUS 2 TIMES DAILY
Status: DISCONTINUED | OUTPATIENT
Start: 2021-01-01 | End: 2021-01-01

## 2021-01-01 RX ORDER — LEVETIRACETAM 100 MG/ML
250 SOLUTION ORAL 2 TIMES DAILY
Status: DISCONTINUED | OUTPATIENT
Start: 2021-01-01 | End: 2021-01-01 | Stop reason: HOSPADM

## 2021-01-01 RX ORDER — HYDROXYZINE PAMOATE 50 MG/1
CAPSULE ORAL
Qty: 30 CAPSULE | Refills: 0 | Status: SHIPPED | OUTPATIENT
Start: 2021-01-01 | End: 2021-01-01 | Stop reason: SDUPTHER

## 2021-01-01 RX ORDER — SODIUM CHLORIDE 0.9 % (FLUSH) 0.9 %
10 SYRINGE (ML) INJECTION PRN
Status: DISCONTINUED | OUTPATIENT
Start: 2021-01-01 | End: 2021-01-01 | Stop reason: HOSPADM

## 2021-01-01 RX ORDER — SITAGLIPTIN 50 MG/1
TABLET, FILM COATED ORAL
COMMUNITY
Start: 2021-01-01

## 2021-01-01 RX ORDER — FLUCONAZOLE 150 MG/1
TABLET ORAL
Qty: 3 TABLET | Refills: 0 | Status: SHIPPED | OUTPATIENT
Start: 2021-01-01 | End: 2021-01-01 | Stop reason: ALTCHOICE

## 2021-01-01 RX ORDER — CITALOPRAM 10 MG/1
40 TABLET ORAL DAILY
Status: DISCONTINUED | OUTPATIENT
Start: 2021-01-01 | End: 2021-01-01 | Stop reason: HOSPADM

## 2021-01-01 RX ORDER — CISATRACURIUM BESYLATE 2 MG/ML
10 INJECTION, SOLUTION INTRAVENOUS ONCE
Status: COMPLETED | OUTPATIENT
Start: 2021-01-01 | End: 2021-01-01

## 2021-01-01 RX ORDER — ACETAMINOPHEN 80 MG
TABLET,CHEWABLE ORAL ONCE
Status: DISCONTINUED | OUTPATIENT
Start: 2021-01-01 | End: 2021-01-01 | Stop reason: HOSPADM

## 2021-01-01 RX ORDER — 0.9 % SODIUM CHLORIDE 0.9 %
250 INTRAVENOUS SOLUTION INTRAVENOUS ONCE
Status: COMPLETED | OUTPATIENT
Start: 2021-01-01 | End: 2021-01-01

## 2021-01-01 RX ORDER — EPINEPHRINE 0.1 MG/ML
SYRINGE (ML) INJECTION
Status: COMPLETED | OUTPATIENT
Start: 2021-01-01 | End: 2021-01-01

## 2021-01-01 RX ORDER — SODIUM CHLORIDE 9 MG/ML
250 INJECTION, SOLUTION INTRAVENOUS ONCE
Status: COMPLETED | OUTPATIENT
Start: 2021-01-01 | End: 2021-01-01

## 2021-01-01 RX ORDER — DIPHENHYDRAMINE HCL 25 MG
25 TABLET ORAL EVERY 6 HOURS PRN
Status: DISCONTINUED | OUTPATIENT
Start: 2021-01-01 | End: 2021-01-01 | Stop reason: HOSPADM

## 2021-01-01 RX ORDER — DEXAMETHASONE SODIUM PHOSPHATE 10 MG/ML
6 INJECTION, SOLUTION INTRAMUSCULAR; INTRAVENOUS ONCE
Status: COMPLETED | OUTPATIENT
Start: 2021-01-01 | End: 2021-01-01

## 2021-01-01 RX ORDER — DIPHENHYDRAMINE HCL 25 MG
12.5 TABLET ORAL EVERY 6 HOURS PRN
Status: DISCONTINUED | OUTPATIENT
Start: 2021-01-01 | End: 2021-01-01

## 2021-01-01 RX ORDER — GUAIFENESIN/DEXTROMETHORPHAN 100-10MG/5
5 SYRUP ORAL EVERY 4 HOURS PRN
Status: DISCONTINUED | OUTPATIENT
Start: 2021-01-01 | End: 2021-01-01 | Stop reason: HOSPADM

## 2021-01-01 RX ORDER — FLUCONAZOLE 150 MG/1
150 TABLET ORAL ONCE
Qty: 1 TABLET | Refills: 0 | Status: SHIPPED | OUTPATIENT
Start: 2021-01-01 | End: 2021-01-01

## 2021-01-01 RX ORDER — VITAMIN B COMPLEX
2000 TABLET ORAL DAILY
Status: DISCONTINUED | OUTPATIENT
Start: 2021-01-01 | End: 2021-01-01 | Stop reason: HOSPADM

## 2021-01-01 RX ORDER — SODIUM CHLORIDE 9 MG/ML
25 INJECTION, SOLUTION INTRAVENOUS PRN
Status: DISCONTINUED | OUTPATIENT
Start: 2021-01-01 | End: 2021-01-01 | Stop reason: HOSPADM

## 2021-01-01 RX ORDER — CALCITONIN SALMON 200 [USP'U]/ML
100 INJECTION, SOLUTION INTRAMUSCULAR; SUBCUTANEOUS ONCE
Status: COMPLETED | OUTPATIENT
Start: 2021-01-01 | End: 2021-01-01

## 2021-01-01 RX ORDER — CYANOCOBALAMIN 1000 UG/ML
1000 INJECTION INTRAMUSCULAR; SUBCUTANEOUS ONCE
Status: COMPLETED | OUTPATIENT
Start: 2021-01-01 | End: 2021-01-01

## 2021-01-01 RX ORDER — SODIUM CHLORIDE 9 MG/ML
INJECTION, SOLUTION INTRAVENOUS
Status: COMPLETED
Start: 2021-01-01 | End: 2021-01-01

## 2021-01-01 RX ORDER — ONDANSETRON 2 MG/ML
4 INJECTION INTRAMUSCULAR; INTRAVENOUS EVERY 6 HOURS PRN
Status: DISCONTINUED | OUTPATIENT
Start: 2021-01-01 | End: 2021-01-01 | Stop reason: HOSPADM

## 2021-01-01 RX ORDER — PANTOPRAZOLE SODIUM 40 MG/10ML
40 INJECTION, POWDER, LYOPHILIZED, FOR SOLUTION INTRAVENOUS DAILY
Status: DISCONTINUED | OUTPATIENT
Start: 2021-01-01 | End: 2021-01-01 | Stop reason: HOSPADM

## 2021-01-01 RX ORDER — FAMOTIDINE 40 MG/1
40 TABLET, FILM COATED ORAL DAILY
Status: ON HOLD | COMMUNITY
End: 2021-01-01 | Stop reason: HOSPADM

## 2021-01-01 RX ORDER — QUETIAPINE FUMARATE 25 MG/1
50 TABLET, FILM COATED ORAL DAILY
Status: DISCONTINUED | OUTPATIENT
Start: 2021-01-01 | End: 2021-01-01 | Stop reason: HOSPADM

## 2021-01-01 RX ORDER — LIDOCAINE HYDROCHLORIDE 20 MG/ML
5 INJECTION, SOLUTION INFILTRATION; PERINEURAL ONCE
Status: COMPLETED | OUTPATIENT
Start: 2021-01-01 | End: 2021-01-01

## 2021-01-01 RX ORDER — AMITRIPTYLINE HYDROCHLORIDE 25 MG/1
25 TABLET, FILM COATED ORAL NIGHTLY
COMMUNITY

## 2021-01-01 RX ORDER — LIDOCAINE HYDROCHLORIDE 10 MG/ML
22 INJECTION, SOLUTION INFILTRATION; PERINEURAL ONCE
Status: COMPLETED | OUTPATIENT
Start: 2021-01-01 | End: 2021-01-01

## 2021-01-01 RX ORDER — PANTOPRAZOLE SODIUM 40 MG/1
40 TABLET, DELAYED RELEASE ORAL
Status: DISCONTINUED | OUTPATIENT
Start: 2021-01-01 | End: 2021-01-01

## 2021-01-01 RX ORDER — METAXALONE 800 MG/1
800 TABLET ORAL 3 TIMES DAILY
COMMUNITY
End: 2021-01-01

## 2021-01-01 RX ORDER — LEVETIRACETAM 250 MG/1
250 TABLET ORAL 2 TIMES DAILY
Status: DISCONTINUED | OUTPATIENT
Start: 2021-01-01 | End: 2021-01-01

## 2021-01-01 RX ORDER — NYSTATIN 100000 [USP'U]/G
POWDER TOPICAL
Qty: 1 BOTTLE | Refills: 0 | Status: SHIPPED | OUTPATIENT
Start: 2021-01-01 | End: 2021-01-01 | Stop reason: SDUPTHER

## 2021-01-01 RX ORDER — ACETAMINOPHEN 650 MG/1
650 SUPPOSITORY RECTAL EVERY 6 HOURS PRN
Status: DISCONTINUED | OUTPATIENT
Start: 2021-01-01 | End: 2021-01-01 | Stop reason: HOSPADM

## 2021-01-01 RX ORDER — METHOCARBAMOL 500 MG/1
500 TABLET, FILM COATED ORAL EVERY 12 HOURS PRN
Qty: 60 TABLET | Refills: 0 | Status: SHIPPED | OUTPATIENT
Start: 2021-01-01 | End: 2021-01-01 | Stop reason: SDUPTHER

## 2021-01-01 RX ORDER — NITROFURANTOIN 25; 75 MG/1; MG/1
100 CAPSULE ORAL 2 TIMES DAILY
Qty: 10 CAPSULE | Refills: 0 | Status: SHIPPED | OUTPATIENT
Start: 2021-01-01 | End: 2021-01-01

## 2021-01-01 RX ORDER — SODIUM CHLORIDE 9 MG/ML
INJECTION, SOLUTION INTRAVENOUS
Status: DISPENSED
Start: 2021-01-01 | End: 2021-01-01

## 2021-01-01 RX ORDER — LORAZEPAM 2 MG/ML
1 INJECTION INTRAMUSCULAR ONCE
Status: COMPLETED | OUTPATIENT
Start: 2021-01-01 | End: 2021-01-01

## 2021-01-01 RX ORDER — MIDAZOLAM HYDROCHLORIDE 2 MG/2ML
4 INJECTION, SOLUTION INTRAMUSCULAR; INTRAVENOUS ONCE
Status: COMPLETED | OUTPATIENT
Start: 2021-01-01 | End: 2021-01-01

## 2021-01-01 RX ORDER — CISATRACURIUM BESYLATE 2 MG/ML
10 INJECTION, SOLUTION INTRAVENOUS ONCE
Status: DISCONTINUED | OUTPATIENT
Start: 2021-01-01 | End: 2021-01-01 | Stop reason: HOSPADM

## 2021-01-01 RX ORDER — MAGNESIUM SULFATE IN WATER 40 MG/ML
2000 INJECTION, SOLUTION INTRAVENOUS ONCE
Status: COMPLETED | OUTPATIENT
Start: 2021-01-01 | End: 2021-01-01

## 2021-01-01 RX ORDER — ZOLEDRONIC ACID 5 MG/100ML
5 INJECTION, SOLUTION INTRAVENOUS ONCE
Status: CANCELLED | OUTPATIENT
Start: 2021-01-01 | End: 2021-01-01

## 2021-01-01 RX ORDER — SODIUM CHLORIDE 0.9 % (FLUSH) 0.9 %
5-40 SYRINGE (ML) INJECTION EVERY 12 HOURS SCHEDULED
Status: DISCONTINUED | OUTPATIENT
Start: 2021-01-01 | End: 2021-01-01 | Stop reason: HOSPADM

## 2021-01-01 RX ORDER — AMIODARONE HYDROCHLORIDE 50 MG/ML
INJECTION, SOLUTION INTRAVENOUS
Status: COMPLETED | OUTPATIENT
Start: 2021-01-01 | End: 2021-01-01

## 2021-01-01 RX ORDER — LIDOCAINE 50 MG/G
PATCH TOPICAL
Qty: 30 PATCH | Refills: 3 | Status: SHIPPED | OUTPATIENT
Start: 2021-01-01

## 2021-01-01 RX ORDER — DEXTROSE MONOHYDRATE 50 MG/ML
100 INJECTION, SOLUTION INTRAVENOUS PRN
Status: DISCONTINUED | OUTPATIENT
Start: 2021-01-01 | End: 2021-01-01 | Stop reason: HOSPADM

## 2021-01-01 RX ORDER — GABAPENTIN 400 MG/1
CAPSULE ORAL
Qty: 270 CAPSULE | Refills: 3 | Status: ON HOLD | OUTPATIENT
Start: 2021-01-01 | End: 2021-01-01 | Stop reason: HOSPADM

## 2021-01-01 RX ORDER — BALSALAZIDE DISODIUM 750 MG/1
2250 CAPSULE ORAL 3 TIMES DAILY
COMMUNITY

## 2021-01-01 RX ORDER — CARVEDILOL 6.25 MG/1
6.25 TABLET ORAL 2 TIMES DAILY WITH MEALS
Qty: 180 TABLET | Refills: 3 | Status: SHIPPED | OUTPATIENT
Start: 2021-01-01 | End: 2021-01-01

## 2021-01-01 RX ORDER — LIDOCAINE HYDROCHLORIDE 10 MG/ML
23 INJECTION, SOLUTION INFILTRATION; PERINEURAL ONCE
Status: COMPLETED | OUTPATIENT
Start: 2021-01-01 | End: 2021-01-01

## 2021-01-01 RX ORDER — HYDROCODONE BITARTRATE AND ACETAMINOPHEN 5; 325 MG/1; MG/1
1 TABLET ORAL EVERY 4 HOURS PRN
Qty: 70 TABLET | Refills: 0 | Status: SHIPPED | OUTPATIENT
Start: 2021-01-01 | End: 2021-01-01

## 2021-01-01 RX ORDER — SODIUM CHLORIDE 450 MG/100ML
INJECTION, SOLUTION INTRAVENOUS CONTINUOUS
Status: DISPENSED | OUTPATIENT
Start: 2021-01-01 | End: 2021-01-01

## 2021-01-01 RX ORDER — 0.9 % SODIUM CHLORIDE 0.9 %
1000 INTRAVENOUS SOLUTION INTRAVENOUS ONCE
Status: COMPLETED | OUTPATIENT
Start: 2021-01-01 | End: 2021-01-01

## 2021-01-01 RX ORDER — PANTOPRAZOLE SODIUM 40 MG/1
40 TABLET, DELAYED RELEASE ORAL
Status: DISCONTINUED | OUTPATIENT
Start: 2021-01-01 | End: 2021-01-01 | Stop reason: HOSPADM

## 2021-01-01 RX ORDER — ACETAMINOPHEN 325 MG/1
325 TABLET ORAL EVERY 6 HOURS PRN
Status: DISCONTINUED | OUTPATIENT
Start: 2021-01-01 | End: 2021-01-01 | Stop reason: HOSPADM

## 2021-01-01 RX ORDER — SODIUM CHLORIDE 0.9 % (FLUSH) 0.9 %
5-40 SYRINGE (ML) INJECTION PRN
Status: DISCONTINUED | OUTPATIENT
Start: 2021-01-01 | End: 2021-01-01 | Stop reason: HOSPADM

## 2021-01-01 RX ORDER — NICOTINE POLACRILEX 4 MG
15 LOZENGE BUCCAL PRN
Status: DISCONTINUED | OUTPATIENT
Start: 2021-01-01 | End: 2021-01-01 | Stop reason: SDUPTHER

## 2021-01-01 RX ORDER — DEXTROSE MONOHYDRATE 25 G/50ML
12.5 INJECTION, SOLUTION INTRAVENOUS PRN
Status: DISCONTINUED | OUTPATIENT
Start: 2021-01-01 | End: 2021-01-01 | Stop reason: SDUPTHER

## 2021-01-01 RX ORDER — DEXAMETHASONE 6 MG/1
6 TABLET ORAL DAILY
Status: DISCONTINUED | OUTPATIENT
Start: 2021-01-01 | End: 2021-01-01 | Stop reason: HOSPADM

## 2021-01-01 RX ORDER — BLOOD-GLUCOSE TRANSMITTER
1 EACH MISCELLANEOUS CONTINUOUS
Qty: 1 EACH | Refills: 3 | Status: ON HOLD | OUTPATIENT
Start: 2021-01-01 | End: 2021-01-01 | Stop reason: HOSPADM

## 2021-01-01 RX ORDER — SODIUM CHLORIDE 0.9 % (FLUSH) 0.9 %
3 SYRINGE (ML) INJECTION EVERY 8 HOURS
Status: DISCONTINUED | OUTPATIENT
Start: 2021-01-01 | End: 2021-01-01 | Stop reason: HOSPADM

## 2021-01-01 RX ORDER — SODIUM CHLORIDE 0.9 % (FLUSH) 0.9 %
10 SYRINGE (ML) INJECTION EVERY 12 HOURS SCHEDULED
Status: DISCONTINUED | OUTPATIENT
Start: 2021-01-01 | End: 2021-01-01 | Stop reason: HOSPADM

## 2021-01-01 RX ORDER — ORAL SEMAGLUTIDE 3 MG/1
1 TABLET ORAL DAILY
Qty: 90 TABLET | Refills: 1 | Status: SHIPPED | OUTPATIENT
Start: 2021-01-01 | End: 2021-01-01

## 2021-01-01 RX ORDER — HYDROXYZINE HYDROCHLORIDE 25 MG/1
25 TABLET, FILM COATED ORAL EVERY 8 HOURS PRN
Qty: 30 TABLET | Refills: 0 | Status: SHIPPED | OUTPATIENT
Start: 2021-01-01 | End: 2021-01-01

## 2021-01-01 RX ORDER — LEVETIRACETAM 250 MG/1
250 TABLET ORAL 2 TIMES DAILY
Qty: 180 TABLET | Refills: 3 | Status: SHIPPED | OUTPATIENT
Start: 2021-01-01

## 2021-01-01 RX ORDER — PROMETHAZINE HYDROCHLORIDE 12.5 MG/1
12.5 TABLET ORAL EVERY 6 HOURS PRN
Status: DISCONTINUED | OUTPATIENT
Start: 2021-01-01 | End: 2021-01-01 | Stop reason: HOSPADM

## 2021-01-01 RX ORDER — DULAGLUTIDE 1.5 MG/.5ML
1.5 INJECTION, SOLUTION SUBCUTANEOUS WEEKLY
Qty: 12 PEN | Refills: 3 | Status: SHIPPED | OUTPATIENT
Start: 2021-01-01

## 2021-01-01 RX ORDER — HYDROXYZINE PAMOATE 50 MG/1
CAPSULE ORAL
Qty: 270 CAPSULE | Refills: 4 | Status: SHIPPED | OUTPATIENT
Start: 2021-01-01 | End: 2021-01-01 | Stop reason: SDUPTHER

## 2021-01-01 RX ORDER — HYDROXYZINE PAMOATE 50 MG/1
CAPSULE ORAL
Qty: 30 CAPSULE | Refills: 0 | Status: SHIPPED | OUTPATIENT
Start: 2021-01-01 | End: 2021-01-01

## 2021-01-01 RX ORDER — CARVEDILOL 6.25 MG/1
6.25 TABLET ORAL 2 TIMES DAILY
Status: DISCONTINUED | OUTPATIENT
Start: 2021-01-01 | End: 2021-01-01 | Stop reason: HOSPADM

## 2021-01-01 RX ORDER — ROPINIROLE 0.25 MG/1
TABLET, FILM COATED ORAL
Qty: 60 TABLET | Refills: 3 | Status: SHIPPED | OUTPATIENT
Start: 2021-01-01 | End: 2021-01-01 | Stop reason: SDUPTHER

## 2021-01-01 RX ORDER — HALOPERIDOL 5 MG/ML
2 INJECTION INTRAMUSCULAR ONCE
Status: COMPLETED | OUTPATIENT
Start: 2021-01-01 | End: 2021-01-01

## 2021-01-01 RX ORDER — BLOOD-GLUCOSE SENSOR
1 EACH MISCELLANEOUS CONTINUOUS
Qty: 3 EACH | Refills: 11 | Status: SHIPPED | OUTPATIENT
Start: 2021-01-01

## 2021-01-01 RX ORDER — FUROSEMIDE 20 MG/1
20 TABLET ORAL DAILY
Qty: 90 TABLET | Refills: 3 | Status: ON HOLD | OUTPATIENT
Start: 2021-01-01 | End: 2021-01-01 | Stop reason: HOSPADM

## 2021-01-01 RX ORDER — OMEPRAZOLE 40 MG/1
40 CAPSULE, DELAYED RELEASE ORAL DAILY
COMMUNITY

## 2021-01-01 RX ORDER — DEXTROSE MONOHYDRATE 25 G/50ML
12.5 INJECTION, SOLUTION INTRAVENOUS PRN
Status: DISCONTINUED | OUTPATIENT
Start: 2021-01-01 | End: 2021-01-01 | Stop reason: HOSPADM

## 2021-01-01 RX ORDER — ACETAMINOPHEN 325 MG/1
650 TABLET ORAL EVERY 6 HOURS PRN
Status: DISCONTINUED | OUTPATIENT
Start: 2021-01-01 | End: 2021-01-01 | Stop reason: HOSPADM

## 2021-01-01 RX ORDER — CHLORHEXIDINE GLUCONATE 0.12 MG/ML
15 RINSE ORAL 2 TIMES DAILY
Status: DISCONTINUED | OUTPATIENT
Start: 2021-01-01 | End: 2021-01-01 | Stop reason: HOSPADM

## 2021-01-01 RX ORDER — SODIUM CHLORIDE 9 MG/ML
10 INJECTION INTRAVENOUS DAILY
Status: DISCONTINUED | OUTPATIENT
Start: 2021-01-01 | End: 2021-01-01 | Stop reason: HOSPADM

## 2021-01-01 RX ORDER — AMITRIPTYLINE HYDROCHLORIDE 25 MG/1
25 TABLET, FILM COATED ORAL NIGHTLY
Status: DISCONTINUED | OUTPATIENT
Start: 2021-01-01 | End: 2021-01-01 | Stop reason: HOSPADM

## 2021-01-01 RX ORDER — NICOTINE POLACRILEX 4 MG
15 LOZENGE BUCCAL PRN
Status: DISCONTINUED | OUTPATIENT
Start: 2021-01-01 | End: 2021-01-01 | Stop reason: HOSPADM

## 2021-01-01 RX ORDER — LIDOCAINE HYDROCHLORIDE 10 MG/ML
13 INJECTION, SOLUTION INFILTRATION; PERINEURAL ONCE
Status: COMPLETED | OUTPATIENT
Start: 2021-01-01 | End: 2021-01-01

## 2021-01-01 RX ORDER — ONDANSETRON 4 MG/1
4 TABLET, ORALLY DISINTEGRATING ORAL EVERY 8 HOURS PRN
Status: DISCONTINUED | OUTPATIENT
Start: 2021-01-01 | End: 2021-01-01 | Stop reason: HOSPADM

## 2021-01-01 RX ORDER — POTASSIUM CHLORIDE 750 MG/1
20 TABLET, EXTENDED RELEASE ORAL ONCE
Status: COMPLETED | OUTPATIENT
Start: 2021-01-01 | End: 2021-01-01

## 2021-01-01 RX ORDER — CLOBETASOL PROPIONATE 0.46 MG/ML
SOLUTION TOPICAL
Qty: 50 ML | Refills: 3 | Status: SHIPPED | OUTPATIENT
Start: 2021-01-01

## 2021-01-01 RX ORDER — CARVEDILOL 6.25 MG/1
6.25 TABLET ORAL 2 TIMES DAILY WITH MEALS
Status: DISCONTINUED | OUTPATIENT
Start: 2021-01-01 | End: 2021-01-01 | Stop reason: HOSPADM

## 2021-01-01 RX ORDER — HYDROCODONE BITARTRATE AND ACETAMINOPHEN 5; 325 MG/1; MG/1
1 TABLET ORAL EVERY 4 HOURS PRN
COMMUNITY
End: 2021-01-01 | Stop reason: ALTCHOICE

## 2021-01-01 RX ORDER — ROPINIROLE 0.25 MG/1
TABLET, FILM COATED ORAL
Qty: 180 TABLET | Refills: 1 | Status: SHIPPED | OUTPATIENT
Start: 2021-01-01

## 2021-01-01 RX ORDER — GABAPENTIN 400 MG/1
400 CAPSULE ORAL 3 TIMES DAILY
COMMUNITY

## 2021-01-01 RX ORDER — METHYLPREDNISOLONE SODIUM SUCCINATE 125 MG/2ML
125 INJECTION, POWDER, LYOPHILIZED, FOR SOLUTION INTRAMUSCULAR; INTRAVENOUS ONCE
Status: COMPLETED | OUTPATIENT
Start: 2021-01-01 | End: 2021-01-01

## 2021-01-01 RX ORDER — LIDOCAINE 50 MG/G
1 PATCH TOPICAL DAILY
Qty: 90 PATCH | Refills: 0 | Status: SHIPPED | OUTPATIENT
Start: 2021-01-01 | End: 2021-01-01

## 2021-01-01 RX ORDER — FOLIC ACID 1 MG/1
1 TABLET ORAL DAILY
Status: DISCONTINUED | OUTPATIENT
Start: 2021-01-01 | End: 2021-01-01 | Stop reason: HOSPADM

## 2021-01-01 RX ORDER — PROPOFOL 10 MG/ML
5-50 INJECTION, EMULSION INTRAVENOUS
Status: DISCONTINUED | OUTPATIENT
Start: 2021-01-01 | End: 2021-01-01 | Stop reason: HOSPADM

## 2021-01-01 RX ORDER — ALBUTEROL SULFATE 2.5 MG/3ML
0.63 SOLUTION RESPIRATORY (INHALATION) EVERY 6 HOURS PRN
Status: DISCONTINUED | OUTPATIENT
Start: 2021-01-01 | End: 2021-01-01 | Stop reason: HOSPADM

## 2021-01-01 RX ORDER — LIDOCAINE 4 G/G
1 PATCH TOPICAL DAILY
Status: DISCONTINUED | OUTPATIENT
Start: 2021-01-01 | End: 2021-01-01 | Stop reason: HOSPADM

## 2021-01-01 RX ORDER — GABAPENTIN 100 MG/1
300 CAPSULE ORAL 2 TIMES DAILY
Status: DISCONTINUED | OUTPATIENT
Start: 2021-01-01 | End: 2021-01-01 | Stop reason: HOSPADM

## 2021-01-01 RX ORDER — CYCLOBENZAPRINE HCL 5 MG
5 TABLET ORAL 3 TIMES DAILY PRN
Qty: 30 TABLET | Refills: 2 | Status: SHIPPED | OUTPATIENT
Start: 2021-01-01 | End: 2021-01-01

## 2021-01-01 RX ORDER — 0.9 % SODIUM CHLORIDE 0.9 %
1000 INTRAVENOUS SOLUTION INTRAVENOUS ONCE
Status: DISCONTINUED | OUTPATIENT
Start: 2021-01-01 | End: 2021-01-01

## 2021-01-01 RX ORDER — HYDROXYZINE PAMOATE 50 MG/1
CAPSULE ORAL
Qty: 30 CAPSULE | Refills: 0 | Status: SHIPPED | OUTPATIENT
Start: 2021-01-01

## 2021-01-01 RX ORDER — CITALOPRAM 20 MG/1
10 TABLET ORAL DAILY
Status: DISCONTINUED | OUTPATIENT
Start: 2021-01-01 | End: 2021-01-01 | Stop reason: HOSPADM

## 2021-01-01 RX ORDER — ALLOPURINOL 300 MG/1
300 TABLET ORAL DAILY
Status: DISCONTINUED | OUTPATIENT
Start: 2021-01-01 | End: 2021-01-01

## 2021-01-01 RX ORDER — DEXTROSE MONOHYDRATE 50 MG/ML
100 INJECTION, SOLUTION INTRAVENOUS PRN
Status: DISCONTINUED | OUTPATIENT
Start: 2021-01-01 | End: 2021-01-01 | Stop reason: SDUPTHER

## 2021-01-01 RX ORDER — LEVETIRACETAM 500 MG/1
250 TABLET ORAL 2 TIMES DAILY
Status: DISCONTINUED | OUTPATIENT
Start: 2021-01-01 | End: 2021-01-01 | Stop reason: HOSPADM

## 2021-01-01 RX ORDER — CITALOPRAM 40 MG/1
TABLET ORAL
Qty: 90 TABLET | Refills: 3 | Status: SHIPPED | OUTPATIENT
Start: 2021-01-01

## 2021-01-01 RX ORDER — MAGNESIUM SULFATE IN WATER 40 MG/ML
4000 INJECTION, SOLUTION INTRAVENOUS ONCE
Status: COMPLETED | OUTPATIENT
Start: 2021-01-01 | End: 2021-01-01

## 2021-01-01 RX ORDER — NITROFURANTOIN 25; 75 MG/1; MG/1
100 CAPSULE ORAL 2 TIMES DAILY
Qty: 14 CAPSULE | Refills: 0 | Status: SHIPPED | OUTPATIENT
Start: 2021-01-01 | End: 2021-01-01 | Stop reason: ALTCHOICE

## 2021-01-01 RX ORDER — POTASSIUM CHLORIDE 750 MG/1
40 TABLET, EXTENDED RELEASE ORAL ONCE
Status: COMPLETED | OUTPATIENT
Start: 2021-01-01 | End: 2021-01-01

## 2021-01-01 RX ORDER — LIDOCAINE HYDROCHLORIDE 10 MG/ML
15 INJECTION, SOLUTION INFILTRATION; PERINEURAL ONCE
Status: COMPLETED | OUTPATIENT
Start: 2021-01-01 | End: 2021-01-01

## 2021-01-01 RX ORDER — ALOGLIPTIN 12.5 MG/1
6.25 TABLET, FILM COATED ORAL DAILY
Status: DISCONTINUED | OUTPATIENT
Start: 2021-01-01 | End: 2021-01-01 | Stop reason: HOSPADM

## 2021-01-01 RX ORDER — FOLIC ACID 1 MG/1
1 TABLET ORAL DAILY
Status: ON HOLD | COMMUNITY
End: 2021-01-01 | Stop reason: HOSPADM

## 2021-01-01 RX ORDER — DULAGLUTIDE 0.75 MG/.5ML
0.75 INJECTION, SOLUTION SUBCUTANEOUS WEEKLY
Qty: 4 PEN | Refills: 1 | Status: SHIPPED | OUTPATIENT
Start: 2021-01-01 | End: 2021-01-01 | Stop reason: ALTCHOICE

## 2021-01-01 RX ORDER — GABAPENTIN 400 MG/1
400 CAPSULE ORAL 3 TIMES DAILY
Status: DISCONTINUED | OUTPATIENT
Start: 2021-01-01 | End: 2021-01-01

## 2021-01-01 RX ORDER — HYDROCODONE BITARTRATE AND ACETAMINOPHEN 5; 325 MG/1; MG/1
1 TABLET ORAL EVERY 4 HOURS PRN
Status: DISCONTINUED | OUTPATIENT
Start: 2021-01-01 | End: 2021-01-01 | Stop reason: HOSPADM

## 2021-01-01 RX ORDER — TRAMADOL HYDROCHLORIDE 50 MG/1
37.5 TABLET ORAL EVERY 6 HOURS PRN
Status: DISCONTINUED | OUTPATIENT
Start: 2021-01-01 | End: 2021-01-01 | Stop reason: HOSPADM

## 2021-01-01 RX ORDER — NYSTATIN 100000 [USP'U]/G
POWDER TOPICAL
Qty: 30 G | Refills: 0 | Status: SHIPPED | OUTPATIENT
Start: 2021-01-01 | End: 2021-01-01 | Stop reason: ALTCHOICE

## 2021-01-01 RX ORDER — NYSTATIN 100000 [USP'U]/G
POWDER TOPICAL
Qty: 60 G | Refills: 3 | Status: SHIPPED | OUTPATIENT
Start: 2021-01-01

## 2021-01-01 RX ORDER — CEPHALEXIN 500 MG/1
500 CAPSULE ORAL 2 TIMES DAILY
Qty: 14 CAPSULE | Refills: 4 | Status: SHIPPED | OUTPATIENT
Start: 2021-01-01

## 2021-01-01 RX ORDER — LIDOCAINE 50 MG/G
PATCH TOPICAL
COMMUNITY
Start: 2021-01-01 | End: 2021-01-01

## 2021-01-01 RX ORDER — AMITRIPTYLINE HYDROCHLORIDE 25 MG/1
TABLET, FILM COATED ORAL
Qty: 180 TABLET | Refills: 3 | Status: ON HOLD | OUTPATIENT
Start: 2021-01-01 | End: 2021-01-01 | Stop reason: HOSPADM

## 2021-01-01 RX ORDER — FLUCONAZOLE 150 MG/1
150 TABLET ORAL DAILY
Qty: 2 TABLET | Refills: 0 | Status: SHIPPED | OUTPATIENT
Start: 2021-01-01 | End: 2021-01-01

## 2021-01-01 RX ORDER — FUROSEMIDE 10 MG/ML
20 INJECTION INTRAMUSCULAR; INTRAVENOUS ONCE
Status: COMPLETED | OUTPATIENT
Start: 2021-01-01 | End: 2021-01-01

## 2021-01-01 RX ORDER — 0.9 % SODIUM CHLORIDE 0.9 %
30 INTRAVENOUS SOLUTION INTRAVENOUS PRN
Status: DISCONTINUED | OUTPATIENT
Start: 2021-01-01 | End: 2021-01-01 | Stop reason: HOSPADM

## 2021-01-01 RX ORDER — PREDNISONE 2.5 MG
TABLET ORAL
COMMUNITY
Start: 2021-01-01

## 2021-01-01 RX ORDER — ALLOPURINOL 300 MG/1
TABLET ORAL
Qty: 30 TABLET | Status: CANCELLED | OUTPATIENT
Start: 2021-01-01

## 2021-01-01 RX ORDER — METHOCARBAMOL 500 MG/1
500 TABLET, FILM COATED ORAL EVERY 12 HOURS PRN
Qty: 60 TABLET | Refills: 0 | Status: SHIPPED | OUTPATIENT
Start: 2021-01-01 | End: 2021-01-01

## 2021-01-01 RX ORDER — LIDOCAINE HYDROCHLORIDE 10 MG/ML
21 INJECTION, SOLUTION INFILTRATION; PERINEURAL ONCE
Status: COMPLETED | OUTPATIENT
Start: 2021-01-01 | End: 2021-01-01

## 2021-01-01 RX ADMIN — Medication 10 ML: at 08:55

## 2021-01-01 RX ADMIN — CALCITONIN SALMON 100 UNITS: 200 INJECTION, SOLUTION INTRAMUSCULAR; SUBCUTANEOUS at 23:29

## 2021-01-01 RX ADMIN — SODIUM CHLORIDE, PRESERVATIVE FREE 10 ML: 5 INJECTION INTRAVENOUS at 08:24

## 2021-01-01 RX ADMIN — SODIUM BICARBONATE: 84 INJECTION, SOLUTION INTRAVENOUS at 23:09

## 2021-01-01 RX ADMIN — AMITRIPTYLINE HYDROCHLORIDE 25 MG: 25 TABLET, FILM COATED ORAL at 00:17

## 2021-01-01 RX ADMIN — SODIUM CHLORIDE 33.28 UNITS/HR: 9 INJECTION, SOLUTION INTRAVENOUS at 22:03

## 2021-01-01 RX ADMIN — NOREPINEPHRINE BITARTRATE 83 MCG/MIN: 1 INJECTION INTRAVENOUS at 05:17

## 2021-01-01 RX ADMIN — AMITRIPTYLINE HYDROCHLORIDE 25 MG: 25 TABLET, FILM COATED ORAL at 23:08

## 2021-01-01 RX ADMIN — SODIUM BICARBONATE 50 MEQ: 84 INJECTION, SOLUTION INTRAVENOUS at 06:46

## 2021-01-01 RX ADMIN — SODIUM CHLORIDE, PRESERVATIVE FREE 10 ML: 5 INJECTION INTRAVENOUS at 12:13

## 2021-01-01 RX ADMIN — PROPOFOL 50 MCG/KG/MIN: 10 INJECTION, EMULSION INTRAVENOUS at 00:05

## 2021-01-01 RX ADMIN — SODIUM CHLORIDE, PRESERVATIVE FREE 10 ML: 5 INJECTION INTRAVENOUS at 10:27

## 2021-01-01 RX ADMIN — LEVETIRACETAM 250 MG: 500 SOLUTION ORAL at 11:24

## 2021-01-01 RX ADMIN — SODIUM CHLORIDE 250 ML: 9 INJECTION, SOLUTION INTRAVENOUS at 13:59

## 2021-01-01 RX ADMIN — CARVEDILOL 6.25 MG: 6.25 TABLET, FILM COATED ORAL at 17:48

## 2021-01-01 RX ADMIN — PANTOPRAZOLE SODIUM 40 MG: 40 TABLET, DELAYED RELEASE ORAL at 05:58

## 2021-01-01 RX ADMIN — GABAPENTIN 400 MG: 400 CAPSULE ORAL at 08:26

## 2021-01-01 RX ADMIN — LORAZEPAM 1 MG: 2 INJECTION INTRAMUSCULAR; INTRAVENOUS at 00:08

## 2021-01-01 RX ADMIN — SODIUM BICARBONATE: 84 INJECTION, SOLUTION INTRAVENOUS at 06:31

## 2021-01-01 RX ADMIN — LIDOCAINE HYDROCHLORIDE 21 ML: 10 INJECTION, SOLUTION INFILTRATION; PERINEURAL at 15:28

## 2021-01-01 RX ADMIN — PANTOPRAZOLE SODIUM 40 MG: 40 TABLET, DELAYED RELEASE ORAL at 05:09

## 2021-01-01 RX ADMIN — CITALOPRAM HYDROBROMIDE 10 MG: 20 TABLET ORAL at 08:38

## 2021-01-01 RX ADMIN — SODIUM BICARBONATE: 84 INJECTION, SOLUTION INTRAVENOUS at 14:50

## 2021-01-01 RX ADMIN — FENTANYL CITRATE 200 MCG/HR: 50 INJECTION INTRAVENOUS at 13:07

## 2021-01-01 RX ADMIN — CYANOCOBALAMIN 1000 MCG: 1000 INJECTION INTRAMUSCULAR; SUBCUTANEOUS at 11:14

## 2021-01-01 RX ADMIN — BARICITINIB 4 MG: 2 TABLET, FILM COATED ORAL at 08:19

## 2021-01-01 RX ADMIN — LIDOCAINE HYDROCHLORIDE 23 ML: 10 INJECTION, SOLUTION INFILTRATION; PERINEURAL at 15:22

## 2021-01-01 RX ADMIN — CEFTRIAXONE 1000 MG: 1 INJECTION, POWDER, FOR SOLUTION INTRAMUSCULAR; INTRAVENOUS at 17:48

## 2021-01-01 RX ADMIN — RIVAROXABAN 20 MG: 10 TABLET, FILM COATED ORAL at 18:23

## 2021-01-01 RX ADMIN — FENTANYL CITRATE 200 MCG/HR: 50 INJECTION INTRAVENOUS at 22:02

## 2021-01-01 RX ADMIN — ALOGLIPTIN 6.25 MG: 12.5 TABLET, FILM COATED ORAL at 09:50

## 2021-01-01 RX ADMIN — FENTANYL CITRATE 200 MCG/HR: 50 INJECTION INTRAVENOUS at 16:18

## 2021-01-01 RX ADMIN — PROPOFOL 50 MCG/KG/MIN: 10 INJECTION, EMULSION INTRAVENOUS at 05:37

## 2021-01-01 RX ADMIN — PANTOPRAZOLE SODIUM 40 MG: 40 INJECTION, POWDER, FOR SOLUTION INTRAVENOUS at 08:19

## 2021-01-01 RX ADMIN — RIVAROXABAN 15 MG: 15 TABLET, FILM COATED ORAL at 17:38

## 2021-01-01 RX ADMIN — MAGNESIUM SULFATE HEPTAHYDRATE 4000 MG: 40 INJECTION, SOLUTION INTRAVENOUS at 12:44

## 2021-01-01 RX ADMIN — PROPOFOL 30 MCG/KG/MIN: 10 INJECTION, EMULSION INTRAVENOUS at 14:48

## 2021-01-01 RX ADMIN — FENTANYL CITRATE 200 MCG/HR: 50 INJECTION INTRAVENOUS at 11:46

## 2021-01-01 RX ADMIN — CEFTRIAXONE 1000 MG: 1 INJECTION, POWDER, FOR SOLUTION INTRAMUSCULAR; INTRAVENOUS at 17:38

## 2021-01-01 RX ADMIN — AMITRIPTYLINE HYDROCHLORIDE 25 MG: 25 TABLET, FILM COATED ORAL at 20:19

## 2021-01-01 RX ADMIN — GABAPENTIN 400 MG: 400 CAPSULE ORAL at 23:16

## 2021-01-01 RX ADMIN — REMDESIVIR 100 MG: 100 INJECTION, POWDER, LYOPHILIZED, FOR SOLUTION INTRAVENOUS at 12:24

## 2021-01-01 RX ADMIN — CISATRACURIUM BESYLATE 10 MG: 10 INJECTION INTRAVENOUS at 11:41

## 2021-01-01 RX ADMIN — POTASSIUM CHLORIDE 20 MEQ: 10 TABLET, EXTENDED RELEASE ORAL at 10:25

## 2021-01-01 RX ADMIN — MAGNESIUM SULFATE HEPTAHYDRATE 2000 MG: 40 INJECTION, SOLUTION INTRAVENOUS at 12:19

## 2021-01-01 RX ADMIN — AMITRIPTYLINE HYDROCHLORIDE 25 MG: 25 TABLET, FILM COATED ORAL at 23:15

## 2021-01-01 RX ADMIN — INSULIN GLARGINE 20 UNITS: 100 INJECTION, SOLUTION SUBCUTANEOUS at 21:41

## 2021-01-01 RX ADMIN — SODIUM CHLORIDE: 4.5 INJECTION, SOLUTION INTRAVENOUS at 12:44

## 2021-01-01 RX ADMIN — BARICITINIB 4 MG: 2 TABLET, FILM COATED ORAL at 12:11

## 2021-01-01 RX ADMIN — MAGNESIUM SULFATE HEPTAHYDRATE 2000 MG: 40 INJECTION, SOLUTION INTRAVENOUS at 09:17

## 2021-01-01 RX ADMIN — Medication 2000 UNITS: at 08:19

## 2021-01-01 RX ADMIN — ACETAMINOPHEN 650 MG: 325 TABLET ORAL at 10:25

## 2021-01-01 RX ADMIN — CARVEDILOL 6.25 MG: 6.25 TABLET, FILM COATED ORAL at 23:16

## 2021-01-01 RX ADMIN — AMIODARONE HYDROCHLORIDE 300 MG: 50 INJECTION, SOLUTION INTRAVENOUS at 06:40

## 2021-01-01 RX ADMIN — LEVETIRACETAM 250 MG: 500 TABLET ORAL at 08:47

## 2021-01-01 RX ADMIN — CARVEDILOL 6.25 MG: 6.25 TABLET, FILM COATED ORAL at 08:37

## 2021-01-01 RX ADMIN — FENTANYL CITRATE 200 MCG/HR: 50 INJECTION INTRAVENOUS at 04:50

## 2021-01-01 RX ADMIN — EPINEPHRINE 1 MG: 0.1 INJECTION, SOLUTION ENDOTRACHEAL; INTRACARDIAC; INTRAVENOUS at 06:38

## 2021-01-01 RX ADMIN — DEXAMETHASONE 6 MG: 6 TABLET ORAL at 10:26

## 2021-01-01 RX ADMIN — FUROSEMIDE 20 MG: 10 INJECTION, SOLUTION INTRAMUSCULAR; INTRAVENOUS at 18:31

## 2021-01-01 RX ADMIN — SODIUM CHLORIDE 0.2 MCG/KG/HR: 9 INJECTION, SOLUTION INTRAVENOUS at 01:15

## 2021-01-01 RX ADMIN — RIVAROXABAN 20 MG: 20 TABLET, FILM COATED ORAL at 21:17

## 2021-01-01 RX ADMIN — POTASSIUM CHLORIDE 40 MEQ: 10 TABLET, EXTENDED RELEASE ORAL at 04:27

## 2021-01-01 RX ADMIN — DEXAMETHASONE SODIUM PHOSPHATE 6 MG: 10 INJECTION, SOLUTION INTRAMUSCULAR; INTRAVENOUS at 00:35

## 2021-01-01 RX ADMIN — PROPOFOL 50 MCG/KG/MIN: 10 INJECTION, EMULSION INTRAVENOUS at 20:16

## 2021-01-01 RX ADMIN — INSULIN LISPRO 8 UNITS: 100 INJECTION, SOLUTION INTRAVENOUS; SUBCUTANEOUS at 00:00

## 2021-01-01 RX ADMIN — PANTOPRAZOLE SODIUM 40 MG: 40 TABLET, DELAYED RELEASE ORAL at 09:50

## 2021-01-01 RX ADMIN — CISATRACURIUM BESYLATE 1 MCG/KG/MIN: 10 INJECTION, SOLUTION INTRAVENOUS at 13:05

## 2021-01-01 RX ADMIN — METHYLPREDNISOLONE SODIUM SUCCINATE 125 MG: 125 INJECTION, POWDER, FOR SOLUTION INTRAMUSCULAR; INTRAVENOUS at 08:05

## 2021-01-01 RX ADMIN — PREDNISONE 2.5 MG: 1 TABLET ORAL at 08:37

## 2021-01-01 RX ADMIN — ALOGLIPTIN 6.25 MG: 12.5 TABLET, FILM COATED ORAL at 21:01

## 2021-01-01 RX ADMIN — LEVETIRACETAM 250 MG: 250 TABLET, FILM COATED ORAL at 23:08

## 2021-01-01 RX ADMIN — PROPOFOL 50 MCG/KG/MIN: 10 INJECTION, EMULSION INTRAVENOUS at 03:59

## 2021-01-01 RX ADMIN — INSULIN LISPRO 10 UNITS: 100 INJECTION, SOLUTION INTRAVENOUS; SUBCUTANEOUS at 05:17

## 2021-01-01 RX ADMIN — LEVETIRACETAM 250 MG: 500 TABLET ORAL at 08:38

## 2021-01-01 RX ADMIN — DEXAMETHASONE 6 MG: 6 TABLET ORAL at 09:33

## 2021-01-01 RX ADMIN — CYANOCOBALAMIN 1000 MCG: 1000 INJECTION INTRAMUSCULAR; SUBCUTANEOUS at 15:28

## 2021-01-01 RX ADMIN — CARVEDILOL 6.25 MG: 6.25 TABLET, FILM COATED ORAL at 17:38

## 2021-01-01 RX ADMIN — PROPOFOL 50 MCG/KG/MIN: 10 INJECTION, EMULSION INTRAVENOUS at 17:43

## 2021-01-01 RX ADMIN — Medication 10 ML: at 08:26

## 2021-01-01 RX ADMIN — EPINEPHRINE 1 MG: 0.1 INJECTION, SOLUTION ENDOTRACHEAL; INTRACARDIAC; INTRAVENOUS at 06:44

## 2021-01-01 RX ADMIN — EPINEPHRINE 1 MG: 0.1 INJECTION, SOLUTION ENDOTRACHEAL; INTRACARDIAC; INTRAVENOUS at 06:41

## 2021-01-01 RX ADMIN — MAGNESIUM OXIDE 400 MG: 400 TABLET ORAL at 09:54

## 2021-01-01 RX ADMIN — ALOGLIPTIN 6.25 MG: 12.5 TABLET, FILM COATED ORAL at 08:24

## 2021-01-01 RX ADMIN — POTASSIUM CHLORIDE AND SODIUM CHLORIDE: 900; 300 INJECTION, SOLUTION INTRAVENOUS at 03:22

## 2021-01-01 RX ADMIN — LEVETIRACETAM 250 MG: 500 SOLUTION ORAL at 20:19

## 2021-01-01 RX ADMIN — CARVEDILOL 6.25 MG: 6.25 TABLET, FILM COATED ORAL at 08:47

## 2021-01-01 RX ADMIN — FENTANYL CITRATE 100 MCG/HR: 50 INJECTION INTRAVENOUS at 09:00

## 2021-01-01 RX ADMIN — SODIUM BICARBONATE: 84 INJECTION, SOLUTION INTRAVENOUS at 16:21

## 2021-01-01 RX ADMIN — BARICITINIB 4 MG: 2 TABLET, FILM COATED ORAL at 23:08

## 2021-01-01 RX ADMIN — CYANOCOBALAMIN 1000 MCG: 1000 INJECTION INTRAMUSCULAR; SUBCUTANEOUS at 15:36

## 2021-01-01 RX ADMIN — PANTOPRAZOLE SODIUM 40 MG: 40 INJECTION, POWDER, FOR SOLUTION INTRAVENOUS at 16:46

## 2021-01-01 RX ADMIN — Medication 3 ML: at 05:38

## 2021-01-01 RX ADMIN — NOREPINEPHRINE BITARTRATE 15 MCG/MIN: 1 INJECTION INTRAVENOUS at 04:51

## 2021-01-01 RX ADMIN — IOPAMIDOL 100 ML: 755 INJECTION, SOLUTION INTRAVENOUS at 09:03

## 2021-01-01 RX ADMIN — LEVETIRACETAM 250 MG: 250 TABLET, FILM COATED ORAL at 23:17

## 2021-01-01 RX ADMIN — SODIUM CHLORIDE 1.4 MCG/KG/HR: 9 INJECTION, SOLUTION INTRAVENOUS at 05:11

## 2021-01-01 RX ADMIN — LIDOCAINE HYDROCHLORIDE 13 ML: 10 INJECTION, SOLUTION INFILTRATION; PERINEURAL at 11:15

## 2021-01-01 RX ADMIN — DIPHENHYDRAMINE HCL 25 MG: 25 TABLET ORAL at 18:30

## 2021-01-01 RX ADMIN — QUETIAPINE FUMARATE 50 MG: 50 TABLET ORAL at 13:12

## 2021-01-01 RX ADMIN — FENTANYL CITRATE 200 MCG/HR: 50 INJECTION INTRAVENOUS at 01:01

## 2021-01-01 RX ADMIN — ENOXAPARIN SODIUM 80 MG: 80 INJECTION SUBCUTANEOUS at 23:50

## 2021-01-01 RX ADMIN — CARVEDILOL 6.25 MG: 6.25 TABLET, FILM COATED ORAL at 18:23

## 2021-01-01 RX ADMIN — MAGNESIUM SULFATE HEPTAHYDRATE 2000 MG: 40 INJECTION, SOLUTION INTRAVENOUS at 21:05

## 2021-01-01 RX ADMIN — PREDNISONE 2.5 MG: 1 TABLET ORAL at 08:24

## 2021-01-01 RX ADMIN — EPINEPHRINE 1 MG: 0.1 INJECTION, SOLUTION ENDOTRACHEAL; INTRACARDIAC; INTRAVENOUS at 06:48

## 2021-01-01 RX ADMIN — CISATRACURIUM BESYLATE 1 MCG/KG/MIN: 10 INJECTION, SOLUTION INTRAVENOUS at 11:36

## 2021-01-01 RX ADMIN — SODIUM CHLORIDE 38 UNITS/HR: 9 INJECTION, SOLUTION INTRAVENOUS at 05:47

## 2021-01-01 RX ADMIN — SODIUM CHLORIDE: 9 INJECTION, SOLUTION INTRAVENOUS at 05:00

## 2021-01-01 RX ADMIN — REMDESIVIR 100 MG: 100 INJECTION, POWDER, LYOPHILIZED, FOR SOLUTION INTRAVENOUS at 13:12

## 2021-01-01 RX ADMIN — CARVEDILOL 6.25 MG: 6.25 TABLET, FILM COATED ORAL at 23:50

## 2021-01-01 RX ADMIN — ALOGLIPTIN 6.25 MG: 12.5 TABLET, FILM COATED ORAL at 08:38

## 2021-01-01 RX ADMIN — LEVETIRACETAM 250 MG: 500 TABLET ORAL at 09:50

## 2021-01-01 RX ADMIN — LIDOCAINE HYDROCHLORIDE 22 ML: 10 INJECTION, SOLUTION INFILTRATION; PERINEURAL at 16:09

## 2021-01-01 RX ADMIN — CITALOPRAM HYDROBROMIDE 10 MG: 20 TABLET ORAL at 08:48

## 2021-01-01 RX ADMIN — SODIUM CHLORIDE, PRESERVATIVE FREE 10 ML: 5 INJECTION INTRAVENOUS at 08:19

## 2021-01-01 RX ADMIN — REMDESIVIR 100 MG: 100 INJECTION, POWDER, LYOPHILIZED, FOR SOLUTION INTRAVENOUS at 14:48

## 2021-01-01 RX ADMIN — EPINEPHRINE 1 MG: 0.1 INJECTION, SOLUTION ENDOTRACHEAL; INTRACARDIAC; INTRAVENOUS at 06:35

## 2021-01-01 RX ADMIN — QUETIAPINE FUMARATE 50 MG: 50 TABLET ORAL at 09:35

## 2021-01-01 RX ADMIN — FOLIC ACID 1 MG: 1 TABLET ORAL at 09:51

## 2021-01-01 RX ADMIN — Medication 38 MCG/MIN: at 20:20

## 2021-01-01 RX ADMIN — PANTOPRAZOLE SODIUM 40 MG: 40 TABLET, DELAYED RELEASE ORAL at 05:31

## 2021-01-01 RX ADMIN — RIVAROXABAN 20 MG: 20 TABLET, FILM COATED ORAL at 00:06

## 2021-01-01 RX ADMIN — Medication 3 ML: at 15:07

## 2021-01-01 RX ADMIN — Medication 2000 UNITS: at 09:36

## 2021-01-01 RX ADMIN — FOLIC ACID 1 MG: 1 TABLET ORAL at 08:37

## 2021-01-01 RX ADMIN — GABAPENTIN 400 MG: 400 CAPSULE ORAL at 23:08

## 2021-01-01 RX ADMIN — ENOXAPARIN SODIUM 30 MG: 100 INJECTION SUBCUTANEOUS at 12:12

## 2021-01-01 RX ADMIN — DEXAMETHASONE 6 MG: 6 TABLET ORAL at 08:27

## 2021-01-01 RX ADMIN — SODIUM CHLORIDE: 9 INJECTION, SOLUTION INTRAVENOUS at 20:19

## 2021-01-01 RX ADMIN — SODIUM CHLORIDE 20.08 UNITS/HR: 9 INJECTION, SOLUTION INTRAVENOUS at 15:55

## 2021-01-01 RX ADMIN — DIPHENHYDRAMINE HCL 12.5 MG: 25 TABLET ORAL at 08:24

## 2021-01-01 RX ADMIN — INSULIN GLARGINE 20 UNITS: 100 INJECTION, SOLUTION SUBCUTANEOUS at 23:49

## 2021-01-01 RX ADMIN — GABAPENTIN 300 MG: 100 CAPSULE ORAL at 20:16

## 2021-01-01 RX ADMIN — CITALOPRAM HYDROBROMIDE 40 MG: 10 TABLET ORAL at 09:32

## 2021-01-01 RX ADMIN — PROPOFOL 20 MCG/KG/MIN: 10 INJECTION, EMULSION INTRAVENOUS at 05:13

## 2021-01-01 RX ADMIN — CITALOPRAM HYDROBROMIDE 40 MG: 10 TABLET ORAL at 08:27

## 2021-01-01 RX ADMIN — PANTOPRAZOLE SODIUM 40 MG: 40 TABLET, DELAYED RELEASE ORAL at 06:12

## 2021-01-01 RX ADMIN — CARVEDILOL 6.25 MG: 6.25 TABLET, FILM COATED ORAL at 08:25

## 2021-01-01 RX ADMIN — LEVETIRACETAM 250 MG: 250 TABLET, FILM COATED ORAL at 10:26

## 2021-01-01 RX ADMIN — SODIUM CHLORIDE 250 ML: 9 INJECTION, SOLUTION INTRAVENOUS at 13:50

## 2021-01-01 RX ADMIN — CYANOCOBALAMIN 1000 MCG: 1000 INJECTION INTRAMUSCULAR; SUBCUTANEOUS at 14:52

## 2021-01-01 RX ADMIN — CARVEDILOL 6.25 MG: 6.25 TABLET, FILM COATED ORAL at 09:48

## 2021-01-01 RX ADMIN — SODIUM BICARBONATE 50 MEQ: 84 INJECTION, SOLUTION INTRAVENOUS at 06:35

## 2021-01-01 RX ADMIN — Medication 10 ML: at 08:38

## 2021-01-01 RX ADMIN — PREDNISONE 2.5 MG: 1 TABLET ORAL at 09:49

## 2021-01-01 RX ADMIN — DEXTROSE MONOHYDRATE 12.5 G: 25 INJECTION, SOLUTION INTRAVENOUS at 16:46

## 2021-01-01 RX ADMIN — MIDAZOLAM HYDROCHLORIDE 4 MG: 1 INJECTION, SOLUTION INTRAMUSCULAR; INTRAVENOUS at 10:30

## 2021-01-01 RX ADMIN — ALLOPURINOL 300 MG: 300 TABLET ORAL at 08:27

## 2021-01-01 RX ADMIN — POTASSIUM CHLORIDE AND SODIUM CHLORIDE: 900; 300 INJECTION, SOLUTION INTRAVENOUS at 18:41

## 2021-01-01 RX ADMIN — PROPOFOL 25 MCG/KG/MIN: 10 INJECTION, EMULSION INTRAVENOUS at 09:13

## 2021-01-01 RX ADMIN — CITALOPRAM HYDROBROMIDE 10 MG: 20 TABLET ORAL at 09:50

## 2021-01-01 RX ADMIN — RIVAROXABAN 20 MG: 20 TABLET, FILM COATED ORAL at 20:19

## 2021-01-01 RX ADMIN — Medication 2000 UNITS: at 12:13

## 2021-01-01 RX ADMIN — CYANOCOBALAMIN 1000 MCG: 1000 INJECTION INTRAMUSCULAR; SUBCUTANEOUS at 13:52

## 2021-01-01 RX ADMIN — SODIUM BICARBONATE: 84 INJECTION, SOLUTION INTRAVENOUS at 23:08

## 2021-01-01 RX ADMIN — 0.12% CHLORHEXIDINE GLUCONATE 15 ML: 1.2 RINSE ORAL at 21:17

## 2021-01-01 RX ADMIN — POTASSIUM CHLORIDE AND SODIUM CHLORIDE: 900; 300 INJECTION, SOLUTION INTRAVENOUS at 14:48

## 2021-01-01 RX ADMIN — LIDOCAINE HYDROCHLORIDE 21 ML: 10 INJECTION, SOLUTION INFILTRATION; PERINEURAL at 14:52

## 2021-01-01 RX ADMIN — POTASSIUM CHLORIDE AND SODIUM CHLORIDE: 900; 300 INJECTION, SOLUTION INTRAVENOUS at 05:09

## 2021-01-01 RX ADMIN — QUETIAPINE FUMARATE 50 MG: 50 TABLET ORAL at 08:27

## 2021-01-01 RX ADMIN — LEVETIRACETAM 250 MG: 250 TABLET, FILM COATED ORAL at 23:50

## 2021-01-01 RX ADMIN — LEVETIRACETAM 250 MG: 500 TABLET ORAL at 20:54

## 2021-01-01 RX ADMIN — SODIUM CHLORIDE, PRESERVATIVE FREE 10 ML: 5 INJECTION INTRAVENOUS at 23:16

## 2021-01-01 RX ADMIN — SODIUM CHLORIDE: 4.5 INJECTION, SOLUTION INTRAVENOUS at 09:46

## 2021-01-01 RX ADMIN — SODIUM CHLORIDE 23.63 UNITS/HR: 9 INJECTION, SOLUTION INTRAVENOUS at 02:12

## 2021-01-01 RX ADMIN — SODIUM CHLORIDE 1.4 MCG/KG/HR: 9 INJECTION, SOLUTION INTRAVENOUS at 18:13

## 2021-01-01 RX ADMIN — LEVETIRACETAM 250 MG: 500 TABLET ORAL at 08:25

## 2021-01-01 RX ADMIN — PROPOFOL 50 MCG/KG/MIN: 10 INJECTION, EMULSION INTRAVENOUS at 01:47

## 2021-01-01 RX ADMIN — HALOPERIDOL LACTATE 2 MG: 5 INJECTION, SOLUTION INTRAMUSCULAR at 13:12

## 2021-01-01 RX ADMIN — ALLOPURINOL 300 MG: 300 TABLET ORAL at 09:31

## 2021-01-01 RX ADMIN — LIDOCAINE HYDROCHLORIDE 5 ML: 20 INJECTION, SOLUTION INFILTRATION; PERINEURAL at 13:49

## 2021-01-01 RX ADMIN — 0.12% CHLORHEXIDINE GLUCONATE 15 ML: 1.2 RINSE ORAL at 20:16

## 2021-01-01 RX ADMIN — LEVETIRACETAM 250 MG: 500 TABLET ORAL at 20:53

## 2021-01-01 RX ADMIN — LIDOCAINE HYDROCHLORIDE 15 ML: 10 INJECTION, SOLUTION INFILTRATION; PERINEURAL at 13:52

## 2021-01-01 RX ADMIN — 0.12% CHLORHEXIDINE GLUCONATE 15 ML: 1.2 RINSE ORAL at 08:53

## 2021-01-01 RX ADMIN — Medication 10 ML: at 09:51

## 2021-01-01 RX ADMIN — PREDNISONE 2.5 MG: 1 TABLET ORAL at 08:47

## 2021-01-01 RX ADMIN — BARICITINIB 4 MG: 2 TABLET, FILM COATED ORAL at 10:26

## 2021-01-01 RX ADMIN — REMDESIVIR 200 MG: 100 INJECTION, POWDER, LYOPHILIZED, FOR SOLUTION INTRAVENOUS at 01:19

## 2021-01-01 RX ADMIN — FOLIC ACID 1 MG: 1 TABLET ORAL at 08:48

## 2021-01-01 RX ADMIN — CARVEDILOL 6.25 MG: 6.25 TABLET, FILM COATED ORAL at 20:54

## 2021-01-01 RX ADMIN — CEFTRIAXONE 1000 MG: 1 INJECTION, POWDER, FOR SOLUTION INTRAMUSCULAR; INTRAVENOUS at 17:59

## 2021-01-01 RX ADMIN — LORAZEPAM 2 MG: 2 INJECTION INTRAMUSCULAR; INTRAVENOUS at 01:55

## 2021-01-01 RX ADMIN — ALOGLIPTIN 6.25 MG: 12.5 TABLET, FILM COATED ORAL at 08:47

## 2021-01-01 RX ADMIN — SODIUM CHLORIDE 1000 ML: 9 INJECTION, SOLUTION INTRAVENOUS at 08:05

## 2021-01-01 RX ADMIN — GABAPENTIN 400 MG: 400 CAPSULE ORAL at 09:34

## 2021-01-01 RX ADMIN — FENTANYL CITRATE 200 MCG/HR: 50 INJECTION INTRAVENOUS at 03:04

## 2021-01-01 RX ADMIN — GABAPENTIN 400 MG: 400 CAPSULE ORAL at 13:12

## 2021-01-01 RX ADMIN — SODIUM CHLORIDE 1000 ML: 9 INJECTION, SOLUTION INTRAVENOUS at 15:08

## 2021-01-01 RX ADMIN — NOREPINEPHRINE BITARTRATE 3 MCG/MIN: 1 INJECTION INTRAVENOUS at 05:11

## 2021-01-01 RX ADMIN — FOLIC ACID 1 MG: 1 TABLET ORAL at 08:25

## 2021-01-01 RX ADMIN — DIPHENHYDRAMINE HCL 12.5 MG: 25 TABLET ORAL at 20:27

## 2021-01-01 RX ADMIN — SODIUM CHLORIDE 9.1 UNITS/HR: 9 INJECTION, SOLUTION INTRAVENOUS at 09:04

## 2021-01-01 RX ADMIN — PANTOPRAZOLE SODIUM 40 MG: 40 TABLET, DELAYED RELEASE ORAL at 06:28

## 2021-01-01 RX ADMIN — LIDOCAINE HYDROCHLORIDE 22 ML: 10 INJECTION, SOLUTION INFILTRATION; PERINEURAL at 16:10

## 2021-01-01 RX ADMIN — DEXAMETHASONE 6 MG: 6 TABLET ORAL at 12:13

## 2021-01-01 RX ADMIN — LIDOCAINE HYDROCHLORIDE 22 ML: 10 INJECTION, SOLUTION INFILTRATION; PERINEURAL at 15:06

## 2021-01-01 RX ADMIN — RIVAROXABAN 20 MG: 10 TABLET, FILM COATED ORAL at 17:48

## 2021-01-01 RX ADMIN — LEVETIRACETAM 250 MG: 250 TABLET, FILM COATED ORAL at 09:35

## 2021-01-01 RX ADMIN — CITALOPRAM HYDROBROMIDE 10 MG: 20 TABLET ORAL at 08:25

## 2021-01-01 RX ADMIN — INSULIN GLARGINE 20 UNITS: 100 INJECTION, SOLUTION SUBCUTANEOUS at 10:18

## 2021-01-01 RX ADMIN — CYANOCOBALAMIN 1000 MCG: 1000 INJECTION INTRAMUSCULAR; SUBCUTANEOUS at 15:22

## 2021-01-01 RX ADMIN — SODIUM CHLORIDE, PRESERVATIVE FREE 10 ML: 5 INJECTION INTRAVENOUS at 16:46

## 2021-01-01 RX ADMIN — LIDOCAINE HYDROCHLORIDE 23 ML: 10 INJECTION, SOLUTION INFILTRATION; PERINEURAL at 15:37

## 2021-01-01 RX ADMIN — POTASSIUM CHLORIDE 20 MEQ: 10 TABLET, EXTENDED RELEASE ORAL at 08:46

## 2021-01-01 RX ADMIN — FENTANYL CITRATE 200 MCG/HR: 50 INJECTION INTRAVENOUS at 19:46

## 2021-01-01 RX ADMIN — PROPOFOL 50 MCG/KG/MIN: 10 INJECTION, EMULSION INTRAVENOUS at 21:18

## 2021-01-01 RX ADMIN — LEVETIRACETAM 250 MG: 500 TABLET ORAL at 20:27

## 2021-01-01 RX ADMIN — EPINEPHRINE 1 MG: 0.1 INJECTION, SOLUTION ENDOTRACHEAL; INTRACARDIAC; INTRAVENOUS at 06:51

## 2021-01-01 RX ADMIN — LEVETIRACETAM 250 MG: 500 TABLET ORAL at 20:33

## 2021-01-01 RX ADMIN — VASOPRESSIN 0.04 UNITS/MIN: 20 INJECTION INTRAVENOUS at 03:58

## 2021-01-01 SDOH — ECONOMIC STABILITY: FOOD INSECURITY: WITHIN THE PAST 12 MONTHS, YOU WORRIED THAT YOUR FOOD WOULD RUN OUT BEFORE YOU GOT MONEY TO BUY MORE.: NEVER TRUE

## 2021-01-01 SDOH — ECONOMIC STABILITY: FOOD INSECURITY: WITHIN THE PAST 12 MONTHS, THE FOOD YOU BOUGHT JUST DIDN'T LAST AND YOU DIDN'T HAVE MONEY TO GET MORE.: NEVER TRUE

## 2021-01-01 ASSESSMENT — ENCOUNTER SYMPTOMS
CONSTIPATION: 0
APNEA: 0
BACK PAIN: 0
EYE PAIN: 0
VOMITING: 0
COLOR CHANGE: 0
SORE THROAT: 0
BACK PAIN: 1
FACIAL SWELLING: 0
SINUS PRESSURE: 0
BACK PAIN: 0
VOMITING: 0
BLOOD IN STOOL: 0
ANAL BLEEDING: 0
EYE REDNESS: 0
SINUS PAIN: 0
PHOTOPHOBIA: 0
EYE REDNESS: 0
CONSTIPATION: 0
GASTROINTESTINAL NEGATIVE: 1
WHEEZING: 0
ANAL BLEEDING: 0
BACK PAIN: 1
WHEEZING: 0
PHOTOPHOBIA: 0
BACK PAIN: 1
APNEA: 0
SHORTNESS OF BREATH: 0
EYE REDNESS: 0
DIARRHEA: 0
BACK PAIN: 1
EYES NEGATIVE: 1
TROUBLE SWALLOWING: 0
TROUBLE SWALLOWING: 0
WHEEZING: 0
WHEEZING: 0
ABDOMINAL PAIN: 0
ALLERGIC/IMMUNOLOGIC NEGATIVE: 1
BACK PAIN: 0
SHORTNESS OF BREATH: 0
DIARRHEA: 0
TROUBLE SWALLOWING: 0
ABDOMINAL PAIN: 0
RHINORRHEA: 0
SHORTNESS OF BREATH: 0
ALLERGIC/IMMUNOLOGIC NEGATIVE: 1
EYE DISCHARGE: 0
COUGH: 0
COLOR CHANGE: 0
COLOR CHANGE: 0
CHOKING: 0
SHORTNESS OF BREATH: 0
SHORTNESS OF BREATH: 1
CONSTIPATION: 0
SHORTNESS OF BREATH: 1
VOMITING: 0
ABDOMINAL DISTENTION: 0
COUGH: 0
BLOOD IN STOOL: 0
RHINORRHEA: 0
NAUSEA: 0
NAIL CHANGES: 0
EYE REDNESS: 0
CHOKING: 0
COLOR CHANGE: 0
DIARRHEA: 0
COUGH: 0
EYE PAIN: 0
SINUS PRESSURE: 0
NAUSEA: 0
SINUS PRESSURE: 0
SORE THROAT: 0
RHINORRHEA: 0
PHOTOPHOBIA: 0
RHINORRHEA: 0
WHEEZING: 0
FACIAL SWELLING: 0
SHORTNESS OF BREATH: 0
VOMITING: 0
BOWEL INCONTINENCE: 0
TROUBLE SWALLOWING: 0
NAUSEA: 0
CHEST TIGHTNESS: 0
SORE THROAT: 0
BACK PAIN: 0
NAUSEA: 0
PHOTOPHOBIA: 0
CONSTIPATION: 1
EYES NEGATIVE: 1
STRIDOR: 0
PHOTOPHOBIA: 0
PHOTOPHOBIA: 0
NAUSEA: 0
CHEST TIGHTNESS: 0
VOMITING: 0
SORE THROAT: 0
ABDOMINAL PAIN: 0
TROUBLE SWALLOWING: 0
ABDOMINAL PAIN: 0
NAUSEA: 0
VOMITING: 0
VOMITING: 0
CHOKING: 0
NAUSEA: 0
CHEST TIGHTNESS: 0
EYE PAIN: 0
COUGH: 0
CHEST TIGHTNESS: 0
VOMITING: 0
VOMITING: 0
SHORTNESS OF BREATH: 0
ABDOMINAL PAIN: 0
COUGH: 0
SHORTNESS OF BREATH: 0
EYE PAIN: 0
RHINORRHEA: 0
ABDOMINAL PAIN: 0
TROUBLE SWALLOWING: 0
COUGH: 1
BACK PAIN: 0
BOWEL INCONTINENCE: 0
ABDOMINAL PAIN: 0
BLOOD IN STOOL: 0
ABDOMINAL PAIN: 0
NAUSEA: 0
COLOR CHANGE: 0
BOWEL INCONTINENCE: 0
SINUS PAIN: 0
ABDOMINAL DISTENTION: 0
SHORTNESS OF BREATH: 1
TROUBLE SWALLOWING: 0
COUGH: 0
SINUS PRESSURE: 0
EYE ITCHING: 0
ABDOMINAL PAIN: 0
VOMITING: 0
DIARRHEA: 0
EYE PAIN: 0
SORE THROAT: 0
EYE PAIN: 0
NAUSEA: 0
DIARRHEA: 0
WHEEZING: 0
BOWEL INCONTINENCE: 0
SHORTNESS OF BREATH: 0
PHOTOPHOBIA: 0
EYE PAIN: 0
EYE PAIN: 0
WHEEZING: 0
EYE REDNESS: 0
SORE THROAT: 0
NAUSEA: 0
WHEEZING: 0
WHEEZING: 0
ABDOMINAL PAIN: 0
DIARRHEA: 0
FACIAL SWELLING: 0
SINUS PRESSURE: 0
COUGH: 0
RHINORRHEA: 0
ABDOMINAL PAIN: 0
VOMITING: 0
NAUSEA: 0
COLOR CHANGE: 0
ABDOMINAL DISTENTION: 0
ABDOMINAL PAIN: 0
SORE THROAT: 0
WHEEZING: 0

## 2021-01-01 ASSESSMENT — PATIENT HEALTH QUESTIONNAIRE - PHQ9
1. LITTLE INTEREST OR PLEASURE IN DOING THINGS: 0
1. LITTLE INTEREST OR PLEASURE IN DOING THINGS: 0
SUM OF ALL RESPONSES TO PHQ9 QUESTIONS 1 & 2: 0
SUM OF ALL RESPONSES TO PHQ QUESTIONS 1-9: 0
2. FEELING DOWN, DEPRESSED OR HOPELESS: 0
SUM OF ALL RESPONSES TO PHQ QUESTIONS 1-9: 0
SUM OF ALL RESPONSES TO PHQ9 QUESTIONS 1 & 2: 0
SUM OF ALL RESPONSES TO PHQ QUESTIONS 1-9: 0
SUM OF ALL RESPONSES TO PHQ QUESTIONS 1-9: 0
2. FEELING DOWN, DEPRESSED OR HOPELESS: 0
SUM OF ALL RESPONSES TO PHQ9 QUESTIONS 1 & 2: 0
SUM OF ALL RESPONSES TO PHQ QUESTIONS 1-9: 0
SUM OF ALL RESPONSES TO PHQ QUESTIONS 1-9: 0
1. LITTLE INTEREST OR PLEASURE IN DOING THINGS: 0
SUM OF ALL RESPONSES TO PHQ QUESTIONS 1-9: 0

## 2021-01-01 ASSESSMENT — PAIN SCALES - WONG BAKER
WONGBAKER_NUMERICALRESPONSE: 0
WONGBAKER_NUMERICALRESPONSE: 2

## 2021-01-01 ASSESSMENT — PAIN DESCRIPTION - ONSET: ONSET: ON-GOING

## 2021-01-01 ASSESSMENT — PULMONARY FUNCTION TESTS
PIF_VALUE: 35
PIF_VALUE: 35
PIF_VALUE: 32
PIF_VALUE: 40
PIF_VALUE: 46
PIF_VALUE: 38
PIF_VALUE: 39
PIF_VALUE: 37
PIF_VALUE: 36
PIF_VALUE: 39
PIF_VALUE: 33
PIF_VALUE: 34

## 2021-01-01 ASSESSMENT — PAIN SCALES - GENERAL
PAINLEVEL_OUTOF10: 6
PAINLEVEL_OUTOF10: 0
PAINLEVEL_OUTOF10: 8
PAINLEVEL_OUTOF10: 0
PAINLEVEL_OUTOF10: 7
PAINLEVEL_OUTOF10: 3
PAINLEVEL_OUTOF10: 0

## 2021-01-01 ASSESSMENT — PAIN DESCRIPTION - FREQUENCY
FREQUENCY: CONTINUOUS
FREQUENCY: CONTINUOUS

## 2021-01-01 ASSESSMENT — PAIN DESCRIPTION - PAIN TYPE
TYPE: ACUTE PAIN
TYPE: ACUTE PAIN
TYPE: CHRONIC PAIN

## 2021-01-01 ASSESSMENT — PAIN DESCRIPTION - PROGRESSION
CLINICAL_PROGRESSION: NOT CHANGED

## 2021-01-01 ASSESSMENT — PAIN - FUNCTIONAL ASSESSMENT
PAIN_FUNCTIONAL_ASSESSMENT: ACTIVITIES ARE NOT PREVENTED
PAIN_FUNCTIONAL_ASSESSMENT: ACTIVITIES ARE NOT PREVENTED

## 2021-01-01 ASSESSMENT — PAIN DESCRIPTION - DESCRIPTORS
DESCRIPTORS: SHARP
DESCRIPTORS: ACHING

## 2021-01-01 ASSESSMENT — PAIN DESCRIPTION - ORIENTATION: ORIENTATION: RIGHT

## 2021-01-01 ASSESSMENT — PAIN DESCRIPTION - LOCATION
LOCATION: WRIST
LOCATION: GENERALIZED

## 2021-01-01 ASSESSMENT — SOCIAL DETERMINANTS OF HEALTH (SDOH): HOW HARD IS IT FOR YOU TO PAY FOR THE VERY BASICS LIKE FOOD, HOUSING, MEDICAL CARE, AND HEATING?: NOT HARD AT ALL

## 2021-01-11 NOTE — TELEPHONE ENCOUNTER
Patient called said that she went to see Dr Jos Meng last week and he said that her kidneys levels are elevated then last time. I Called to get the office notes from that visit I have scanned them in the chart to see if you need to have an video visit earlier than 2/12/2021. trace

## 2021-01-12 NOTE — TELEPHONE ENCOUNTER
Please let her know that I was able to review the notes from hematology and yes her kidney function is down below her baseline. I do recommend having her establish with nephrology. Dr. Michael Jarrell in [facility] is an option unless she would like to follow with someone from the [address] [place] [address] clinic group. Please let me know.

## 2021-01-14 NOTE — TELEPHONE ENCOUNTER
Patient is aware and will check with her insurance company. .. and call back with a name.     Aguilar Vargas

## 2021-01-15 NOTE — TELEPHONE ENCOUNTER
Called patient to let her know that I am faxing over information to Dr Keri Huynh office and they will call her. cp

## 2021-01-26 NOTE — PROGRESS NOTES
interactions with friends and family. Back Pain  This is a recurrent problem. The current episode started more than 1 year ago. The problem occurs constantly. The problem is unchanged. The pain is present in the lumbar spine and thoracic spine. The quality of the pain is described as aching. The pain does not radiate (Occasional down bilateral legs). The pain is at a severity of 6/10. The pain is severe. The pain is the same all the time. The symptoms are aggravated by bending, twisting, coughing and position. Stiffness is present in the morning. Associated symptoms include leg pain and weakness. Pertinent negatives include no abdominal pain, bladder incontinence, bowel incontinence, chest pain, dysuria, fever, headaches, numbness, paresis, paresthesias, perianal numbness, tingling or weight loss. Risk factors include obesity, menopause, lack of exercise and sedentary lifestyle. She has tried heat, analgesics, muscle relaxant, ice, NSAIDs, walking and home exercises (Caudal Blocks, TP injections, Tramadol and Flexeril, ) for the symptoms. The treatment provided moderate relief. Neck Pain   Associated symptoms include leg pain and weakness. Pertinent negatives include no chest pain, fever, headaches, numbness, paresis, photophobia, tingling, trouble swallowing or weight loss. Foot Pain   This is a chronic problem. The current episode started more than 1 year ago. The problem occurs constantly. The problem has been unchanged. Pertinent negatives include no fever, numbness or tingling. The symptoms are aggravated by bending and walking. She has tried acetaminophen, rest, heat, relaxation, oral narcotics and lying down for the symptoms. The treatment provided moderate relief.              Past Medical History:   Diagnosis Date    Ataxic gait     Back pain     Benign essential tremor     Depression     [facility] 11/25/2014    DVT (deep venous thrombosis) ([facility])     Elevated troponin 5/25/2016    Factor V Leiden carrier ([facility]     history of DVT, on Xarelto    GERD (gastroesophageal reflux disease)     Hereditary sensory-motor neuropathy, type I     HTN (hypertension)     HTN (hypertension)     Hyperlipidemia     Lumbar radicular pain     Neuropathy of both feet 8/22/2016    NSTEMI (non-ST elevated myocardial infarction) (HCC)     Obesity     Osteoarthritis     Pulmonary embolism (HCC)     Renal failure 6/29/2018    Right inguinal hernia 11/9/2017    SOB (shortness of breath) 4/19/2017    Systemic lupus erythematosus ([facility] 9/8/2017    Transient brainstem ischemia     Umbilical hernia 92/8/7298    Vasomotor flushing     on hormone replacement therapy       Past Surgical History:   Procedure Laterality Date    APPENDECTOMY      CARDIAC CATHETERIZATION  06/2018    CATARACT REMOVAL Bilateral Winter 2019   [facility] COLONOSCOPY      Dr. Tg Webber    COLONOSCOPY  12/16/13    DR SINGER repeat in 10 yrs per pt    COLONOSCOPY  08/2017    Dr Diamond Crooked Right 11/15/2017    RIGHT  HERNIA INGUINAL REPAIR performed by Basil Womack MD at [address] [place], [place] [address]Mg ARTHROSCOPY Left     LAPAROSCOPY N/A 11/15/2017    DIAGNOSTIC LAPAROSCOPY performed by Basil Womack MD at [address] Right 3/14/2019    RIGHT QUADRICEPS MUSCLE BIOPSY performed by Regan Rider MD at [facility] N/A 9/25/2017    [address] [place] [address] performed by Leisa Laws MD at [facility],4+R/G,OXACB N/A 63/14/6657    HERNIA UMBILICAL REPAIR, [place] performed by Basil Womack MD at [place] [address]  12/16/13    DR SINGER       Social History     Socioeconomic History    Marital status:      Spouse name: [address] Number of children: 1    Years of education: 15    Highest education level: 12th grade   Occupational History    Occupation: Housewife    Occupation: retired [employer]   Social Needs    Financial resource strain: Not hard at all   Boring-Liang insecurity     Worry: Never true     Inability: Never true   [employer] needs     Medical: No     Non-medical: No   Tobacco Use    Smoking status: Former Smoker     Packs/day: 1.25     Years: 10.00     Pack years: 12.50     Types: Cigarettes     Quit date: 1/1/2006     Years since quitting: 15.0    Smokeless tobacco: Never Used    Tobacco comment: start smoking age 25   Substance and Sexual Activity    Alcohol use: Not Currently    Drug use: No    Sexual activity: Yes   Lifestyle    Physical activity     Days per week: 0 days     Minutes per session: 0 min    Stress:  Only a little   Relationships    Social connections     Talks on phone: More than three times a week     Gets together: More than three times a week     Attends Religion service: 1 to 4 times per year     Active member of club or organization: No     Attends meetings of clubs or organizations: Never     Relationship status:     Intimate partner violence     Fear of current or ex partner: No     Emotionally abused: No     Physically abused: No     Forced sexual activity: No   Other Topics Concern    None   Social History Narrative    Off work dt--retired early from [employer]--worked [employer]---moved back to [place] to work at [employer] in [address] up in [place] went to [id number]       Family History   Problem Relation Age of Onset    Substance Abuse Brother     High Blood Pressure Mother     High Cholesterol Mother     Arthritis Mother     High Blood Pressure Father     High Cholesterol Father     Clotting Disorder Father         clot to intestines    Arthritis Father     Substance Abuse Brother     Substance Abuse Brother     Stroke Paternal Uncle 36    Cystic Fibrosis Daughter        Allergies   Allergen Reactions    Sulfa Antibiotics Rash    Ciprofloxacin Hcl Swelling     facial, tongue swelling    Nsaids Other (See Comments)     Bleed risk dt Xaralto    Statins Other (See Comments)     Liver enzyme elevation       Review of Systems   Constitutional: Positive for activity change and fatigue. Negative for appetite change, chills, fever, unexpected weight change and weight loss. HENT: Negative for ear discharge, ear pain, facial swelling, hearing loss and trouble swallowing. Eyes: Negative for photophobia, pain and redness. Respiratory: Negative for cough, choking, chest tightness, shortness of breath and wheezing. Cardiovascular: Negative for chest pain, palpitations and leg swelling. Gastrointestinal: Negative for abdominal distention, abdominal pain, anal bleeding, blood in stool, bowel incontinence, constipation, nausea and vomiting. Genitourinary: Negative for bladder incontinence, difficulty urinating, dysuria, flank pain, frequency and urgency. Musculoskeletal: Positive for arthralgias, back pain, gait problem, myalgias and neck pain. Negative for neck stiffness. Skin: Negative for color change, pallor, rash and wound. Neurological: Positive for weakness. Negative for dizziness, tingling, tremors, syncope, facial asymmetry, speech difficulty, light-headedness, numbness, headaches and paresthesias. Hematological: Negative for adenopathy. Does not bruise/bleed easily. Psychiatric/Behavioral: Positive for sleep disturbance. Negative for agitation, behavioral problems, confusion, decreased concentration, dysphoric mood, hallucinations, self-injury and suicidal ideas. The patient is not nervous/anxious and is not hyperactive. All other systems reviewed and are negative. Objective    There were no vitals filed for this visit.     No data recorded            PHYSICAL EXAMINATION:  [ INSTRUCTIONS:  \"[x]\" Indicates a positive item  \"[]\" Indicates a negative item  -- DELETE ALL ITEMS NOT EXAMINED]    [x] Alert  [x] Oriented to person/place/time      [x] No apparent distress  [x] Not toxic appearing [x] Face complexion normal appearing [x] Sclera clear  [x] Lips are not cyanotic      [x] Breathing appears normal  [] Appears tachypneic      [] Rash on visible skin    [x] Cranial Nerves II-XII grossly intact    [x] Motor grossly intact in visible upper extremities, including stiff but intact range of motion in cervical, upper extremity and thoracic  [x] Motor grossly intact in visible lower extremities, including normal range of motion in the hips and knees for stiffness in the lumbar spine    [x] Normal Mood  [x] Not anxious appearing    [x] Not depressed appearing  [x] Not confused appearing      [x]  Good short term memory  [x]  Good long term memory    [x]  Able to follow 2-3 step commands    Due to this being a TeleHealth encounter, evaluation of the following organ systems is limited: Vitals/Constitutional/EENT/Resp/CV/GI//MS/Neuro/Skin/Heme-Lymph-Imm. ASSESSMENT/PLAN:     After a thorough review and discussion of the previous medical records, patient comprehensive medical, surgical, and family and social history, Review of Systems, their OARRS, their Screener and Opioid Assessment for Patients with Pain (SOAPP®-R), recent diagnostics, and symptomatic results to previous treatment, it is my impression that the patients is suffering with progressive and severe:     Diagnosis Orders   1. Chronic low back pain with sciatica, sciatica laterality unspecified, unspecified back pain laterality  Urine Drug Screen    traMADol-acetaminophen (ULTRACET) 37.5-325 MG per tablet   2. Vitamin D deficiency  Urine Drug Screen   3. High risk medication use - 12/08/17 OARRS PM&R, 02/07/18 OARRS PM&R, 02/22/17 Med Contract PM&R  Urine Drug Screen   4. Discoid lupus erythematosus     5. Neck pain     6. Factor 5 Leiden mutation, heterozygous ([facility]     7. Chronic bilateral low back pain with bilateral sciatica     8. Pain in both hands     9.  Lupus erythematosus, unspecified form  traMADol-acetaminophen (ULTRACET) 37.5-325 MG per tablet   10. SI (sacroiliac) pain  traMADol-acetaminophen (ULTRACET) 37.5-325 MG per tablet   11. High risk medication use  traMADol-acetaminophen (ULTRACET) 37.5-325 MG per tablet    OARRS and last refill reviewed. 2015 narcotic contract signed. 11/14/14 tox screening        I am also concerned by lifestyle and mood issues including:    Past Medical History:   Diagnosis Date    Ataxic gait     Back pain     Benign essential tremor     Depression     DM [facility]diabetes mellitus) [facility] 11/25/2014    [facility]) [facility]     Elevated troponin 5/25/2016    Factor V Leiden carrier ([facility]     history of DVT, on Xarelto    GERD (gastroesophageal reflux disease)     Hereditary sensory-motor neuropathy, type I     HTN (hypertension)     HTN (hypertension)     Hyperlipidemia     Lumbar radicular pain     Neuropathy of both feet 8/22/2016    NSTEMI (non-ST elevated myocardial infarction) ([facility]     Obesity     Osteoarthritis     [facility]     Renal failure 6/29/2018    Right inguinal hernia 11/9/2017    SOB (shortness of breath) 4/19/2017    Systemic lupus erythematosus ([facility] 9/8/2017    Transient brainstem ischemia     Umbilical hernia 51/5/6437    Vasomotor flushing     on hormone replacement therapy           Given their medication, chronic pain and lifestyle and medications they are at risk for :    Falls, constipation, addiction  Loss of livelyhood due to severe pain, debility, weight gain and  vitamin D deficiency    The patient was educated regarding proper diet, fitness routine, and regulatory restrictions concerning pain medications. Previous notes, comprehensive past medical, surgical, family history, and diagnostics were reviewed. Patient education and councelling were provided regarding off label use,treatment options and medication and injection risks. Overall greater than 25 minutes spent in direct and indirect patient care.     Current and old [facility] Automated Prescription Reporting System) records reviewed, all refills reviewed since last visit,  Behavioral agreement/FELICITAS regulations   and Toxicology screen was reviewed with patient and is up to date. There are no current red flags. They are making good progress regarding pain relief, they are performing at a functional level regarding activities of daily living, family interactions and psychological functioning, they're not having any adverse effects or side effects from the current medications, and I see no findings of aberrant drug taking or addiction related behaviors. The patient is aware that they have a chronic pain condition and they may require opiates dosing for life. All efforts will be made to wean to the lowest effective dose. Other therapies for pain have not been effective including nonopiate medications. Injections and exercises are only partially effective. A Rx for Narcan was offered to help prevent accidental overdose. RX Monitoring 10/23/2019   Attestation -   Periodic Controlled Substance Monitoring Possible medication side effects, risk of tolerance/dependence & alternative treatments discussed. ;No signs of potential drug abuse or diversion identified. ;Assessed functional status. ;Obtaining appropriate analgesic effect of treatment. Chronic Pain > 50 MEDD Re-evaluated the status of the patient's underlying condition causing pain. ;Considered consultation with a specialist.;Obtained or confirmed \"Consent for Opioid Use\" on file.    Chronic Pain > 80 MEDD -               Patient is currently taking:       I am having Kristen Stephenson maintain her vitamin D, Accu-Chek Advantage Diabetes, blood glucose test strips, Lancets, albuterol sulfate HFA, hydroxychloroquine, diclofenac sodium, hydrocortisone, cyanocobalamin, folic acid, methotrexate, cholestyramine, lansoprazole, allopurinol, balsalazide, leucovorin calcium, furosemide, carvedilol, Prolia, predniSONE, albuterol, albuterol, rivaroxaban, cyclobenzaprine, nitrofurantoin (macrocrystal-monohydrate), traMADol, omeprazole, gabapentin, levETIRAcetam, amitriptyline, amitriptyline, triamcinolone, hydrOXYzine, Rybelsus, SITagliptin, citalopram, traMADol-acetaminophen, methocarbamol, and lidocaine. We will continue to administer lidocaine. I also recommend the following Medications:    Orders Placed This Encounter   Medications    traMADol-acetaminophen (ULTRACET) 37.5-325 MG per tablet     Sig: Take 1 tablet by mouth every 4 hours as needed for Pain (Maximum of 2.5 tablets daily. Do not get narcotic pain medication from any other providers.) for up to 30 days. Dispense:  75 tablet     Refill:  0     Reduce doses taken as pain becomes manageable    methocarbamol (ROBAXIN) 500 MG tablet     Sig: Take 1 tablet by mouth every 12 hours as needed (spasms) Generic equivalent acceptable, unless otherwise noted. Dispense:  60 tablet     Refill:  0    lidocaine (LIDODERM) 5 %     Sig: Place 1 patch onto the skin daily 12 hours on, 12 hours off. Dispense:  90 patch     Refill:  0        -which helps with pain and function. Otherwise, continue the current pain medications that I have prescibed. Radiologic:   Old films reviewed,     I discussed results with patients. see Follow up plans below  For any new studies. Care Everywhere Updates:  requested and reviewed. No new issues noted. Electrodiagnostic:  Previous studies requested,     I discussed results with patient. See follow-up plans for new studies.         Labs:  Previous labs reviewed     Lab Results   Component Value Date     11/05/2020    K 3.9 11/05/2020    K 4.0 11/19/2018     11/05/2020    CO2 25 11/05/2020    BUN 33 11/05/2020    CREATININE 1.39 11/05/2020    CALCIUM 9.2 11/05/2020    LABALBU 3.8 11/05/2020    LABALBU 4.2 03/13/2012    BILITOT 0.3 11/05/2020    ALKPHOS 74 11/05/2020    AST 26 11/05/2020    ALT 30 11/05/2020     Lab Results   Component Value Date    WBC 4.8 11/05/2020    RBC 3.73 11/05/2020    RBC 4.77 03/13/2012    HGB 11.1 11/05/2020    HCT 34.4 11/05/2020    MCV 92.3 11/05/2020    MCH 29.7 11/05/2020    MCHC 32.1 11/05/2020    RDW 14.1 11/05/2020     11/05/2020    MPV 8.2 07/31/2015       Lab Results   Component Value Date    LABAMPH Neg 11/17/2018    BARBSCNU Neg 11/17/2018    LABBENZ Neg 11/17/2018    LABBENZ NotDTCD 10/11/2012    CANSU Neg 11/17/2018    COCAIMETSCRU Neg 11/17/2018    PHENCYCLIDINESCREENURINE Neg 91/35/4675    TRICYCLIC POSITIVE 13/76/3824    DSCOMMENT see below 11/17/2018       Lab Results   Component Value Date    CODEINE Not Detected 12/19/2016    MORPHINE Not Detected 12/19/2016    ACETYLMORPHI Not Detected 12/19/2016    OXYCODONE Not Detected 12/19/2016    NOROXYCODONE Not Detected 12/19/2016    NOROXYMU Not Detected 12/19/2016    HYDRCO Not Detected 12/19/2016    NORHYDU Not Detected 12/19/2016    HYDROMO Not Detected 12/19/2016    Kenny Hammersmith Not Detected 12/19/2016    Wing Likens Not Detected 12/19/2016    FENTA Not Detected 12/19/2016    NORFENT Not Detected 12/19/2016    MEPERIDINE Not Detected 12/19/2016    TAPENU Not Detected 12/19/2016    TAPOSULFUR Not Detected 12/19/2016    METHADONE Not Detected 12/19/2016    LABPROP Not Detected 12/19/2016    TRAM Present 12/19/2016    AMPH Not Detected 12/19/2016    METHAMP Not Detected 12/19/2016    MDMA Not Detected 12/19/2016    ECMDA Not Detected 12/19/2016       Lab Results   Component Value Date    METPHEN Not Detected 12/19/2016    PHENTERMINE Not Detected 12/19/2016    BENZOYL Not Detected 12/19/2016    Jearline Arun Not Detected 12/19/2016    Chasidy Lombard Not Detected 12/19/2016    CLONAZEPAM Not Detected 12/19/2016    Frederick Abo Not Detected 12/19/2016    DIAZEP Not Detected 12/19/2016    DEBORA Not Detected 12/19/2016    OXAZ Not Detected 12/19/2016    Kelsi Mariner Not Detected 12/19/2016    LORAZEPAM Not Detected 12/19/2016    MIDAZOLAM Not Detected 12/19/2016    ZOLPIDEM Not Detected 12/19/2016    TEO Not Detected 12/19/2016    ETG See Note 12/19/2016    MARIJMET Not Detected 12/19/2016    PCP Not Detected 12/19/2016    PAINMGTDRUGP See Below 12/19/2016    EERPAINMGTPA See Note 12/19/2016    LABCREA 145.8 11/05/2020         , I discussed results with patient. See follow-up plans for new studies. Therapies:  HEP-gentle stretching and relaxation techniques-demonstrated with patient-they are to do them twice a day. They are also advised to make the following lifestyle changes:  Goals       Limit Carbs to 2-3/meal maximum      Exercise 3x per week (30 min per time)      Piriformis stretches and abd strengthening       HEMOGLOBIN A1C < 6.5      SOAPP- R GOAL LESS THAN 9      12/19/16 SCORE: 15-MODERATE RISK   02/22/17 score: 22-High risk   05/15/17 score: 26-high risk  07/12/17 score: 22-high risk  10/02/17 score: 15-moderate risk  12/11/17 score: 11-moderate risk  02/08/18 score: 14-moderate risk  04/18/18 score: 15-moderate risk  8/9/18 score: 10 low risk  10/11/18 score: 22- high risk  12/14/18 score:17-moderate risk  3/27/19 score: 12 moderate risk  6/13/19 score: 24 high risk  8/22/19 score: 18- moderate risk   10/23/19 score: 18- moderate risk  01/03/20 score: 23- moderate risk       Weight < 160 lb (72.576 kg)           Injections or Epidurals:  Injection options were discussed. TPs ok with 10-20 mg Kenalog/session max. Monthly trigger point injections add vitamin B12 when able. Patient gave verbal consent to ordered injections. See follow-up plans for planned injections. Supplements:  Vitamin D with increased dosing during the rainy months, add co-Q10 for heart health. Education was given on:   Dietary and Fitness--daily stretches and low carb diet-in chair Yoga when possible         FU with rheum Dr Nereyda Morocho re Lupus. Follow up with Primary Care Physician regarding their general medical needs.      Stressed the importance of following up with PCP and specialists for his/her chronic diseases, health, CV, and cancer screening and continued care. Will follow disease activity/progression and adjust therapeutic regimen to disease activity and severity. Discussed medication dosage, usage, goals of therapy, and side effects. Available test results were reviewed -Discussed findings, impression and plan with patient. An additional 10 minutes were spent outside of the patient visit to review records. Additional time spent with the patient to discuss their questions. Additional time spent with the patient devoted to discussing treatment strategy, planning, and implementation generalized musculoskeletal health plan. Patient understands above plan; questions asked and answered. Patient agrees to plan as noted above. F/U in 2-3months  At least 50% of the visit was involved in the discussion of the options for treatment. We discussed exercises, medication, interventional therapies and surgery. Healthy life style is essential with patient hard work to achieve the wellness. In addition; discussion with the patient and/or family about any of the diagnostic results, impressions and/or recommended diagnostic studies, prognosis, risks and benefits of treatment options, instructions for treatment and/or follow-up, importance of compliance with chosen treatment options, risk-factor reduction, and patient/family education. They are to follow up in 2 months to review medication, efficacy of injections, pill counts, OARRS check, SOAPPR assessment, review diagnostics, to review previous and future treatment plans and assess appropriateness for continued therapy. New Diagnostics  Orders Placed This Encounter   Procedures    Urine Drug Screen       Follow-up in 2-1/2 to 3 months for guarding the efficacy of current plan and future treatment. An  electronic signature was used to authenticate this note.     -Dr Wendy Haile, DO With MA assistance from:   Haroldo Flores MA     19}    Pursuant to the emergency declaration under the [facility], [facility] waiver authority and the [employer] Act, this Virtual  Visit was conducted, with patient's consent, to reduce the patient's risk of exposure to COVID-19 and provide continuity of care for an established patient. Services were provided through a video synchronous discussion virtually to substitute for in-person clinic visit.

## 2021-02-05 NOTE — TELEPHONE ENCOUNTER
Rx request   Requested Prescriptions     Pending Prescriptions Disp Refills    hydrOXYzine (VISTARIL) 50 MG capsule [Pharmacy Med Name: Tom Harris CAPS 50MG] 270 capsule 4     Sig: TAKE 1 CAPSULE FOUR TIMES A DAY AS NEEDED FOR ITCHING OR ANXIETY     LOV 11/6/2020  Next Visit Date:  Future Appointments   Date Time Provider Delmer Zhao   2/12/2021 10:30 AM [facility]   2/15/2021  1:15 PM Catia Mcfadden DO [address]   4/22/2021  2:00 PM Catia Mcfadden DO [address]   5/7/2021  9:00 AM DO Jess Jamison   6/16/2021  2:45 PM Isidoro Way MD [facility]

## 2021-02-12 PROBLEM — E11.65 UNCONTROLLED TYPE 2 DIABETES MELLITUS WITH HYPERGLYCEMIA (HCC): Status: ACTIVE | Noted: 2021-01-01

## 2021-02-12 PROBLEM — N18.30 CKD STAGE 3 SECONDARY TO DIABETES (HCC): Status: ACTIVE | Noted: 2021-01-01

## 2021-02-12 PROBLEM — E11.22 CKD STAGE 3 SECONDARY TO DIABETES (HCC): Status: ACTIVE | Noted: 2021-01-01

## 2021-02-12 NOTE — PROGRESS NOTES
2/12/2021    TELEHEALTH EVALUATION -- Audio/Visual (During XXRWF-18 public health emergency)    Due to [place] 19 outbreak, patient's office visit was converted to a virtual visit. Patient was contacted and agreed to proceed with a virtual visit via Doxy. me  The risks and benefits of converting to a virtual visit were discussed in light of the current infectious disease epidemic. Patient also understood that insurance coverage and co-pays are up to their individual insurance plans. Bev Sofia is a 61 y.o. female being evaluated by a Virtual Visit (video visit) encounter to address concerns as mentioned above. A caregiver was present when appropriate. Due to this being a TeleHealth encounter (During GXQHL-84 public health emergency), evaluation of the following organ systems was limited: Vitals/Constitutional/EENT/Resp/CV/GI//MS/Neuro/Skin/Heme-Lymph-Imm. Pursuant to the emergency declaration under the [address] [place] [address] authority and the [email or web address] and [employer] Act, this Virtual Visit was conducted with patient's (and/or legal guardian's) consent, to reduce the patient's risk of exposure to COVID-19 and provide necessary medical care. The patient (and/or legal guardian) has also been advised to contact this office for worsening conditions or problems, and seek emergency medical treatment and/or call 911 if deemed necessary. Patient identification was verified at the start of the visit: Yes    Total time spent for this encounter: Not billed by time    Services were provided through a video synchronous discussion virtually to substitute for in-person clinic visit. Patient and provider were located at their individual homes. The patient is talking with me virtually from her home and I am located at my office in [facility].        HPI: Chika Wong (:  1960) has requested an audio/video evaluation for the following concern(s):    Diabetes: She states that she has been taking the Januvia without any issues. No low sugar episodes reported. Upon further questioning, she is not currently taking the Rybelsus that was ordered at the time of her last virtual visit. She states that she has been trying to eat healthy overall. Getting activity as tolerated. She states that she recently establish care with nephrology in [facility]. HTN/Dyslipidemia: She states that she has been taking Coreg with no side effects. Has not had any recent swelling while taking Lasix routinely. She cannot tolerate medication for cholesterol secondary to polymyositis. Polymyositis/Lupus/back pain: She states that she continues to follow-up routinely with neurology as well as rheumatology and recently had another infusion. She has not had any exacerbation of symptoms lately. States that overall there is no change in her mobility or other issues secondary to chronic condition. She has been following with Dr. Chito Johnston for chronic low back pain symptoms. Is going to be getting trigger point injections soon. Is currently taking ultram with some relief in chronic pain symptoms. Cold extremities: she states that she has noticed that her hands get really cold now in the winter. This is something different for her. No numbness /tingling but feels that her fingers are always cold when temperatures are low outside. She denies any abnormal bruising or bleeding while taking eliquis for history of factor 5 mutation.        Review of Systems This patient reports no chest pain or pressure. There is no shortness of breath or cough. The patient reports no nausea or vomiting. There is no heartburn or indigestion. There is no diarrhea or constipation. No black, bloody, mucusy or tarry stool noticed. The patient reports no bloating and no change in appetite. There is no edema to the extremities. Prior to Visit Medications    Medication Sig Taking? Authorizing Provider   furosemide (LASIX) 20 MG tablet Take 1 tablet by mouth daily Yes BUTCH Chacon CNP   carvedilol (COREG) 6.25 MG tablet Take 1 tablet by mouth 2 times daily (with meals) Yes BUTCH Chacon CNP   Semaglutide (RYBELSUS) 3 MG TABS Take 1 tablet by mouth daily Before breakfast on an empty stomach Yes BUTCH Chacon CNP   hydrOXYzine (VISTARIL) 50 MG capsule TAKE 1 CAPSULE FOUR TIMES A DAY AS NEEDED FOR ITCHING OR ANXIETY Yes BUTCH Gardner CNP   traMADol-acetaminophen (ULTRACET) 37.5-325 MG per tablet Take 1 tablet by mouth every 4 hours as needed for Pain (Maximum of 2.5 tablets daily. Do not get narcotic pain medication from any other providers.) for up to 30 days. Yes Maria Elena Cid,    methocarbamol (ROBAXIN) 500 MG tablet Take 1 tablet by mouth every 12 hours as needed (spasms) Generic equivalent acceptable, unless otherwise noted. Yes Maria Elena Cid, DO   lidocaine (LIDODERM) 5 % Place 1 patch onto the skin daily 12 hours on, 12 hours off.  Yes Maria Elena Cid DO   citalopram (CELEXA) 40 MG tablet TAKE 1 TABLET DAILY Yes BUTCH Gardner CNP   SITagliptin (JANUVIA) 50 MG tablet Take 1 tablet by mouth daily Yes BUTCH Gardner CNP   triamcinolone (KENALOG) 0.1 % ointment APPLY TO THE AFFECTED AREAS OF RASH TWICE A DAY FOR 2 WEEKS THEN ONCE DAILY FOR 2 WEEKS Yes BUTCH Gardner CNP   levETIRAcetam (KEPPRA) 250 MG tablet Take 1 tablet by mouth 2 times daily Yes Miah Gallagher MD cyclobenzaprine (FLEXERIL) 10 MG tablet Take 10 mg by mouth 2 times daily as needed Yes Historical Provider, MD   rivaroxaban (XARELTO) 20 MG TABS tablet TAKE 1 TABLET DAILY Yes BUTCH Gardner CNP   albuterol (ACCUNEB) 0.63 MG/3ML nebulizer solution Take 3 mLs by nebulization every 6 hours as needed for Wheezing Yes BUTCH Cannon CNP   albuterol (PROVENTIL) (5 MG/ML) 0.5% nebulizer solution Take 1 mL by nebulization 4 times daily as needed for Wheezing Yes BUTCH Cannon CNP   PROLIA 60 MG/ML SOSY SC injection  Yes Historical Provider, MD   predniSONE (DELTASONE) 2.5 MG tablet TAKE 3 TABLETS BY MOUTH DAILY FOR 2 WEEKS THEN ALTERNATE 3 TABLETS (EVEN DAYS) AND 2 TABLETS (ODD DAYS) EVERY OTHER DAY THEREAFTER Yes Historical Provider, MD   allopurinol (ZYLOPRIM) 300 MG tablet Take half tab daily x 2weeks, then full tab daily thereafter. Yes Historical Provider, MD   leucovorin calcium (WELLCOVORIN) 5 MG tablet TAKE 1 TO 2 TABLETS BY MOUTH ONCE DAILY THE DAY AFTER METHOTREXATE Yes Historical Provider, MD   lansoprazole (PREVACID) 30 MG delayed release capsule Take 1 capsule by mouth daily Yes BUTCH Gayle CNP   methotrexate (RHEUMATREX) 2.5 MG chemo tablet TAKE 6 TABS ONCE A WEEK with food. No alcohol. Hold if on antibiotics or ill. Yes Historical Provider, MD   hydrocortisone 1 % cream Apply topically 2 times daily.  Yes BUTCH Cannon CNP   diclofenac sodium (VOLTAREN) 1 % GEL Apply 4 g topically 4 times daily Yes Maria Elena Cid DO   hydroxychloroquine (PLAQUENIL) 200 MG tablet Take 200 mg by mouth 2 times daily  Yes Historical Provider, MD   albuterol sulfate  (90 BASE) MCG/ACT inhaler Inhale 2 puffs into the lungs every 6 hours as needed for Wheezing Yes BUTCH Echavarria CNP Blood Glucose Monitoring Suppl (ACCU-CHEK ADVANTAGE DIABETES) KIT Diabetic monitoring kit(any brand), with supplies for testing. Test fasting once daily.  Dia.[id number] Yes HAILEE Desir   Glucose Blood (BLOOD GLUCOSE TEST STRIPS) STRP Test once daily Yes HAILEE Guzmán   Lancets MISC Testing q day Yes HAILEE Wilson   Cholecalciferol (VITAMIN D) 2000 UNITS CAPS capsule Take 2 capsules by mouth daily  Yes Historical Provider, MD   amitriptyline (ELAVIL) 25 MG tablet 2 tablets PO at bedtime  Garland Pop MD   gabapentin (NEURONTIN) 400 MG capsule TAKE 1 CAPSULE THREE TIMES A DAY  Brooklynn Mabry, APRN - CNP   cyanocobalamin ([employer] VITAMIN B12) 1000 MCG tablet Take 1 tablet by mouth daily  Deejay Rodriguez MD   folic acid (FOLVITE) 1 MG tablet Take 1 tablet by mouth daily  Deejay Rodriguez MD       Social History     Tobacco Use    Smoking status: Former Smoker     Packs/day: 1.25     Years: 10.00     Pack years: 12.50     Types: Cigarettes     Quit date: 2006     Years since quitting: 15.1    Smokeless tobacco: Never Used    Tobacco comment: start smoking age 25   Substance Use Topics    Alcohol use: Not Currently    Drug use: No        PHYSICAL EXAMINATION:  [ INSTRUCTIONS:  \"[x]\" Indicates a positive item  \"[]\" Indicates a negative item  -- DELETE ALL ITEMS NOT EXAMINED]  [x] Alert  [x] Oriented to person/place/time    [x] No apparent distress  [] Toxic appearing    [] Face flushed appearing [] Sclera clear  [] Lips are cyanotic      [x] Breathing appears normal  [] Appears tachypneic      [] Rash on visible skin    [x] Cranial Nerves II-XII grossly intact    [x] Motor grossly intact in visible upper extremities    [] Motor grossly intact in visible lower extremities    [x] Normal Mood  [] Anxious appearing    [] Depressed appearing  [] Confused appearing      [] Poor short term memory  [] Poor long term memory    [] OTHER: Due to this being a TeleHealth encounter, evaluation of the following organ systems is limited: Vitals/Constitutional/EENT/Resp/CV/GI//MS/Neuro/Skin/Heme-Lymph-Imm. ASSESSMENT/PLAN:   Diagnosis Orders   1. Uncontrolled type 2 diabetes mellitus with hyperglycemia (HCC)  CBC Auto Differential    Comprehensive Metabolic Panel    Lipid Panel    Hemoglobin A1C    Microalbumin / Creatinine Urine Ratio    Semaglutide (RYBELSUS) 3 MG TABS   2. Essential hypertension  carvedilol (COREG) 6.25 MG tablet   3. Dyslipidemia     4. Vitamin D deficiency  Vitamin D 25 Hydroxy   5. CKD stage 3 secondary to diabetes ([facility]     6. Polymyositis with myopathy ([facility]     7. Immunosuppressed status ([facility]- IV rituxan at [facility]     8. Discoid lupus erythematosus     9. Factor V Leiden carrier ([facility]     10. Neuropathy of both feet- Dr. Dee Swann     11. Chronic midline low back pain with sciatica, sciatica laterality unspecified     12. Cold intolerance  TSH without Reflex    T4, Free    Thyroid Peroxidase Antibody         Return in about 3 months (around 5/12/2021) for Diabetes- in office visit- level 2.     1.  Diabetes is uncontrolled on current medication. We discussed starting the Rybelsus and prescription sent to pharmacy of choice. Take first thing in the morning before breakfast.  Notify me of any side effects. 2.  3.  Lipid panel is overall stable. Continue low-fat and low-salt/ diabetic diet. Continue current medication. 4.  Vitamin D is stable on current supplementation. Continue the same. 5.  Kidney function is overall stable with improved GFR. She has established with nephrologist.  Will await office visit note to review. 6.  7.  8.  She continues to follow routinely with neurology as well as rheumatology. States that symptoms have overall been stable. She also continues to follow with pain management for chronic low back pain symptoms. 9.  No abnormal bruising or bleeding while taking Eliquis. 12.  Discussed recommendation to check thyroid function with next lab. If symptoms worsen before her next appointment she will notify me. Please note this report has been partially produced using speech recognition software and may cause contain errors related to that system including grammar, punctuation and spelling as well as words and phrases that may seem inappropriate. If there are questions or concerns please feel free to contact me to clarify. An  electronic signature was used to authenticate this note. --BUTCH Llamas - CNP on 2/12/2021 at 2:52 PM        Pursuant to the emergency declaration under the [facility] waiver authority and the [employer] and [employer] Act, this Virtual  Visit was conducted, with patient's consent, to reduce the patient's risk of exposure to COVID-19 and provide continuity of care for an established patient. Services were provided through a video synchronous discussion virtually to substitute for in-person clinic visit.

## 2021-02-12 NOTE — LETTER
[address]  Phone: [phone]  Fax: [phone]    BUTCH Her CNP        February 12, 2021     Patient: Aj Turcios   YOB: 1960   Date of Visit: 2/12/2021       To Whom It May Concern: It is my medical opinion that Omi Motley requires a disability parking placard for the following reasons:  She cannot walk 200 feet without stopping to rest.  Duration of need: permanent    If you have any questions or concerns, please don't hesitate to call.     Sincerely,        BUTCH Her CNP

## 2021-02-15 NOTE — TELEPHONE ENCOUNTER
Pharmacy is  requesting medication refill.  Please approve or deny this request.    Rx requested:  Requested Prescriptions     Pending Prescriptions Disp Refills    gabapentin (NEURONTIN) 400 MG capsule [Pharmacy Med Name: GABAPENTIN CAPS 400MG] 270 capsule 3     Sig: TAKE 1 [address] A DAY         Last Office Visit:   2/12/2021      Next Visit Date:  Future Appointments   Date Time Provider Delmer Zhao   3/22/2021  1:15 PM Stephanie Jones DO [address]   4/22/2021  2:00 PM Stephanie Jones DO [address]   4/26/2021  1:00 PM Stephanie Jones DO [address]   5/7/2021  9:00 AM Fam Cunha DO [facility]   5/14/2021  9:10 AM BUTCH Mccurdy - CNP [facility]   6/16/2021  2:45 PM MD Fabián Ritchie

## 2021-03-22 NOTE — PROGRESS NOTES
Patient here for trigger point injections. Patient taken back to exam room, and placed on drape locking stool. Areas to be injected marked appropriately, and cleansed with alcohol. 22cc of 1% Lidocaine, and 2cc of Kenalog to be injected by provider.

## 2021-04-22 NOTE — PROGRESS NOTES
TELEHEALTH EVALUATION -- Audio/Visual (During BZUJY-14 public health emergency)    Due to COVID 19 outbreak, patient's office visit was converted to a virtual visit. Patient was contacted and agreed to proceed with a virtual visit via  "[employer]. me   The risks and benefits of converting to a virtual visit were discussed in light of the current infectious disease epidemic. Patient also understood that insurance coverage and co-pays are up to their individual insurance plans. Subjective       This patient has requested an audio/video evaluation for the following concern(s):    HPI:     Back Pain (Trigger point injections 2/15/21 & 3/22/21 with 60% reduction in pain lasting 2-3 weeks)   Neck Pain   Medication Refill (Tramadol, Lidoderm, Robaxin refill today)   Pain (8- Without medication (Back)        She rarely takes the Ultracet  She has no signs of overuse or abuse however her Soaper score is rather high we discussed relaxation techniques and stress management. Seems like this score is getting better however-Her goal is to come off the prednisone a--now is on 5 mg /daY And she is getting twice yearly infusions with Prolia. GETS RIUXIMAB INFUSIONS q 6 months     She remains on Xarelto because of her factor V Leiden clotting--she would like to have blocks but would have to be off Ulice Schimke for 2-3 days prior and I would like to avoid this. .     She recently had a caudal block which helped her pain quite a bit. It is complicated by the risk factor going off the Xarelto for her few days prior to the block. She states that her doctor okayed her to come off of the Xarelto for 2 days prior to the caudal blocks as she is done in the past to get the caudal safely. She had one block and have quite a bit she would like them in the future as needed to be that she will need to hold the Xarelto as above. She has no signs of drug abuse or overuse during the Ultracet.    She is more functional with it shows no signs of drug-seeking behaviors. She is able to do light housework and has good interactions with friends and family. Back Pain  This is a recurrent problem. The current episode started more than 1 year ago. The problem occurs constantly. The problem is unchanged. The pain is present in the lumbar spine and thoracic spine. The quality of the pain is described as aching. The pain does not radiate (Occasional down bilateral legs). The pain is at a severity of 6/10. The pain is severe. The pain is the same all the time. The symptoms are aggravated by bending, twisting, coughing and position. Stiffness is present in the morning. Associated symptoms include leg pain and weakness. Pertinent negatives include no abdominal pain, bladder incontinence, bowel incontinence, chest pain, dysuria, fever, headaches, numbness, paresis, paresthesias, perianal numbness, tingling or weight loss. Risk factors include obesity, menopause, lack of exercise and sedentary lifestyle. She has tried heat, analgesics, muscle relaxant, ice, NSAIDs, walking and home exercises (Caudal Blocks, TP injections, Tramadol and Flexeril, ) for the symptoms. The treatment provided moderate relief. Neck Pain   Associated symptoms include leg pain and weakness. Pertinent negatives include no chest pain, fever, headaches, numbness, paresis, photophobia, tingling, trouble swallowing or weight loss. Foot Pain   This is a chronic problem. The current episode started more than 1 year ago. The problem occurs constantly. The problem has been unchanged. Pertinent negatives include no fever, numbness or tingling. The symptoms are aggravated by bending and walking. She has tried acetaminophen, rest, heat, relaxation, oral narcotics and lying down for the symptoms. The treatment provided moderate relief.              Past Medical History:   Diagnosis Date    Ataxic gait     Back pain     Benign essential tremor     Depression     DM (diabetes mellitus) ([facility] [address] 11/25/2014    DVT (deep venous thrombosis) (HCC)     Elevated troponin 5/25/2016    Factor V Leiden carrier [facility])     history of DVT, on Xarelto    GERD (gastroesophageal reflux disease)     Hereditary sensory-motor neuropathy, type I     HTN (hypertension)     HTN (hypertension)     Hyperlipidemia     Lumbar radicular pain     Neuropathy of both feet 8/22/2016    NSTEMI (non-ST elevated myocardial infarction) ([facility] [address]     Obesity     Osteoarthritis     Pulmonary embolism (HCC)     Renal failure 6/29/2018    Right inguinal hernia 11/9/2017    SOB (shortness of breath) 4/19/2017    Systemic lupus erythematosus ([facility] [address] 9/8/2017    Transient brainstem ischemia     Umbilical hernia 97/9/1563    Vasomotor flushing     on hormone replacement therapy       Past Surgical History:   Procedure Laterality Date    APPENDECTOMY      CARDIAC CATHETERIZATION  06/2018    CATARACT REMOVAL Bilateral Winter 2019   Colon COLONOSCOPY      Dr. Mitchell Quiroga    COLONOSCOPY  12/16/13    DR SINGER repeat in 10 yrs per pt    COLONOSCOPY  08/2017    Dr Foster Bring Right 11/15/2017    RIGHT  HERNIA INGUINAL REPAIR [facility]     LAPAROSCOPY N/A 11/15/2017    DIAGNOSTIC LAPAROSCOPY performed by Polina Veronica MD at [address] Right 3/14/2019    RIGHT QUADRICEPS MUSCLE BIOPSY performed by Sonia May MD at [address] [facility] N/A 9/25/2017    [facility] [place] [address] performed by Cleavon Nageotte, MD at [facility],0+U/W,JTEDZ N/A 29/39/9833    HERNIA UMBILICAL REPAIR, [facility] [place] performed by Polina Veronica MD at [facility].  12/16/13    DR SINGER       Social History     Socioeconomic History    Marital status:      Spouse name: [address] Number of children: 1    Years of education: 12    Highest education level: 12th grade   Occupational History    Occupation: Housewife    Occupation: retired [employer]   Social Needs    Financial resource strain: Not hard at all   [place] insecurity     Worry: Never true     Inability: Never true   [employer] needs     Medical: No     Non-medical: No   Tobacco Use    Smoking status: Former Smoker     Packs/day: 1.25     Years: 10.00     Pack years: 12.50     Types: Cigarettes     Quit date: 1/1/2006     Years since quitting: 15.3    Smokeless tobacco: Never Used    Tobacco comment: start smoking age 25   Substance and Sexual Activity    Alcohol use: Not Currently    Drug use: No    Sexual activity: Yes   Lifestyle    Physical activity     Days per week: 0 days     Minutes per session: 0 min    Stress:  Only a little   Relationships    Social connections     Talks on phone: More than three times a week     Gets together: More than three times a week     Attends Tenriism service: 1 to 4 times per year     Active member of club or organization: No     Attends meetings of clubs or organizations: Never     Relationship status:     Intimate partner violence     Fear of current or ex partner: No     Emotionally abused: No     Physically abused: No     Forced sexual activity: No   Other Topics Concern    None   Social History Narrative    Off work dt--retired early from --worked [employer]---moved back to [place] [facility] to work at [employer] in [address] up in [place] went to [employer]       Family History   Problem Relation Age of Onset    Substance Abuse Brother     High Blood Pressure Mother     High Cholesterol Mother     Arthritis Mother     High Blood Pressure Father     High Cholesterol Father     Clotting Disorder Father         clot to intestines    Arthritis Father     Substance Abuse Brother     Substance Abuse Brother     Stroke Paternal Uncle 36    Cystic Fibrosis Daughter        Allergies   Allergen Reactions    Sulfa Antibiotics Rash    to person/place/time      [x] No apparent distress  [x] Not toxic appearing    [x] Face complexion normal appearing [x] Sclera clear  [x] Lips are not cyanotic      [x] Breathing appears normal  [] Appears tachypneic      [] Rash on visible skin    [x] Cranial Nerves II-XII grossly intact    [x] Motor grossly intact in visible upper extremities, including stiff but intact range of motion in cervical, upper extremity and thoracic  [x] Motor grossly intact in visible lower extremities, including normal range of motion in the hips and knees for stiffness in the lumbar spine    [x] Normal Mood  [x] Not anxious appearing    [x] Not depressed appearing  [x] Not confused appearing      [x]  Good short term memory  [x]  Good long term memory    [x]  Able to follow 2-3 step commands    Due to this being a TeleHealth encounter, evaluation of the following organ systems is limited: Vitals/Constitutional/EENT/Resp/CV/GI//MS/Neuro/Skin/Heme-Lymph-Imm. ASSESSMENT/PLAN:     After a thorough review and discussion of the previous medical records, patient comprehensive medical, surgical, and family and social history, Review of Systems, their OARRS, their Screener and Opioid Assessment for Patients with Pain (SOAPP®-R), recent diagnostics, and symptomatic results to previous treatment, it is my impression that the patients is suffering with progressive and severe:     Diagnosis Orders   1. SI (sacroiliac) pain  traMADol-acetaminophen (ULTRACET) 37.5-325 MG per tablet    HYDROcodone-acetaminophen (NORCO) 5-325 MG per tablet   2. Primary insomnia  HYDROcodone-acetaminophen (NORCO) 5-325 MG per tablet   3. Vitamin D deficiency  HYDROcodone-acetaminophen (NORCO) 5-325 MG per tablet   4. Neck pain     5. Generalized OA  HYDROcodone-acetaminophen (NORCO) 5-325 MG per tablet   6. Long-term use of Plaquenil     7. Muscle weakness (generalized)     8.  Lupus erythematosus, unspecified form  traMADol-acetaminophen (ULTRACET) 37.5-325 MG per tablet    HYDROcodone-acetaminophen (NORCO) 5-325 MG per tablet   9. Chronic low back pain with sciatica, sciatica laterality unspecified, unspecified back pain laterality  traMADol-acetaminophen (ULTRACET) 37.5-325 MG per tablet   10. High risk medication use  traMADol-acetaminophen (ULTRACET) 37.5-325 MG per tablet    HYDROcodone-acetaminophen (NORCO) 5-325 MG per tablet    OARRS and last refill reviewed. 2015 narcotic contract signed. 11/14/14 tox screening    11.  Myalgia  DC INJECT TRIGGER POINTS, 3 OR GREATER    DC INJECT TRIGGER POINTS, 3 OR GREATER       I am also concerned by lifestyle and mood issues including:    Past Medical History:   Diagnosis Date    Ataxic gait     Back pain     Benign essential tremor     Depression     DM (diabetes mellitus) ([facility] [address] 11/25/2014    D[facility] thrombosis) ([facility])     Elevated troponin 5/25/2016    Factor V Leiden carrier ([facility] [address]     history of DVT, on Xarelto    GERD (gastroesophageal reflux disease)     Hereditary sensory-motor neuropathy, type I     HTN (hypertension)     HTN (hypertension)     Hyperlipidemia     Lumbar radicular pain     Neuropathy of both feet 8/22/2016    NSTEMI (non-ST elevated myocardial infarction) ([facility] [address]     Obesity     Osteoarthritis     Pulmonary embolism ([facility] [address]     Renal failure 6/29/2018    Right inguinal hernia 11/9/2017    SOB (shortness of breath) 4/19/2017    Systemic lupus erythematosus ([address] 9/8/2017    Transient brainstem ischemia     Umbilical hernia 95/0/2470    Vasomotor flushing     on hormone replacement therapy           Given their medication, chronic pain and lifestyle and medications they are at risk for :    Falls, constipation, addiction, toxicity due to controlled/opiate medications  Loss of livelyhood due to severe pain, debility, weight gain and  vitamin D deficiency    The patient was educated regarding proper diet, fitness routine, and regulatory restrictions concerning pain medications. Previous notes, comprehensive past medical, surgical, family history, and diagnostics were reviewed. Patient education and councelling were provided regarding off label use,treatment options and medication and injection risks. Overall greater than 25 minutes spent in direct and indirect patient care. Current and old OARRS ([address] [place] [address] Automated Prescription Reporting System) records reviewed, all refills reviewed since last visit,  Behavioral agreement/FELICITAS regulations   and Toxicology screen was reviewed with patient and is up to date. There are no current red flags. They are making good progress regarding pain relief, they are performing at a functional level regarding activities of daily living, family interactions and psychological functioning, they're not having any adverse effects or side effects from the current medications, and I see no findings of aberrant drug taking or addiction related behaviors. The patient is aware that they have a chronic pain condition and they may require opiates dosing for life. All efforts will be made to wean to the lowest effective dose. Other therapies for pain have not been effective including nonopiate medications. Injections and exercises are only partially effective. A Rx for Narcan was offered to help prevent accidental overdose. RX Monitoring 10/23/2019   Attestation -   Periodic Controlled Substance Monitoring Possible medication side effects, risk of tolerance/dependence & alternative treatments discussed. ;No signs of potential drug abuse or diversion identified. ;Assessed functional status. ;Obtaining appropriate analgesic effect of treatment. Chronic Pain > 50 MEDD Re-evaluated the status of the patient's underlying condition causing pain. ;Considered consultation with a specialist.;Obtained or confirmed \"Consent for Opioid Use\" on file. Chronic Pain > 80 MEDD -               Patient is currently taking:       I am having Castano Kojo. inversion recovery. Axial T1 and T2. Sagittal and axial T1 with fat suppression after the IV administration of 20 cc of gadolinium (ProHance). Sagittal T1 whole spine localizer. 3 plane T2 localizer.       FINDINGS:       For the purposes of enumerating the lumbar disc levels the lowest lumbar-type disc will be referred to as L5-S1. This is confirmed on the localizer and prior CT.       Conus medullaris is unremarkable. No abnormal intraspinal or vertebral osseous enhancement.       Vertebral body heights are maintained with no acute compression fractures. Moderate predominantly fatty marrow signal. No pathologic marrow replacement.       T10-11 disc level included on the sagittal images. Very minimal bilateral paracentral and/or subarticular bulging and probable ligamentous hypertrophy. There does not appear to be significant canal narrowing on the sagittal images.       T11-12 and T12-L1 disc minimal degenerative anterior spurring, disc space narrowing and degenerative endplate signal changes. Posterior disc margin is normal. No canal or foraminal stenosis.       L1-2 (L1): Mild-moderate disc space narrowing with little desiccation. Anterior disc bulging and endplate spurring. Minimal posterior disc bulging. No mass effect on the thecal sac, canal or foraminal stenosis.       L2-3 (L2): Severe disc space narrowing, desiccation and type II (fat) endplate signal changes. Mild to moderate anterior spurring. Posterior there is mild diffuse bulge of the desiccated disc with a superimposed left subarticular inferior disc extrusion    extending from the disc continuously 1.5 cm below the endplate to the level of the mid L3 vertebral body. Extrusion measures approximately 6 x 4.8 mm in transverse dimension causing very mild mass effect on the thecal sac. After contrast administration    there is peripheral enhancement indicating scarring with a much smaller nonenhancing component measuring 4.7 x 2.6 x 4.8 mm.  Enhancement is also between the disc level and the nonenhancing area raising the possibility of a sequestration/free fragment    with surrounding scarring. Disc causes narrowing of the L3 lateral recess with compression of the left L3 nerve root as it exits the thecal sac within the spinal canal (axial image #23).     No significant L2-3 foraminal stenosis.       L3-4 (L3): Mild disc space narrowing and desiccation more pronounced posteriorly. Diffuse bulge of the disc more pronounced in the right paracentral and subarticular location causing very mild narrowing of the right side of the spinal canal. Part of the    right paracentral disc extends just superior to the inferior L3 endplate. Mild ligamentous hypertrophy. No significant foraminal stenosis.       L4-5 (L4): Moderate disc space narrowing and minimal desiccation. Very mild grade 1 spondylosis the cysts secondary to moderate to severe bilateral facet arthrosis. Minimal pseudobulging of the disc along with ligamentous hypertrophy creates a canal at    the lower limits of normal in size. Trivial foraminal narrowing despite facet arthrosis and disc bulging.        L5-S1 (L5): Minimal disc space narrowing. No significant desiccation. Mild central bulging without significant mass effect on the thecal sac. Thecal sac is narrowed at this level due to epidural lipomatosis. . No foraminal stenosis. Mild predominately    superior right greater than left facet arthrosis.       Incidental note is made of moderate parasagittal subcutaneous fat edema superficial to the spinous processes.  This is a nonspecific finding that may be associated with increased weight, female gender and/or advanced age.       Moderate cervical and mild to moderate thoracic degenerative disc disease on the localizer.       Susceptibility artifact in the pelvis due to prior surgery.                   Impression       Degenerative disc disease most pronounced and severe at L2-3 where there is a diffuse disc DSCOMMENT see below 11/17/2018       Lab Results   Component Value Date    CODEINE Not Detected 12/19/2016    MORPHINE Not Detected 12/19/2016    ACETYLMORPHI Not Detected 12/19/2016    OXYCODONE Not Detected 12/19/2016    NOROXYCODONE Not Detected 12/19/2016    NOROXYMU Not Detected 12/19/2016    HYDRCO Not Detected 12/19/2016    NORHYDU Not Detected 12/19/2016    HYDROMO Not Detected 12/19/2016    Radha Ishihara Not Detected 12/19/2016    Elwyn Lori Not Detected 12/19/2016    FENTA Not Detected 12/19/2016    NORFENT Not Detected 12/19/2016    MEPERIDINE Not Detected 12/19/2016    TAPENU Not Detected 12/19/2016    TAPOSULFUR Not Detected 12/19/2016    METHADONE Not Detected 12/19/2016    LABPROP Not Detected 12/19/2016    TRAM Present 12/19/2016    AMPH Not Detected 12/19/2016    METHAMP Not Detected 12/19/2016    MDMA Not Detected 12/19/2016    ECMDA Not Detected 12/19/2016       Lab Results   Component Value Date    METPHEN Not Detected 12/19/2016    PHENTERMINE Not Detected 12/19/2016    BENZOYL Not Detected 12/19/2016    Vermell Knee Not Detected 12/19/2016    ALPHAOHALPRA Not Detected 12/19/2016    CLONAZEPAM Not Detected 12/19/2016    Angela Charter Not Detected 12/19/2016    DIAZEP Not Detected 12/19/2016    DEBORA Not Detected 12/19/2016    OXAZ Not Detected 12/19/2016    Ycndee Barnacle Not Detected 12/19/2016    LORAZEPAM Not Detected 12/19/2016    MIDAZOLAM Not Detected 12/19/2016    ZOLPIDEM Not Detected 12/19/2016    ETO Not Detected 12/19/2016    ETG See Note 12/19/2016    MARIJMET Not Detected 12/19/2016    PCP Not Detected 12/19/2016    PAINMGTDRUGP See Below 12/19/2016    EERPAINMGTPA See Note 12/19/2016    LABCREA 13.9 03/08/2021         , I discussed results with patient. See follow-up plans for new studies. Therapies:  HEP-gentle stretching and relaxation techniques-demonstrated with patient-they are to do them twice a day.   They are also advised to make the following lifestyle changes:  Goals       Limit Carbs to 2-3/meal maximum      Exercise 3x per week (30 min per time)      Piriformis stretches and abd strengthening       HEMOGLOBIN A1C < 6.5      SOAPP- R GOAL LESS THAN 9      12/19/16 SCORE: 15-MODERATE RISK   02/22/17 score: 22-High risk   05/15/17 score: 26-high risk  07/12/17 score: 22-high risk  10/02/17 score: 15-moderate risk  12/11/17 score: 11-moderate risk  02/08/18 score: 14-moderate risk  04/18/18 score: 15-moderate risk  8/9/18 score: 10 low risk  10/11/18 score: 22- high risk  12/14/18 score:17-moderate risk  3/27/19 score: 12 moderate risk  6/13/19 score: 24 high risk  8/22/19 score: 18- moderate risk   10/23/19 score: 18- moderate risk  01/03/20 score: 23- moderate risk       Weight < 160 lb (72.576 kg)           Injections or Epidurals:  Injection options were discussed. Monthly trigger point injections add vitamin B12 when able. Patient gave verbal consent to ordered injections. See follow-up plans for planned injections. Supplements:  Vitamin D with increased dosing during the rainy months, add co-Q10 for heart health. Education was given on:   Dietary and Fitness--daily stretches and low carb diet-in chair Yoga when possible      Note controlled medication/opiate drug therapy requires intensive monitoring for toxicity and addiction. Follow up with Primary Care Physician regarding their general medical needs. Stressed the importance of following up with PCP and specialists for his/her chronic diseases, health, CV, and cancer screening and continued care. Will follow disease activity/progression and adjust therapeutic regimen to disease activity and severity. Discussed medication dosage, usage, goals of therapy, and side effects. Available test results were reviewed -Discussed findings, impression and plan with patient. An additional 5 minutes were spent outside of the patient visit to review records.   Additional time spent with the patient to discuss their

## 2021-05-04 NOTE — TELEPHONE ENCOUNTER
Requested Prescriptions     Pending Prescriptions Disp Refills    levETIRAcetam (KEPPRA) 250 MG tablet 180 tablet 3     Sig: Take 1 tablet by mouth 2 times daily

## 2021-05-11 NOTE — TELEPHONE ENCOUNTER
Called patient to let her know that her handicap placard form is here to  she said she will pick it up when she comes in on Friday. I scanned it in the Media and it is in the bottom drawer up front. cp

## 2021-05-14 NOTE — PROGRESS NOTES
Subjective:     Diabetes Mellitus Type 2: Current symptoms/problems include none and neuropathy. Home blood sugar records:  patient does not test  Any episodes of hypoglycemia? no  Tobacco history: She  reports that she quit smoking about 15 years ago. Her smoking use included cigarettes. She has a 12.50 pack-year smoking history. She has never used smokeless tobacco.   Known diabetic complications: nephropathy, peripheral neuropathy and cardiovascular disease    Hypertension:  Home blood pressure monitoring: No.  She is adherent to a low sodium diet. Antihypertensive medication side effects: no medication side effects noted. Use of agents associated with hypertension: none. CKD: She states that she has been avoiding anti-inflammatory medication. Has establish care with nephrology in [facility]. Lupus/polymyositis: she continues to follow with rheumatology. She sees Dr. Jinny Bowling routinely and states that she has been tolerating her medication without issue. She has noticed some spider veins on both of her legs. Tends to have pains in her muscles when she walks more but uses her Rollator when ambulating. She states that she had a mild fall about a month ago with no reported injury. Does not notice any progressive weakness of the lower extremities. Chronic low back pain: She states that she does continue to follow with pain management routinely and is getting routine back injections. She states that back pain symptoms have been tolerable. No worsening since her last visit. Abdominal pain: She states that she has been having some issues lately with left lower quadrant abdominal pain. She had similar symptoms in the past when she had abdominal hernias. States that Dr. Kellen Carlos was able to fix 2 of them but she has 1 more that remains. She has not had any change in bowel patterns. No severe pain but states the pain can be bothersome and is present much of the time.     The five diabetic measures for masses. Extremities: Extremities warm to touch, pink, with no edema. There are various spider veins noted to both of her bilateral lower extremities but no evidence of varicosities. Dorsalis pedis and posterior tibial pulses are symmetric. No fissures between the toes. No open sores on the feet. Sensation intact to monofilament testing in 8 of 8 areas tested. No edema in feet. Radial pulses strong and symmetric. No edema in hands or wrists. Assessment:      Diagnosis Orders   1. Uncontrolled type 2 diabetes mellitus with hyperglycemia (HCC)  HM DIABETES FOOT EXAM    CBC Auto Differential    Comprehensive Metabolic Panel    Lipid Panel    Hemoglobin A1C    Microalbumin / Creatinine Urine Ratio   2. Essential hypertension     3. Dyslipidemia     4. Vitamin D deficiency  Vitamin D 25 Hydroxy   5. CKD stage 3 secondary to diabetes ([facility]     6. Polymyositis with myopathy ([facility] [address]     7. Discoid lupus erythematosus     8. Spider veins     9. Neuropathy of both feet- Dr. Junior Martinez     10. Chronic midline low back pain with sciatica, sciatica laterality unspecified     11. LLQ abdominal pain  CT ABDOMEN PELVIS W IV CONTRAST Additional Contrast? Oral       PLAN: Include orders in the DX section. Diabetes Counseling   Patient was counseled regarding disease risks and adopting healthy behaviors. Patient was provided education materials to assist with self management. Patient was provided log (or received log during previous visit) to record blood pressure, food intake and/or blood sugar. Patient was instructed to keep log up-to-date and to always bring log to all office visits. Follow up: 3 months and as needed. Blood work one week prior as ordered. 1.  Hemoglobin A1c is above goal.  She is taking medication as prescribed but has taken more steroids lately secondary to chronic pain. Will recheck levels in about 3 months and recommend adding medication or referral to endocrinology in the future if needed.   We discussed appropriate diabetic diet and need to increase physical activity as tolerated. 2.  3.  Blood pressure is well controlled. Lipid panel is stable. 4.  Vitamin D has been stable on supplementation. Continue daily weightbearing activity. 5.  Kidney function is overall stable. She has established with nephrology in [facility]. 6.  7.  She continues to follow routinely with rheumatology and is taking medication as prescribed. No recent significant exacerbation of symptoms. 8.  Discussed option of referral to vein specialist if she would like but no evidence of edema. No pain reported to these areas. 9.  She continues to follow routinely with neurology. No worsening of neuropathy over time. Foot examination is normal today. 10. She continues to follow routinely with pain management specialist and states that pain levels have been stable. 11.  Discussed recommendation for CT scan of the abdomen and pelvis given chronicity of left lower quadrant abdominal pain symptoms. Can refer to general surgery if she would like to have remaining hernia repaired. No evidence of hernia on physical examination today but patient is aware of this from previous surgery. Please note this report has been partially produced using speech recognition software and may cause contain errors related to that system including grammar, punctuation and spelling as well as words and phrases that may seem inappropriate. If there are questions or concerns please feel free to contact me to clarify.         Electronically signed by Matthew Brewer, 10:40 AM 5/14/21

## 2021-05-26 NOTE — PROGRESS NOTES
Patient here for trigger point injections. Patient taken back to exam room, and placed on drape locking stool. Areas to be injected marked appropriately, and cleansed with alcohol. 13cc of 1% Lidocaine, 2cc of Kenalog, and 1cc of B12 to be injected by provider.

## 2021-06-04 NOTE — PROGRESS NOTES
6/4/2021        HPI:    6-30-18:     Patient is a 62 y. o. female who presents with a chief complaint of Sob. Patient is followed on a regular basis by BUTCH Wynn Shinto has been expressing shortness of breath of 2 days onset associated with left arm tingling.  She also has abdominal pain at the hernia repair incisional site. Zane Benavides has a history of pulmonary embolism 2 years ago which she is on Carlota Lasso has been expressing diarrhea as well.  She was noted to have acute renal failure with a creatinine of 1.6 now improved to 1.09.  Her cardiac enzyme was elevated at 1.4 and trending down.  She denies any chest pain chest pressure heaviness.  Patient had a normal myocardial perfusion stresses in 2016 with breast attenuation artifact.  2-D echocardiogram in 2000 seen was also essentially normal.        7-31-18: Patient presents for initial medical evaluation. Patient is followed on a regular basis by BUTCH Wynn CNP. S/p hospitalization for SOB, NSTEMI. S/p ECHO with EF of 30%, grade I DD, mild AI, no PFO. S/p LHC with normal coronaries, EF Of 30%. Has hx of DVT/PE, factor V leiden defiency, on life long [place]. No bleeding issues. Her BP has been low in cardiac rehab. meds adjusted. Pt denies chest pain,   nausea, vomiting, diarrhea, constipation, motor weakness, insomnia, weight loss, syncope, dizziness, lightheadedness, palpitations, PND, orthopnea, or claudication. . BP and hr are good. No LE discoloration or ulcers. No LE edema. No CHF type symptoms. Lipid profile is normal.         11-1-18: s/p ECHO at [place], no official report but appears to have an EF of 50%. Has SOB with going up a few steps of stairs. She does go to the gym to swim. Pt denies chest pain, dyspnea,change in exercise capacity, fatigue,  nausea, vomiting, diarrhea, constipation, motor weakness, insomnia, weight loss, syncope, dizziness, lightheadedness, palpitations, PND, orthopnea, or claudication.  No nitro use. BP and hr are good. CAD is stable. No LE discoloration or ulcers. No LE edema. No CHF type symptoms. Lipid profile is normal. No recent hospitalization. No change in meds. On NOAC for hx of thromboembolic disease, no bleeding issues.      19: OFFICIAL ECHO from last visit showed an EF of 60%, grade I DD. Does have baseline SOB. Pt denies chest pain, nausea, vomiting, diarrhea, constipation, motor weakness, insomnia, weight loss, syncope, dizziness, lightheadedness, palpitations, PND, orthopnea, or claudication. Does have fatigue. No nitro use. BP and hr are good. CAD is stable. No LE discoloration or ulcers. No LE edema. No CHF type symptoms. Lipid profile is normal.On NOAC for hx of thromboembolic disease, no bleeding issues. She is on oral steroids for SLE    20: Ritika Meng (:  1960) has requested an audio/video evaluation for the following concern(s): SOB. Has baseline SOB. On albuterol tx. Following up with Rheumatology for SLE. Pt denies chest pain,nausea, vomiting, diarrhea, constipation, motor weakness, insomnia, weight loss, syncope, dizziness, lightheadedness, palpitations, PND, orthopnea, or claudication. On NOAC for hx of thromboembolic disease, factor V def, no bleeding issues. Lipid profile is slightly abnormal.  Hgb is stable. Home BP check is ok.       21: Patient with history of factor V Leiden deficiency on chronic oral anticoagulation for history of thromboembolic disease as well. Patient with history of lupus on steroids chronically. No bleeding issues lipid profile is normal.  Pt denies chest pain, dyspnea, dyspnea on exertion, change in exercise capacity, fatigue,  nausea, vomiting, diarrhea, constipation, motor weakness, insomnia, weight loss, syncope, dizziness, lightheadedness, palpitations, PND, orthopnea, or claudication. History of negative cardiac catheterization in 2018 with normal coronary arteries and normal LV function.   Patient with history of recovered cardiomyopathy  EKG with chronic changes of inferior wall infarct/poor R wave progression. Review of Systems   Constitutional: Negative. Negative for chills and fever. HENT: Negative. Eyes: Negative. Respiratory: Positive for shortness of breath. Negative for wheezing. Cardiovascular: Negative for chest pain, palpitations and leg swelling. Gastrointestinal: Negative. Negative for abdominal pain, nausea and vomiting. Endocrine: Negative. Genitourinary: Negative. Musculoskeletal: Negative. Skin: Negative. Negative for rash. Allergic/Immunologic: Negative. Neurological: Negative for dizziness, weakness and headaches. Hematological: Negative. Prior to Visit Medications    Medication Sig Taking? Authorizing Provider   HYDROcodone-acetaminophen (NORCO) 5-325 MG per tablet Take 1 tablet by mouth every 4 hours as needed for Pain.  Yes Historical Provider, MD   lidocaine (LIDODERM) 5 %  Yes Historical Provider, MD   benzocaine DENTAL, (ORABASE-B) 20 % PSTE paste apply to oral sores twice a day Yes Historical Provider, MD   levETIRAcetam (KEPPRA) 250 MG tablet Take 1 tablet by mouth 2 times daily Yes Conchis Georges MD   furosemide (LASIX) 20 MG tablet Take 1 tablet by mouth daily Yes Gearl Blocker BUTCH Mabry CNP   carvedilol (COREG) 6.25 MG tablet Take 1 tablet by mouth 2 times daily (with meals) Yes Gearl Blocker BUTCH Mabry CNP   hydrOXYzine (VISTARIL) 50 MG capsule TAKE 1 CAPSULE FOUR TIMES A DAY AS NEEDED FOR ITCHING OR ANXIETY Yes BUTCH Gardner CNP   citalopram (CELEXA) 40 MG tablet TAKE 1 TABLET DAILY Yes BUTCH Gardner CNP   SITagliptin (JANUVIA) 50 MG tablet Take 1 tablet by mouth daily Yes Gearl Blocker BUTCH Mabry CNP   triamcinolone (KENALOG) 0.1 % ointment APPLY TO THE AFFECTED AREAS OF RASH TWICE A DAY FOR 2 WEEKS THEN ONCE DAILY FOR 2 WEEKS Yes BUTCH Gardner CNP   cyclobenzaprine (FLEXERIL) 10 MG tablet Take 10 mg by mouth 2 (shortness of breath) 4/19/2017    Systemic lupus erythematosus ([address] 9/8/2017    Transient brainstem ischemia     Umbilical hernia 09/8/7990    Vasomotor flushing     on hormone replacement therapy   ,   Past Surgical History:   Procedure Laterality Date    APPENDECTOMY      CARDIAC CATHETERIZATION  06/2018    CATARACT REMOVAL Bilateral Winter 2019   [facility] COLONOSCOPY      Dr. Erlinda Negrete COLONOSCOPY  12/16/13    DR SINGER repeat in 10 yrs per pt    COLONOSCOPY  08/2017    Dr Nancy Arenas Right 11/15/2017    RIGHT  HERNIA INGUINAL REPAIR performed by Estrella Eugene MD at [address] [facility]  ARTHROSCOPY Left     LAPAROSCOPY N/A 11/15/2017    DIAGNOSTIC LAPAROSCOPY performed by Estrella Eugene MD at [address] Right 3/14/2019    RIGHT QUADRICEPS MUSCLE BIOPSY performed by Linwood Cook MD at [address] [facility] N/A 9/25/2017    [address] [facility] 3 performed by Chester Szymanski MD at [facility]2+W/U,VZDKL N/A 24/35/5175    HERNIA UMBILICAL REPAIR, [place] performed by Estrella Eugene MD at [address]  12/16/13    DR Titus Olsen   ,   Family History   Problem Relation Age of Onset    Substance Abuse Brother     High Blood Pressure Mother     High Cholesterol Mother     Arthritis Mother     High Blood Pressure Father     High Cholesterol Father     Clotting Disorder Father         clot to intestines    Arthritis Father     Substance Abuse Brother     Substance Abuse Brother     Stroke Paternal Uncle 36    Cystic Fibrosis Daughter        PHYSICAL EXAMINATION:  [ INSTRUCTIONS:  \"[x]\" Indicates a positive item  \"[]\" Indicates a negative item  -- DELETE ALL ITEMS NOT EXAMINED]  [x] Alert  [x] Oriented to person/place/time    [x] No apparent distress  [] Toxic appearing    [] Face flushed appearing [x] Sclera clear  [] Lips are cyanotic      [x] Breathing above the target ranges or if it is low. Patient clearly understands and agrees to the instructions.      We will need to continue to monitor muscle and liver enzymes, BUN, CR, and electrolytes.     Details of medical condition explained and patient was warned about adverse consequences of uncontrolled medical conditions and possible side effects of prescribed medications. No follow-ups on file. An  electronic signature was used to authenticate this note.     --Cyndee Oakland Holiday, DO on 6/4/2021 at 11:01 AM  9}

## 2021-06-24 NOTE — PROGRESS NOTES
Subjective  Nimisha White, 64 y.o. female presents today with:  Chief Complaint   Patient presents with    Rash        Rash  This is a new problem. The current episode started 1 to 4 weeks ago (2 weeks). The problem has been gradually worsening (ithcing skin irritation, rash lower abdomin and upper groin, small discharge) since onset. The rash is diffuse. The rash is characterized by itchiness. She was exposed to nothing. Pertinent negatives include no anorexia, congestion, cough, diarrhea, eye pain, facial edema, fatigue, fever, joint pain, nail changes, rhinorrhea, shortness of breath, sore throat or vomiting. Past treatments include nothing. There is no history of allergies, asthma, eczema or varicella. (Diabetes)         Review of Systems   Constitutional: Negative for activity change, appetite change, chills, diaphoresis, fatigue and fever. HENT: Negative for congestion, ear pain, postnasal drip, rhinorrhea, sinus pressure, sinus pain, sore throat and trouble swallowing. Eyes: Negative for pain and visual disturbance. Respiratory: Negative for cough, chest tightness, shortness of breath and wheezing. Cardiovascular: Negative for chest pain and palpitations. Gastrointestinal: Negative for abdominal pain, anorexia, diarrhea, nausea and vomiting. Genitourinary: Positive for difficulty urinating (intermittently), frequency and vaginal discharge. Negative for decreased urine volume, dysuria, flank pain, hematuria, urgency, vaginal bleeding and vaginal pain. Musculoskeletal: Negative for arthralgias, back pain, joint pain, myalgias, neck pain and neck stiffness. Skin: Positive for rash. Negative for color change and nail changes. Neurological: Negative for dizziness, tremors, seizures, syncope, speech difficulty, weakness, light-headedness, numbness and headaches.        Past Medical History:   Diagnosis Date    Ataxic gait     Back pain     Benign essential tremor     Depression     DM (diabetes mellitus) ([facility] 11/25/2014    DVT (deep venous thrombosis) (HCC)     Elevated troponin 5/25/2016    Factor V Leiden carrier [facility])     history of DVT, on Xarelto    GERD (gastroesophageal reflux disease)     Hereditary sensory-motor neuropathy, type I     HTN (hypertension)     HTN (hypertension)     Hyperlipidemia     Lumbar radicular pain     Neuropathy of both feet 8/22/2016    NSTEMI (non-ST elevated myocardial infarction) ([facility]     Obesity     Osteoarthritis     Pulmonary embolism ([facility]     Renal failure 6/29/2018    Right inguinal hernia 11/9/2017    SOB (shortness of breath) 4/19/2017    Systemic lupus erythematosus ([facility] [address] [address] 9/8/2017    Transient brainstem ischemia     Umbilical hernia 18/1/5682    Vasomotor flushing     on hormone replacement therapy     Past Surgical History:   Procedure Laterality Date    APPENDECTOMY      CARDIAC CATHETERIZATION  06/2018    CATARACT REMOVAL Bilateral Winter 2019   [employer] COLONOSCOPY      Dr. Narinder Chao    COLONOSCOPY  12/16/13    DR SINGER repeat in 10 yrs per pt    COLONOSCOPY  08/2017    Dr Bhatia Heading Right 11/15/2017    RIGHT  HERNIA INGUINAL REPAIR performed by Frida Weathers MD at [address] [place], [place] [address]Mg ARTHROSCOPY Left     LAPAROSCOPY N/A 11/15/2017    DIAGNOSTIC LAPAROSCOPY performed by Frida Weathers MD at [address] Right 3/14/2019    RIGHT QUADRICEPS MUSCLE BIOPSY performed by Neville Goodrich MD at [address] [facility] N/A 9/25/2017    [address] [place] [address] performed by Bria Cates MD at [facility],6+L/O,BPDPG N/A 92/85/9548    HERNIA UMBILICAL REPAIR, [facility] performed by Frida Weathers MD at [address] [facility]  12/16/13    DR SINGER     Social History     Socioeconomic History    Marital status:      Spouse name: [address] Number of children: 1    Years of education: 15     Substance Abuse Brother     Stroke Paternal Uncle 36    Cystic Fibrosis Daughter      Allergies   Allergen Reactions    Sulfa Antibiotics Rash    Ciprofloxacin Hcl Swelling     facial, tongue swelling    Nsaids Other (See Comments)     Bleed risk dt Regine Flores    Statins Other (See Comments)     Liver enzyme elevation     Current Outpatient Medications   Medication Sig Dispense Refill    fluconazole (DIFLUCAN) 150 MG tablet Take 1 tablet by mouth daily for 2 doses 2 tablet 0    nystatin (MYCOSTATIN) 225486 UNIT/GM powder Apply 3 times daily. 30 g 0    hydrOXYzine (ATARAX) 25 MG tablet Take 1 tablet by mouth every 8 hours as needed for Itching 30 tablet 0    amitriptyline (ELAVIL) 25 MG tablet TAKE 2 TABLETS AT BEDTIME 180 tablet 3    HYDROcodone-acetaminophen (NORCO) 5-325 MG per tablet Take 1 tablet by mouth every 4 hours as needed for Pain.       lidocaine (LIDODERM) 5 %       benzocaine, DENTAL, (ORABASE-B) 20 % PSTE paste apply to oral sores twice a day      levETIRAcetam (KEPPRA) 250 MG tablet Take 1 tablet by mouth 2 times daily 180 tablet 3    gabapentin (NEURONTIN) 400 MG capsule TAKE 1 CAPSULE THREE TIMES A  capsule 3    furosemide (LASIX) 20 MG tablet Take 1 tablet by mouth daily 90 tablet 3    carvedilol (COREG) 6.25 MG tablet Take 1 tablet by mouth 2 times daily (with meals) 180 tablet 3    hydrOXYzine (VISTARIL) 50 MG capsule TAKE 1 CAPSULE FOUR TIMES A DAY AS NEEDED FOR ITCHING OR ANXIETY 270 capsule 4    citalopram (CELEXA) 40 MG tablet TAKE 1 TABLET DAILY 90 tablet 3    SITagliptin (JANUVIA) 50 MG tablet Take 1 tablet by mouth daily 90 tablet 3    triamcinolone (KENALOG) 0.1 % ointment APPLY TO THE AFFECTED AREAS OF RASH TWICE A DAY FOR 2 WEEKS THEN ONCE DAILY FOR 2 WEEKS 60 g 12    cyclobenzaprine (FLEXERIL) 10 MG tablet Take 10 mg by mouth 2 times daily as needed      rivaroxaban (XARELTO) 20 MG TABS tablet TAKE 1 TABLET DAILY 90 tablet 4    albuterol (ACCUNEB) 0.63 MG/3ML nebulizer solution Take 3 mLs by nebulization every 6 hours as needed for Wheezing 270 mL 3    albuterol (PROVENTIL) (5 MG/ML) 0.5% nebulizer solution Take 1 mL by nebulization 4 times daily as needed for Wheezing 120 each 3    PROLIA 60 MG/ML SOSY SC injection       predniSONE (DELTASONE) 2.5 MG tablet TAKE 3 TABLETS BY MOUTH DAILY FOR 2 WEEKS THEN ALTERNATE 3 TABLETS (EVEN DAYS) AND 2 TABLETS (ODD DAYS) EVERY OTHER DAY THEREAFTER      allopurinol (ZYLOPRIM) 300 MG tablet Take half tab daily x 2weeks, then full tab daily thereafter.  leucovorin calcium (WELLCOVORIN) 5 MG tablet TAKE 1 TO 2 TABLETS BY MOUTH ONCE DAILY THE DAY AFTER METHOTREXATE      lansoprazole (PREVACID) 30 MG delayed release capsule Take 1 capsule by mouth daily 90 capsule 1    methotrexate (RHEUMATREX) 2.5 MG chemo tablet TAKE 6 TABS ONCE A WEEK with food. No alcohol. Hold if on antibiotics or ill.  cyanocobalamin ([employer] VITAMIN B12) 1000 MCG tablet Take 1 tablet by mouth daily 90 tablet 3    folic acid (FOLVITE) 1 MG tablet Take 1 tablet by mouth daily 90 tablet 3    hydrocortisone 1 % cream Apply topically 2 times daily. 1 Tube 1    diclofenac sodium (VOLTAREN) 1 % GEL Apply 4 g topically 4 times daily 300 g 3    hydroxychloroquine (PLAQUENIL) 200 MG tablet Take 200 mg by mouth 2 times daily       albuterol sulfate  (90 BASE) MCG/ACT inhaler Inhale 2 puffs into the lungs every 6 hours as needed for Wheezing 1 Inhaler 3    Blood Glucose Monitoring Suppl (ACCU-CHEK ADVANTAGE DIABETES) KIT Diabetic monitoring kit(any brand), with supplies for testing. Test fasting once daily.  Dia.00 1 kit 0    Glucose Blood (BLOOD GLUCOSE TEST STRIPS) STRP Test once daily 100 strip 4    Lancets MISC Testing q day 100 each 3    Cholecalciferol (VITAMIN D) 2000 UNITS CAPS capsule Take 2 capsules by mouth daily        Current Facility-Administered Medications   Medication Dose Route Frequency Provider Last Rate Last Admin (Auto)    Culture, Urine    fluconazole (DIFLUCAN) 150 MG tablet   2. Skin yeast infection  nystatin (MYCOSTATIN) 997593 UNIT/GM powder    hydrOXYzine (ATARAX) 25 MG tablet     Orders Placed This Encounter   Procedures    Culture, Urine     Standing Status:   Future     Standing Expiration Date:   6/24/2022     Order Specific Question:   Specify (ex-cath, midstream, cysto, etc)? Answer:   midstream    POCT Urinalysis No Micro (Auto)     Orders Placed This Encounter   Medications    fluconazole (DIFLUCAN) 150 MG tablet     Sig: Take 1 tablet by mouth daily for 2 doses     Dispense:  2 tablet     Refill:  0    nystatin (MYCOSTATIN) 628673 UNIT/GM powder     Sig: Apply 3 times daily. Dispense:  30 g     Refill:  0    hydrOXYzine (ATARAX) 25 MG tablet     Sig: Take 1 tablet by mouth every 8 hours as needed for Itching     Dispense:  30 tablet     Refill:  0     There are no discontinued medications. Return in about 1 week (around 7/1/2021), or if symptoms worsen or fail to improve, for follow up with PCP. Reviewed with the patient: current clinical status,medications, activities and diet. Side effects, adverse effects of themedication prescribed today, as well as treatment plan/ rationale and result expectations have been discussed with the patient who expresses understanding and desires to proceed. Close follow up to evaluate treatment results and for coordination of care. I have reviewed the patient's medical history in detail and updated the computerized patient record.      Davian Lynn, APRN - CNP

## 2021-06-24 NOTE — PATIENT INSTRUCTIONS
birth control. The oil in some vaginal medicines weakens latex. · Don't douche. When should you call for help? Call your doctor now or seek immediate medical care if:    · You have unexpected vaginal bleeding.     · You have new or increased pain in your vagina or pelvis. Watch closely for changes in your health, and be sure to contact your doctor if:    · You have a fever.     · You are not getting better after 2 days.     · Your symptoms come back after you finish your medicines. Where can you learn more? Go to [email or web address]eb.[email or web address] org and sign in to your [facility] account. Enter I739 in the [facility] box to learn more about \"Vaginal Yeast Infection: Care Instructions. \"     If you do not have an account, please click on the \"Sign Up Now\" link. Current as of: February 11, 2021               Content Version: 12.9  © 2006-2021 [employer]. Care instructions adapted under license by [facility] ([facility]). If you have questions about a medical condition or this instruction, always ask your healthcare professional. [address] any warranty or liability for your use of this information. Patient Education        Candidiasis: Care Instructions  Your Care Instructions  Candidiasis (say \"tnz-sep-MF-uh-isaura\") is a yeast infection. Yeast normally lives in your body. But it can cause problems if your body's defenses don't work as they should. Some medicines can increase your chance of getting a yeast infection. These include antibiotics, steroids, and cancer drugs. And some diseases like AIDS and diabetes can make you more likely to get yeast infections. There are different types of yeast infections. Platinum Boys is a yeast infection in the mouth. It usually occurs in people with weak immune systems. It causes white patches inside the mouth and throat.   Yeast infections of the skin usually occur in skin folds where the skin stays moist. They cause red, oozing patches on your skin. Babies can get these infections under the diaper. People who often wear gloves can get them on their hands. Many women get vaginal yeast infections. They are most common when women take antibiotics. These infections can cause the vagina to itch and burn. They also cause white discharge that looks like cottage cheese. In rare cases, yeast infects the blood. This can cause serious disease. This kind of infection is treated with medicine given through a needle into a vein (IV). After you start treatment, a yeast infection usually goes away quickly. But if your immune system is weak, the infection may come back. Tell your doctor if you get yeast infections often. Follow-up care is a key part of your treatment and safety. Be sure to make and go to all appointments, and call your doctor if you are having problems. It's also a good idea to know your test results and keep a list of the medicines you take. How can you care for yourself at home? · Take your medicines exactly as prescribed. Call your doctor if you think you are having a problem with your medicine. · Use antibiotics only as directed by your doctor. · Eat yogurt with live cultures. It has bacteria called lactobacillus. It may help prevent some types of yeast infections. · Keep your skin clean and dry. Put powder on moist places. · If you are using a cream or suppository to treat a vaginal yeast infection, don't use condoms or a diaphragm. Use a different type of birth control. · Eat a healthy diet and get regular exercise. This will help keep your immune system strong. When should you call for help? Watch closely for changes in your health, and be sure to contact your doctor if:    · You do not get better as expected. Where can you learn more? Go to https://chpeelmaeb.health-partners. org and sign in to your [employer] account.  Enter H810 in the [facility] box to learn more about \"Candidiasis: Care Instructions. \"     If you do not have an account, please click on the \"Sign Up Now\" link. Current as of: February 11, 2021               Content Version: 12.9  © 2655-1535 Healthwise, Incorporated. Care instructions adapted under license by [facility] ([facility]). If you have questions about a medical condition or this instruction, always ask your healthcare professional. [address] any warranty or liability for your use of this information.

## 2021-06-28 NOTE — PROGRESS NOTES
Patient here for trigger point injections. Patient taken back to exam room, and placed on drape locking stool. Areas to be injected marked appropriately, and cleansed with alcohol. 21cc of 1% Lidocaine, 2cc of Kenalog, and 1cc of B12 to be injected by provider.

## 2021-07-08 NOTE — PATIENT INSTRUCTIONS
Please make f/u appointment with your PCP to assure further treatment is not needed. Also get labwork completed that was ordered by your PCP. Take medication as directed in meantime.

## 2021-07-08 NOTE — PROGRESS NOTES
Subjective  Geraldine Galdamez, 64 y.o. female presents today with:  Chief Complaint   Patient presents with    Vaginitis     Pt states that she was trated for a yeast infection that has not gotten any better. Pt states that she feels like she has to pee but when she gets in the bathroom she only can go a little.  Other     Pt states that she also sees a wound under her belly where the yeast infection is at        Patient reports that she has had a yeast infection which was treated but has not improved. Patient reports that she has a rash under the folds of her skin around her pannus and vaginal area. In addition, she states she has discomfort when she urinates. She tries to urinate but has incomplete emptying and has to urinate often. Patient has mild mid pelvic pressure. Patient denies fever, nausea, or vomiting. Patient has a history of diabetes and has not checked her blood sugar recently. Patient also reports that she has lab work pending from her PCP and has not followed up with obtaining of blood work or seen her PCP. Review of Systems   Constitutional: Negative for appetite change, fatigue, fever and unexpected weight change. HENT: Negative for congestion, rhinorrhea and sore throat. Eyes: Negative. Negative for photophobia and visual disturbance. Respiratory: Negative for shortness of breath and wheezing. Cardiovascular: Negative for chest pain. Gastrointestinal: Negative for abdominal pain, nausea and vomiting. Endocrine: Negative. Negative for polydipsia, polyphagia and polyuria. Genitourinary: Positive for difficulty urinating, dysuria and frequency. Negative for pelvic pain. Musculoskeletal: Negative for back pain. Skin: Positive for rash. Allergic/Immunologic: Negative. Neurological: Negative. Negative for dizziness, speech difficulty and weakness. Hematological: Negative. Psychiatric/Behavioral: Negative.   Negative for behavioral problems and Cholesterol Mother     Arthritis Mother     High Blood Pressure Father     High Cholesterol Father     Clotting Disorder Father         clot to intestines    Arthritis Father     Substance Abuse Brother     Substance Abuse Brother     Stroke Paternal Uncle 36    Cystic Fibrosis Daughter      Allergies   Allergen Reactions    Sulfa Antibiotics Rash    Ciprofloxacin Hcl Swelling     facial, tongue swelling    Nsaids Other (See Comments)     Bleed risk dt Xaralto    Statins Other (See Comments)     Liver enzyme elevation     Current Outpatient Medications   Medication Sig Dispense Refill    predniSONE (DELTASONE) 2.5 MG tablet 5mg daily with food in AM.      fluconazole (DIFLUCAN) 150 MG tablet Take 1 tablet by mouth today, then take another dose in 3 days then take a third dose in 3 days 3 tablet 0    nystatin (MYCOSTATIN) 861481 UNIT/GM powder Apply topically 4 times daily. 1 Bottle 0    nitrofurantoin, macrocrystal-monohydrate, (MACROBID) 100 MG capsule Take 1 capsule by mouth 2 times daily for 5 days 10 capsule 0    hydrOXYzine (VISTARIL) 50 MG capsule Take one pill every 8 hrs for itching 30 capsule 0    nystatin (MYCOSTATIN) 964867 UNIT/GM powder Apply 3 times daily. 30 g 0    amitriptyline (ELAVIL) 25 MG tablet TAKE 2 TABLETS AT BEDTIME 180 tablet 3    HYDROcodone-acetaminophen (NORCO) 5-325 MG per tablet Take 1 tablet by mouth every 4 hours as needed for Pain.       lidocaine (LIDODERM) 5 %       benzocaine, DENTAL, (ORABASE-B) 20 % PSTE paste apply to oral sores twice a day      levETIRAcetam (KEPPRA) 250 MG tablet Take 1 tablet by mouth 2 times daily 180 tablet 3    furosemide (LASIX) 20 MG tablet Take 1 tablet by mouth daily 90 tablet 3    carvedilol (COREG) 6.25 MG tablet Take 1 tablet by mouth 2 times daily (with meals) 180 tablet 3    citalopram (CELEXA) 40 MG tablet TAKE 1 TABLET DAILY 90 tablet 3    SITagliptin (JANUVIA) 50 MG tablet Take 1 tablet by mouth daily 90 tablet 3  triamcinolone (KENALOG) 0.1 % ointment APPLY TO THE AFFECTED AREAS OF RASH TWICE A DAY FOR 2 WEEKS THEN ONCE DAILY FOR 2 WEEKS 60 g 12    cyclobenzaprine (FLEXERIL) 10 MG tablet Take 10 mg by mouth 2 times daily as needed      rivaroxaban (XARELTO) 20 MG TABS tablet TAKE 1 TABLET DAILY 90 tablet 4    albuterol (ACCUNEB) 0.63 MG/3ML nebulizer solution Take 3 mLs by nebulization every 6 hours as needed for Wheezing 270 mL 3    PROLIA 60 MG/ML SOSY SC injection       predniSONE (DELTASONE) 2.5 MG tablet TAKE 3 TABLETS BY MOUTH DAILY FOR 2 WEEKS THEN ALTERNATE 3 TABLETS (EVEN DAYS) AND 2 TABLETS (ODD DAYS) EVERY OTHER DAY THEREAFTER      allopurinol (ZYLOPRIM) 300 MG tablet Take half tab daily x 2weeks, then full tab daily thereafter.  leucovorin calcium (WELLCOVORIN) 5 MG tablet TAKE 1 TO 2 TABLETS BY MOUTH ONCE DAILY THE DAY AFTER METHOTREXATE      lansoprazole (PREVACID) 30 MG delayed release capsule Take 1 capsule by mouth daily 90 capsule 1    methotrexate (RHEUMATREX) 2.5 MG chemo tablet TAKE 6 TABS ONCE A WEEK with food. No alcohol. Hold if on antibiotics or ill.  hydrocortisone 1 % cream Apply topically 2 times daily. 1 Tube 1    diclofenac sodium (VOLTAREN) 1 % GEL Apply 4 g topically 4 times daily 300 g 3    hydroxychloroquine (PLAQUENIL) 200 MG tablet Take 200 mg by mouth 2 times daily       albuterol sulfate  (90 BASE) MCG/ACT inhaler Inhale 2 puffs into the lungs every 6 hours as needed for Wheezing 1 Inhaler 3    Blood Glucose Monitoring Suppl (ACCU-CHEK ADVANTAGE DIABETES) KIT Diabetic monitoring kit(any brand), with supplies for testing. Test fasting once daily.  Dia.00 1 kit 0    Glucose Blood (BLOOD GLUCOSE TEST STRIPS) STRP Test once daily 100 strip 4    Lancets MISC Testing q day 100 each 3    Cholecalciferol (VITAMIN D) 2000 UNITS CAPS capsule Take 2 capsules by mouth daily       gabapentin (NEURONTIN) 400 MG capsule TAKE 1 CAPSULE THREE TIMES A  capsule 3    cyanocobalamin (CVS VITAMIN B12) 1000 MCG tablet Take 1 tablet by mouth daily 90 tablet 3    folic acid (FOLVITE) 1 MG tablet Take 1 tablet by mouth daily 90 tablet 3     Current Facility-Administered Medications   Medication Dose Route Frequency Provider Last Rate Last Admin    lidocaine 1 % injection 16 mL  16 mL Other Once Parviz Goyal DO         PMH, Surgical Hx, Family Hx, and Social Hxreviewed and updated. Health Maintenance reviewed. Objective    Vitals:    07/08/21 1052   BP: 110/70   Site: Right Upper Arm   Position: Sitting   Cuff Size: Medium Adult   Pulse: 87   Temp: 98.2 °F (36.8 °C)   TempSrc: Temporal   SpO2: 95%   Weight: 202 lb (91.6 kg)   Height: 5' 3\" (1.6 m)       Physical Exam  Vitals and nursing note reviewed. Exam conducted with a chaperone present. Constitutional:       General: She is not in acute distress. Appearance: She is well-developed. HENT:      Right Ear: External ear normal.      Left Ear: External ear normal.      Nose: Nose normal.   Eyes:      Pupils: Pupils are equal, round, and reactive to light. Cardiovascular:      Rate and Rhythm: Normal rate and regular rhythm. Pulmonary:      Effort: Pulmonary effort is normal. No respiratory distress. Breath sounds: Normal breath sounds. No wheezing or rales. Abdominal:      General: Bowel sounds are normal. There is no distension. Palpations: Abdomen is soft. There is no mass. Tenderness: There is no abdominal tenderness. There is no guarding or rebound. Hernia: No hernia is present. Musculoskeletal:         General: Normal range of motion. Cervical back: Normal range of motion. Skin:     General: Skin is warm and dry. Capillary Refill: Capillary refill takes less than 2 seconds. Findings: Erythema and rash present. No abscess. Neurological:      Mental Status: She is alert and oriented to person, place, and time.          Assessment & Plan  Advised patient that she does have a UTI based on UA. We are going to send a culture. However, an antibiotic can often make yeast infection worse so the important that we treat the yeast infection aggressively and she must follow-up with her PCP. Advised patient to make appoint with PCP before leaving today and make sure she gets to that appointment. In addition, patient is to go to the lab and get the lab work done that she was ordered before she follows up with her PCP. For the itching she can take the Vistaril as needed. Diagnosis Orders   1. Yeast infection  POCT Urinalysis No Micro (Auto)    Culture, Urine   2. Acute cystitis without hematuria       Orders Placed This Encounter   Procedures    Culture, Urine     Standing Status:   Future     Standing Expiration Date:   7/8/2022     Order Specific Question:   Specify (ex-cath, midstream, cysto, etc)? Answer:   mid stream    POCT Urinalysis No Micro (Auto)     Orders Placed This Encounter   Medications    fluconazole (DIFLUCAN) 150 MG tablet     Sig: Take 1 tablet by mouth today, then take another dose in 3 days then take a third dose in 3 days     Dispense:  3 tablet     Refill:  0    nystatin (MYCOSTATIN) 313846 UNIT/GM powder     Sig: Apply topically 4 times daily. Dispense:  1 Bottle     Refill:  0    nitrofurantoin, macrocrystal-monohydrate, (MACROBID) 100 MG capsule     Sig: Take 1 capsule by mouth 2 times daily for 5 days     Dispense:  10 capsule     Refill:  0    hydrOXYzine (VISTARIL) 50 MG capsule     Sig: Take one pill every 8 hrs for itching     Dispense:  30 capsule     Refill:  0     Medications Discontinued During This Encounter   Medication Reason    albuterol (PROVENTIL) (5 MG/ML) 0.5% nebulizer solution LIST CLEANUP    hydrOXYzine (VISTARIL) 50 MG capsule REORDER     Return if symptoms worsen or fail to improve. Reviewed with the patient: current clinical status, medications, activities and diet.      Side effects, adverse effects of the medication prescribed today, as well as treatment plan/ rationale and resultexpectations have been discussed with the patient who expresses understanding and desires to proceed. Close follow up to evaluate treatment resultsand for coordination of care. I have reviewed the patient's medical history in detail and updated the computerized patient record.     Anabel Leo, APRN - CNP

## 2021-07-19 NOTE — TELEPHONE ENCOUNTER
Please let her know that I have ordered a referral for endocrinology and will contact the specialist to help get her scheduled this week. She may need insulin. If sugar goes over 400 again I would advise emergency room evaluation and treatment. She should not avoid eating. Eat foods high in lean protein and vegetables. Try to avoid starches and simple sugars.

## 2021-07-19 NOTE — TELEPHONE ENCOUNTER
Has she had any increased steroid lately? Any symptoms of illness? How did she bring her sugar down to where it is now and when was it over 400?

## 2021-07-19 NOTE — TELEPHONE ENCOUNTER
Pt reprots she taken 5mg every morning for her lupus, but she has always been on this dose. She declines any illness. She brought her sugar down by just not eating which read at 290.

## 2021-07-19 NOTE — TELEPHONE ENCOUNTER
Pt called stating that her blood sugar is up pt states its at 454 and she got it down to 211. Pt would like know to know what she should do.  Pt said you call her as well

## 2021-07-19 NOTE — TELEPHONE ENCOUNTER
Patient made aware. Given referral information and was told to give endo a call first thing tomorrow morning.

## 2021-07-20 NOTE — PROGRESS NOTES
7/20/2021    Assessment:       Diagnosis Orders   1. Type 2 diabetes mellitus with other specified complication, without long-term current use of insulin (HCC)  POCT Glucose    POCT glycosylated hemoglobin (Hb A1C)       PLAN:     1. Continue Januvia 50 mg taken daily (will stop when transitioned to higher dose of Trulicity)  2. Start Trulicity 6.32 mg injected weekly  3. Start Farxiga 5 mg daily   4. Glucose logs given  5. Dexcom G6 CGM ordered  6. Follow up in 4 weeks       Orders Placed This Encounter   Procedures    POCT Glucose    POCT glycosylated hemoglobin (Hb A1C)     No orders of the defined types were placed in this encounter. No follow-ups on file. Subjective:     Chief Complaint   Patient presents with    Diabetes     Vitals:    07/20/21 1116   BP: 111/81   Site: Right Upper Arm   Position: Sitting   Cuff Size: Large Adult   Pulse: 91   Weight: 199 lb (90.3 kg)   Height: 5' 3\" (1.6 m)     Wt Readings from Last 3 Encounters:   07/20/21 199 lb (90.3 kg)   07/08/21 202 lb (91.6 kg)   06/04/21 200 lb 6.4 oz (90.9 kg)     BP Readings from Last 3 Encounters:   07/20/21 111/81   07/08/21 110/70   06/04/21 104/70     Patient is a 80-year-old female with a history of type 2 diabetes with worsening glycemic control. Average glucose at home is running 250-300. I spent 30 minutes of this encounter on diabetic education, progression of diabetes, long-term effects of hyperglycemia, and the importance of lifestyle modifications including diet and exercise program.  The patient and her  are going have been and are going to go back on a low carbohydrate dietary plan. Diabetes  She presents for her initial diabetic visit. She has type 2 diabetes mellitus. No MedicAlert identification noted. Pertinent negatives for hypoglycemia include no dizziness or headaches. Pertinent negatives for diabetes include no chest pain, no fatigue, no polydipsia and no polyuria.  Current diabetic treatment includes oral agent (triple therapy). She is compliant with treatment all of the time. She is following a generally healthy diet. Meal planning includes avoidance of concentrated sweets and carbohydrate counting. She never participates in exercise. There is no change in her home blood glucose trend. Her overall blood glucose range is 180-200 mg/dl. An ACE inhibitor/angiotensin II receptor blocker is not being taken. She does not see a podiatrist.Eye exam is current.      Past Medical History:   Diagnosis Date    Ataxic gait     Back pain     Benign essential tremor     Depression     DM (diabetes mellitus) ([facility] [address] 11/25/2014    DVT [facility] venous thrombosis) [facility])     Elevated troponin 5/25/2016    Factor V Leiden carrier ([facility] [address]     history of DVT, on Xarelto    GERD (gastroesophageal reflux disease)     Hereditary sensory-motor neuropathy, type I     HTN (hypertension)     HTN (hypertension)     Hyperlipidemia     Lumbar radicular pain     Neuropathy of both feet 8/22/2016    NSTEMI (non-ST elevated myocardial infarction) ([facility])     Obesity     Osteoarthritis     Pulmonary embolism ([facility] [address]     Renal failure 6/29/2018    Right inguinal hernia 11/9/2017    SOB (shortness of breath) 4/19/2017    Systemic lupus erythematosus ([facility] [address] 9/8/2017    Transient brainstem ischemia     Umbilical hernia 32/0/2250    Vasomotor flushing     on hormone replacement therapy     Past Surgical History:   Procedure Laterality Date    APPENDECTOMY      CARDIAC CATHETERIZATION  06/2018    CATARACT REMOVAL Bilateral Winter 2019   [facility] COLONOSCOPY      Dr. Kathy Kelley    COLONOSCOPY  12/16/13    DR SINGER repeat in 10 yrs per pt    COLONOSCOPY  08/2017    Dr Jens Anderson Right 11/15/2017    RIGHT  HERNIA INGUINAL REPAIR performed by Eric Kulkarni MD at [facility]  ARTHROSCOPY Left     LAPAROSCOPY N/A 11/15/2017    DIAGNOSTIC LAPAROSCOPY performed by Eric Kulkarni MD at [facility]:     albuterol sulfate  (90 BASE) MCG/ACT inhaler, Inhale 2 puffs into the lungs every 6 hours as needed for Wheezing, Disp: 1 Inhaler, [facility]: 3    Blood Glucose Monitoring Suppl (ACCU-CHEK ADVANTAGE DIABETES) KIT, Diabetic monitoring kit(any brand), with supplies for testing. Test fasting once daily.  Dia.00, Disp: 1 kit, [facility]fl: 0    Glucose Blood (BLOOD GLUCOSE TEST STRIPS) STRP, Test once daily, Disp: 100 strip, [facility]: 4    Lancets MISC, Testing q day, Disp: 100 each, Rfl: 3    Cholecalciferol (VITAMIN D) 2000 UNITS CAPS capsule, Take 2 capsules by mouth daily , Disp: , Rfl:     gabapentin (NEURONTIN) 400 MG capsule, TAKE 1 CAPSULE THREE TIMES A DAY, Disp: 270 capsule, Rfl: 3    cyanocobalamin (CVS VITAMIN B12) 1000 MCG tablet, Take 1 tablet by mouth daily, Disp: 90 tablet, Rfl: 3    folic acid (FOLVITE) 1 MG tablet, Take 1 tablet by mouth daily, Disp: 90 tablet, [facility]: 3  Lab Results   Component Value Date     2021    K 4.3 2021     2021    CO2 27 2021    BUN 32 (H) 2021    CREATININE 1.26 (H) 2021    GLUCOSE 271 2021    CALCIUM 9.1 2021    PROT 6.2 (L) 2021    LABALBU 3.9 2021    BILITOT 0.3 2021    ALKPHOS 80 2021    AST 23 2021    ALT 26 2021    LABGLOM 43.2 (L) 2021    GFRAA 52.2 (L) 2021    GLOB 2.3 2021     Lab Results   Component Value Date    WBC 7.6 2021    HGB 11.3 (L) 2021    HCT 34.3 (L) 2021    MCV 92.9 2021     2021     Lab Results   Component Value Date    LABA1C 8.4 (H) 2021    LABA1C 7.8 (H) 2021    LABA1C 7.5 (H) 2020     Lab Results   Component Value Date    HDL 75 (H) 2021    HDL 74 (H) 2021    HDL 61 (H) 2020    LDLCALC 102 2021    LDLCALC 116 2021    LDLCALC 98 2020    CHOL 198 2021    CHOL 208 (H) 2021    CHOL 186 2020    TRIG 104 2021    TRIG 92 02/04/2021    TRIG 135 11/05/2020     No results found for: TESTM  Lab Results   Component Value Date    TSH 2.330 05/07/2021    TSH 1.410 10/19/2016    TSH 1.730 04/21/2016    FT3 2.9 04/21/2016    T4FREE 1.03 05/07/2021    T4FREE 0.95 04/21/2016    T4FREE 1.04 06/20/2014     Lab Results   Component Value Date    TPOABS <4.0 05/07/2021       Review of Systems   Constitutional: Negative for chills, fatigue and fever. HENT: Negative for congestion, ear pain, postnasal drip, rhinorrhea, sinus pressure and sore throat. Eyes: Negative for pain and redness. Respiratory: Negative for cough, shortness of breath and wheezing. Cardiovascular: Negative for chest pain, palpitations and leg swelling. Gastrointestinal: Negative for abdominal pain, diarrhea, nausea and vomiting. Endocrine: Negative for polydipsia and polyuria. Genitourinary: Negative for difficulty urinating. Musculoskeletal: Negative for arthralgias. Skin: Negative for rash. Allergic/Immunologic: Negative for environmental allergies. Neurological: Negative for dizziness and headaches. Hematological: Negative for adenopathy. Psychiatric/Behavioral: Negative for dysphoric mood. Objective:   Physical Exam  Vitals reviewed. Constitutional:       Appearance: She is well-developed. HENT:      Head: Normocephalic and atraumatic. Nose: No congestion. Eyes:      Conjunctiva/sclera: Conjunctivae normal.   Neck:      Comments: No thyromegaly or palpable nodules  Cardiovascular:      Rate and Rhythm: Normal rate and regular rhythm. Heart sounds: Normal heart sounds. Pulmonary:      Effort: Pulmonary effort is normal.      Breath sounds: Normal breath sounds. Abdominal:      General: Bowel sounds are normal.      Palpations: Abdomen is soft. Musculoskeletal:         General: Normal range of motion. Cervical back: Normal range of motion and neck supple. Skin:     General: Skin is warm and dry.    Neurological: Mental Status: She is alert and oriented to person, place, and time.    Psychiatric:         Mood and Affect: Mood normal.

## 2021-07-20 NOTE — PATIENT INSTRUCTIONS
Endocrinology-diabetes    1. Check your blood sugars 4 times a day, before meals and at night  2. Document these numbers and a blood glucose log and bring them with you to your follow-up appointment. 3. Call our office if you have blood sugars less than 80 or greater then 300 on two or more occasions  4. Call our office if you have any questions regarding your blood sugars or insulin dosing regiment  5. Signs of low blood sugar include sweating , heart racing, dizziness and weakness. Check your blood sugar if you have any of these symptoms. Medications-  6. Continue Januvia 50 mg taken daily  7. Start Trulicity 1.84 mg injected weekly  8. Start Farxiga 5 mg daily   9.  Follow up in 4 weeks

## 2021-07-23 NOTE — TELEPHONE ENCOUNTER
Pt was only able to get her insulin she said the other medication was too expensive and she couldn't pick it up.   Please advise if it is ok for her to only take insulin or if you would like to do any medication adjustments

## 2021-08-10 NOTE — PROGRESS NOTES
Chief Complaint   Patient presents with    Diabetes    Hypertension    Hyperlipidemia    Altered Mental Status     patient is here for a possible uti       HPI: Shea Dickinson is a 64 y.o. female presenting for follow-up of general health. Frequent falls: Patient states that she has been falling a lot more over the past week than normal.  This typically happens mostly in the morning when she first gets up. She states that she was recently diagnosed with a UTI and is not sure if her symptoms have gone away or not. She denies any injury with her falls. Continues to use a Rollator to get around. She denies any lightheadedness or dizziness. She denies any increased weakness overall. She continues to follow with multiple specialist.  She does not report any muscle pain or cramping beyond her baseline. She has noticed that her short-term memory has not been the best over recent days. Has been forgetting little things here and there. She has had some slurred speech in the past but has noticed that it seems to be worse over the past few days as well. She has not noticed any other neurological symptoms. He still has some pain with urination today but is not able to leave a urine sample during her visit. She does not report any flank pain or blood in the urine. No fever or chills. Lupus/polymyositis: she continues to follow routinely with her specialist. Dr. Surinder Riggs. She states that her pain levels have been stable on current combination of medication. She does not report any abnormal bruising or bleeding on Xarelto. Neuropathy: she continues to follow with Dr. Nita Patterson routinely. He does not have an upcoming appointment for quite some time however. She was told that things are stable the last time she had her appointment and has been taking her medication as prescribed. Diabetes: She states that her glucose levels have been more stable. She does not report any low sugar episodes.     ROS: This patient reports no chest pain or pressure. There is no shortness of breath or cough. The patient reports no nausea or vomiting. There is no heartburn or indigestion. There is no diarrhea or constipation. No black, bloody, mucusy or tarry stool noticed. The patient reports no bloating and no change in appetite. EXAM:  Constitutional Blood pressure 110/60, pulse 93, temperature 97.8 °F (36.6 °C), temperature source Temporal, resp. rate 16, height 5' 3\" (1.6 m), weight 191 lb (86.6 kg), SpO2 94 %, not currently breastfeeding. .  She has a normal affect, no acute distress, appears well developed and well nourished. Neck:  neck- supple, no mass, non-tender and no bruits  Lungs:  Normal expansion. Clear to auscultation. No rales, rhonchi, or wheezing., No chest wall tenderness. Heart:  Heart sounds are normal.  Regular rate and rhythm without murmur, gallop or rub. Abdomen:  Soft, non-tender, normal bowel sounds. No bruits, organomegaly or masses. Extremities: Extremities warm to touch, pink, with no edema. Some scattered bruising to her lower back region. No hematoma seen or palpated. DIAGNOSIS:    Diagnosis Orders   1. Dysuria  Urinalysis    Culture, Urine   2. Uncontrolled type 2 diabetes mellitus with hyperglycemia ([address]     3. Polymyositis with myopathy ([facility] [address]     4. Neuropathy of both feet- Dr. Stefano Vigil     5. Discoid lupus erythematosus     6. Frequent falls     7. Short-term memory loss     8. Slurred speech     9. Pruritus     10. Factor V Leiden carrier [facility])           PLAN: Include orders in the DX section. Follow up: 1 month and as needed. Blood work one week prior as ordered. With current combination of symptoms, recommend blood work to be done as soon as possible today. She is not able to leave urine testing but feels that she still has UTI. Want to ensure that infection is the cause of her symptoms before treating with additional antibiotic however.   Office to call with results when available. Office staff to help arrange appointment with her neurologist as soon as possible with recent short-term memory loss, frequent falls and some slurred speech. She is encouraged to go to the emergency room if symptoms worsen at all. She declines going at this time. Vital signs are stable and overall physical examination findings are normal/baseline. Please note this report has been partially produced using speech recognition software and may cause contain errors related to that system including grammar, punctuation and spelling as well as words and phrases that may seem inappropriate. If there are questions or concerns please feel free to contact me to clarify.       Electronically signed by Michael Grimes APRN - CNP-CNP, 3:12 PM 8/16/21

## 2021-08-12 PROBLEM — E83.52 HYPERCALCEMIA: Status: ACTIVE | Noted: 2021-01-01

## 2021-08-12 NOTE — ED PROVIDER NOTES
CC/HPI: 72-year-old female with history of diabetes, lupus hypertension,, factor V Leiden, coronary artery disease with previous non-STEMI, PE, DVT UTI,, and chronic renal insufficiency to the emergency department with abnormal lab work done as an outpatient 2 days ago. Lab work from 8/10 showed the patient patient with a calcium of 12 hemoglobin A1c of 9.1 , BUN/creatinine of 34 and 1.88, white blood cell count of 8.2 and a UA of moderate leukocyte Estrace. Patient complains of dysuria and urinary frequency. States she has history of recurrent urinary tract infections. She has had no fever no vomiting. Denies chest pain or difficulty breathing. Patient states she feels fatigued and rundown. VITALS/PMH/PSH: Reviewed per nurses notes    REVIEW OF SYSTEMS: As in chief complaint history of present illness, otherwise all other systems are reviewed and negative the total 10 systems reviewed    PHYSICAL EXAM:  GEN: Pt alert and oriented, no acute distress. Patient appears slightly disoriented. Slow to respond at times. HEENT:         Normocephalic/Atramatic        PERRL, EOMI       Throat non-edematous. No erythema noted. No exudates noted. Moist membranes  NECK: Nontender, no signs of trauma, no lymphadenopathy  HEART: Reg S1/S2, without murmer, rub or gallop  LUNGS: Clear to auscultation bilaterally, respirations even and unlabored  ABDOMEN: Bowel sounds positive, soft, nondistended. Mild appearing suprapubic tenderness to palpation. .  No guarding rebound or rigidity  MUSCULOSKELETAL/EXTREMITITES:  No signs of trauma, cyanosis or edema. No CVA tenderness  LYMPH: no peripheral lympadenopathy noted  SKIN:  Warm & dry, no rash  NEUROLOGIC:  Alert and oriented. Speech clear. Cranial nerves II through XII grossly intact as are strength and sensation no focal or cerebellar deficits    Medical decision making/ED course;  Patient taken to room 6. IV was established lab work was obtained.   Patient with a white blood cell count of 7 with an H&H of 13 and 41 and platelets of 091. Troponin was elevated at 0.20. Patient chemistry within normal limits other than potassium being low at 3 BUN and creatinine were 35 and 2.33. Patient calcium was 14. Patient INR was 3.1 with a PTT of 30.8. Analysis showed signs of infection. ER EKG interpretation normal sinus rhythm at 92 bpm with nonspecific ST-T changes. LVH noted. And also left axis deviation. Impression   No acute hemorrhage or extra-axial fluid collection seen on this examination.           All CT scans at this facility use dose modulation, iterative reconstruction, and/or weight based dosing when appropriate to reduce radiation dose to as low as reasonably achievable.               PORTABLE CHEST       COMPARISON: No prior studies available for comparison.           HISTORY: confusion        TECHNIQUE: AP view           FINDINGS:   No consolidating pneumonia, pleural effusion or pneumothorax is seen. Coarsening of the interstitium is seen. The cardiac silhouette is within normal limits of size. The bony structures are grossly intact.       IMPRESSION: No evidence of acute cardiopulmonary process, findings are suggestive of COPD.             Patient was given initially IV fluid bolus. Patient then was given a liter of normal saline with 40 of KCl at 200 cc/h. Patient was given IV Lasix 20 mg and also IV Rocephin for the urinary tract infection. Case was discussed with Dr. Harman Phalen. Will admit to [facility] for further evaluation and treatmetn    Clinical impression;  1) acute hypercalcemia  2) worsening chronic renal insufficiency  3) urinary tract infection    Disposition/plan;   Patient being admitted to [facility] for further evaluation and treatment.      Yue Grimaldo DO  08/12/21 [id number]

## 2021-08-12 NOTE — Clinical Note
Patient Class: Inpatient [101]   REQUIRED: Diagnosis: Hypercalcemia [275.42. ICD-9-CM]   Estimated Length of Stay: Estimated stay of more than 2 midnights   Future Attending Provider: Corby Soto [[id number]]

## 2021-08-12 NOTE — ED NOTES
Green, Red, and purple tube obtained, unable to obtain blue tube.      Raymon Miner, EMT-P  08/12/21 1453

## 2021-08-13 NOTE — PROGRESS NOTES
Occupational Therapy   Occupational Therapy Initial Assessment- Med  Date: 2021   Patient Name: Flakita Bartholomew  MRN: [id number]     : 1960    Date of Service: 2021    Discharge Recommendations:  Home with Home health OT, Home with assist PRN       Assessment   Performance deficits / Impairments: Decreased functional mobility ; Decreased ADL status; Decreased endurance;Decreased balance;Decreased high-level IADLs;Decreased safe awareness  Assessment: Pt is a 63y/o female with Lupus, PLOF lives home with , was I/MI with ADL, whom presents to [facility] 2* Acute cystitis without hematuria, hypercalcemia. Pt demo's the above resulting perf def/impair and would benefit from OT skilled services to maximize strength/end and safety/I with ADL for safe d/c transition. Treatment Diagnosis: Acute cystitis without hematuria, hypercalcemia  Prognosis: Good  History: multi comorb  Exam: 6 perf def/impair  OT Education: OT Role;Plan of Care;Transfer Training  REQUIRES OT FOLLOW UP: Yes  Safety Devices  Safety Devices in place: Yes  Type of devices: All fall risk precautions in place           Patient Diagnosis(es): The primary encounter diagnosis was Acute cystitis without hematuria. Diagnoses of Hypercalcemia and Renal insufficiency were also pertinent to this visit.      has a past medical history of Ataxic gait, Back pain, Benign essential tremor, Depression, DM (diabetes mellitus) ([address] DVT (deep venous thrombosis) ([address] Elevated troponin, Factor V Leiden carrier ([address] GERD (gastroesophageal reflux disease), Hereditary sensory-motor neuropathy, type I, HTN (hypertension), HTN (hypertension), Hyperlipidemia, Lumbar radicular pain, Neuropathy of both feet, NSTEMI (non-ST elevated myocardial infarction) ([address] Obesity, Osteoarthritis, Pulmonary embolism ([address] Renal failure, Right inguinal hernia, SOB (shortness of breath), Systemic lupus erythematosus ([address] Transient brainstem ischemia, Umbilical hernia, and Vasomotor flushing.   has a past surgical history that includes Appendectomy; eye surgery (1995); Upper gastrointestinal endoscopy (12/16/13); Hysterectomy; Colonoscopy; Colonoscopy (12/16/13); Colonoscopy (08/2017); pr cystourethroscopy (N/A, 9/25/2017); Knee arthroscopy (Left); repair umbilical GMSR,8+P/A,YHCIG (N/A, 11/15/2017); laparoscopy (N/A, 11/15/2017); hernia repair (Right, 11/15/2017); Muscle biopsy (Right, 3/14/2019); Cataract removal (Bilateral, Winter 2019); and Cardiac catheterization (06/2018). Treatment Diagnosis: Acute cystitis without hematuria, hypercalcemia      Restrictions  Restrictions/Precautions  Restrictions/Precautions: Fall Risk (multiple recent falls)    Subjective   General  Chart Reviewed: Yes  Patient assessed for rehabilitation services?: Yes (Simultaneous filing. User may not have seen previous data.)  Additional Pertinent Hx: Pt has Lupus, reports hx of multiple falls- has been falling a lot lately, loosing her thoughts a lot lately- dec'd cog  Family / Caregiver Present: No  Referring Practitioner: Dr. Ivy Marcano  Diagnosis: Acute cystitis without hematuria, hypercalcemia  Subjective  Subjective: Pt agreeable to OT eval/tx  Patient Currently in Pain: Yes (Simultaneous filing. User may not have seen previous data.)  Pain Assessment  Pain Assessment: 0-10 (Simultaneous filing. User may not have seen previous data.)  Pain Level: 8  Roy-Jara Pain Rating: Hurts a little bit  Pain Type: Acute pain  Pain Location: Wrist (Simultaneous filing. User may not have seen previous data.)  Pain Orientation: Right (Simultaneous filing. User may not have seen previous data.)  Pain Descriptors: Sharp (Simultaneous filing. User may not have seen previous data.)  Pain Frequency: Continuous  Vital Signs  Level of Consciousness: Alert (0)  Patient Currently in Pain: Yes (Simultaneous filing.  User may not have seen previous data.)  Social/Functional History  Social/Functional History  Lives With: Spouse  Type of Home: House  Home Layout: One level  Home Access: Stairs to enter without rails  Entrance Stairs - Number of Steps: 3  Bathroom Shower/Tub: Tub/Shower unit  Bathroom Toilet: Standard  Bathroom Accessibility: Accessible  Home Equipment: 4 wheeled walker, Rolling walker, [address] Help From: Family  ADL Assistance: Independent  Homemaking Assistance: Independent  Homemaking Responsibilities: Yes  Ambulation Assistance: Independent (Pt and her  reports that she started using a [employer])  Transfer Assistance: Independent  Active : Yes  Mode of Transportation: [employer]  Occupation: Retired  Type of occupation: Worked at [address] [place] [address] Road: Reading, writing and Bingo 1x per week       Objective      Functional Mobility  Functional - Mobility Device: Rolling Walker  Activity: Other (level tile surface in hallway)  Assist Level: Stand by assistance  ADL  Feeding: Modified independent   Grooming: Modified independent   UE Bathing: Setup;Supervision  LE Bathing: Setup;Stand by assistance  UE Dressing: Modified independent   LE Dressing: Stand by assistance; Increased time to complete  Toileting: Stand by assistance  Tone RUE  RUE Tone: Normotonic  Tone LUE  LUE Tone: Normotonic  Coordination  Movements Are Fluid And Coordinated: Yes     Bed mobility  Supine to Sit: Modified independent  Transfers  Sit to stand: Stand by assistance  Stand to sit: Stand by assistance              Sensation  Overall Sensation Status: Impaired  Additional Comments: WFL BUE, impaired- neuropathy both feet        LUE AROM (degrees)  LUE AROM : WFL  Left Hand AROM (degrees)  Left Hand AROM: WFL  RUE AROM (degrees)  RUE AROM : WFL  Right Hand AROM (degrees)  Right Hand AROM: WFL  LUE Strength  Gross LUE Strength: WFL  RUE Strength  Gross RUE Strength: WFL     Hand Dominance  Hand Dominance: Right             Plan   Plan  Times per week: 3-6x/wk  Times per day: Daily  Plan weeks: <1- med pt  Current Treatment Recommendations: Strengthening, Balance Training, Functional Mobility Training, Endurance Training, Stair training, Pain Management, Safety Education & Training, Patient/Caregiver Education & Training, Self-Care / ADL, Home Management Training            Goals  Short term goals  Time Frame for Short term goals: 3-5 days- med pt  Short term goal 1: I BUE HEP  Short term goal 2: Spv toileting  Short term goal 3: Spv LB dressing and bathing  Short term goal 4: Spv fxl ADL xfers  Long term goals  Time Frame for Long term goals : Same as STGs  Long term goal 1: Same as STGs  Long term goal 2: Same as STGs       Therapy Time   Individual Concurrent Group Co-treatment   Time In  9:10         Time Out  10:10         Minutes  [address], [place]

## 2021-08-13 NOTE — PROGRESS NOTES
Lab called with a critical troponin of 0.019 which is better then previous troponin level. Caitlin Hutchins notified.    Electronically signed by Toby Craig RN on 8/12/2021 at 10:39 PM

## 2021-08-13 NOTE — PROGRESS NOTES
Physical Therapy    Facility/Department: [facility] MED SURG UNIT  Initial Assessment -Tatiana Surg    NAME: Shailesh Braxton  : 1960  MRN: [id number]    Date of Service: 2021    Discharge Recommendations:  Home with Home health PT, Home with assist PRN        Assessment   Body structures, Functions, Activity limitations: Decreased functional mobility ; Decreased cognition;Decreased endurance;Decreased sensation;Decreased strength;Decreased balance; Increased pain  Assessment: Pt is a 63 yo female who reports having increased falls in the past  1-2 weeks and that her speech and thought process has been delayed for the past week. PT completed MS evaluation with pt being very fatigued but willing to work with therapy. Pt was able to answer ther majority of PTs questions but with delayed response. Pts  had to help to answer some of the questions. Therapy recommended ST therapy for cognition but Dr Autumn Esteves reports that pts calcium levels were very high and once this is resolved that her cognition issues will clear as well. Pt ambulated into the hallway with fair balance and was then assisted to the recliner and postioned for comfort. PT recommend a few days of MS then home with Willy Stodd[facility] PT an assistance from family PRN. Pt agreeable with plan. Treatment Diagnosis: Acute Cystitis, hypercalemia and renal insufficieny  Prognosis: Good  Decision Making: Medium Complexity  REQUIRES PT FOLLOW UP: Yes  Activity Tolerance  Activity Tolerance: Patient limited by fatigue       Patient Diagnosis(es): The primary encounter diagnosis was Acute cystitis without hematuria. Diagnoses of Hypercalcemia and Renal insufficiency were also pertinent to this visit.      has a past medical history of Ataxic gait, Back pain, Benign essential tremor, Depression, DM (diabetes mellitus) ([address] DVT (deep venous thrombosis) ([facility] [address] Elevated troponin, Factor V Leiden carrier ([facility] [address] GERD (gastroesophageal reflux disease), Hereditary sensory-motor neuropathy, type I, HTN (hypertension), HTN (hypertension), Hyperlipidemia, Lumbar radicular pain, Neuropathy of both feet, NSTEMI (non-ST elevated myocardial infarction) ([address] Obesity, Osteoarthritis, Pulmonary embolism ([address] Renal failure, Right inguinal hernia, SOB (shortness of breath), Systemic lupus erythematosus ([address] Transient brainstem ischemia, Umbilical hernia, and Vasomotor flushing.   has a past surgical history that includes Appendectomy; eye surgery (1995); Upper gastrointestinal endoscopy (12/16/13); Hysterectomy; Colonoscopy; Colonoscopy (12/16/13); Colonoscopy (08/2017); pr cystourethroscopy (N/A, 9/25/2017); Knee arthroscopy (Left); repair umbilical PEJP,5+E/N,ISEKE (N/A, 11/15/2017); laparoscopy (N/A, 11/15/2017); hernia repair (Right, 11/15/2017); Muscle biopsy (Right, 3/14/2019); Cataract removal (Bilateral, Winter 2019); and Cardiac catheterization (06/2018). Restrictions  Restrictions/Precautions  Restrictions/Precautions: Fall Risk (multiple recent falls)  Vision/Hearing        Subjective  General  Chart Reviewed: Yes  Patient assessed for rehabilitation services?: Yes  Additional Pertinent Hx: Pt reports having many falls \"I fall all the time\" and that she has Lupus; Pt and her  reports that she speech and thought process is delayed  Family / Caregiver Present: Yes ()  Referring Practitioner: Dr Erlinda Nowak  Referral Date : 08/13/21  Diagnosis: Acute cystitis, hypercalemia and renal insufficiency  Subjective  Subjective: Pt reports that her pain is 8/10 right wrist 'sharp\"  Pain Screening  Patient Currently in Pain: Yes  Pain Assessment  Pain Assessment: 0-10 (Simultaneous filing. User may not have seen previous data.)  Pain Level: 8  Pain Type: Acute pain  Pain Location: Wrist (Simultaneous filing. User may not have seen previous data.)  Pain Orientation: Right (Simultaneous filing. User may not have seen previous data.)  Pain Descriptors: Sharp (Simultaneous filing.  User may not have seen previous data.)  Pain Frequency: Continuous  Vital Signs  Patient Currently in Pain: Yes       Orientation     Social/Functional History  Social/Functional History  Lives With: Spouse  Type of Home: House  Home Layout: One level  Home Access: Stairs to enter without rails  Entrance Stairs - Number of Steps: 3  Bathroom Shower/Tub: Tub/Shower unit  Bathroom Toilet: Standard  Bathroom Accessibility: Accessible  Home Equipment: 4 wheeled walker, Rolling walker, Wheelchair-manual  Receives Help From: Family  ADL Assistance: [facility]: Independent  Homemaking Responsibilities: Yes  Ambulation Assistance: Independent (Pt and her  reports that she started using a [employer])  Transfer Assistance: Independent  Active : Yes  Mode of Transportation: [place]  Occupation: Retired  Type of occupation: Worked at [employer] [address]: Reading, writing and Bingo 1x per week  Cognition        Objective          AROM RLE (degrees)  RLE AROM: WFL  AROM LLE (degrees)  LLE AROM : WFL  AROM RUE (degrees)  RUE AROM : WFL  AROM LUE (degrees)  LUE AROM : WFL  Strength RLE  Strength RLE: WFL  Strength LLE  Strength LLE: WFL  Strength RUE  Strength RUE: WFL  Strength LUE  Strength LUE: WFL     Sensation  Overall Sensation Status: Impaired  Additional Comments: WFL BUE, impaired- neuropathy both feet  Bed mobility  Supine to Sit: Modified independent  Transfers  Sit to Stand: Stand by assistance  Stand to sit: Stand by assistance  Ambulation  Ambulation?: Yes  Ambulation 1  Surface: level tile  Device: Rolling Walker  Assistance: Stand by assistance  Quality of Gait: Pt ambulated with a flexed forward posture with decreased step length B LE with fair dynamic balance. Pt was SOB with gait and reports fatigued after gait.   Gait Deviations: Decreased step length  Distance: 75'x 2  Stairs/Curb  Stairs?: No              Plan   Plan  Times per week: 5-7  Times per day: Daily (QD to BID)  Plan weeks: 2-3 days MEd surg and then home with [facility] PT and assistance from family PRN  Current Treatment Recommendations: Strengthening, Transfer Training, Endurance Training, Neuromuscular Re-education, Patient/Caregiver Education & Training, Pain Management, Balance Training, Gait Training, Home Exercise Program, Functional Mobility Training, Stair training, Safety Education & Training            Goals  Short term goals  Time Frame for Short term goals: 2-3 days  Short term goal 1: Pt indep with bed mobility  Short term goal 2: Pt indep with transfers  Short term goal 3: Pt able to ambulate with AD for 125'x 1 with supervision and safe gait pattern  Short term goal 4: Pt able to ambulate up/down 3 steps with no railing with supervision so pt can safely enter/exit her home. Short term goal 5: Indep with HEP  Long term goals  Time Frame for Long term goals : Same as STGs  Patient Goals   Patient goals :  To get stronger       Therapy Time   Individual Concurrent Group Co-treatment   Time In  9:00am          Time Out  10:00am          Minutes  60 min                  SONNY Krishna#[id number]

## 2021-08-13 NOTE — PROGRESS NOTES
Pt A&Ox4. 22g in R hand. Pt OOB x1 with walker. Pt up in chair with call light within reach. Will continue to monitor.

## 2021-08-13 NOTE — PROGRESS NOTES
Pt given paperwork for Living Will/POA    All blood work has been received by lab per Angelina Escobar and awaiting  for [facility]. Pt msied hat when voiding so still awaiting urine specimen.  Encouraging pt to drink, Water and milk given to pt

## 2021-08-13 NOTE — H&P
Hospital Medicine  History and Physical    Patient:  Fred Law  MRN: [id number]    CHIEF COMPLAINT:    Chief Complaint   Patient presents with    Dysuria       History Obtained From:  Patient, EMR  Primary Care Physician: BUTCH Riley CNP    HISTORY OF PRESENT ILLNESS:   The patient is a 64 y.o. female with PMH of hypertension, diabetes and stage III kidney failure as well as factor V Leiden coagulopathy. She came in with subtle change in mental status and was found to have hypercalcemia with a calcium level of 14 as well as acute kidney failure. Patient reported having weight loss. No chest pain or palpitation. No abdominal pain. No focal weakness or numbness. No fever or chills.     Past Medical History:      Diagnosis Date    Ataxic gait     Back pain     Benign essential tremor     Depression     DM (diabetes mellitus) ([facility] 11/25/2014    DVT (deep venous thrombosis) ([facility])     Elevated troponin 5/25/2016    Factor V Leiden carrier ([facility]     history of DVT, on Xarelto    GERD (gastroesophageal reflux disease)     Hereditary sensory-motor neuropathy, type I     HTN (hypertension)     HTN (hypertension)     Hyperlipidemia     Lumbar radicular pain     Neuropathy of both feet 8/22/2016    NSTEMI (non-ST elevated myocardial infarction) ([facility])     Obesity     Osteoarthritis     Pulmonary embolism ([address]     Renal failure 6/29/2018    Right inguinal hernia 11/9/2017    SOB (shortness of breath) 4/19/2017    Systemic lupus erythematosus ([address] 9/8/2017    Transient brainstem ischemia     Umbilical hernia 31/0/3861    Vasomotor flushing     on hormone replacement therapy       Past Surgical History:      Procedure Laterality Date    APPENDECTOMY      CARDIAC CATHETERIZATION  06/2018    CATARACT REMOVAL Bilateral Winter 2019   [employer] COLONOSCOPY      Dr. Yvette Knutson    COLONOSCOPY  12/16/13    DR SINGER repeat in 10 yrs per pt    COLONOSCOPY  08/2017    Dr Solo Zimmerman Lasik    HERNIA REPAIR Right 11/15/2017    RIGHT  HERNIA INGUINAL REPAIR performed by Erasto Hill MD at [facility]  ARTHROSCOPY Left     LAPAROSCOPY N/A 11/15/2017    DIAGNOSTIC LAPAROSCOPY performed by Erasto Hill MD at [address] Right 3/14/2019    RIGHT QUADRICEPS MUSCLE BIOPSY performed by Renetta Guerin MD at [facility] N/A 9/25/2017    FLEXABLE CYSTOSCOPY performed by Christie Buitrago MD at [facility],1+E/I,PBVWW N/A 74/50/4550    HERNIA UMBILICAL REPAIR, 90 MINS SUPINE performed by Erasto Hill MD at [address]  12/16/13    DR SINGER       Medications Prior to Admission:    Prior to Admission medications    Medication Sig Start Date End Date Taking? Authorizing Provider   cyanocobalamin 1000 MCG tablet Take 1,000 mcg by mouth daily   Yes Historical Provider, MD   folic acid (FOLVITE) 1 MG tablet Take 1 mg by mouth daily   Yes Historical Provider, MD   gabapentin (NEURONTIN) 400 MG capsule Take 400 mg by mouth 3 times daily.    Yes Historical Provider, MD   traMADol-acetaminophen (ULTRACET) 37.5-325 MG per tablet TAKE AS INSTRUCTED BY YOUR PRESCRIBER 7/21/21 8/20/21 Yes Maria Elena Cid,    lidocaine (LIDODERM) 5 % PLACE 1 PATCH ON THE SKIN DAILY, 12 HOURS ON AND 12 HOURS OFF 7/21/21  Yes Maria Elena Cid,    balsalazide (COLAZAL) 750 MG capsule Take 2,250 mg by mouth 3 times daily   Yes Historical Provider, MD   famotidine (PEPCID) 40 MG tablet Take 40 mg by mouth daily   Yes Historical Provider, MD   metaxalone (SKELAXIN) 800 MG tablet Take 800 mg by mouth 3 times daily   Yes Historical Provider, MD   omeprazole (PRILOSEC) 40 MG delayed release capsule Take 40 mg by mouth daily   Yes Historical Provider, MD   Dulaglutide (TRULICITY) 4.64 SN/5.1JG SOPN Inject 0.75 mg into the skin once a week  Patient taking differently: Inject 0.75 mg into the skin once a week Friday 7/20/21  Yes HAILEE Piedra   dapagliflozin (FARXIGA) 5 MG tablet Take 1 tablet by mouth daily May substitute for generic or covered medication 7/20/21  Yes HAILEE Piedra   predniSONE (DELTASONE) 2.5 MG tablet 5mg daily with food in AM. 6/7/21  Yes Historical Provider, MD   hydrOXYzine (VISTARIL) 50 MG capsule Take one pill every 8 hrs for itching 7/8/21  Yes BUTCH Johnson CNP   amitriptyline (ELAVIL) 25 MG tablet TAKE 2 TABLETS AT BEDTIME 6/14/21  Yes Isidoro Patino MD   HYDROcodone-acetaminophen (NORCO) 5-325 MG per tablet Take 1 tablet by mouth every 4 hours as needed for Pain. Yes Historical Provider, MD   benzocaine, DENTAL, (ORABASE-B) 20 % PSTE paste apply to oral sores twice a day 4/28/21  Yes Historical Provider, MD   levETIRAcetam (KEPPRA) 250 MG tablet Take 1 tablet by mouth 2 times daily 5/4/21  Yes Isidoro Patino MD   furosemide (LASIX) 20 MG tablet Take 1 tablet by mouth daily 2/12/21  Yes BUTCH Olivier CNP   carvedilol (COREG) 6.25 MG tablet Take 1 tablet by mouth 2 times daily (with meals) 2/12/21  Yes BUTCH Olivier CNP   citalopram (CELEXA) 40 MG tablet TAKE 1 TABLET DAILY 1/25/21  Yes BUTCH Olivier CNP   SITagliptin (JANUVIA) 50 MG tablet Take 1 tablet by mouth daily 1/12/21  Yes BUTCH Olivier CNP   rivaroxaban (XARELTO) 20 MG TABS tablet TAKE 1 TABLET DAILY 4/6/20  Yes BUTCH Olivier CNP   allopurinol (ZYLOPRIM) 300 MG tablet Take half tab daily x 2weeks, then full tab daily thereafter. 11/12/19  Yes Historical Provider, MD   leucovorin calcium (WELLCOVORIN) 5 MG tablet Tuesday 12/9/19  Yes Historical Provider, MD   methotrexate (RHEUMATREX) 2.5 MG chemo tablet Monday 4/18/19  Yes Historical Provider, MD   hydrocortisone 1 % cream Apply topically 2 times daily. Patient taking differently: as needed Apply topically 2 times daily.  11/20/17  Yes BUTCH Olivier - BRANDY   diclofenac sodium (VOLTAREN) 1 % GEL Apply 4 g topically 4 times daily 10/12/17  Yes Maria Elena Cid DO   hydroxychloroquine (PLAQUENIL) 200 MG tablet Take 200 mg by mouth 2 times daily  17  Yes Historical Provider, MD   Blood Glucose Monitoring Suppl (ACCU-CHEK ADVANTAGE DIABETES) KIT Diabetic monitoring kit(any brand), with supplies for testing. Test fasting once daily.  Dia.00 14  Yes HAILEE Horn   Cholecalciferol (VITAMIN D) 2000 UNITS CAPS capsule Take 2 capsules by mouth daily    Yes Historical Provider, MD   Continuous Blood Gluc Sensor (DEXCOM G6 SENSOR) MISC 1 Device by Does not apply route continuous 21   HAILEE Younger   Continuous Blood Gluc Transmit (DEXCOM G6 TRANSMITTER) MISC 1 Device by Does not apply route continuous 21   HAILEE Younger   gabapentin (NEURONTIN) 400 MG capsule TAKE 1 CAPSULE THREE TIMES A DAY 2/15/21 5/16/21  BUTCH Gardner CNP   triamcinolone (KENALOG) 0.1 % ointment APPLY TO THE AFFECTED AREAS OF RASH TWICE A DAY FOR 2 WEEKS THEN ONCE DAILY FOR 2 WEEKS 20   BUTCH Villanueva CNP   albuterol (ACCUNEB) 0.63 MG/3ML nebulizer solution Take 3 mLs by nebulization every 6 hours as needed for Wheezing 3/27/20   BUTCH Villanueva CNP   PROLIA 60 MG/ML SOSY SC injection Every 6 months received through DR office 20   Historical Provider, MD   cyanocobalamin ([employer] VITAMIN B12) 1000 MCG tablet Take 1 tablet by mouth daily 2/6/19 3/17/20  Taya Callaway MD   folic acid (FOLVITE) 1 MG tablet Take 1 tablet by mouth daily 2/6/19 3/17/20  Taya Callaway MD   albuterol sulfate  (90 BASE) MCG/ACT inhaler Inhale 2 puffs into the lungs every 6 hours as needed for Wheezing 16   BUTCH Villanueva CNP   Glucose Blood (BLOOD GLUCOSE TEST STRIPS) STRP Test once daily 14   HAILEE Werner   Lancets MISC Testing q day 14   HAILEE Werner       Allergies:  Sulfa antibiotics, Ciprofloxacin hcl, Nsaids, and Statins    Social History: TOBACCO:   reports that she quit smoking about 15 years ago. Her smoking use included cigarettes. She has a 12.50 pack-year smoking history. She has never used smokeless tobacco.  ETOH:   reports previous alcohol use. Family History:       Problem Relation Age of Onset    Substance Abuse Brother     High Blood Pressure Mother     High Cholesterol Mother     Arthritis Mother     High Blood Pressure Father     High Cholesterol Father     Clotting Disorder Father         clot to intestines    Arthritis Father     Substance Abuse Brother     Substance Abuse Brother     Stroke Paternal Uncle 36    Cystic Fibrosis Daughter        REVIEW OF SYSTEMS:  Ten systems reviewed and negative except for stated in HPI    Physical Exam:    Vitals: /72   Pulse 98   Temp 97.9 °F (36.6 °C) (Oral)   Resp 18   Ht 5' 3\" (1.6 m)   Wt 188 lb 12.8 oz (85.6 kg)   LMP  (LMP Unknown)   SpO2 93%   BMI 33.44 kg/m²   General appearance: alert, appears stated age and cooperative, no acute distress. Skin: Skin color, texture, turgor normal. No rashes or lesions  HEENT: Head: Normocephalic, no lesions, without obvious abnormality. Neck: no adenopathy, no carotid bruit, no JVD, supple, symmetrical, trachea midline and thyroid not enlarged, symmetric, no tenderness/mass/nodules  Lungs: clear to auscultation bilaterally  Heart: regular rate and rhythm, S1, S2 normal, no murmur, click, rub or gallop  Abdomen: soft, non-tender; bowel sounds normal; no masses,  no organomegaly  Extremities: extremities normal, atraumatic, no cyanosis or edema  Neurologic: Mental status: Patient is awake and alert. She has subtle slow response. She is oriented. She is able to answer questions but it takes her a minute or 2 to process. Symmetrical motor and tone.     Recent Labs     08/12/21  1457 08/13/21  0547   WBC 7.0 6.6   HGB 13.1 10.7*    268     Recent Labs     08/12/21  1457 08/12/21  2158 08/13/21  0601    144 141 K 3.0* 3.1* 3.6   CL 94* 100 102   CO2 28 28 27   BUN 35* 34* 30*   CREATININE 2.33* 2.18* 2.01*   GLUCOSE 141* 153* 212*   AST 38*  --  23   ALT 49*  --  33   BILITOT 0.5  --  <0.2   ALKPHOS 131*  --  102     Troponin T:   Recent Labs     08/12/21  1457 08/12/21  2159 08/13/21  0601   TROPONINI 0.020* 0.019* <0.010       ABGs: No results found for: PHART, PO2ART, GPW4BRA  INR:   Recent Labs     08/12/21  1555   INR 3.1     URINALYSIS:  Recent Labs     08/12/21  1436 08/12/21  1436 08/12/21  2338 08/12/21  2350   COLORU Light yellow   < > Yellow  --    PHUR 5.5   < > 6.0  --    WBCUA 21-50   < >  --  3-5   RBCUA 0-2   < >  --  0-2   BACTERIA MANY*  --   --   --    CLARITYU Cloudy   < > Clear  --    SPECGRAV 1.020   < > 1.010  --    LEUKOCYTESUR Large   < > Moderate  --    UROBILINOGEN 0.2   < > 0.2  --    BILIRUBINUR Negative   < > Negative  --    BLOODU Trace-intact   < > Negative  --    GLUCOSEU Negative   < > Negative  --     < > = values in this interval not displayed. -----------------------------------------------------------------   CT Head WO Contrast    Result Date: 8/12/2021  COMPARISON: November 16, 2018. HISTORY: Confusion, lethargic COMMENTS:  SENT BY PHYSICIAN ABNORMAL TEST RESULTS TECHNIQUE: CT HEAD WO CONTRAST, XR CHEST PORTABLE FINDINGS: No acute hemorrhage or extra-axial fluid collection seen. The ventricular system is midline with no evidence for hydrocephalus. No wide vessel territory stroke is seen. Mild periventricular white matter changes are seen thought to represent small vessel disease. Global involutional changes are also visualized. The visualized paranasal sinuses are unremarkable. The calvarium appears to be intact. No acute hemorrhage or extra-axial fluid collection seen on this examination. All CT scans at this facility use dose modulation, iterative reconstruction, and/or weight based dosing when appropriate to reduce radiation dose to as low as reasonably achievable. PORTABLE CHEST COMPARISON: No prior studies available for comparison. HISTORY: confusion TECHNIQUE: AP view FINDINGS: No consolidating pneumonia, pleural effusion or pneumothorax is seen. Coarsening of the interstitium is seen. The cardiac silhouette is within normal limits of size. The bony structures are grossly intact. IMPRESSION: No evidence of acute cardiopulmonary process, findings are suggestive of COPD. XR CHEST PORTABLE    Result Date: 8/12/2021  COMPARISON: November 16, 2018. HISTORY: Confusion, lethargic COMMENTS:  SENT BY PHYSICIAN ABNORMAL TEST RESULTS TECHNIQUE: CT HEAD WO CONTRAST, XR CHEST PORTABLE FINDINGS: No acute hemorrhage or extra-axial fluid collection seen. The ventricular system is midline with no evidence for hydrocephalus. No wide vessel territory stroke is seen. Mild periventricular white matter changes are seen thought to represent small vessel disease. Global involutional changes are also visualized. The visualized paranasal sinuses are unremarkable. The calvarium appears to be intact. No acute hemorrhage or extra-axial fluid collection seen on this examination. All CT scans at this facility use dose modulation, iterative reconstruction, and/or weight based dosing when appropriate to reduce radiation dose to as low as reasonably achievable. PORTABLE CHEST COMPARISON: No prior studies available for comparison. HISTORY: confusion TECHNIQUE: AP view FINDINGS: No consolidating pneumonia, pleural effusion or pneumothorax is seen. Coarsening of the interstitium is seen. The cardiac silhouette is within normal limits of size. The bony structures are grossly intact. IMPRESSION: No evidence of acute cardiopulmonary process, findings are suggestive of COPD. Assessment and Plan     *Hypercalcemia. Broad differential diagnosis. This could be due to dehydration.   Other possible etiologies include hyperparathyroidism, familial hypercalcemia hypocalciuria, vitamin D toxicity, multiple myeloma, other type of cancers, sarcoidosis, thyroid disease, lymphoma. *Acute on chronic kidney failure. This is a probably caused by hypercalcemia. I requested the CT abdomen to rule out urinary obstruction or hydronephrosis. *UTI.    *Altered mental status, encephalopathy, metabolic encephalopathy secondary to hypercalcemia and kidney failure. *DM. 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 *Neuropathy. *Hypo kalemia    *Hypomagnesemia    *Multiple other medical problems not listed above. Plan:  I accepted to admit the patient to the medical floor. I started the patient on intravenous fluid infusion. I gave the patient a dose of calcitonin. I requested the CT abdomen to rule out obstructive uropathy, hydronephrosis, malignancy. I requested TSH, parathyroid hormone, parathyroid related hormone, 25-hydroxy vitamin D level, 1-25 hydroxy vitamin D level, serum electrophoresis, kappa and lambda chain, phosphorus and magnesium level. Antibiotic for UTI. Potassium and magnesium supplementation. Insulin with a sliding scale coverage. Patient's status is dynamic and evolutionary therefore the aforementioned assessment and plan may or may not be complete or conclusive at this time. Additional needed work-up, investigation and therapeutic intervention will be determined based on the clinical progression and follow-up test result.     Patient Active Problem List   Diagnosis Code    GERD (gastroesophageal reflux disease) K21.9    Depression F32.9    Fibromyalgia muscle pain M79.7    SI (sacroiliac) pain M53.3    Vitamin D deficiency E55.9    Chronic low back pain M54.5, G89.29    High risk medication use - 12/08/17 OARRS PM&R, 02/07/18 OARRS PM&R, 02/22/17 Med Contract PM&R Z79.899    Postmenopausal symptoms N95.9    Postmenopausal hormone replacement therapy Z79.890    Pulmonary embolism (HCC) I26.99    Deep vein thrombosis (DVT) ([facility]) I82.409    DM (diabetes mellitus) ([address] E11.9    Reactive airway disease J45.909    Borderline hypertension R03.0    Neck pain M54.2    Thoracic back pain M54.6    Factor 5 Leiden mutation, heterozygous ([address] D68.51    Neuropathy of both feet- Dr. Ernst Gibson G57.93    Weakness of both lower extremities R29.898    Paresthesia of both feet R20.2    Positive DESTINEE (antinuclear antibody) Dr. Scarlet Cruz R76.8    Dyspnea R06.00    Type 2 diabetes mellitus without complication, without long-term current use of insulin ([facility]) E11.9    Wrist pain, left M25.532    Chronic bilateral low back pain with bilateral sciatica M54.42, M54.41, G89.29    Colitis K52.9    History of recurrent UTIs Z87.440    Chronic bilateral thoracic back pain M54.6, G89.29    Pain in both hands M79.641, M79.642    Autoantibody titer elevated R76.8    Generalized OA M15.9    Lupus erythematosus L93.0    Chronic abdominal pain R10.9, G89.29    Vaginal dryness N89.8    S/P total hysterectomy Z90.710    Long-term use of Plaquenil Z79.899    Rash and nonspecific skin eruption R21    Esophageal web Q39.4    Class 1 obesity with serious comorbidity and body mass index (BMI) of 34.0 to 34.9 in adult E66.9, Z68.34    Renal failure N19    Esophageal dysphagia R13.10    NSTEMI (non-ST elevated myocardial infarction) ([facility]) I21.4    Back pain M54.9    History of rhabdomyolysis Z87.39    Elevated aldolase level R74.8    Elevated LFTs R79.89    HyperCKemia R74.8    Hyperuricemia E79.0    Excessive sweating R61    Dystonia G24.9    Factor V Leiden carrier ([address] D68.51    HTN (hypertension) I10    Lactic acidosis E87.2    Benzodiazepine misuse F13.90    Hyperlipidemia E78.5    Tremor R25.1    Muscle weakness (generalized) M62.81    History of pulmonary embolism Z86.711    History of deep vein thrombosis (DVT) of lower extremity Z86.718    Other thrombophilia ([facility]) D68.69    Long term current use of systemic steroids Z79.52    Long-term use of high-risk medication Z79.899    Mouth sores K13.79    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    Right leg swelling M79.89    Hyperhomocysteinemia (HCC) E72.11    Small vessel vasculitis (HCC) I77.6    Immunosuppressed status ([facility]- IV rituxan at [facility] D84.9    Other osteoporosis without current pathological fracture M81.8    Cushingoid side effect of steroids (HCC) E24.2    Hereditary sensory-motor neuropathy, type I G60.0    Ataxic gait R26.0    Lumbar radicular pain M54.16    Transient brainstem ischemia G45.9    Essential tremor G25.0    Bronchitis J40    Tachycardia R00.0    Primary insomnia F51.01    Uncontrolled type 2 diabetes mellitus with hyperglycemia (HCC) E11.65    CKD stage 3 secondary to diabetes (HCC) E11.22, N18.30    Hypercalcemia E83.52       Shannon Way MD, MD  Admitting Hospitalist    TTS: 85mins where I focused more than 75% of my attention on rendering care, and planning treatment course for this patient, in addition to talking to RN team, mid levels, consulting with other physicians and following up on labs and imaging. High Risk Readmission Screening Tool Score Noted.      Emergency Contact:

## 2021-08-14 NOTE — PROGRESS NOTES
Physical Therapy  Facility/Department: [facility] MED SURG UNIT  Daily Treatment Note  NAME: Kimmie Lawson  : 1960  MRN: [id number]    Date of Service: 2021    Discharge Recommendations:  Home with Home health PT, Home with assist PRN        Assessment   Body structures, Functions, Activity limitations: Decreased functional mobility ; Decreased cognition;Decreased endurance;Decreased sensation;Decreased strength;Decreased balance; Increased pain  Assessment: Pt. with significant improvement in cognitive ability vs. yesterday. She demonstrated good participation as well as gait quality with increased endurance. Treatment Diagnosis: Acute Cystitis, hypercalemia and renal insufficieny  Prognosis: Good  Decision Making: Medium Complexity  REQUIRES PT FOLLOW UP: Yes  Activity Tolerance  Activity Tolerance: Patient limited by fatigue     Patient Diagnosis(es): The primary encounter diagnosis was Acute cystitis without hematuria. Diagnoses of Hypercalcemia and Renal insufficiency were also pertinent to this visit. has a past medical history of Ataxic gait, Back pain, Benign essential tremor, Depression, DM (diabetes mellitus) ([address] DVT (deep venous thrombosis) ([address] Elevated troponin, Factor V Leiden carrier ([address] GERD (gastroesophageal reflux disease), Hereditary sensory-motor neuropathy, type I, HTN (hypertension), HTN (hypertension), Hyperlipidemia, Lumbar radicular pain, Neuropathy of both feet, NSTEMI (non-ST elevated myocardial infarction) ([address] Obesity, Osteoarthritis, Pulmonary embolism ([address] Renal failure, Right inguinal hernia, SOB (shortness of breath), Systemic lupus erythematosus ([address] Transient brainstem ischemia, Umbilical hernia, and Vasomotor flushing.   has a past surgical history that includes Appendectomy; eye surgery (); Upper gastrointestinal endoscopy (13); Hysterectomy; Colonoscopy; Colonoscopy (13); Colonoscopy (2017); pr cystourethroscopy (N/A, 2017);  Knee arthroscopy (Left); repair umbilical IDGE,9+B/V,SFOXC (N/A, 11/15/2017); laparoscopy (N/A, 11/15/2017); hernia repair (Right, 11/15/2017); Muscle biopsy (Right, 3/14/2019); Cataract removal (Bilateral, Winter 2019); and Cardiac catheterization (06/2018). Restrictions  Restrictions/Precautions  Restrictions/Precautions: Fall Risk  Subjective   General  Chart Reviewed: Yes  Additional Pertinent Hx: Pt reports having many falls \"I fall all the time\" and that she has Lupus; Pt and her  reports that she speech and thought process is delayed  Family / Caregiver Present: No  Referring Practitioner: Dr Julio C Wright: Pt. denied pain today. She stated that she feels like she is getting better with her memory and getting thoughts out. Orientation     Cognition      Objective      Transfers  Sit to Stand: Stand by assistance  Stand to sit: Stand by assistance  Ambulation  Ambulation?: Yes  Ambulation 1  Surface: level tile  Device: Rolling Walker  Assistance: Stand by assistance  Quality of Gait: Pt ambulated with a flexed forward posture with decreased step length B LE with fair dynamic balance. Pt was SOB with gait and reports fatigued after gait. Gait Deviations: Decreased step length  Distance: 100'x1  Stairs/Curb  Stairs?: No                                 G-Code     OutComes Score                                                     AM-PAC Score             Goals  Short term goals  Time Frame for Short term goals: 2-3 days  Short term goal 1: Pt indep with bed mobility  Short term goal 2: Pt indep with transfers  Short term goal 3: Pt able to ambulate with AD for 125'x 1 with supervision and safe gait pattern  Short term goal 4: Pt able to ambulate up/down 3 steps with no railing with supervision so pt can safely enter/exit her home. Short term goal 5: Indep with HEP  Long term goals  Time Frame for Long term goals : Same as STGs  Patient Goals   Patient goals :  To get stronger    Plan    Plan  Times per week: 5-7  Times per day: Daily  Plan weeks: 2-3 days MEd surg and then home with Willy Stoddard PT and assistance from family PRN  Current Treatment Recommendations: Strengthening, Transfer Training, Endurance Training, Neuromuscular Re-education, Patient/Caregiver Education & Training, Pain Management, Balance Training, Gait Training, Home Exercise Program, Functional Mobility Training, Stair training, Safety Education & Training     Therapy Time   Individual Concurrent Group Co-treatment   Time In  950         Time Out  1030         Minutes  Stephany An 83, PTA  License and Lilli 33 Number: [id number]

## 2021-08-14 NOTE — PROGRESS NOTES
Patient is doing much better. She is much more awake. No weakness or numbness. No confusion. No delusion. No fever or chills. No hematemesis or melena. General appearance: alert, appears stated age and cooperative, no acute distress. Skin: Skin color, texture, turgor normal. No rashes or lesions  HEENT: Head: Normocephalic, no lesions, without obvious abnormality. Neck: no adenopathy, no carotid bruit, no JVD, supple, symmetrical, trachea midline and thyroid not enlarged, symmetric, no tenderness/mass/nodules  Lungs: clear to auscultation bilaterally  Heart: regular rate and rhythm, S1, S2 normal, no murmur, click, rub or gallop  Abdomen: soft, non-tender; bowel sounds normal; no masses,  no organomegaly  Extremities: extremities normal, atraumatic, no cyanosis or edema  Neurologic: Mental status: Patient is awake and alert. She is much more awake and coherent than previously seen. No focal weakness or numbness.     *Hypercalcemia. Broad differential diagnosis. This could be due to dehydration. Other possible etiologies include hyperparathyroidism, familial hypercalcemia hypocalciuria, vitamin D toxicity, multiple myeloma, other type of cancers, sarcoidosis, thyroid disease, lymphoma. Parathyroid hormone came back low. This is nonparathyroid hormone related hypercalcemia. Still waiting on K/L chains serum, serum electrophoresis, vitamin D level. Unlikely lymphoma, unlikely sarcoidosis. Could be due to severe dehydration. Could not exclude underlying malignancy.     *Acute on chronic kidney failure. This is a probably caused by hypercalcemia. CT abdomen did not show any hydronephrosis or obstructive uropathy.     *UTI. Continue Rocephin.     *Altered mental status, encephalopathy, metabolic encephalopathy secondary to hypercalcemia and kidney failure. Resolving.     *DM.  Fair control.     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     *Neuropathy.     *Hypo kalemia status post repletion.     *Hypomagnesemia, status post repletion.     *Multiple other medical problems not listed above.

## 2021-08-15 NOTE — PROGRESS NOTES
Patient is doing much better. Significant improvement of her mental status over the last 3 days. No chest pain or palpitation. No abdominal pain, nausea or vomiting. No fever or chills. General appearance: alert, appears stated age and cooperative, no acute distress. Skin: Skin color, texture, turgor normal. No rashes or lesions  HEENT: Head: Normocephalic, no lesions, without obvious abnormality. Neck: no adenopathy, no carotid bruit, no JVD, supple, symmetrical, trachea midline and thyroid not enlarged, symmetric, no tenderness/mass/nodules  Lungs: clear to auscultation bilaterally  Heart: regular rate and rhythm, S1, S2 normal, no murmur, click, rub or gallop  Abdomen: soft, non-tender; bowel sounds normal; no masses,  no organomegaly  Extremities: extremities normal, atraumatic, no cyanosis or edema  Neurologic: Mental status: Patient is awake and alert.   She is much more awake and coherent than previously seen. No focal weakness or numbness.      *Hypercalcemia.  Broad differential diagnosis.  This could be due to dehydration.  Other possible etiologies include hyperparathyroidism, familial hypercalcemia hypocalciuria, vitamin D toxicity, multiple myeloma, other type of cancers, sarcoidosis, thyroid disease, lymphoma. Update:  Parathyroid hormone came back low. This is nonparathyroid hormone related hypercalcemia. Still waiting on  parathyroid related hormone level. 125 vitamin D level and a chest x-ray normal making a possibility of sarcoidosis or a vitamin D toxicity less likely. Unlikely lymphoma, normal white count, normal lymphocyte. Could be due to severe dehydration. Or at least to dehydration contributing to hypercalcemia. Kappa chain came back elevated suspecting that patient may have monoclonal gammopathy or multiple myeloma. Still waiting on serum electrophoresis.   Could not exclude underlying malignancy.     *Acute on chronic kidney failure.  This is a probably caused by hypercalcemia. CT abdomen did not show any hydronephrosis or obstructive uropathy. Significant improvement of GIOVANI.     *UTI. Urine culture came back negative, discontinue Rocephin.     *Altered mental status, encephalopathy, metabolic encephalopathy secondary to hypercalcemia and kidney failure. Resolving.     *DM. Fair control.     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     *Neuropathy.     *Hypo kalemia status post repletion.     *Hypomagnesemia, status post repletion.     *Multiple other medical problems not listed above. Plan:  Magnesium supplementation. Gentle IV fluid infusion. Patient could potentially be discharged tomorrow if her electrolytes are normal.    Patient would need to follow-up with hematology. She may need to have a bone marrow aspiration. Patient will need to follow-up with PCP to obtain pending work-up report and proceed with additional needed that work-up, investigation, diagnostic and therapeutic intervention in collaboration with other needed outpatient providers. Patient would need to have age-appropriate cancer screening including but not limited to breast exam, pelvic exam, mammogram, colonoscopy and others. This could be done in outpatient setting to be arranged by PCP. Once again patient is to follow-up with the hematology team regarding her hypercalcemia, renal failure and elevated copper level suspecting gammopathy or MM.

## 2021-08-15 NOTE — PROGRESS NOTES
Physical Therapy  Facility/Department: [facility] MED SURG UNIT  Daily Treatment Note  NAME: Jose Corbin  : 1960  MRN: [id number]    Date of Service: 8/15/2021    Discharge Recommendations:  Home with Home health PT, Home with assist PRN        Assessment   Body structures, Functions, Activity limitations: (P) Decreased functional mobility ; Decreased cognition;Decreased endurance;Decreased sensation;Decreased strength;Decreased balance; Increased pain  Assessment: (P) Patient able to climb stairs with only stand by asisstance but did require ues to refrain from rushing through exercises and with gait in general.   Treatment Diagnosis: Acute Cystitis, hypercalemia and renal insufficieny  Prognosis: Good  Decision Making: Medium Complexity  REQUIRES PT FOLLOW UP: Yes  Activity Tolerance  Activity Tolerance: Patient limited by fatigue     Patient Diagnosis(es): The primary encounter diagnosis was Acute cystitis without hematuria. Diagnoses of Hypercalcemia and Renal insufficiency were also pertinent to this visit. has a past medical history of Ataxic gait, Back pain, Benign essential tremor, Depression, DM (diabetes mellitus) ([address] DVT (deep venous thrombosis) ([address] Elevated troponin, Factor V Leiden carrier ([address] GERD (gastroesophageal reflux disease), Hereditary sensory-motor neuropathy, type I, HTN (hypertension), HTN (hypertension), Hyperlipidemia, Lumbar radicular pain, Neuropathy of both feet, NSTEMI (non-ST elevated myocardial infarction) ([address] Obesity, Osteoarthritis, Pulmonary embolism ([address] Renal failure, Right inguinal hernia, SOB (shortness of breath), Systemic lupus erythematosus ([address] Transient brainstem ischemia, Umbilical hernia, and Vasomotor flushing.   has a past surgical history that includes Appendectomy; eye surgery (); Upper gastrointestinal endoscopy (13); Hysterectomy; Colonoscopy; Colonoscopy (13); Colonoscopy (2017); pr cystourethroscopy (N/A, 2017);  Knee AM-PAC Score             Goals  Short term goals  Time Frame for Short term goals: (P) 2-3 days  Short term goal 1: (P) Pt indep with bed mobility  Short term goal 2: (P) Pt indep with transfers  Short term goal 3: (P) Pt able to ambulate with AD for 125'x 1 with supervision and safe gait pattern  Short term goal 4: (P) Pt able to ambulate up/down 3 steps with no railing with supervision so pt can safely enter/exit her home. Short term goal 5: (P) Indep with HEP  Long term goals  Time Frame for Long term goals : (P) Same as STGs  Patient Goals   Patient goals :  To get stronger    Plan    Plan  Times per week: 5-7  Times per day: Daily  Plan weeks: 2-3 days MEd surg and then home with [facility] PT and assistance from family PRN  Current Treatment Recommendations: Strengthening, Transfer Training, Endurance Training, Neuromuscular Re-education, Patient/Caregiver Education & Training, Pain Management, Balance Training, Gait Training, Home Exercise Program, Functional Mobility Training, Stair training, Safety Education & Training     Therapy Time   Individual Concurrent Group Co-treatment   Time In  1110         Time Out  1155         Minutes  [id number] Aidan Valle, PTA  License and [address] Number: [id number]

## 2021-08-15 NOTE — PROGRESS NOTES
I spent about 35 minutes updating her  on assessment, treatment plan and needed additional work-up and investigation as described in my note. Both patient and  are aware of needing additional medical diagnostic and therapeutic intervention as well as referrals. Guerrero Montero

## 2021-08-16 NOTE — CARE COORDINATION
Per the supervisor at [facility], the patient is to discharge home today. The LSW met with the patient and her . The patient would like [facility] at discharge. [place] of choice was discussed. The patient would like [facility]. The supervisor at [facility] states she will call the [facility] to [facility]. The patient states she has a walker at home. The LSW updated the patient's RN.  Electronically signed by JOSH Swanson on 8/16/21 at 12:01 PM EDT

## 2021-08-16 NOTE — PROGRESS NOTES
Occupational Therapy  Facility/Department: [facility] MED SURG UNIT  Daily Treatment Note  NAME: Umberto Jacobo  : 1960  MRN: [id number]    Date of Service: 2021    Discharge Recommendations:  Home with Home health OT, Home with assist PRN       Assessment      REQUIRES OT FOLLOW UP: Yes     Pt. Is going home today. Pt. is packed up and waiting to be released to go home she said. Trained and educated pt. In HEP. Gave pt. handout for HEP. Pt. Demonstrated UB exercises with BUE to increase UB strength. Pt. Demonstrated transferring at supervision and standing for 7 minutes. Pt. Demonstrated bed mobility at supervision. Answered pt.'s questions and concerns. Patient Diagnosis(es): The primary encounter diagnosis was Acute cystitis without hematuria. Diagnoses of Hypercalcemia and Renal insufficiency were also pertinent to this visit. has a past medical history of Ataxic gait, Back pain, Benign essential tremor, Depression, DM (diabetes mellitus) ([address] DVT (deep venous thrombosis) ([address] Elevated troponin, Factor V Leiden carrier ([address] GERD (gastroesophageal reflux disease), Hereditary sensory-motor neuropathy, type I, HTN (hypertension), HTN (hypertension), Hyperlipidemia, Lumbar radicular pain, Neuropathy of both feet, NSTEMI (non-ST elevated myocardial infarction) ([address] Obesity, Osteoarthritis, Pulmonary embolism ([address] Renal failure, Right inguinal hernia, SOB (shortness of breath), Systemic lupus erythematosus ([address] Transient brainstem ischemia, Umbilical hernia, and Vasomotor flushing.   has a past surgical history that includes Appendectomy; eye surgery (); Upper gastrointestinal endoscopy (13); Hysterectomy; Colonoscopy; Colonoscopy (13); Colonoscopy (2017); pr cystourethroscopy (N/A, 2017); Knee arthroscopy (Left); repair umbilical [id number],XOZQL (N/A, 11/15/2017); laparoscopy (N/A, 11/15/2017); hernia repair (Right, 11/15/2017);  Muscle biopsy (Right, 3/14/2019); Cataract removal (Bilateral, Winter 2019); and Cardiac catheterization (06/2018). Restrictions  Restrictions/Precautions  Restrictions/Precautions: Fall Risk  Subjective   General  Chart Reviewed: Yes  Patient assessed for rehabilitation services?: Yes  Additional Pertinent Hx: Pt has Lupus, reports hx of multiple falls- has been falling a lot lately, loosing her thoughts a lot lately- dec'd cog  Family / Caregiver Present: No  Referring Practitioner: Dr. Joanie Vieira  Diagnosis: Acute cystitis without hematuria, hypercalcemia  Subjective  Subjective: Pt. Is going home today. Trained and educated pt. In HEP. Orientation     Objective                                         Transfer-supervision  Pt. Tolerated standing for 7 minutes   Bed mobility-supervision  Scooting, rolling, getting to EOB                       Type of ROM/Therapeutic Exercise  Type of ROM/Therapeutic Exercise: AROM  Comment:  (Trained and eudcated pt. in HEP.)  Gave pt. Handout for HEP. Exercises  Shoulder Flexion: 20  Shoulder Extension: 20  Shoulder ABduction: 20  Shoulder ADduction: 20  Horizontal ABduction: 20  Horizontal ADduction: 20  Elbow Flexion: 20  Elbow Extension: 20  Wrist Flexion: 20  Wrist Extension: 20         Answered pt.'s questions and concerns about home management.            Plan   Plan  Times per week: 3-6x/wk  Times per day: Daily  Plan weeks: <1- med pt  Current Treatment Recommendations: Strengthening, Balance Training, Functional Mobility Training, Endurance Training, Stair training, Pain Management, Safety Education & Training, Patient/Caregiver Education & Training, Self-Care / ADL, Home Management Training  G-Code     OutComes Score                                                  AM-PAC Score             Goals  Short term goals  Time Frame for Short term goals: 3-5 days- med pt  Short term goal 1: I BUE HEP  Short term goal 2: Spv toileting  Short term goal 3: Spv LB dressing and bathing  Short term goal 4: Spv fxl ADL xfers  Long term goals  Time Frame for Long term goals : Same as STGs  Long term goal 1: Same as STGs  Long term goal 2: Same as STGs       Therapy Time   Individual Concurrent Group Co-treatment   Time In  8:30am         Time Out  9:00am         Minutes  [address] [facility] Basilio Leong[address] Number: [id number]

## 2021-08-16 NOTE — PROGRESS NOTES
Discharge instructions reviewed with pt and spouse. Both verbalize understanding. Pt left unit via wheelchair to private vehicle with all documented belongings.

## 2021-08-16 NOTE — DISCHARGE INSTR - COC
Continuity of Care Form    Patient Name: Davide Gavin   :  1960  MRN:  [id number]    Admit date:  2021  Discharge date:  2021    Code Status Order: Full Code   Advance Directives:      Admitting Physician:  Brittany Kwon MD  PCP: BUTCH Saleh CNP    Discharging Nurse: [facility] Unit/Room#: 0218/0218-01  Discharging Unit Phone Number: ***    Emergency Contact:   Extended Emergency Contact Information  Primary Emergency Contact: Zeb Jackson  Address: [address] [place] [address] Phone: [phone]  Work Phone: [phone]  Mobile Phone: [phone]  Relation: Spouse  Secondary Emergency Contact: Kelvin Matamoros [address] Phone: [phone]  Work Phone: [phone]  Mobile Phone: [phone]  Relation: Child    Past Surgical History:  Past Surgical History:   Procedure Laterality Date    APPENDECTOMY      CARDIAC CATHETERIZATION  2018    CATARACT REMOVAL Bilateral Winter 2019   [employer] COLONOSCOPY      Dr. Mirza Hemphill COLONOSCOPY  13    DR SINGER repeat in 10 yrs per pt    COLONOSCOPY  2017    Dr Flori Almodovar Right 11/15/2017    RIGHT  HERNIA INGUINAL REPAIR performed by Flaca Londono MD at [address] [place], [place] [address]Mg ARTHROSCOPY Left     LAPAROSCOPY N/A 11/15/2017    DIAGNOSTIC LAPAROSCOPY performed by Flaca Londono MD at [address] Right 3/14/2019    RIGHT QUADRICEPS MUSCLE BIOPSY performed by Shira Rey MD at [facility] 2017    [address] [place] [address] performed by Maria Ines Schroeder MD at [facility],6+X/F,XHNJM N/A     Valerie [address] performed by Flaca Londono MD at [address]  [address]     [address] [place] [address]       Immunization History:   Immunization History   Administered Date(s) Administered  COVID-19, Moderna, PF, 100mcg/0.5mL 03/05/2021, 03/30/2021    Influenza 11/11/2013    Influenza A ([id number]) Vaccine PF IM 12/28/2009    Influenza Virus Vaccine 11/08/2007, 11/08/2007, 10/07/2014, 10/05/2015, 10/05/2015, 11/03/2017, 10/06/2019    Influenza, Dulcy Cocks, IM, (6 mo and older Fluzone, Flulaval, Fluarix and 3 yrs and older Afluria) 10/29/2016, 10/01/2018    Influenza, Dulcy Cocks, IM, PF (6 mo and older Fluzone, Flulaval, Fluarix, and 3 yrs and older Afluria) 10/29/2016, 10/06/2019    Influenza, Dulcy Cocks, Recombinant, IM PF (Flublok 18 yrs and older) 10/14/2020    PPD Test 05/31/2019    Pneumococcal Conjugate 13-valent (Vxtaffs14) 10/14/2019    Pneumococcal Conjugate 7-valent (Prevnar7) 08/21/2014    Pneumococcal Polysaccharide (Aeulbgtun26) 08/21/2014    Tdap (Boostrix, Adacel) 02/23/2017    Zoster Live (Zostavax) 11/02/2015    Zoster Recombinant (Shingrix) 10/14/2020, 01/14/2021       Active Problems:  Patient Active Problem List   Diagnosis Code    GERD (gastroesophageal reflux disease) K21.9    Depression F32.9    Fibromyalgia muscle pain M79.7    SI (sacroiliac) pain M53.3    Vitamin D deficiency E55.9    Chronic low back pain M54.5, G89.29    High risk medication use - 12/08/17 OARRS PM&R, 02/07/18 OARRS PM&R, 02/22/17 Med Contract PM&R Z79.899    Postmenopausal symptoms N95.9    Postmenopausal hormone replacement therapy Z79.890    Pulmonary embolism (HCC) I26.99    Deep vein thrombosis (DVT) (HCC) I82.409    DM (diabetes mellitus) ([facility] E11.9    Reactive airway disease J45.909    Borderline hypertension R03.0    Neck pain M54.2    Thoracic back pain M54.6    Factor 5 Leiden mutation, heterozygous ([facility] D68.51    Neuropathy of both feet- Dr. Santo Horton G57.93    Weakness of both lower extremities R29.898    Paresthesia of both feet R20.2    Positive DESTINEE (antinuclear antibody) Dr. Meghan Kemp R76.8    Dyspnea R06.00    Type 2 diabetes mellitus without complication, without long-term current use of insulin ([facility]) E11.9    Wrist pain, left M25.532    Chronic bilateral low back pain with bilateral sciatica M54.42, M54.41, G89.29    Colitis K52.9    History of recurrent UTIs Z87.440    Chronic bilateral thoracic back pain M54.6, G89.29    Pain in both hands M79.641, M79.642    Autoantibody titer elevated R76.8    Generalized OA M15.9    Lupus erythematosus L93.0    Chronic abdominal pain R10.9, G89.29    Vaginal dryness N89.8    S/P total hysterectomy Z90.710    Long-term use of Plaquenil Z79.899    Rash and nonspecific skin eruption R21    Esophageal web Q39.4    Class 1 obesity with serious comorbidity and body mass index (BMI) of 34.0 to 34.9 in adult E66.9, Z68.34    Renal failure N19    Esophageal dysphagia R13.10    NSTEMI (non-ST elevated myocardial infarction) ([facility]) I21.4    Back pain M54.9    History of rhabdomyolysis Z87.39    Elevated aldolase level R74.8    Elevated LFTs R79.89    HyperCKemia R74.8    Hyperuricemia E79.0    Excessive sweating R61    Dystonia G24.9    Factor V Leiden carrier ([facility] D68.51    HTN (hypertension) I10    Lactic acidosis E87.2    Benzodiazepine misuse F13.90    Hyperlipidemia E78.5    Tremor R25.1    Muscle weakness (generalized) M62.81    History of pulmonary embolism Z86.711    History of deep vein thrombosis (DVT) of lower extremity Z86.718    Other thrombophilia ([facility]) D68.69    Long term current use of systemic steroids Z79.52    Long-term use of high-risk medication Z79.899    Mouth sores K13.79    Polymyositis with myopathy ([facility]) M33.22    Right leg swelling M79.89    Hyperhomocysteinemia ([facility]) E72.11    Small vessel vasculitis ([facility]) I77.6    Immunosuppressed status ([facility]- IV rituxan at [facility] D84.9    Other osteoporosis without current pathological fracture M81.8    Cushingoid side effect of steroids ([facility]) E24.2    Hereditary sensory-motor neuropathy, type I G60.0    Ataxic gait R26.0    Lumbar radicular pain M54.16    Transient brainstem ischemia G45.9    Essential tremor G25.0    Bronchitis J40    Tachycardia R00.0    Primary insomnia F51.01    Uncontrolled type 2 diabetes mellitus with hyperglycemia (HCC) E11.65    CKD stage 3 secondary to diabetes ([facility] E11.22, N18.30    Hypercalcemia E83.52       Isolation/Infection:   Isolation            No Isolation          Patient Infection Status       Infection Onset Added Last Indicated Last Indicated By Review Planned Expiration Resolved Resolved By    None active    Resolved    COVID-19 Rule Out 08/12/21 08/12/21 08/12/21 COVID-19, Rapid (Ordered)   08/12/21 Rule-Out Test Resulted            Nurse Assessment:  Last Vital Signs: /77   Pulse 98   Temp 97.7 °F (36.5 °C) (Oral)   Resp 18   Ht 5' 3\" (1.6 m)   Wt 188 lb 12.8 oz (85.6 kg)   LMP  (LMP Unknown)   SpO2 96%   BMI 33.44 kg/m²     Last documented pain score (0-10 scale): Pain Level: 0  Last Weight:   Wt Readings from Last 1 Encounters:   08/12/21 188 lb 12.8 oz (85.6 kg)     Mental Status:  oriented and alert    IV Access:  - None    Nursing Mobility/ADLs:  Walking   Independent  Transfer  Independent  Bathing  Independent  Dressing  Independent  Toileting  Independent  Feeding  Independent  Med Admin  Independent  Med Delivery   whole    Wound Care Documentation and Therapy:  Incision 11/15/17 Abdomen (Active)   Number of days: 2913        Elimination:  Continence:   · Bowel: Yes  · Bladder: Yes  Urinary Catheter: None   Colostomy/Ileostomy/Ileal Conduit: No       Date of Last BM: ***  No intake or output data in the 24 hours ending 08/16/21 0938  I/O last 3 completed shifts: In: 240 [P.O.:240]  Out: -     Safety Concerns:     History of Falls (last 30 days) and At Risk for Falls    Impairments/Disabilities:      None    Nutrition Therapy:  Current Nutrition Therapy:   - Oral Diet:  General    Routes of Feeding: Oral  Liquids:  Thin Liquids  Daily Fluid Restriction: no  Last Modified Barium Swallow with Video (Video Swallowing Test): not done    Treatments at the Time of Hospital Discharge:   Respiratory Treatments: ***  Oxygen Therapy:  is not on home oxygen therapy. Ventilator:    - No ventilator support    Rehab Therapies: Physical Therapy and Occupational Therapy  Weight Bearing Status/Restrictions: No weight bearing restirctions  Other Medical Equipment (for information only, NOT a DME order):  walker  Other Treatments: ***    Patient's personal belongings (please select all that are sent with patient):  Glasses    RN SIGNATURE:  Electronically signed by Lloyd Machuca RN on 8/16/21 at 12:09 PM EDT    CASE MANAGEMENT/SOCIAL WORK SECTION    Inpatient Status Date: ***    Readmission Risk Assessment Score:  Readmission Risk              Risk of Unplanned Readmission:  34           Discharging to Facility/ Agency   · Name: [address] [place] [address]  · Address:  · Phone:[phone]  · Fax:    Dialysis Facility (if applicable)   · Name:  · Address:  · Dialysis Schedule:  · Phone:  · Fax:    / signature: {Esignature:[id number]:::0}    PHYSICIAN SECTION    Prognosis: Good    Condition at Discharge: Stable    Rehab Potential (if transferring to Rehab): Good    Recommended Labs or Other Treatments After Discharge: accu check    Physician Certification: I certify the above information and transfer of Mya Mao  is necessary for the continuing treatment of the diagnosis listed and that she requires Home Care for greater 30 days.      Update Admission H&P: No change in H&P    PHYSICIAN SIGNATURE:  Electronically signed by Phylicia Maguire MD on 8/16/21 at 9:38 AM EDT

## 2021-08-16 NOTE — PROGRESS NOTES
Spiritual Care Services     Summary of Visit:   visited with patient and . Patient felt hopeful because the doctor is discharging her today.  offered prayer and blessing for patient's continued good health. Spiritual Assessment/Intervention/Outcomes:    Encounter Summary  Services provided to[de-identified] Patient and family together  Referral/Consult From[de-identified] Rounding  Support System: Spouse  Continue Visiting: No  Complexity of Encounter: Moderate  Length of Encounter: 15 minutes  Spiritual Assessment Completed: Yes  Routine  Type: Initial     Spiritual/Latter day  Type: Spiritual support  Assessment: Calm, Approachable  Intervention: Prayer  Outcome: Expressed gratitude, Encouraged                    Values / Beliefs  Do you have any ethnic, cultural, sacramental, or spiritual [employer] needs you would like us to be aware of while you are in the hospital?: No    Care Plan:        [id number] [address]   Electronically signed by Brandon Fleming on 8/16/21 at 12:22 PM EDT     To reach a  for emotional and spiritual support, place an [facility] consult request.   If a  is needed immediately, dial 0 and ask to page the on-call .

## 2021-08-16 NOTE — PROGRESS NOTES
Physical Therapy  Facility/Department: [facility]  Daily Treatment Note  NAME: Mya Mao  : 1960  MRN: [id number]    Date of Service: 2021    Discharge Recommendations:  Home with Home health PT, Home with assist PRN        Assessment   Body structures, Functions, Activity limitations: (P) Decreased functional mobility ; Decreased cognition;Decreased endurance;Decreased sensation;Decreased strength;Decreased balance; Increased pain  Assessment: (P) Pt. able to complete each functional activity without incident. Pt. impulsive with movement, tsf's, ther ex, and gait pattern. Pt. education to increase functional awarness towards safe gait pattern. Pt. education on and issued HEP for pt. to benefit with functional strength. Treatment Diagnosis: Acute Cystitis, hypercalemia and renal insufficieny  Prognosis: Good  Decision Making: Medium Complexity  REQUIRES PT FOLLOW UP: Yes  Activity Tolerance  Activity Tolerance: Patient limited by fatigue     Patient Diagnosis(es): The primary encounter diagnosis was Acute cystitis without hematuria. Diagnoses of Hypercalcemia and Renal insufficiency were also pertinent to this visit. has a past medical history of Ataxic gait, Back pain, Benign essential tremor, Depression, DM (diabetes mellitus) ([address] DVT (deep venous thrombosis) ([address] Elevated troponin, Factor V Leiden carrier ([address] GERD (gastroesophageal reflux disease), Hereditary sensory-motor neuropathy, type I, HTN (hypertension), HTN (hypertension), Hyperlipidemia, Lumbar radicular pain, Neuropathy of both feet, NSTEMI (non-ST elevated myocardial infarction) ([address] Obesity, Osteoarthritis, Pulmonary embolism ([address] Renal failure, Right inguinal hernia, SOB (shortness of breath), Systemic lupus erythematosus ([address] Transient brainstem ischemia, Umbilical hernia, and Vasomotor flushing.   has a past surgical history that includes Appendectomy; eye surgery ();  Upper gastrointestinal endoscopy (12/16/13); Hysterectomy; Colonoscopy; Colonoscopy (12/16/13); Colonoscopy (08/2017); pr cystourethroscopy (N/A, 9/25/2017); Knee arthroscopy (Left); repair umbilical VFWK,6+J/J,LUZQH (N/A, 11/15/2017); laparoscopy (N/A, 11/15/2017); hernia repair (Right, 11/15/2017); Muscle biopsy (Right, 3/14/2019); Cataract removal (Bilateral, Winter 2019); and Cardiac catheterization (06/2018). Restrictions  Restrictions/Precautions  Restrictions/Precautions: Fall Risk  Subjective   General  Chart Reviewed: Yes  Additional Pertinent Hx: Pt reports having many falls \"I fall all the time\" and that she has Lupus; Pt and her  reports that she speech and thought process is delayed  Family / Caregiver Present: No  Referring Practitioner: Dr Potter Sic: Pt. reports no pain. Pt. reports I have limited endurance at times. I use my walker at home. I am cold. Pain Screening  Patient Currently in Pain: Yes  Vital Signs  Patient Currently in Pain: Yes       Orientation  Orientation  Overall Orientation Status: Within Normal Limits  Cognition      Objective    Bed Mobility:  Supine -> sit: independent. Transfers  Sit to Stand: Supervision  Stand to sit: Stand by assistance  Ambulation  Ambulation?: Yes  Ambulation 1  Surface: level tile  Device: Rolling Walker  Assistance: Stand by assistance  Quality of Gait: (P) Impulsive, Pt. demo's antalgic gait and decrease proprioception of equal step through and width. Pt. tends to demo sway with change of visual direction with occasional tipping of FWW with 0 LOB. VC\"S and pt. education with maintaining visual upright and safe step width to to reduce risk of sway to increase safe gait pattern. Distance: 240'x2  Stairs/Curb  Stairs?: Yes  Stairs  # Steps : 5  Stairs Height:  (4 and 6 inch )  Rails: Bilateral  Assistance: Stand by assistance  Comment: non recipical ascend with either LE.       Balance  Standing - Static: (P) Fair  Standing - Dynamic: (P) Fair;-  Exercises  Hamstring Sets: (P) x10' RTB  Hip Flexion: (P) x 10  Hip Abduction: (P) x 10 RTB  Knee Long Arc Quad: (P) x 10  Ankle Pumps: (P) x10  Comments: (P) Seated ther ex B LE for strength. HEP educated and issued for seated and standing ther ex for B LE strengthening to benefit with quality of functional skills. G-Code     OutComes Score                                                     AM-PAC Score             Goals  Short term goals  Time Frame for Short term goals: (P) 2-3 days  Short term goal 1: (P) Pt indep with bed mobility  Short term goal 2: (P) Pt indep with transfers  Short term goal 3: (P) Pt able to ambulate with AD for 125'x 1 with supervision and safe gait pattern  Short term goal 4: (P) Pt able to ambulate up/down 3 steps with no railing with supervision so pt can safely enter/exit her home.   Short term goal 5: (P) Indep with HEP  Long term goals  Time Frame for Long term goals : (P) Same as STGs  Patient Goals   Patient goals : (P) To get stronger    Plan    Plan  Times per week: 5-7  Times per day: Daily  Plan weeks: 2-3 days MEd surg and then home with [facility] and assistance from family PRN  Current Treatment Recommendations: Strengthening, Transfer Training, Endurance Training, Neuromuscular Re-education, Patient/Caregiver Education & Training, Pain Management, Balance Training, Gait Training, Home Exercise Program, Functional Mobility Training, Stair training, Safety Education & Training     Therapy Time   Individual Concurrent Group Co-treatment   Time In 1005         Time Out  1105         Minutes  [address], PTA  License and [address] Number: (P) .[id number]

## 2021-08-16 NOTE — DISCHARGE SUMMARY
albuterol sulfate  (90 Base) MCG/ACT inhaler  Inhale 2 puffs into the lungs every 6 hours as needed for Wheezing     * albuterol 0.63 MG/3ML nebulizer solution  Commonly known as: ACCUNEB  Take 3 mLs by nebulization every 6 hours as needed for Wheezing     allopurinol 300 MG tablet  Commonly known as: ZYLOPRIM     balsalazide 750 MG capsule  Commonly known as: COLAZAL     benzocaine (DENTAL) 20 % Pste paste  Commonly known as: ORABASE-B     blood glucose test strips  Test once daily     carvedilol 6.25 MG tablet  Commonly known as: COREG  Take 1 tablet by mouth 2 times daily (with meals)     citalopram 40 MG tablet  Commonly known as: CELEXA  TAKE 1 TABLET DAILY     dapagliflozin 5 MG tablet  Commonly known as: Farxiga  Take 1 tablet by mouth daily May substitute for generic or covered medication     Dexcom G6 Sensor Misc  1 Device by Does not apply route continuous     diclofenac sodium 1 % Gel  Commonly known as: Voltaren  Apply 4 g topically 4 times daily     HYDROcodone-acetaminophen 5-325 MG per tablet  Commonly known as: NORCO     hydroxychloroquine 200 MG tablet  Commonly known as: PLAQUENIL     hydrOXYzine 50 MG capsule  Commonly known as: VISTARIL  Take one pill every 8 hrs for itching     Lancets Misc  Testing q day     leucovorin calcium 5 MG tablet  Commonly known as: WELLCOVORIN     levETIRAcetam 250 MG tablet  Commonly known as: KEPPRA  Take 1 tablet by mouth 2 times daily     lidocaine 5 %  Commonly known as: LIDODERM  PLACE 1 PATCH ON THE SKIN DAILY, 12 HOURS ON AND 12 HOURS OFF     metaxalone 800 MG tablet  Commonly known as: SKELAXIN     methotrexate 2.5 MG chemo tablet  Commonly known as: RHEUMATREX     omeprazole 40 MG delayed release capsule  Commonly known as: PRILOSEC     predniSONE 2.5 MG tablet  Commonly known as: DELTASONE     Prolia 60 MG/ML Sosy SC injection  Generic drug: denosumab     rivaroxaban 20 MG Tabs tablet  Commonly known as: Xarelto  TAKE 1 TABLET DAILY     SITagliptin 50 MG tablet  Commonly known as: Januvia  Take 1 tablet by mouth daily     traMADol-acetaminophen 37.5-325 MG per tablet  Commonly known as: ULTRACET  TAKE AS INSTRUCTED BY YOUR PRESCRIBER     triamcinolone 0.1 % ointment  Commonly known as: KENALOG  APPLY TO THE AFFECTED AREAS OF RASH TWICE A DAY FOR 2 WEEKS THEN ONCE DAILY FOR 2 WEEKS         * This list has 2 medication(s) that are the same as other medications prescribed for you. Read the directions carefully, and ask your doctor or other care provider to review them with you.             STOP taking these medications    amitriptyline 25 MG tablet  Commonly known as: ELAVIL     Dexcom G6 Transmitter Misc     estrogens (conjugated) 0.625 MG tablet  Commonly known as: PREMARIN     famotidine 40 MG tablet  Commonly known as: PEPCID     furosemide 20 MG tablet  Commonly known as: LASIX     vitamin D 50 MCG (2000 UT) Caps capsule           Where to Get Your Medications      These medications were sent to [facility] [address] [place] [address]    Phone: [phone]   · magnesium oxide 400 (241.3 Mg) MG Tabs tablet         Physical Exam:    Vitals:  Vitals:    08/15/21 0542 08/15/21 1823 08/15/21 1824 08/16/21 0600   BP: 135/81 (!) 132/90 (!) 132/90 130/77   Pulse: 95 100 99 98   Resp:    18   Temp: 97.7 °F (36.5 °C)  98.1 °F (36.7 °C) 97.7 °F (36.5 °C)   TempSrc:    Oral   SpO2: 95%  96% 96%   Weight:       Height:         Weight: Weight: 188 lb 12.8 oz (85.6 kg)     24 hour intake/output:No intake or output data in the 24 hours ending 08/16/21 0942    General appearance - alert, well appearing, and in no distress  Chest - clear to auscultation, no wheezes, rales or rhonchi, symmetric air entry  Heart - normal rate, regular rhythm, normal S1, S2, no murmurs, rubs, clicks or gallops  Abdomen - soft, nontender, nondistended, no masses or organomegaly  Obese: Yes; Protuberant: No   Neurological - alert, oriented, normal speech, no focal findings or movement disorder noted  Extremities - peripheral pulses normal, no pedal edema, no clubbing or cyanosis  Skin - normal coloration and turgor, no rashes, no suspicious skin lesions noted        Radiology reports as per the Radiologist  Radiology: CT ABDOMEN PELVIS WO CONTRAST Additional Contrast? None    Result Date: 8/13/2021  Examination: CT ABDOMEN PELVIS WO CONTRAST Indication:   Renal failure, r/o hydronephrosis, obstructive uropathy, Hi Calcium r/o malignancy Technique: Multiple serial axial images was performed through the abdomen and pelvis without intravenous or oral administration of contrast Images were reconstructed in the axial and coronal and sagittal planes. Comparison: September 11, 2017 4729 hours Findings: There are bibasilar areas of atelectasis, with patchy groundglass infiltrates. The liver, gallbladder, spleen, pancreas, adrenals, kidneys are unremarkable. No bladder calculi. Large and small bowel show no sign of obstruction. The appendix is surgically absent. Small hiatal hernia. No diverticulitis. The uterus is surgically absent. No free air. No free fluid. The visualized abdominal aorta is of normal size and caliber. No significant retroperitoneal adenopathy. There is multilevel degenerative changes with bridging osteophytes of the lumbar spine. There is a minimal grade 1 anterolisthesis of L4 and L5.     1. BIBASILAR AREAS OF ATELECTASIS, WITH SMALL PATCHY GROUNDGLASS INFILTRATES All CT scans at this facility use dose modulation, iterative reconstruction, and/or weight based dosing when appropriate to reduce radiation dose to as low as reasonably achievable. CT Head WO Contrast    Result Date: 8/12/2021  COMPARISON: November 16, 2018.  HISTORY: Confusion, lethargic COMMENTS:  SENT BY PHYSICIAN ABNORMAL TEST RESULTS TECHNIQUE: CT HEAD WO CONTRAST, XR CHEST PORTABLE FINDINGS: No acute hemorrhage or extra-axial fluid collection seen. The ventricular system is midline with no evidence for hydrocephalus. No wide vessel territory stroke is seen. Mild periventricular white matter changes are seen thought to represent small vessel disease. Global involutional changes are also visualized. The visualized paranasal sinuses are unremarkable. The calvarium appears to be intact. No acute hemorrhage or extra-axial fluid collection seen on this examination. All CT scans at this facility use dose modulation, iterative reconstruction, and/or weight based dosing when appropriate to reduce radiation dose to as low as reasonably achievable. PORTABLE CHEST COMPARISON: No prior studies available for comparison. HISTORY: confusion TECHNIQUE: AP view FINDINGS: No consolidating pneumonia, pleural effusion or pneumothorax is seen. Coarsening of the interstitium is seen. The cardiac silhouette is within normal limits of size. The bony structures are grossly intact. IMPRESSION: No evidence of acute cardiopulmonary process, findings are suggestive of COPD. XR CHEST PORTABLE    Result Date: 8/12/2021  COMPARISON: November 16, 2018. HISTORY: Confusion, lethargic COMMENTS:  SENT BY PHYSICIAN ABNORMAL TEST RESULTS TECHNIQUE: CT HEAD WO CONTRAST, XR CHEST PORTABLE FINDINGS: No acute hemorrhage or extra-axial fluid collection seen. The ventricular system is midline with no evidence for hydrocephalus. No wide vessel territory stroke is seen. Mild periventricular white matter changes are seen thought to represent small vessel disease. Global involutional changes are also visualized. The visualized paranasal sinuses are unremarkable. The calvarium appears to be intact. No acute hemorrhage or extra-axial fluid collection seen on this examination.  All CT scans at this facility use dose modulation, iterative reconstruction, and/or weight based dosing when appropriate to reduce radiation dose to as low as reasonably achievable. PORTABLE CHEST COMPARISON: No prior studies available for comparison. HISTORY: confusion TECHNIQUE: AP view FINDINGS: No consolidating pneumonia, pleural effusion or pneumothorax is seen. Coarsening of the interstitium is seen. The cardiac silhouette is within normal limits of size. The bony structures are grossly intact. IMPRESSION: No evidence of acute cardiopulmonary process, findings are suggestive of COPD. Results for orders placed or performed during the hospital encounter of 08/12/21   COVID-19, Rapid    Specimen: Nasopharyngeal Swab   Result Value Ref Range    SARS-CoV-2, NAAT Not Detected Not Detected   Culture, Urine    Specimen: Urine, clean catch   Result Value Ref Range    Urine Culture, Routine       >50,000 CFU/ml of mixed candice  Multiple organisms isolated, no predominance. Culture  indicates probable contamination. Please review colony count  and clinical indications to determine if a repeat culture is  necessary. No further workup to be done.      Urine Reflex to Culture    Specimen: Urine, clean catch   Result Value Ref Range    Color, UA Light yellow Straw/Yellow    Clarity, UA Cloudy Clear    Glucose, Ur Negative Negative mg/dL    Bilirubin Urine Negative Negative    Ketones, Urine Negative Negative mg/dL    Specific Gravity, UA 1.020 1.005 - 1.030    Blood, Urine Trace-intact Negative    pH, UA 5.5 5.0 - 9.0    Protein, UA Negative Negative mg/dL    Urobilinogen, Urine 0.2 <2.0 E.U./dL    Nitrite, Urine Negative Negative    Leukocyte Esterase, Urine Large Negative    Urine Reflex to Culture Yes    Comprehensive Metabolic Panel   Result Value Ref Range    Sodium 139 135 - 144 mEq/L    Potassium 3.0 (LL) 3.4 - 4.9 mEq/L    Chloride 94 (L) 95 - 107 mEq/L    CO2 28 20 - 31 mEq/L    Anion Gap 17 (H) 9 - 15 mEq/L    Glucose 141 (H) 70 - 99 mg/dL    BUN 35 (H) 8 - 23 mg/dL    CREATININE 2.33 (H) 0.50 - 0.90 mg/dL    GFR Non-African American 21.2 (L) >60    GFR  25.7 (L) >60    Calcium 14.0 (HH) 8.5 - 9.9 mg/dL    Total Protein 7.6 6.3 - 8.0 g/dL    Albumin 4.2 3.5 - 4.6 g/dL    Total Bilirubin 0.5 0.2 - 0.7 mg/dL    Alkaline Phosphatase 131 (H) 40 - 130 U/L    ALT 49 (H) 0 - 33 U/L    AST 38 (H) 0 - 35 U/L    Globulin 3.4 2.3 - 3.5 g/dL   CBC Auto Differential   Result Value Ref Range    WBC 7.0 4.0 - 10.0 K/uL    RBC 4.64 3.93 - 5.22 M/uL    Hemoglobin 13.1 11.2 - 15.7 g/dL    Hematocrit 40.9 37.0 - 47.0 %    MCV 88.1 79.4 - 94.8 fL    MCH 28.2 25.6 - 32.2 pg    MCHC 32.0 (L) 32.2 - 35.5 %    RDW 13.9 11.7 - 14.4 %    Platelets 219 305 - 717 K/uL    Neutrophils % 56.6 34.0 - 71.1 %    Immature Granulocytes % 2.4 %    Lymphocytes % 27.4 %    Monocytes % 10.1 4.7 - 12.5 %    Eosinophils % 2.4 0.7 - 5.8 %    Basophils % 1.1 0.1 - 1.2 %    Neutrophils Absolute 3.9 1.6 - 6.1 K/uL    Immature Granulocytes # 0.2 K/uL    Lymphocytes Absolute 1.9 1.2 - 3.7 K/uL    Monocytes Absolute 0.7 0.2 - 0.9 K/uL    Eosinophils Absolute 0.2 0.0 - 0.4 K/uL    Basophils Absolute 0.1 0.0 - 0.1 K/uL   Ethanol   Result Value Ref Range    Ethanol Lvl <10 mg/dL    Ethanol percent Not indicated G/dL   Lipase   Result Value Ref Range    Lipase 14 12 - 95 U/L   Troponin   Result Value Ref Range    Troponin 0.020 (HH) 0.000 - 0.010 ng/mL   Urine Reflex to Culture    Specimen: Urine, clean catch   Result Value Ref Range    Color, UA Yellow Straw/Yellow    Clarity, UA Clear Clear    Glucose, Ur Negative Negative mg/dL    Bilirubin Urine Negative Negative    Ketones, Urine Negative Negative mg/dL    Specific Gravity, UA 1.010 1.005 - 1.030    Blood, Urine Negative Negative    pH, UA 6.0 5.0 - 9.0    Protein, UA Negative Negative mg/dL    Urobilinogen, Urine 0.2 <2.0 E.U./dL    Nitrite, Urine Negative Negative    Leukocyte Esterase, Urine Moderate Negative    Urine Reflex to Culture Not Indicated    Protime-INR   Result Value Ref Range    Protime 30.8 (H) 12.3 - 14.9 sec    INR 3.1    Microscopic Urinalysis Result Value Ref Range    WBC, UA 21-50 0 - 5 /HPF    RBC, UA 0-2 0 - 2 /HPF    Epithelial Cells, UA 10-20 /HPF    Bacteria, UA MANY (A) Negative /HPF   PTH, INTACT   Result Value Ref Range    PTH 13.8 (L) 15.0 - 65.0 pg/mL   VITAMIN D 1,25 DIHYDROXY   Result Value Ref Range    Vit D, 1,25-Dihydroxy 27.5 19.9 - 79.3 pg/mL   KAPPA/LAMBDA QUANT FREE LIGHT CHAINS SERUM   Result Value Ref Range    Kappa Free Light Chains QNT 30.64 (H) 3.30 - 19.40 mg/L    Lambda Free Light Chains QNT 17.27 5.71 - 26.30 mg/L    Kappa/Lambda Fluid C Ratio 1.77 (H) 0.26 - 1.65   CALCIUM, RANDOM URINE   Result Value Ref Range    Calcium, Ur 11.4 Not Established mg/dL   Creatinine, Random Urine   Result Value Ref Range    Creatinine, Ur 23.5 Not Established mg/dL   Hepatitis Panel, Acute   Result Value Ref Range    Hep A IgM Non-reactive     Hep B Core Ab, IgM Non-reactive     Hep B S Ag Interp Non-reactive     Hep C Ab Interp Non-reactive     Hepatitis Interpretation: see below    Phosphorus   Result Value Ref Range    Phosphorus 3.8 2.3 - 4.8 mg/dL   Magnesium   Result Value Ref Range    Magnesium 1.1 (L) 1.7 - 2.4 mg/dL   Comprehensive Metabolic Panel w/ Reflex to MG   Result Value Ref Range    Sodium 141 135 - 144 mEq/L    Potassium reflex Magnesium 3.6 3.4 - 4.9 mEq/L    Chloride 102 95 - 107 mEq/L    CO2 27 20 - 31 mEq/L    Anion Gap 12 9 - 15 mEq/L    Glucose 212 (H) 70 - 99 mg/dL    BUN 30 (H) 8 - 23 mg/dL    CREATININE 2.01 (H) 0.50 - 0.90 mg/dL    GFR Non-African American 25.2 (L) >60    GFR  30.4 (L) >60    Calcium 11.4 (H) 8.5 - 9.9 mg/dL    Total Protein 6.1 (L) 6.3 - 8.0 g/dL    Albumin 3.5 3.5 - 4.6 g/dL    Total Bilirubin <0.2 0.2 - 0.7 mg/dL    Alkaline Phosphatase 102 40 - 130 U/L    ALT 33 0 - 33 U/L    AST 23 0 - 35 U/L    Globulin 2.6 2.3 - 3.5 g/dL   CBC   Result Value Ref Range    WBC 6.6 4.0 - 10.0 K/uL    RBC 3.74 (L) 3.93 - 5.22 M/uL    Hemoglobin 10.7 (L) 11.2 - 15.7 g/dL    Hematocrit 32.8 (L) 37.0 - 47.0 %    MCV 87.7 79.4 - 94.8 fL    MCH 28.6 25.6 - 32.2 pg    MCHC 32.6 32.2 - 35.5 %    RDW 13.9 11.7 - 14.4 %    Platelets 489 096 - 987 K/uL   Basic Metabolic Panel   Result Value Ref Range    Sodium 144 135 - 144 mEq/L    Potassium 3.1 (L) 3.4 - 4.9 mEq/L    Chloride 100 95 - 107 mEq/L    CO2 28 20 - 31 mEq/L    Anion Gap 16 (H) 9 - 15 mEq/L    Glucose 153 (H) 70 - 99 mg/dL    BUN 34 (H) 8 - 23 mg/dL    CREATININE 2.18 (H) 0.50 - 0.90 mg/dL    GFR Non-African American 22.9 (L) >60    GFR  27.7 (L) >60    Calcium 12.7 (H) 8.5 - 9.9 mg/dL   Magnesium   Result Value Ref Range    Magnesium 1.6 (L) 1.7 - 2.4 mg/dL   Troponin   Result Value Ref Range    Troponin <0.010 0.000 - 0.010 ng/mL   Troponin   Result Value Ref Range    Troponin 0.019 (HH) 0.000 - 0.010 ng/mL   Urine Drug Screen, Comprehensive   Result Value Ref Range    PCP Screen, Urine Neg Negative <25 ng/mL    Benzodiazepine Screen, Urine Neg Negative <150 ng/mL    Cocaine Metabolite Screen, Urine Neg Negative <150 ng/mL    Amphetamine Screen, Urine Neg Negative <500 ng/mL    Cannabinoid Scrn, Ur Neg Negative <50 ng/mL    Opiate Scrn, Ur Neg Negative <100 ng/mL    Barbiturate Screen, Ur Neg Negative <200 ng/mL    Drug Screen Comment: see below    Microscopic Urinalysis   Result Value Ref Range    WBC, UA 3-5 0 - 5 /HPF    RBC, UA 0-2 0 - 2 /HPF    Epithelial Cells, UA 0-2 /HPF   Basic Metabolic Panel   Result Value Ref Range    Sodium 139 135 - 144 mEq/L    Potassium 4.3 3.4 - 4.9 mEq/L    Chloride 106 95 - 107 mEq/L    CO2 23 20 - 31 mEq/L    Anion Gap 10 9 - 15 mEq/L    Glucose 130 (H) 70 - 99 mg/dL    BUN 24 (H) 8 - 23 mg/dL    CREATININE 1.61 (H) 0.50 - 0.90 mg/dL    GFR Non-African American 32.5 (L) >60    GFR  39.3 (L) >60    Calcium 10.2 (H) 8.5 - 9.9 mg/dL   Comprehensive Metabolic Panel   Result Value Ref Range    Sodium 143 135 - 144 mEq/L    Potassium 4.2 3.4 - 4.9 mEq/L    Chloride 111 (H) 95 - 107 mEq/L CO2 22 20 - 31 mEq/L    Anion Gap 10 9 - 15 mEq/L    Glucose 116 (H) 70 - 99 mg/dL    BUN 20 8 - 23 mg/dL    CREATININE 1.50 (H) 0.50 - 0.90 mg/dL    GFR Non-African American 35.3 (L) >60    GFR  42.7 (L) >60    Calcium 9.9 8.5 - 9.9 mg/dL    Total Protein 5.6 (L) 6.3 - 8.0 g/dL    Albumin 3.1 (L) 3.5 - 4.6 g/dL    Total Bilirubin <0.2 0.2 - 0.7 mg/dL    Alkaline Phosphatase 88 40 - 130 U/L    ALT 27 0 - 33 U/L    AST 22 0 - 35 U/L    Globulin 2.5 2.3 - 3.5 g/dL   CBC Auto Differential   Result Value Ref Range    WBC 6.6 4.0 - 10.0 K/uL    RBC 3.46 (L) 3.93 - 5.22 M/uL    Hemoglobin 9.8 (L) 11.2 - 15.7 g/dL    Hematocrit 31.2 (L) 37.0 - 47.0 %    MCV 90.2 79.4 - 94.8 fL    MCH 28.3 25.6 - 32.2 pg    MCHC 31.4 (L) 32.2 - 35.5 %    RDW 13.9 11.7 - 14.4 %    Platelets 517 857 - 204 K/uL    Neutrophils % 61.9 34.0 - 71.1 %    Immature Granulocytes % 1.4 %    Lymphocytes % 25.5 %    Monocytes % 9.3 4.7 - 12.5 %    Eosinophils % 0.8 0.7 - 5.8 %    Basophils % 1.1 0.1 - 1.2 %    Neutrophils Absolute 4.1 1.6 - 6.1 K/uL    Immature Granulocytes # 0.1 K/uL    Lymphocytes Absolute 1.7 1.2 - 3.7 K/uL    Monocytes Absolute 0.6 0.2 - 0.9 K/uL    Eosinophils Absolute 0.1 0.0 - 0.4 K/uL    Basophils Absolute 0.1 0.0 - 0.1 K/uL   Comprehensive Metabolic Panel   Result Value Ref Range    Sodium 140 135 - 144 mEq/L    Potassium 3.8 3.4 - 4.9 mEq/L    Chloride 107 95 - 107 mEq/L    CO2 21 20 - 31 mEq/L    Anion Gap 12 9 - 15 mEq/L    Glucose 124 (H) 70 - 99 mg/dL    BUN 15 8 - 23 mg/dL    CREATININE 1.40 (H) 0.50 - 0.90 mg/dL    GFR Non-African American 38.2 (L) >60    GFR  46.2 (L) >60    Calcium 9.5 8.5 - 9.9 mg/dL    Total Protein 5.9 (L) 6.3 - 8.0 g/dL    Albumin 3.4 (L) 3.5 - 4.6 g/dL    Total Bilirubin <0.2 0.2 - 0.7 mg/dL    Alkaline Phosphatase 94 40 - 130 U/L    ALT 32 0 - 33 U/L    AST 26 0 - 35 U/L    Globulin 2.5 2.3 - 3.5 g/dL   CBC Auto Differential   Result Value Ref Range    WBC 6.1 4.0 - 10.0 K/uL    RBC 3.70 (L) 3.93 - 5.22 M/uL    Hemoglobin 10.3 (L) 11.2 - 15.7 g/dL    Hematocrit 33.1 (L) 37.0 - 47.0 %    MCV 89.5 79.4 - 94.8 fL    MCH 27.8 25.6 - 32.2 pg    MCHC 31.1 (L) 32.2 - 35.5 %    RDW 14.0 11.7 - 14.4 %    Platelets 276 053 - 250 K/uL    Neutrophils % 63.7 34.0 - 71.1 %    Immature Granulocytes % 1.6 %    Lymphocytes % 24.7 %    Monocytes % 8.3 4.7 - 12.5 %    Eosinophils % 0.7 0.7 - 5.8 %    Basophils % 1.0 0.1 - 1.2 %    Neutrophils Absolute 3.9 1.6 - 6.1 K/uL    Immature Granulocytes # 0.1 K/uL    Lymphocytes Absolute 1.5 1.2 - 3.7 K/uL    Monocytes Absolute 0.5 0.2 - 0.9 K/uL    Eosinophils Absolute 0.0 0.0 - 0.4 K/uL    Basophils Absolute 0.1 0.0 - 0.1 K/uL   Magnesium   Result Value Ref Range    Magnesium 1.1 (L) 1.7 - 2.4 mg/dL   Phosphorus   Result Value Ref Range    Phosphorus 1.8 (L) 2.3 - 4.8 mg/dL   CBC Auto Differential   Result Value Ref Range    WBC 5.2 4.0 - 10.0 K/uL    RBC 3.55 (L) 3.93 - 5.22 M/uL    Hemoglobin 10.1 (L) 11.2 - 15.7 g/dL    Hematocrit 31.4 (L) 37.0 - 47.0 %    MCV 88.5 79.4 - 94.8 fL    MCH 28.5 25.6 - 32.2 pg    MCHC 32.2 32.2 - 35.5 %    RDW 14.2 11.7 - 14.4 %    Platelets 216 637 - 199 K/uL    Neutrophils % 62.6 34.0 - 71.1 %    Immature Granulocytes % 2.1 %    Lymphocytes % 22.5 %    Monocytes % 11.2 4.7 - 12.5 %    Eosinophils % 0.6 (L) 0.7 - 5.8 %    Basophils % 1.0 0.1 - 1.2 %    Neutrophils Absolute 3.3 1.6 - 6.1 K/uL    Immature Granulocytes # 0.1 K/uL    Lymphocytes Absolute 1.2 1.2 - 3.7 K/uL    Monocytes Absolute 0.6 0.2 - 0.9 K/uL    Eosinophils Absolute 0.0 0.0 - 0.4 K/uL    Basophils Absolute 0.1 0.0 - 0.1 K/uL   Magnesium   Result Value Ref Range    Magnesium 1.8 1.7 - 2.4 mg/dL   Basic Metabolic Panel   Result Value Ref Range    Calcium 8.6 8.5 - 9.9 mg/dL    Sodium 139 135 - 144 mEq/L    Potassium 4.0 3.4 - 4.9 mEq/L    Chloride 107 95 - 107 mEq/L    CO2 20 20 - 31 mEq/L    Anion Gap 12 9 - 15 mEq/L    Glucose 90 70 - 99 mg/dL BUN 10 8 - 23 mg/dL    CREATININE 1.21 (H) 0.50 - 0.90 mg/dL    GFR Non-African American 45.2 (L) >60    GFR  54.7 (L) >60   POCT Glucose   Result Value Ref Range    POC Glucose 119 (H) 60 - 115 mg/dl    Performed on ACCU-CHEK    POCT Glucose   Result Value Ref Range    POC Glucose 189 (H) 60 - 115 mg/dl    Performed on ACCU-CHEK    POCT Glucose   Result Value Ref Range    POC Glucose 154 (H) 60 - 115 mg/dl    Performed on ACCU-CHEK    POCT Glucose   Result Value Ref Range    POC Glucose 197 (H) 60 - 115 mg/dl    Performed on ACCU-CHEK    POCT Glucose   Result Value Ref Range    POC Glucose 129 (H) 60 - 115 mg/dl    Performed on ACCU-CHEK    POCT Glucose   Result Value Ref Range    POC Glucose 115 60 - 115 mg/dl    Performed on ACCU-CHEK    POCT Glucose   Result Value Ref Range    POC Glucose 173 (H) 60 - 115 mg/dl    Performed on ACCU-CHEK    POCT Glucose   Result Value Ref Range    POC Glucose 175 (H) 60 - 115 mg/dl    Performed on ACCU-CHEK    POCT Glucose   Result Value Ref Range    POC Glucose 114 60 - 115 mg/dl    Performed on ACCU-CHEK    POCT Glucose   Result Value Ref Range    POC Glucose 112 60 - 115 mg/dl    Performed on ACCU-CHEK    POCT Glucose   Result Value Ref Range    POC Glucose 129 (H) 60 - 115 mg/dl    Performed on ACCU-CHEK    POCT Glucose   Result Value Ref Range    POC Glucose 110 60 - 115 mg/dl    Performed on ACCU-CHEK    POCT Glucose   Result Value Ref Range    POC Glucose 97 60 - 115 mg/dl    Performed on ACCU-CHEK    POCT Glucose   Result Value Ref Range    POC Glucose 99 60 - 115 mg/dl    Performed on ACCU-CHEK    EKG 12 Lead - Chest Pain   Result Value Ref Range    Ventricular Rate 92 BPM    Atrial Rate 92 BPM    P-R Interval 120 ms    QRS Duration 118 ms    Q-T Interval 396 ms    QTc Calculation (Bazett) 489 ms    P Axis 0 degrees    R Axis -31 degrees    T Axis 45 degrees       Diet:  ADULT DIET;  Regular; 4 carb choices (60 gm/meal)    Activity:  Activity as tolerated (Patient may move about with assist as indicated or with supervision.)    Follow-up:  in 1 weeks with BUTCH Adam CNP,     Disposition: home    Condition: Stable    Time Spent: 50 minutes    Electronically signed by Liz Jenkins MD on 8/16/2021 at 9:42 AM    Discharging Hospitalist

## 2021-08-17 NOTE — CARE COORDINATION
Dany 45 Transitions Initial Follow Up Call    Call within 2 business days of discharge: Yes    Patient: Fred Law Patient : 1960   MRN: [id number]  Reason for Admission: 2021 - 2021 Hypercalcemia, AMS, Encephalopathy, UTI, Dehydration, GIOVANI/CKD. Discharge Date: 21 RARS: Readmission Risk Score: 32  NR  CT    Last Discharge [facility]       Complaint Diagnosis Description Type Department Provider    21 Dysuria Acute cystitis without hematuria . .. ED to Hosp-Admission (Discharged) (ADMIT) Asia Nguyen MD; Jack Lam. .. [facility]    Dr Diego Gunter  12:45  Dr Annette Reyes  2:00  Dr Shara Kirby  2:00  [place], [place]  10:20  Need PCP appt    Transitions of Care Initial Call    Was this an external facility discharge? No Discharge Facility:     Challenges to be reviewed by the provider   Additional needs identified to be addressed with provider: No  home health care-[facility]             Method of communication with provider : none      Advance Care Planning:   Does patient have an Advance Directive: reviewed and current. Was this a readmission? No  Patient stated reason for admission: Alert and answers questions appropriately. Richard Hennessy reports no urinary pain/urgency, back/flank/pelvic pain, fever, chills, or flu-like symptoms. States she has clear yellow urine and normal BMs. States good appetite and hydrating well. Reports today's FBS 99. Denies any med or home needs. Has some generalized weakness and uses walker. Pending start of [facility] services and advised will contact in 24-48 hrs dc, v/u. Patients top risk factors for readmission: medical condition-DM    Care Transition Nurse (CTN) contacted the patient by telephone to perform post hospital discharge assessment. Verified name and  with patient as identifiers. Provided introduction to self, and explanation of the CTN role.      CTN reviewed discharge instructions, medical action plan and red flags with patient who verbalized understanding. Patient given an opportunity to ask questions and does not have any further questions or concerns at this time. Were discharge instructions available to patient? Yes. Reviewed appropriate site of care based on symptoms and resources available to patient including: When to call 911. The patient agrees to contact the PCP office for questions related to their healthcare. Medication reconciliation was performed with patient, who verbalizes understanding of administration of home medications. Advised obtaining a 90-day supply of all daily and as-needed medications. CTN provided contact information. Plan for follow-up call in 5-7 days based on severity of symptoms and risk factors.   Plan for next call: symptom management-UTI    Care Transitions 24 Hour Call    Do you have any ongoing symptoms?: Yes  Patient-reported symptoms: Weakness  Do you have a copy of your discharge instructions?: Yes  Do you have all of your prescriptions and are they filled?: Yes  Have you scheduled your follow up appointment?: Yes  Were you discharged with any Home Care or Post Acute Services: Yes  Post Acute Services: Home Health (Comment: [facility])  Do you feel like you have everything you need to keep you well at home?: Yes  Care Transitions Interventions         Follow Up  Future Appointments   Date Time Provider Delmer Zhao   8/17/2021  2:00 PM Leobardo Valverde, [address] [place] [address]   8/27/2021 10:20 AM Mame Umana, [address] [place] [address] Drive   10/18/2021  2:45 PM MD Fabián Staples   6/3/2022 10:30 AM [address] DO [address]

## 2021-08-17 NOTE — PROGRESS NOTES
TELEHEALTH EVALUATION -- Audio/Visual (During VIUWA-38 public health emergency)    Due to COVID 19 outbreak, patient's office visit was converted to a virtual visit. Patient was contacted and agreed to proceed with a virtual visit via  [email or web address]   The risks and benefits of converting to a virtual visit were discussed in light of the current infectious disease epidemic. Patient also understood that insurance coverage and co-pays are up to their individual insurance plans. Subjective       This patient has requested an audio/video evaluation for the following concern(s):    HPI:     Back Pain (Trigger point injections 4/26/21, 5/26/21, 6/28/21, 7/28/21 with 80% reduction in pain lasting 4-6 weeks.)   Neck Pain-SP  IV rituxan at [facility] for RA   Recent hospitalization for renal failure-she had a tox screen done at that time which was unremarkable    She was also evaluated for confusion during the hospitalization -she also had a CT of the head done which showed no acute hemorrhagic findings. Medication Refill (Norco, Tramadol, Robaxin, Lidoderm refill today )   Pain (7- Without medication (Back/Neck)        She rarely takes the Ultracet  She has no signs of overuse or abuse however her Soaper score is rather high we discussed relaxation techniques and stress management. Seems like this score is getting better however-Her goal is to come off the prednisone a--now is on 5 mg /daY And she is getting twice yearly infusions with Prolia. GETS RIUXIMAB INFUSIONS q 6 months     She remains on Xarelto because of her factor V Leiden clotting--she would like to have blocks but would have to be off Margrette Reusing for 2-3 days prior and I would like to avoid this. .     She recently had a caudal block which helped her pain quite a bit. It is complicated by the risk factor going off the Xarelto for her few days prior to the block.   She states that her doctor okayed her to come off of the Xarelto for 2 days prior to the caudal blocks as she is done in the past to get the caudal safely. She had one block and have quite a bit she would like them in the future as needed to be that she will need to hold the Xarelto as above. She has no signs of drug abuse or overuse during the Ultracet. She is more functional with it shows no signs of drug-seeking behaviors. She is able to do light housework and has good interactions with friends and family. Back Pain  This is a recurrent problem. The current episode started more than 1 year ago. The problem occurs constantly. The problem is unchanged. The pain is present in the lumbar spine and thoracic spine. The quality of the pain is described as aching. The pain does not radiate (Occasional down bilateral legs). The pain is at a severity of 6/10. The pain is severe. The pain is the same all the time. The symptoms are aggravated by bending, twisting, coughing and position. Stiffness is present in the morning. Associated symptoms include leg pain and weakness. Pertinent negatives include no abdominal pain, bladder incontinence, bowel incontinence, chest pain, dysuria, fever, headaches, numbness, paresis, paresthesias, perianal numbness, tingling or weight loss. Risk factors include obesity, menopause, lack of exercise and sedentary lifestyle. She has tried heat, analgesics, muscle relaxant, ice, NSAIDs, walking and home exercises (Caudal Blocks, TP injections, Tramadol and Flexeril, ) for the symptoms. The treatment provided moderate relief. Neck Pain   Associated symptoms include leg pain and weakness. Pertinent negatives include no chest pain, fever, headaches, numbness, paresis, photophobia, tingling, trouble swallowing or weight loss. Foot Pain   This is a chronic problem. The current episode started more than 1 year ago. The problem occurs constantly. The problem has been unchanged. Pertinent negatives include no fever, numbness or tingling.  The symptoms are aggravated by bending and walking. She has tried acetaminophen, rest, heat, relaxation, oral narcotics and lying down for the symptoms. The treatment provided moderate relief.              Past Medical History:   Diagnosis Date    Ataxic gait     Back pain     Benign essential tremor     Depression     DM (diabetes mellitus) ([facility] 11/25/2014    DVT (deep venous thrombosis) ([facility])     Elevated troponin 5/25/2016    Factor V Leiden carrier ([facility]     history of DVT, on Xarelto    GERD (gastroesophageal reflux disease)     Hereditary sensory-motor neuropathy, type I     HTN (hypertension)     HTN (hypertension)     Hyperlipidemia     Lumbar radicular pain     Neuropathy of both feet 8/22/2016    NSTEMI (non-ST elevated myocardial infarction) ([facility]     Obesity     Osteoarthritis     Pulmonary embolism ([facility]     Renal failure 6/29/2018    Right inguinal hernia 11/9/2017    SOB (shortness of breath) 4/19/2017    Systemic lupus erythematosus ([facility] 9/8/2017    Transient brainstem ischemia     Umbilical hernia 10/7/5930    Vasomotor flushing     on hormone replacement therapy       Past Surgical History:   Procedure Laterality Date    APPENDECTOMY      CARDIAC CATHETERIZATION  06/2018    CATARACT REMOVAL Bilateral Winter 2019   [facility] COLONOSCOPY      Dr. Gay Proper COLONOSCOPY  12/16/13    DR SINGER repeat in 10 yrs per pt    COLONOSCOPY  08/2017    Dr Carl Palomino Right 11/15/2017    RIGHT  HERNIA [facility]GUIN[facility]PA[facility]     LAPAROSCOPY N/A 11/15/2017    DIAGNOSTIC LAPAROSCOPY performed by Karen Kincaid MD at [address] Right 3/14/2019    RIGHT QUADRICEPS MUSCLE BIOPSY performed by Sonia Vail MD at [facility] N/A 9/25/2017    [facility] [place] [id number] performed by Selina Rodriguez MD at [facility],6+S/V,EXEBK N/A 11/15/2017    HERNIA UMBILICAL REPAIR, 90 MINS SUPINE performed by Maisha Cerna MD at [address] [facility]  12/16/13    DR SINGER       Social History     Socioeconomic History    Marital status:      Spouse name: Goldy Muñoz Number of children: 1    Years of education: 15    Highest education level: 12th grade   Occupational History    Occupation: Housewife    Occupation: retired [employer]   Tobacco Use    Smoking status: Former Smoker     Packs/day: 1.25     Years: 10.00     Pack years: 12.50     Types: Cigarettes     Quit date: 1/1/2006     Years since quitting: 15.6    Smokeless tobacco: Never Used    Tobacco comment: start smoking age 25   Vaping Use    Vaping Use: Never used   Substance and Sexual Activity    Alcohol use: Not Currently    Drug use: No    Sexual activity: Yes   Other Topics Concern    Not on file   Social History Narrative    Off work dt--retired early from --worked [employer]---moved back to [place] to work at [employer] in [address] [place] in [place] [facility] [employer] [facility] Low Risk     Difficulty of Paying Living Expenses: Not hard at [facility] Insecurity: No Food Insecurity    Worried About [address] [facility] in the Last Year: Never true    [facility] in the Last Year: Never true   Transportation Needs:     Lack of Transportation (Medical):      Lack of Transportation (Non-Medical):    Physical Activity:     Days of Exercise per Week:     Minutes of Exercise per Session:    Stress:     Feeling of Stress :    Social Connections:     Frequency of Communication with Friends and Family:     Frequency of Social Gatherings with Friends and Family:     Attends Scientologist Services:     Active Member of Clubs or Organizations:     Attends Club or Organization Meetings:     Marital Status:    Intimate Partner Violence:     Fear of Current or Ex-Partner:     Emotionally Abused:     Physically range of motion in the hips and knees for stiffness in the lumbar spine    [x] Normal Mood  [x] Not anxious appearing    [x] Not depressed appearing  [x] Not confused appearing      [x]  Good short term memory  [x]  Good long term memory    [x]  Able to follow 2-3 step commands    Due to this being a TeleHealth encounter, evaluation of the following organ systems is limited: Vitals/Constitutional/EENT/Resp/CV/GI//MS/Neuro/Skin/Heme-Lymph-Imm. ASSESSMENT/PLAN:     After a thorough review and discussion of the previous medical records, patient comprehensive medical, surgical, and family and social history, Review of Systems, their OARRS, their Screener and Opioid Assessment for Patients with Pain (SOAPP®-R), recent diagnostics, and symptomatic results to previous treatment, it is my impression that the patients is suffering with progressive and severe:     Diagnosis Orders   1. Chronic midline low back pain with sciatica, sciatica laterality unspecified     2. SI (sacroiliac) pain     3. Vitamin D deficiency     4. Current mild episode of major depressive disorder, unspecified whether recurrent ([facility]     5. High risk medication use - 12/08/17 OARRS PM&R, 02/07/18 OARRS PM&R, 02/22/17 Med Contract PM&R     6. Other local lupus erythematosus     7. Neck pain     8.  Myalgia  KS INJECT TRIGGER POINTS, 3 OR GREATER    KS INJECT TRIGGER POINTS, 3 OR GREATER    KS INJECT TRIGGER POINTS, 3 OR GREATER       I am also concerned by lifestyle and mood issues including:    Past Medical History:   Diagnosis Date    Ataxic gait     Back pain     Benign essential tremor     Depression     DM (diabetes mellitus) ([facility] 11/25/2014    DVT (deep venous thrombosis) (HCC)     Elevated troponin 5/25/2016    [facility]     history of DVT, on Xarelto    GERD (gastroesophageal reflux disease)     Hereditary sensory-motor neuropathy, type I     HTN (hypertension)     HTN (hypertension)     Hyperlipidemia     Lumbar radicular pain     Neuropathy of both feet 8/22/2016    NSTEMI (non-ST elevated myocardial infarction) (HCC)     Obesity     Osteoarthritis     Pulmonary embolism (HCC)     Renal failure 6/29/2018    Right inguinal hernia 11/9/2017    SOB (shortness of breath) 4/19/2017    Systemic lupus erythematosus ([facility] 9/8/2017    Transient brainstem ischemia     Umbilical hernia 88/4/1498    Vasomotor flushing     on hormone replacement therapy           Given their medication, chronic pain and lifestyle and medications they are at risk for :    Falls, constipation, addiction, toxicity due to controlled/opiate medications  Loss of livelyhood due to severe pain, debility, weight gain and  vitamin D deficiency    The patient was educated regarding proper diet, fitness routine, and regulatory restrictions concerning pain medications. Previous notes, comprehensive past medical, surgical, family history, and diagnostics were reviewed. Patient education and councelling were provided regarding off label use,treatment options and medication and injection risks. Overall greater than 25 minutes spent in direct and indirect patient care. Current and old OARRS ([address] [place] [address] Automated Prescription Reporting System) records reviewed, all refills reviewed since last visit,  Behavioral agreement/FELICITAS regulations   and Toxicology screen was reviewed with patient and is up to date. There are no current red flags. They are making good progress regarding pain relief, they are performing at a functional level regarding activities of daily living, family interactions and psychological functioning, they're not having any adverse effects or side effects from the current medications, and I see no findings of aberrant drug taking or addiction related behaviors. The patient is aware that they have a chronic pain condition and they may require opiates dosing for life.   All efforts will be made to wean to the lowest effective dose. Other therapies for pain have not been effective including nonopiate medications. Injections and exercises are only partially effective. A Rx for Narcan was offered to help prevent accidental overdose. RX Monitoring 8/15/2021   Attestation -   Acute Pain Prescriptions Prescription exceeds daily limit for a specific reason. See comments or note. ;Not required given exclusionary diagnoses. ..;Severe pain not adequately treated with lower dose. Periodic Controlled Substance Monitoring Possible medication side effects, risk of tolerance/dependence & alternative treatments discussed. ;No signs of potential drug abuse or diversion identified. ;Assessed functional status. ;Obtaining appropriate analgesic effect of treatment. Chronic Pain > 50 MEDD Re-evaluated the status of the patient's underlying condition causing pain. ;Considered consultation with a specialist.;Obtained or confirmed \"Consent for Opioid Use\" on file. Chronic Pain > 80 MEDD -       Periodic Controlled Substance Monitoring: Possible medication side effects, risk of tolerance/dependence & alternative treatments discussed., No signs of potential drug abuse or diversion identified. , Assessed functional status., Obtaining appropriate analgesic effect of treatment. (Sara Brunner, DO)       Patient is currently taking:       I am having eKe Chanel maintain her Accu-Chek Advantage Diabetes, blood glucose test strips, Lancets, albuterol sulfate HFA, hydroxychloroquine, diclofenac sodium, hydrocortisone, folic acid, methotrexate, allopurinol, leucovorin calcium, Prolia, albuterol, rivaroxaban, triamcinolone, SITagliptin, citalopram, carvedilol, levETIRAcetam, benzocaine (DENTAL), HYDROcodone-acetaminophen, predniSONE, hydrOXYzine, balsalazide, metaxalone, omeprazole, Dexcom G6 Sensor, Trulicity, dapagliflozin, traMADol-acetaminophen, lidocaine, cyanocobalamin, gabapentin, and magnesium oxide.               I also recommend the following Medications:    No orders of the defined types were placed in this encounter.       -which helps with pain and function. Otherwise, continue the current pain medications that I have prescibed. Radiologic:   Old films reviewed,     I discussed results with patients. see Follow up plans below  For any new studies. Care Everywhere Updates:  requested and reviewed. No new issues noted. Electrodiagnostic:  Previous studies requested,     I discussed results with patient. See follow-up plans for new studies. Additional history obtained from independent sourcing including patient's family and caregivers.     Labs:  Previous labs reviewed     Lab Results   Component Value Date     08/16/2021    K 4.0 08/16/2021    K 3.6 08/13/2021     08/16/2021    CO2 20 08/16/2021    BUN 10 08/16/2021    CREATININE 1.21 08/16/2021    CALCIUM 8.6 08/16/2021    LABALBU 3.4 08/15/2021    LABALBU 4.2 03/13/2012    BILITOT <0.2 08/15/2021    ALKPHOS 94 08/15/2021    AST 26 08/15/2021    ALT 32 08/15/2021     Lab Results   Component Value Date    WBC 5.2 08/16/2021    RBC 3.55 08/16/2021    RBC 4.77 03/13/2012    HGB 10.1 08/16/2021    HCT 31.4 08/16/2021    MCV 88.5 08/16/2021    MCH 28.5 08/16/2021    MCHC 32.2 08/16/2021    RDW 14.2 08/16/2021     08/16/2021    MPV 8.2 07/31/2015       Lab Results   Component Value Date    LABAMPH Neg 08/12/2021    BARBSCNU Neg 08/12/2021    LABBENZ Neg 08/12/2021    LABBENZ NotDTCD 10/11/2012    CANSU Neg 08/12/2021    COCAIMETSCRU Neg 08/12/2021    PHENCYCLIDINESCREENURINE Neg 58/21/5786    TRICYCLIC POSITIVE 33/75/4116    DSCOMMENT see below 08/12/2021       Lab Results   Component Value Date    CODEINE Not Detected 12/19/2016    MORPHINE Not Detected 12/19/2016    ACETYLMORPHI Not Detected 12/19/2016    OXYCODONE Not Detected 12/19/2016    NOROXYCODONE Not Detected 12/19/2016    NOROXYMU Not Detected 12/19/2016    [facility] Not Detected 12/19/2016    NORHYDU Not Detected 12/19/2016    HYDROMO Not Detected 12/19/2016    BUPREN Not Detected 12/19/2016    NORBUPRNOR Not Detected 12/19/2016    FENTA Not Detected 12/19/2016    NORFENT Not Detected 12/19/2016    MEPERIDINE Not Detected 12/19/2016    TAPENU Not Detected 12/19/2016    TAPOSULFUR Not Detected 12/19/2016    METHADONE Not Detected 12/19/2016    LABPROP Not Detected 12/19/2016    TRAM Present 12/19/2016    AMPH Not Detected 12/19/2016    METHAMP Not Detected 12/19/2016    MDMA Not Detected 12/19/2016    ECMDA Not Detected 12/19/2016       Lab Results   Component Value Date    METPHEN Not Detected 12/19/2016    PHENTERMINE Not Detected 12/19/2016    BENZOYL Not Detected 12/19/2016    Lauren Hugger Not Detected 12/19/2016    ALPHAOHALPRA Not Detected 12/19/2016    CLONAZEPAM Not Detected 12/19/2016    Henery Lor Not Detected 12/19/2016    DIAZEP Not Detected 12/19/2016    DEBORA Not Detected 12/19/2016    OXAZ Not Detected 12/19/2016    Almon Balzarine Not Detected 12/19/2016    LORAZEPAM Not Detected 12/19/2016    MIDAZOLAM Not Detected 12/19/2016    ZOLPIDEM Not Detected 12/19/2016    TEO Not Detected 12/19/2016    ETG See Note 12/19/2016    MARIJMET Not Detected 12/19/2016    PCP Not Detected 12/19/2016    PAINMGTDRUGP See Below 12/19/2016    EERPAINMGTPA See Note 12/19/2016    LABCREA 23.5 08/12/2021         , I discussed results with patient. See follow-up plans for new studies. Therapies:  HEP-gentle stretching and relaxation techniques-demonstrated with patient-they are to do them twice a day.   They are also advised to make the following lifestyle changes:  Goals       Limit Carbs to 2-3/meal maximum      Exercise 3x per week (30 min per time)      Piriformis stretches and abd strengthening       HEMOGLOBIN A1C < 6.5      SOAPP- R GOAL LESS THAN 9      12/19/16 SCORE: 15-MODERATE RISK   02/22/17 score: 22-High risk   05/15/17 score: 26-high risk  07/12/17 score: 22-high risk  10/02/17 score: 15-moderate risk  12/11/17 score: 11-moderate risk  02/08/18 score: 14-moderate risk  04/18/18 score: 15-moderate risk  8/9/18 score: 10 low risk  10/11/18 score: 22- high risk  12/14/18 score:17-moderate risk  3/27/19 score: 12 moderate risk  6/13/19 score: 24 high risk  8/22/19 score: 18- moderate risk   10/23/19 score: 18- moderate risk  01/03/20 score: 23- moderate risk       Weight < 160 lb (72.576 kg)           Injections or Epidurals:  Injection options were discussed. Monthly trigger point injections add vitamin B12 when able. Patient gave verbal consent to ordered injections. See follow-up plans for planned injections. Supplements:  Vitamin D with increased dosing during the rainy months, add co-Q10 for heart health. Education was given on:   Dietary and Fitness--daily stretches and low carb diet-in chair Yoga when possible      Note controlled medication/opiate drug therapy requires intensive monitoring for toxicity and addiction. Follow up with Primary Care Physician regarding their general medical needs-re renal--and ro sleep apnea. Stressed the importance of following up with PCP and specialists for his/her chronic diseases, health, CV, and cancer screening and continued care. Will follow disease activity/progression and adjust therapeutic regimen to disease activity and severity. Discussed medication dosage, usage, goals of therapy, and side effects. Available test results were reviewed -Discussed findings, impression and plan with patient. An additional 5 minutes were spent outside of the patient visit to review records. Additional time spent with the patient to discuss their questions. Additional time spent with the patient devoted to discussing treatment strategy, planning, and implementation generalized musculoskeletal health plan. Patient understands above plan; questions asked and answered. Patient agrees to plan as noted above.      F/U in 2-3months  At least 50% of the visit was involved in the discussion of the options for treatment. We discussed exercises, medication, interventional therapies and surgery. Healthy life style is essential with patient hard work to achieve the wellness. In addition; discussion with the patient and/or family about any of the diagnostic results, impressions and/or recommended diagnostic studies, prognosis, risks and benefits of treatment options, instructions for treatment and/or follow-up, importance of compliance with chosen treatment options, risk-factor reduction, and patient/family education. They are to follow up in 2 1/2 months to review medication, efficacy of injections, pill counts, OARRS check, SOAPPR assessment, review diagnostics, to review previous and future treatment plans and assess appropriateness for continued therapy. New Diagnostics  Orders Placed This Encounter   Procedures    MT INJECT TRIGGER POINTS, 3 OR GREATER    MT INJECT TRIGGER POINTS, 3 OR GREATER    MT INJECT TRIGGER POINTS, 3 OR GREATER       Follow-up in 2-1/2 to 3 months for guarding the efficacy of current plan and future treatment. An  electronic signature was used to authenticate this note. -Dr Beatrice Cazares, DO       With MA assistance from:   Denis Guzman MA     19}    Pursuant to the emergency declaration under the [facility] waiver authority and the [employer] and [employer] Act, this Virtual  Visit was conducted, with patient's consent, to reduce the patient's risk of exposure to COVID-19 and provide continuity of care for an established patient. Services were provided through a video synchronous discussion virtually to substitute for in-person clinic visit.

## 2021-08-19 NOTE — TELEPHONE ENCOUNTER
Mc Meredith with [facility] calling to notify patient has been admitted. They have the initial skilled nursing, PT and OT orders and will be faxing over for signature.

## 2021-08-25 NOTE — CARE COORDINATION
Dany 45 Transitions Follow Up Call    2021    Patient: Tamiko Bhagat  Patient : 1960   MRN: <[id number]>  Reason for Admission: Hypercalcemia, AMS, Encephalopathy, UTI, Dehydration, GIOVANI/CKD. Discharge Date: 21 RARS: Readmission Risk Score: 32    Care Transitions Follow Up Call    Patient states she is doing well. Denies urinary burning, pain, frequency, fever, odor, incomplete emptying of bladder. Denies visible blood in urine. Voiding adequate amount of urine - color yellow. Denies confusion, back pain, nausea, vomiting, weakness and being overly fatigued. Encourage to drink adequate fluids. Patient is alert and oriented to self, place, and time. Denies slurred speech and is able to move all extremities. Patient confirms has all medications and is taking as directed. Patient has a good appetite and is drinking adequate fluids. Normal bladder and bowel elimination patterns. Patient has no needs or concerns for writer at this time. Will continue to follow. Alex Sandoval LPN    [phone]  Delaney Osgood / Dany Paiz Coordinator    Needs to be reviewed by the provider   Additional needs identified to be addressed with provider No  none             Method of communication with provider : none      Care Transition Nurse (CTN) contacted the patient by telephone to follow up after admission on 2021. Verified name and  with patient as identifiers. Addressed changes since last contact: none  Discussed follow-up appointments. If no appointment was previously scheduled, appointment scheduling offered: Yes   Is follow up appointment scheduled within 7 days of discharge? Yes    Advance Care Planning:   Does patient have an Advance Directive:  reviewed and current. CTN reviewed discharge instructions, medical action plan and red flags with patient and discussed any barriers to care and/or understanding of plan of care after discharge.    Discussed appropriate site of care based on symptoms and resources available to patient including: When to call 911. The patient agrees to contact the PCP office for questions related to their healthcare. Patients top risk factors for readmission:   Interventions to address risk factors: Obtained and reviewed discharge summary and/or continuity of care documents    Non-[facility] follow up appointment(s): 8/17/2021    CTN provided contact information for future needs. Plan for follow-up call in 7-10 days based on severity of symptoms and risk factors. Plan for next call: symptom management-Hypercalcemia, AMS, Encephalopathy, UTI, Dehydration, GIOVANI/CKD. Care Transitions Subsequent and Final Call    Subsequent and Final Calls  Do you have any ongoing symptoms?: No  Have your medications changed?: No  Do you have any questions related to your medications?: No  Do you currently have any active services?: No  Are you currently active with any services?: Home Health  Do you have any needs or concerns that I can assist you with?: No  Identified Barriers: None  Care Transitions Interventions  Other Interventions:            Follow Up  Future Appointments   Date Time Provider Department Center   8/27/2021 10:20 AM HAILEE Nelson   9/7/2021  9:45 AM [facility] [place]   10/18/2021  2:45 PM Traci Juarez MD [facility]   10/19/2021 10:15 AM O[facility] DO [facility] [place]   11/2/2021  1:00 PM Olamide [facility] DO [facility] [place]   11/22/2021 11:00 AM Olamide Hudson DO [address] [place] [address]   6/3/2022 10:30 AM Fam Osborn DO [place], [place]

## 2021-08-27 NOTE — PATIENT INSTRUCTIONS
1. Discontinue Januvia 50 mg when you run out   2. Discontune Trulicity 2.98 mg injected weekly when you run out   3. Increase Trulicity 1.5 mg weekly  4. Start Farxiga 5 mg daily (call insurance company to discuss cost and other options in that drug class)    5.  Follow up in 3 months

## 2021-08-27 NOTE — PROGRESS NOTES
8/27/2021    Assessment:       Diagnosis Orders   1. Type 2 diabetes mellitus with other specified complication, without long-term current use of insulin ([facility])  Comprehensive Metabolic Panel    Hemoglobin A1C    POCT Glucose       PLAN:     1. Discontinue Januvia 50 mg when you run out   2. Discontune Trulicity 8.30 mg injected weekly when you run out   3. Increase Trulicity 1.5 mg weekly  4. Start Farxiga 5 mg daily (call insurance company to discuss cost and other options in that drug class)    5. Follow up in 3 months       Orders Placed This Encounter   Procedures    Comprehensive Metabolic Panel     Standing Status:   Future     Standing Expiration Date:   8/27/2022    Hemoglobin A1C     Standing Status:   Future     Standing Expiration Date:   8/27/2022    POCT Glucose     Orders Placed This Encounter   Medications    Dulaglutide (TRULICITY) 1.5 OT/8.6TT SOPN     Sig: Inject 1.5 mg into the skin once a week     Dispense:  12 pen     Refill:  3     Return in about 3 months (around 11/27/2021) for Diabetes. Subjective:     Chief Complaint   Patient presents with    Diabetes     Vitals:    08/27/21 1017   BP: 125/78   Pulse: 91   SpO2: 97%   Weight: 190 lb (86.2 kg)   Height: 5' 3\" (1.6 m)     Wt Readings from Last 3 Encounters:   08/27/21 190 lb (86.2 kg)   08/12/21 188 lb 12.8 oz (85.6 kg)   08/10/21 191 lb (86.6 kg)     BP Readings from Last 3 Encounters:   08/27/21 125/78   08/16/21 130/77   08/10/21 110/60     Patient is a 20-year-old female with a history of type 2 diabetes with worsening glycemic control. Average glucose at home is running 250-300. I spent 30 minutes of this encounter on diabetic education, progression of diabetes, long-term effects of hyperglycemia, and the importance of lifestyle modifications including diet and exercise program.  The patient and her  are going have been and are going to go back on a low carbohydrate dietary plan.     Diabetes  She presents for her initial diabetic visit. She has type 2 diabetes mellitus. No MedicAlert identification noted. Pertinent negatives for hypoglycemia include no dizziness or headaches. Pertinent negatives for diabetes include no chest pain, no fatigue, no polydipsia and no polyuria. Current diabetic treatment includes oral agent (triple therapy). She is compliant with treatment all of the time. She is following a generally healthy diet. Meal planning includes avoidance of concentrated sweets and carbohydrate counting. She never participates in exercise. There is no change in her home blood glucose trend. Her overall blood glucose range is 180-200 mg/dl. An ACE inhibitor/angiotensin II receptor blocker is not being taken. She does not see a podiatrist.Eye exam is current.      Past Medical History:   Diagnosis Date    Ataxic gait     Back pain     Benign essential tremor     Depression     DM (diabetes mellitus) ([address] 11/25/2014    [facility]     Elevated troponin 5/25/2016    Factor V Leiden carrier ([facility]     history of DVT, on Xarelto    GERD (gastroesophageal reflux disease)     Hereditary sensory-motor neuropathy, type I     HTN (hypertension)     HTN (hypertension)     Hyperlipidemia     Lumbar radicular pain     Neuropathy of both feet 8/22/2016    NSTEMI (non-ST elevated myocardial infarction) ([facility]     Obesity     Osteoarthritis     Pulmonary embolism ([address]     Renal failure 6/29/2018    Right inguinal hernia 11/9/2017    SOB (shortness of breath) 4/19/2017    Systemic lupus erythematosus ([address] 9/8/2017    Transient brainstem ischemia     Umbilical hernia 59/8/5409    Vasomotor flushing     on hormone replacement therapy     Past Surgical History:   Procedure Laterality Date    APPENDECTOMY      CARDIAC CATHETERIZATION  06/2018    CATARACT REMOVAL Bilateral Winter 2019   [facility] COLONOSCOPY      Dr. Eloy Fang    COLONOSCOPY  12/16/13    DR SINGER repeat in 10 yrs per pt    COLONOSCOPY  08/2017 Dr Turner Gip Right 11/15/2017    RIGHT  HERNIA INGUINAL REPAIR performed by Mallika Souza MD at [address] [facility]  ARTHROSCOPY Left     LAPAROSCOPY N/A 11/15/2017    DIAGNOSTIC LAPAROSCOPY performed by Mallika Souza MD at [address] Right 3/14/2019    RIGHT QUADRICEPS MUSCLE BIOPSY performed by Garrison Thacker MD at [address] N/A 9/25/2017    [address] [facility] performed by Ninoska Jimenez MD at [facility] HNDB,6+V/Q,EYSGJ N/A 94/75/8007    HERNIA UMBILICAL REPAIR, [place] performed by Mallika Souza MD at [address]  12/16/13    DR SINGER     Social History     Socioeconomic History    Marital status:      Spouse name: Moon Younger Number of children: 1    Years of education: 15    Highest education level: 12th grade   Occupational History    Occupation: Housewife    Occupation: retired    Tobacco Use    Smoking status: Former Smoker     Packs/day: 1.25     Years: 10.00     Pack years: 12.50     Types: Cigarettes     Quit date: 1/1/2006     Years since quitting: 15.6    Smokeless tobacco: Never Used    Tobacco comment: start smoking age 25   Vaping Use    Vaping Use: Never used   Substance and Sexual Activity    Alcohol use: Not Currently    Drug use: No    Sexual activity: Yes   Other Topics Concern    Not on file   Social History Narrative    Off work dt--retired early from --worked [employer]---moved back to [place] to work at [employer] in [address] up in [place] went to [address] [facility] Strain: Low Risk     Difficulty of Paying Living Expenses: Not hard at all   Food Insecurity: No Food Insecurity    Worried About [address] [facility] in the Last Year: Never true    [address] in the Last Year: Never true   Transportation Needs:     Lack of Transportation (Medical):  Lack of Transportation (Non-Medical):    Physical Activity:     Days of Exercise per Week:     Minutes of Exercise per Session:    Stress:     Feeling of Stress :    Social Connections:     Frequency of Communication with Friends and Family:     Frequency of Social Gatherings with Friends and Family:     Attends [employer] Services:     Active Member of Clubs or Organizations:     Attends Club or Organization Meetings:     Marital Status:    Intimate Partner Violence:     Fear of Current or Ex-Partner:     Emotionally Abused:     Physically Abused:     Sexually Abused:      Family History   Problem Relation Age of Onset    Substance Abuse Brother     High Blood Pressure Mother     High Cholesterol Mother     Arthritis Mother     High Blood Pressure Father     High Cholesterol Father     Clotting Disorder Father         clot to intestines    Arthritis Father     Substance Abuse Brother     Substance Abuse Brother     Stroke Paternal Uncle 36    Cystic Fibrosis Daughter      Allergies   Allergen Reactions    Sulfa Antibiotics Rash    Ciprofloxacin Hcl Swelling     facial, tongue swelling    Nsaids Other (See Comments)     Bleed risk dt Xaralto    Statins Other (See Comments)     Liver enzyme elevation       Current Outpatient Medications:     Dulaglutide (TRULICITY) 1.5 KQ/3.6IF SOPN, Inject 1.5 mg into the skin once a week, Disp: 12 pen, Rfl: 3    magnesium oxide (MAG-OX) 400 (241.3 Mg) MG TABS tablet, Take 1 tablet by mouth daily, Disp: 30 tablet, Rfl: 0    cyanocobalamin 1000 MCG tablet, Take 1,000 mcg by mouth daily, Disp: , Rfl:     gabapentin (NEURONTIN) 400 MG capsule, Take 400 mg by mouth 3 times daily. , Disp: , Rfl:     lidocaine (LIDODERM) 5 %, PLACE 1 PATCH ON THE SKIN DAILY, 12 HOURS ON AND 12 HOURS OFF, Disp: 30 patch, Rfl: 3    balsalazide (COLAZAL) 750 MG capsule, Take 2,250 mg by mouth 3 times daily, Disp: , Rfl:     metaxalone (SKELAXIN) 800 MG tablet, Take 800 mg by mouth 3 times daily, Disp: , Rfl:     omeprazole (PRILOSEC) 40 MG delayed release capsule, Take 40 mg by mouth daily, Disp: , Rfl:     dapagliflozin (FARXIGA) 5 MG tablet, Take 1 tablet by mouth daily May substitute for generic or covered medication, Disp: 30 tablet, Rfl: 03    predniSONE (DELTASONE) 2.5 MG tablet, 5mg daily with food in AM., Disp: , Rfl:     hydrOXYzine (VISTARIL) 50 MG capsule, Take one pill every 8 hrs for itching, Disp: 30 capsule, Rfl: 0    benzocaine, DENTAL, (ORABASE-B) 20 % PSTE paste, apply to oral sores twice a day, Disp: , Rfl:     levETIRAcetam (KEPPRA) 250 MG tablet, Take 1 tablet by mouth 2 times daily, Disp: 180 tablet, Rfl: 3    carvedilol (COREG) 6.25 MG tablet, Take 1 tablet by mouth 2 times daily (with meals), Disp: 180 tablet, Rfl: 3    citalopram (CELEXA) 40 MG tablet, TAKE 1 TABLET DAILY, Disp: 90 tablet, Rfl: 3    triamcinolone (KENALOG) 0.1 % ointment, APPLY TO THE AFFECTED AREAS OF RASH TWICE A DAY FOR 2 WEEKS THEN ONCE DAILY FOR 2 WEEKS, Disp: 60 g, Rfl: 12    rivaroxaban (XARELTO) 20 MG TABS tablet, TAKE 1 TABLET DAILY, Disp: 90 tablet, Rfl: 4    albuterol (ACCUNEB) 0.63 MG/3ML nebulizer solution, Take 3 mLs by nebulization every 6 hours as needed for Wheezing, Disp: 270 mL, Rfl: 3    PROLIA 60 MG/ML SOSY SC injection, Every 6 months received through DR office, Disp: , Rfl:     allopurinol (ZYLOPRIM) 300 MG tablet, Take half tab daily x 2weeks, then full tab daily thereafter., Disp: , Rfl:     leucovorin calcium (WELLCOVORIN) 5 MG tablet, Tuesday, Disp: , Rfl:     methotrexate (RHEUMATREX) 2.5 MG chemo tablet, Monday, Disp: , Rfl:     folic acid (FOLVITE) 1 MG tablet, Take 1 tablet by mouth daily, Disp: 90 tablet, Rfl: 3    hydrocortisone 1 % cream, Apply topically 2 times daily.  (Patient taking differently: as needed Apply topically 2 times daily.), Disp: 1 Tube, Rfl: 1    diclofenac sodium (VOLTAREN) 1 % GEL, Apply 4 g topically 4 times daily, Disp: 300 g, Rfl: 3    hydroxychloroquine (PLAQUENIL) 200 MG tablet, Take 200 mg by mouth 2 times daily , Disp: , Rfl:     albuterol sulfate  (90 BASE) MCG/ACT inhaler, Inhale 2 puffs into the lungs every 6 hours as needed for Wheezing, Disp: 1 Inhaler, Rfl: 3    Blood Glucose Monitoring Suppl (ACCU-CHEK ADVANTAGE DIABETES) KIT, Diabetic monitoring kit(any brand), with supplies for testing. Test fasting once daily.  Dia.00, Disp: 1 kit, Rfl: 0    Glucose Blood (BLOOD GLUCOSE TEST STRIPS) STRP, Test once daily, Disp: 100 strip, Rfl: 4    Lancets MISC, Testing q day, Disp: 100 each, Rfl: 3    Continuous Blood Gluc Sensor (DEXCOM G6 SENSOR) MISC, 1 Device by Does not apply route continuous (Patient not taking: Reported on 2021), Disp: 3 each, Rfl: 11  Lab Results   Component Value Date     2021    K 4.0 2021     2021    CO2 20 2021    BUN 10 2021    CREATININE 1.21 (H) 2021    GLUCOSE 125 2021    CALCIUM 8.6 2021    PROT 5.9 (L) 08/15/2021    LABALBU 3.4 (L) 08/15/2021    BILITOT <0.2 08/15/2021    ALKPHOS 94 08/15/2021    AST 26 08/15/2021    ALT 32 08/15/2021    LABGLOM 45.2 (L) 2021    GFRAA 54.7 (L) 2021    GLOB 2.5 08/15/2021     Lab Results   Component Value Date    WBC 5.2 2021    HGB 10.1 (L) 2021    HCT 31.4 (L) 2021    MCV 88.5 2021     2021     Lab Results   Component Value Date    LABA1C 9.1 (H) 08/10/2021    LABA1C 9.4 2021    LABA1C 8.4 (H) 2021     Lab Results   Component Value Date    HDL 65 (H) 08/10/2021    HDL 75 (H) 2021    HDL 74 (H) 2021    LDLCALC 88 08/10/2021    LDLCALC 102 2021    LDLCALC 116 2021    CHOL 185 08/10/2021    CHOL 198 2021    CHOL 208 (H) 2021    TRIG 160 (H) 08/10/2021    TRIG 104 2021    TRIG 92 2021     No results found for: TESTM  Lab Results Component Value Date    TSH 2.330 05/07/2021    TSH 1.410 10/19/2016    TSH 1.730 04/21/2016    FT3 2.9 04/21/2016    T4FREE 1.03 05/07/2021    T4FREE 0.95 04/21/2016    T4FREE 1.04 06/20/2014     Lab Results   Component Value Date    TPOABS <4.0 05/07/2021       Review of Systems   Constitutional: Negative for chills, fatigue and fever. HENT: Negative for congestion, ear pain, postnasal drip, rhinorrhea, sinus pressure and sore throat. Eyes: Negative for pain and redness. Respiratory: Negative for cough, shortness of breath and wheezing. Cardiovascular: Negative for chest pain, palpitations and leg swelling. Gastrointestinal: Negative for abdominal pain, diarrhea, nausea and vomiting. Endocrine: Negative for cold intolerance, heat intolerance, polydipsia and polyuria. Genitourinary: Negative for difficulty urinating. Musculoskeletal: Positive for gait problem, myalgias and neck stiffness. Negative for arthralgias. Skin: Negative for rash. Allergic/Immunologic: Negative for environmental allergies. Neurological: Negative for dizziness and headaches. Hematological: Negative for adenopathy. Psychiatric/Behavioral: Negative for dysphoric mood. Objective:   Physical Exam  Vitals reviewed. Constitutional:       Appearance: She is well-developed. HENT:      Head: Normocephalic and atraumatic. Nose: No congestion. Eyes:      Conjunctiva/sclera: Conjunctivae normal.   Neck:      Comments: No thyromegaly or palpable nodules  Cardiovascular:      Rate and Rhythm: Normal rate and regular rhythm. Heart sounds: Normal heart sounds. Pulmonary:      Effort: Pulmonary effort is normal.      Breath sounds: Normal breath sounds. Abdominal:      General: Bowel sounds are normal.      Palpations: Abdomen is soft. Musculoskeletal:         General: No swelling. Normal range of motion. Cervical back: Normal range of motion and neck supple.       Comments: Upper extremity tremors with outstretched hands    Skin:     General: Skin is warm and dry. Neurological:      Mental Status: She is alert and oriented to person, place, and time.    Psychiatric:         Mood and Affect: Mood normal.

## 2021-08-30 NOTE — TELEPHONE ENCOUNTER
Patient's  is calling because the Ardath Deiters is too expensive. He stated that he called and spoke to the pharmacy and they gave him 3 alternative medications that were similar, but they were about the same price, around $600/month. Patient is not able to afford this. He would like to know what you would like for him to do. Please advise. Thanks!

## 2021-08-31 NOTE — CARE COORDINATION
Dany 45 Transitions Follow Up Call    2021    Patient: David Cabrera  Patient : 1960   MRN: [id number]  Reason for Admission: 2021 - 2021 Hypercalcemia, AMS, Encephalopathy, UTI, Dehydration, GIOVANI/CKD. ITE: [id number]  Discharge Date: 21 RARS: Readmission Risk Score: 32    Subsequent CT outreach. Left Hippa compliant VM. Canceled PCP 21. Attended Oscar Swann appt . Care Transitions Subsequent and Final Call    Subsequent and Final Calls  Care Transitions Interventions  Other Interventions:            Follow Up  Future Appointments   Date Time Provider Delmer Zhao   2021  9:45 AM Beatrice Cazares DO [address]   10/18/2021  2:45 PM Alejandro Rendon MD [facility]   10/19/2021 10:15 AM DO [facility] [place]   2021  1:00 PM DO Jess Corbin [facility] [place]   2021 11:00 AM DO Jess [facility] [address] [facility]   12/3/2021 10:00 AM Kerry Bonds, [address] [place] [address] Drive   6/3/2022 10:30 AM Fam Hughes  [facility]

## 2021-08-31 NOTE — TELEPHONE ENCOUNTER
patient requesting medication refill.  Please approve or deny this request.    Rx requested:  Requested Prescriptions     Pending Prescriptions Disp Refills    hydrOXYzine (VISTARIL) 50 MG capsule 30 capsule 0     Sig: Take one pill every 8 hrs for itching         Last Office Visit:   8/10/2021      Next Visit Date:  Future Appointments   Date Time Provider Delmer Zhao   9/7/2021  9:45 AM Agustín Rose DO [address]   10/18/2021  2:45 PM Pao Durant MD [facility]   10/19/2021 10:15 AM Agustín Rose DO [address]   11/2/2021  1:00 PM Agustín Rose DO [address]   11/22/2021 11:00 AM DO Jess Hogan [facility]   12/3/2021 10:00 AM Rafa Wild, [address]   6/3/2022 10:30 AM Fam Braun DO [facility]

## 2021-09-01 NOTE — CARE COORDINATION
Dany Paiz Transitions Follow Up Call    2021    Patient: Lul Reed  Patient : 1960   MRN: <[id number]>  Reason for Admission: UTI  Discharge Date: 21 RARS: Readmission Risk Score: 32    Care Transitions Follow Up Call    Patient states she is at the lab getting blood work done at this time. Patient states she feels she has another UTI - Denies urinary burning, pain, frequency, fever, odor, incomplete emptying of bladder. Denies visible blood in urine. Voiding adequate amount of urine - color yellow. Encourage to drink adequate fluids. Asked how she knew she had UTI - states has discharge - Denies itching or burning. Instructed the patient to call PCP for possible repeat UA. Will continue to follow. Matilde Fernandes LPN    [phone]  Hazel Ortiz / Dany Paiz Coordinator    Needs to be reviewed by the provider   Additional needs identified to be addressed with provider No  Patient feels she has another UTI. Denies Burning, pain, itching. Frequency. No blood in urine. Patient states she has discharge and just knows she has UTI. Instructed patient to call PCP for possible UA. Method of communication with provider : chart routing      Care Transition Nurse (CTN) contacted the patient by telephone to follow up after admission on 2021. Verified name and  with patient as identifiers. Addressed changes since last contact: none  Discussed follow-up appointments. If no appointment was previously scheduled, appointment scheduling offered: Yes   Is follow up appointment scheduled within 7 days of discharge? Yes    Advance Care Planning:   Does patient have an Advance Directive:  reviewed and current. CTN reviewed discharge instructions, medical action plan and red flags with patient and discussed any barriers to care and/or understanding of plan of care after discharge.    Discussed appropriate site of care based on symptoms and resources available to patient including: PCP and When to call 911. The patient agrees to contact the PCP office for questions related to their healthcare. Patients top risk factors for readmission: lack of knowledge about disease, medical condition-UTI and medication management  Interventions to address risk factors: Obtained and reviewed discharge summary and/or continuity of care documents    Non-[facility] follow up appointment(s): 8/17/2021    CTN provided contact information for future needs. Plan for follow-up call in 3-5 days based on severity of symptoms and risk factors. Plan for next call: symptom management-UTI      Care Transitions Subsequent and Final Call    Subsequent and Final Calls  Do you have any ongoing symptoms?: No  Have your medications changed?: No  Do you have any questions related to your medications?: No  Do you currently have any active services?: No  Are you currently active with any services?: Home Health  Do you have any needs or concerns that I can assist you with?: No  Identified Barriers: None  Care Transitions Interventions  Other Interventions:            Follow Up  Future Appointments   Date Time Provider Delmer Zhao   9/7/2021  9:45 AM Claribel Olivares DO [facility] EM[facility]GENCY [facility] AT [place]   10/18/2021  2:45 PM Ignacia Wild MD [facility]   10/19/2021 10:15 AM DO [facility] AT [place]   11/2/2021  1:00 PM [facility] [place]   11/22/2021 11:00 AM [facility] [address] [facility]   12/3/2021 10:00 AM Anibal Galeana [facility] [address]   6/3/2022 10:30 AM Fam Sargent DO [place], [place]

## 2021-09-02 NOTE — RESULT ENCOUNTER NOTE
Please notify Genet Castro that I have  received her lab results that show stable  findings overall with improved glucose levels. I do recommend a  routine  follow up sometime in the next month or so. Please help her schedule.

## 2021-09-03 NOTE — CARE COORDINATION
Dany 45 Transitions Follow Up Call    9/3/2021    Patient: Martha Paredes  Patient : 1960   MRN: [id number]  Reason for Admission: 2021 - 2021 Hypercalcemia, AMS, Encephalopathy, UTI, Dehydration, GIOVANI/CKD. Discharge Date: 21 RARS: Readmission Risk Score: 32    Care Transitions Follow Up Call    Needs to be reviewed by the provider   Additional needs identified to be addressed with provider: No  none             Method of communication with provider : none      Care Transition Nurse (CTN) contacted the patient by telephone to follow up after admission. Verified name and  with patient as identifiers. Addressed changes since last contact: [facility] Records reports no urinary pain/urgency, mental fogginess, fevers, chills, or flu-like symptoms. States has/taking meds. No further C needs. In good spirits. Reviewed s/s UTI to report, v/u.  Discussed follow-up appointments. If no appointment was previously scheduled, appointment scheduling offered: Attended appt HAILEE Cedillo . Pt canceled PCP . .   Is follow up appointment scheduled within 7 days of discharge? No.    CTN reviewed discharge instructions, medical action plan and red flags with patient and discussed any barriers to care and/or understanding of plan of care after discharge. Discussed appropriate site of care based on symptoms and resources available to patient including: When to call 911. The patient agrees to contact the PCP office for questions related to their healthcare. Patients top risk factors for readmission: medical condition-UTI, GIOVANI. Interventions to address risk factors: Restates understanding of UTI s/s to see physician. Non-[facility] follow up appointment(s):     CTN provided contact information for future needs. No further follow-up call indicated based on severity of symptoms and risk factors. CTN s/o.     Care Transitions Subsequent and Final Call    Subsequent and Final Calls  Do you have any ongoing symptoms?: No  Have your medications changed?: No  Do you have any questions related to your medications?: No  Are you currently active with any services?: Home Health  Do you have any needs or concerns that I can assist you with?: No  Identified Barriers: Lack of Education  Care Transitions Interventions  Other Interventions:            Follow Up  Future Appointments   Date Time Provider Delmer Zhao   9/7/2021  9:45 AM Narendra Frost, DO [address] [place] [address]   10/18/2021  2:45 PM Emy Henriquez MD [facility]   10/19/2021 10:15 AM Narendra Frost, DO [facility] EMERGENCY MEDICAL CENTER AT [place]   11/2/2021  1:00 PM Narendra Frost, DO [facility] EMERGENCY [facility] CENTER AT [place]   11/22/2021 11:00 AM Narendra Frost, DO [facility] Nikkie Bello [facility]   12/3/2021 10:00 AM Delia Julio, [address]   6/3/2022 10:30 AM Fam Logan DO \[facility]\""

## 2021-09-15 NOTE — RESULT ENCOUNTER NOTE
Please notify Aranza Acevedo that urine culture does not show any specific bacterial growth but there is evidence of white blood cells in her urine. Does she currently have symptoms consistent with a UTI?

## 2021-09-21 NOTE — TELEPHONE ENCOUNTER
Patient is requesting medication refill.  Please approve or deny this request.    Rx requested:  Requested Prescriptions     Pending Prescriptions Disp Refills    hydrOXYzine (VISTARIL) 50 MG capsule 30 capsule 0     Sig: Take one pill every 8 hrs for itching         Last Office Visit:   8/10/2021      Next Visit Date:  Future Appointments   Date Time Provider Delmer Zhao   10/18/2021  2:45 PM MD Fabián Sosa   10/19/2021 10:15 AM Olamide Hudson DO [address] [facility]   11/2/2021  1:00 PM Olamide Hudson DO [address] [place] [address]   11/22/2021 11:00 AM DO Jess Alba [facility]   12/3/2021 10:00 AM Edwige Pedraza, [address] [place] [address] Drive   6/3/2022 10:30 [facility]

## 2021-09-21 NOTE — TELEPHONE ENCOUNTER
Patient is requesting medication refill.  Please approve or deny this request.    Rx requested:  Requested Prescriptions     Pending Prescriptions Disp Refills    hydrOXYzine (VISTARIL) 50 MG capsule 30 capsule 0     Sig: Take one pill every 8 hrs for itching         Last Office Visit:   8/10/2021      Next Visit Date:  Future Appointments   Date Time Provider Delmer Zhao   10/18/2021  2:45 PM MD Fabián Ayoub   10/19/2021 10:15 AM Milan Hamilton DO [address] [place] [address]   11/2/2021  1:00 PM Milan Hamilton DO [address] [place] [address]   11/22/2021 11:00 AM DO Jess Hines [facility]   12/3/2021 10:00 AM Len Del Angel, [address] [place] [address] Drive   6/3/2022 10:30 AM [facility]

## 2021-09-27 NOTE — TELEPHONE ENCOUNTER
Patient stopped by office to inquire about her orders. I printed and gave patient the referral to Dr. Lena Mck[facility].

## 2021-10-07 NOTE — PROGRESS NOTES
Subjective:      Patient ID: Mariia Cottrell is a 64 y.o. female. HPI  This is a 65 yo female with GERD, Depression, CRI, Fibromyalgia, SI pain/chronic back pain, DM, DVT/PE/Xarelto, Obesity, HTN, RAD,Lupus on prednisone back in follow-up for recurrent UTI's. Since last seen on 9/25/18, she had been using Macrobid self-start therapy but is no longer using this. She had negative cystoscopy in 2017. She is still getting UTI's with dysuria and increased IVS. She had a recent CT that showed no renal abnl. She has no current UTI symptoms. She has no gross hematuria or abd pain. She gets some Lumbar pain on occasion. She has no abd or flank pain.        Past Medical History:   Diagnosis Date    Ataxic gait     Back pain     Benign essential tremor     Depression     DM (diabetes mellitus) ([address] 11/25/2014    DVT [facility]deep venous thrombosis) ([facility]     Elevated troponin 5/25/2016    Factor V Leiden carrier ([address]     history of DVT, on Xarelto    GERD (gastroesophageal reflux disease)     Hereditary sensory-motor neuropathy, type I     HTN (hypertension)     HTN (hypertension)     Hyperlipidemia     Lumbar radicular pain     Neuropathy of both feet 8/22/2016    NSTEMI (non-ST elevated myocardial infarction) ([address]     Obesity     Osteoarthritis     Pulmonary embolism ([address]     Renal failure 6/29/2018    Right inguinal hernia 11/9/2017    SOB (shortness of breath) 4/19/2017    Systemic lupus erythematosus ([address] 9/8/2017    Transient brainstem ischemia     Umbilical hernia 51/8/8091    Vasomotor flushing     on hormone replacement therapy     Past Surgical History:   Procedure Laterality Date    APPENDECTOMY      CARDIAC CATHETERIZATION  06/2018    CATARACT REMOVAL Bilateral Winter 2019   [facility] COLONOSCOPY      Dr. Kay Sampson    COLONOSCOPY  12/16/13    DR SINGER repeat in 10 yrs per pt    COLONOSCOPY  08/2017    Dr Mino Sun Right 11/15/2017    RIGHT  HERNIA INGUINAL REPAIR performed by Umesh Garza MD at [address] [facility]  ARTHROSCOPY Left     LAPAROSCOPY N/A 11/15/2017    DIAGNOSTIC LAPAROSCOPY performed by Umesh Garza MD at [address] Right 3/14/2019    RIGHT QUADRICEPS MUSCLE BIOPSY performed by Ishmael Victoria MD at [address] N/A 9/25/2017    [address] [facility] performed by Theresa Obregon [facility] at [facility],OPAV[facility] N/A 84/95/7409    HERNIA UMBILICAL REPAIR, [place] performed by Umesh Garza MD at [place] [address]  12/16/13    DR SINGER     Social History     Socioeconomic History    Marital status:      Spouse name: Alejandra Araiza Number of children: 1    Years of education: 15    Highest education level: 12th grade   Occupational History    Occupation: Housewife    Occupation: retired    Tobacco Use    Smoking status: Former Smoker     Packs/day: 1.25     Years: 10.00     Pack years: 12.50     Types: Cigarettes     Quit date: 1/1/2006     Years since quitting: 15.7    Smokeless tobacco: Never Used    Tobacco comment: start smoking age 25   Vaping Use    Vaping Use: Never used   Substance and Sexual Activity    Alcohol use: Not Currently    Drug use: No    Sexual activity: Yes   Other Topics Concern    None   Social History Narrative    Off work dt--retired early from --worked [place]---moved back to [facility] to work at [employer] in [address] up in [place] went to [address] [facility] Strain: Low Risk     Difficulty of Paying Living Expenses: Not hard at all   Food Insecurity: No Food Insecurity    Worried About [address] [facility] in the Last Year: Never true    [facility] in the Last Year: Never true   Transportation Needs:     Lack of Transportation (Medical):      Lack of Transportation (Non-Medical):    Physical Activity:     Days of Exercise per Week:     Minutes of Exercise per Session:    Stress:     Feeling of Stress :    Social Connections:     Frequency of Communication with Friends and Family:     Frequency of Social Gatherings with Friends and Family:     Attends Confucianism Services:     Active Member of Clubs or Organizations:     Attends Club or Organization Meetings:     Marital Status:    Intimate Partner Violence:     Fear of Current or Ex-Partner:     Emotionally Abused:     Physically Abused:     Sexually Abused:      Family History   Problem Relation Age of Onset    Substance Abuse Brother     High Blood Pressure Mother     High Cholesterol Mother     Arthritis Mother     High Blood Pressure Father     High Cholesterol Father     Clotting Disorder Father         clot to intestines    Arthritis Father     Substance Abuse Brother     Substance Abuse Brother     Stroke Paternal Uncle 36    Cystic Fibrosis Daughter      Current Outpatient Medications   Medication Sig Dispense Refill    amitriptyline (ELAVIL) 25 MG tablet Take 25 mg by mouth nightly      traMADol-acetaminophen (ULTRACET) 37.5-325 MG per tablet Take 1 tablet by mouth every 6 hours as needed for Pain.  cephALEXin (KEFLEX) 500 MG capsule Take 1 capsule by mouth 2 times daily 14 capsule 4    hydrOXYzine (VISTARIL) 50 MG capsule Take one pill every 8 hrs for itching 30 capsule 0    Dulaglutide (TRULICITY) 1.5 AC/1.0UO SOPN Inject 1.5 mg into the skin once a week 12 pen 3    magnesium oxide (MAG-OX) 400 (241.3 Mg) MG TABS tablet Take 1 tablet by mouth daily 30 tablet 0    cyanocobalamin 1000 MCG tablet Take 1,000 mcg by mouth daily      gabapentin (NEURONTIN) 400 MG capsule Take 400 mg by mouth 3 times daily.       lidocaine (LIDODERM) 5 % PLACE 1 PATCH ON THE SKIN DAILY, 12 HOURS ON AND 12 HOURS OFF 30 patch 3    balsalazide (COLAZAL) 750 MG capsule Take 2,250 mg by mouth 3 times daily      metaxalone (SKELAXIN) 800 MG tablet Take 800 once daily 100 strip 4    Lancets MISC Testing q day 100 each 3     No current facility-administered medications for this visit. Sulfa antibiotics, Ciprofloxacin hcl, Nsaids, and Statins  reviewed    Review of Systems   Constitutional: Negative for fever and unexpected weight change. Gastrointestinal: Negative for abdominal pain. Genitourinary: Negative for difficulty urinating, flank pain and hematuria. Objective:   Physical Exam  Constitutional:       Appearance: Normal appearance. Abdominal:      General: There is no distension. Palpations: Abdomen is soft. Tenderness: There is no abdominal tenderness. Neurological:      Mental Status: She is alert.    Psychiatric:         Mood and Affect: Mood normal.           CT ABDOMEN PELVIS WO CONTRAST Additional Contrast? None  Status: Final result   Order Providers    MD Viola Fong MD          Signed by    Signed Date/Time Phone Pager   Flaquito Iyer Sravan 8/13/2021 10:11 [phone]    Reading Providers    Read Date Phone Pager   Meaghan Wellington Fri Aug 13, 2021 10:11 [phone]    Routing History    Priority Sent On From To Message Type    8/16/2021 11:17 PM Lai, Chpo Incoming Lab Results From [place], MD Results   Radiation Dose Estimates    No radiation information found for this patient   Narrative   Examination: CT ABDOMEN PELVIS WO CONTRAST       Indication:   Renal failure, r/o hydronephrosis, obstructive uropathy, Hi Calcium r/o malignancy        Technique: Multiple serial axial images was performed through the abdomen and pelvis without intravenous or oral administration of contrast Images were reconstructed in the axial and coronal and sagittal planes.       Comparison: September 11, 2017 0821 hours       Findings:       There are bibasilar areas of atelectasis, with patchy groundglass infiltrates.       The liver, gallbladder, spleen, pancreas, adrenals, kidneys are unremarkable.       No bladder calculi.       Large and small bowel show no sign of obstruction.         The appendix is surgically absent.       Small hiatal hernia.       No diverticulitis.       The uterus is surgically absent.       No free air.  No free fluid.  The visualized abdominal aorta is of normal size and caliber.  No significant retroperitoneal adenopathy.       There is multilevel degenerative changes with bridging osteophytes of the lumbar spine. There is a minimal grade 1 anterolisthesis of L4 and L5.           Impression   1. BIBASILAR AREAS OF ATELECTASIS, WITH SMALL PATCHY GROUNDGLASS INFILTRATES       All CT scans at this facility use dose modulation, iterative reconstruction, and/or weight based dosing when appropriate to reduce radiation dose to as low as reasonably achievable. 9/29/2021  8:19 AM - Lai, Rodolfo Incoming Lab Results From [facility]    Specimen Information: Urine, clean catch        Component Collected Lab   Urine Culture, Routine 09/27/2021  2:21 PM [address]   <50,000 CFU/ml of mixed candice   Multiple organisms isolated, no predominance. Culture   indicates probable contamination. Please review colony count   and clinical indications to determine if a repeat culture is   necessary. No further workup to be done. Testing Performed By    [facility] Name Director Address Valid Date Range   [facility]GE - [address] [facility] Alicia Gee M.D. [address] [place] [address]. [address] [place] [address] 09/14/21 1214-Present   Narrative  Performed by: [address] [facility]  ORDER#: [id number]                          ORDERED BY: SCOTT CARABALLO   SOURCE: Urine Clean Catch                  COLLECTED:  09/27/21 14:21   ANTIBIOTICS AT [facility]. :                      RECEIVED :  09/27/21 21:01        Assessment:       This is a 63 yo female with GERD, Depression, Fibromyalgia, SI pain/chronic back pain, DM, DVT/PE/Xarelto, Obesity, HTN, RAD, Ataxia, Lupus on prednisone, CRI and with h/o recurrent UTI's and prior negative evaluation with recent CT showing no renal abl. The UTI's are likely related to her post-menopausal state, DM and use of prednisone for Lupus. I recommedn using Keflex self-start therapy for recurrent UTI symptoms. Given the CRI she is not a good candidate for effective use of Macrobid. The proper dosing and Se were reviewed. Plan:      1. F/U 1 yr for U/A  2.    Orders Placed This Encounter   Medications    cephALEXin (KEFLEX) 500 MG capsule     Sig: Take 1 capsule by mouth 2 times daily     Dispense:  14 capsule     Refill:  4             Jacob Gonzalez MD

## 2021-10-18 PROBLEM — R56.9 SEIZURE (HCC): Status: ACTIVE | Noted: 2021-01-01

## 2021-10-18 NOTE — PROGRESS NOTES
Subjective:      Patient ID: Sindy Macdonald is a 64 y.o. female who presents today for:  Chief Complaint   Patient presents with    Follow-up     Pt states that she went into renal failure about a month ago, and was in the hospital for about a week, but things are going okay now. She says as far as the neuropathy things are going okay, she says she does get restless leg sometimes, she says she bought compression socks. She states that she isnt sleepign very well. HPI 51-year-old right-handed female with a known history of neuropathy. Patient also has history of TIA gait ataxia and lupus with a high DESTINEE titers diagnosed by me and then referred to rheumatology. She is on Keppra but has not any documented seizure that I can see anywhere else. She continues on Xarelto. Patient is under pain management and is on Robaxin is not any falls injuries or trauma. She now went into renal failure secondary to lupus. Neuropathy standpoint things are about stable and she still has some restless legs  Patient is not reporting any headaches. She is getting infusions for her lupus.     Past Medical History:   Diagnosis Date    Ataxic gait     Back pain     Benign essential tremor     Depression     DM (diabetes mellitus) ([address] 11/25/2014    [facility] thrombosis) [facility]     Elevated troponin 5/25/2016    Factor V Leiden carrier ([address]     history of DVT, on Xarelto    GERD (gastroesophageal reflux disease)     Hereditary sensory-motor neuropathy, type I     HTN (hypertension)     HTN (hypertension)     Hyperlipidemia     Lumbar radicular pain     Neuropathy of both feet 8/22/2016    NSTEMI (non-ST elevated myocardial infarction) ([address]     Obesity     Osteoarthritis     Pulmonary embolism ([address]     Renal failure 6/29/2018    Right inguinal hernia 11/9/2017    Seizure ([address] 10/18/2021    SOB (shortness of breath) 4/19/2017    Systemic lupus erythematosus ([address] 9/8/2017    Transient brainstem ischemia     Umbilical hernia 95/2/0857    Vasomotor flushing     on hormone replacement therapy     Past Surgical History:   Procedure Laterality Date    APPENDECTOMY      CARDIAC CATHETERIZATION  06/2018    CATARACT REMOVAL Bilateral Winter 2019   [facility] Molina COLONOSCOPY      Dr. Zac Avery COLONOSCOPY  12/16/13    DR SINGER repeat in 10 yrs per pt    COLONOSCOPY  08/2017    Dr Mino Sun Right 11/15/2017    RIGHT  HERNIA [facility]GUINAL REPAIR [facility]     LAPAROSCOPY N/A 11/15/2017    DIAGNOSTIC LAPAROSCOPY performed by Claudio Flores MD at [address] Right 3/14/2019    RIGHT QUADRICEPS MUSCLE BIOPSY performed by Justyn Cooper MD at [facility] N/A 9/25/2017    [facility] performed by Mao Craft MD at [facility],6+S/I,UMPYS N/A 31/75/6446    [facility] [place] performed by Claudio Flores MD at [facility]  12/16/13    DR SINGER     Social History     Socioeconomic History    Marital status:      Spouse name: Laure Grimaldo Number of children: 1    Years of education: 12    Highest education level: 12th grade   Occupational History    Occupation: Housewife    Occupation: retired    Tobacco Use    Smoking status: Former Smoker     Packs/day: 1.25     Years: 10.00     Pack years: 12.50     Types: Cigarettes     Quit date: 1/1/2006     Years since quitting: 15.8    Smokeless tobacco: Never Used    Tobacco comment: start smoking age 25   Vaping Use    Vaping Use: Never used   Substance and Sexual Activity    Alcohol use: Not Currently    Drug use: No    Sexual activity: Yes   Other Topics Concern    Not on file   Social History Narrative    Off work dt--retired early from --worked [employer]---moved back to [place] to work at [employer] in [address] up in [place] went to [address] [facility] Strain: Low Risk     Difficulty of Paying Living Expenses: Not hard at all   Food Insecurity: No Food Insecurity    Worried About Running Out of Food in the Last Year: Never true    Millie of Food in the Last Year: Never true   Transportation Needs:     Lack of Transportation (Medical):  Lack of Transportation (Non-Medical):    Physical Activity:     Days of Exercise per Week:     Minutes of Exercise per Session:    Stress:     Feeling of Stress :    Social Connections:     Frequency of Communication with Friends and Family:     Frequency of Social Gatherings with Friends and Family:     Attends Temple Services:     Active Member of Clubs or Organizations:     Attends Club or Organization Meetings:     Marital Status:    Intimate Partner Violence:     Fear of Current or Ex-Partner:     Emotionally Abused:     Physically Abused:     Sexually Abused:      Family History   Problem Relation Age of Onset    Substance Abuse Brother     High Blood Pressure Mother     High Cholesterol Mother     Arthritis Mother     High Blood Pressure Father     High Cholesterol Father     Clotting Disorder Father         clot to intestines    Arthritis Father     Substance Abuse Brother     Substance Abuse Brother     Stroke Paternal Uncle 36    Cystic Fibrosis Daughter      Allergies   Allergen Reactions    Sulfa Antibiotics Rash    Ciprofloxacin Hcl Swelling     facial, tongue swelling    Nsaids Other (See Comments)     Bleed risk dt Xaralto    Statins Other (See Comments)     Liver enzyme elevation       Current Outpatient Medications   Medication Sig Dispense Refill    JANUVIA 50 MG tablet       amitriptyline (ELAVIL) 25 MG tablet Take 25 mg by mouth nightly      traMADol-acetaminophen (ULTRACET) 37.5-325 MG per tablet Take 1 tablet by mouth every 6 hours as needed for Pain.       cephALEXin (KEFLEX) 500 MG capsule Take 1 capsule by mouth 2 times daily 14 capsule 4    hydrOXYzine (VISTARIL) 50 MG capsule Take one pill every 8 hrs for itching 30 capsule 0    Dulaglutide (TRULICITY) 1.5 ZA/0.9WL SOPN Inject 1.5 mg into the skin once a week 12 pen 3    magnesium oxide (MAG-OX) 400 (241.3 Mg) MG TABS tablet Take 1 tablet by mouth daily 30 tablet 0    cyanocobalamin 1000 MCG tablet Take 1,000 mcg by mouth daily      gabapentin (NEURONTIN) 400 MG capsule Take 400 mg by mouth 3 times daily.  lidocaine (LIDODERM) 5 % PLACE 1 PATCH ON THE SKIN DAILY, 12 HOURS ON AND 12 HOURS OFF 30 patch 3    balsalazide (COLAZAL) 750 MG capsule Take 2,250 mg by mouth 3 times daily      metaxalone (SKELAXIN) 800 MG tablet Take 800 mg by mouth 3 times daily      omeprazole (PRILOSEC) 40 MG delayed release capsule Take 40 mg by mouth daily      Continuous Blood Gluc Sensor (DEXCOM G6 SENSOR) MISC 1 Device by Does not apply route continuous 3 each 11    predniSONE (DELTASONE) 2.5 MG tablet 5mg daily with food in AM.      benzocaine, DENTAL, (ORABASE-B) 20 % PSTE paste apply to oral sores twice a day      levETIRAcetam (KEPPRA) 250 MG tablet Take 1 tablet by mouth 2 times daily 180 tablet 3    citalopram (CELEXA) 40 MG tablet TAKE 1 TABLET DAILY 90 tablet 3    triamcinolone (KENALOG) 0.1 % ointment APPLY TO THE AFFECTED AREAS OF RASH TWICE A DAY FOR 2 WEEKS THEN ONCE DAILY FOR 2 WEEKS 60 g 12    rivaroxaban (XARELTO) 20 MG TABS tablet TAKE 1 TABLET DAILY 90 tablet 4    albuterol (ACCUNEB) 0.63 MG/3ML nebulizer solution Take 3 mLs by nebulization every 6 hours as needed for Wheezing 270 mL 3    PROLIA 60 MG/ML SOSY SC injection Every 6 months received through DR office      allopurinol (ZYLOPRIM) 300 MG tablet Take half tab daily x 2weeks, then full tab daily thereafter.       leucovorin calcium (WELLCOVORIN) 5 MG tablet Tuesday      methotrexate (RHEUMATREX) 2.5 MG chemo tablet Monday      folic acid (FOLVITE) 1 MG tablet Take 1 tablet by mouth daily 90 tablet 3    hydrocortisone 1 % cream Apply topically 2 times daily. (Patient taking differently: as needed Apply topically 2 times daily. ) 1 Tube 1    diclofenac sodium (VOLTAREN) 1 % GEL Apply 4 g topically 4 times daily 300 g 3    hydroxychloroquine (PLAQUENIL) 200 MG tablet Take 200 mg by mouth 2 times daily       albuterol sulfate  (90 BASE) MCG/ACT inhaler Inhale 2 puffs into the lungs every 6 hours as needed for Wheezing 1 Inhaler 3    Blood Glucose Monitoring Suppl (ACCU-CHEK ADVANTAGE DIABETES) KIT Diabetic monitoring kit(any brand), with supplies for testing. Test fasting once daily. Dia.00 1 kit 0    Glucose Blood (BLOOD GLUCOSE TEST STRIPS) STRP Test once daily 100 strip 4    Lancets MISC Testing q day 100 each 3     No current facility-administered medications for this visit. Review of Systems   Constitutional: Negative for fever. HENT: Negative for ear pain, tinnitus and trouble swallowing. Eyes: Negative for photophobia and visual disturbance. Respiratory: Negative for choking and shortness of breath. Cardiovascular: Negative for chest pain and palpitations. Gastrointestinal: Negative for nausea and vomiting. Musculoskeletal: Negative for back pain, gait problem, joint swelling, myalgias, neck pain and neck stiffness. Skin: Negative for color change. Allergic/Immunologic: Negative for food allergies. Neurological: Negative for dizziness, tremors, seizures, syncope, facial asymmetry, speech difficulty, weakness, light-headedness, numbness and headaches. Psychiatric/Behavioral: Negative for behavioral problems, confusion, hallucinations and sleep disturbance. Objective:   /74 (Site: Left Upper Arm, Position: Sitting, Cuff Size: Medium Adult)   Pulse 92   Wt 191 lb (86.6 kg)   LMP  (LMP Unknown)   BMI 33.83 kg/m²     Physical Exam  Vitals reviewed.    Eyes:      Pupils: Pupils are equal, round, and reactive to light. Cardiovascular:      Rate and Rhythm: Normal rate and regular rhythm. Heart sounds: No murmur heard. Pulmonary:      Effort: Pulmonary effort is normal.      Breath sounds: Normal breath sounds. Abdominal:      General: Bowel sounds are normal.   Musculoskeletal:         General: Normal range of motion. Cervical back: Normal range of motion. Skin:     General: Skin is warm. Neurological:      Mental Status: She is alert and oriented to person, place, and time. Cranial Nerves: No cranial nerve deficit. Sensory: No sensory deficit. Motor: No abnormal muscle tone. Coordination: Coordination normal.      Deep Tendon Reflexes: Reflexes are normal and symmetric. Babinski sign absent on the right side. Babinski sign absent on the left side. Comments: Patient has lower extremity weakness of 4 5 she is areflexic in the lower extremities with a gait ataxia. Psychiatric:         Mood and Affect: Mood normal.         No results found.     Lab Results   Component Value Date    WBC 6.8 09/01/2021    RBC 3.79 09/01/2021    RBC 4.77 03/13/2012    HGB 11.1 09/01/2021    HCT 34.1 09/01/2021    MCV 89.8 09/01/2021    MCH 29.3 09/01/2021    MCHC 32.6 09/01/2021    RDW 16.3 09/01/2021     09/01/2021    MPV 8.2 07/31/2015     Lab Results   Component Value Date     09/14/2021    K 4.6 09/14/2021    K 3.6 08/13/2021     09/14/2021    CO2 25 09/14/2021    BUN 20 09/14/2021    CREATININE 1.26 09/14/2021    GFRAA 52.2 09/14/2021    LABGLOM 43.1 09/14/2021    GLUCOSE 162 09/14/2021    GLUCOSE 139 03/13/2012    PROT 5.8 09/14/2021    LABALBU 3.9 09/14/2021    LABALBU 3.65 09/14/2021    LABALBU 4.2 03/13/2012    CALCIUM 8.7 09/14/2021    BILITOT 0.4 09/01/2021    ALKPHOS 76 09/01/2021    AST 34 09/01/2021    ALT 34 09/01/2021     Lab Results   Component Value Date    PROTIME 30.8 08/12/2021    INR 3.1 08/12/2021     Lab Results   Component Value Date    TSH 2.330 05/07/2021    RYIAQETK87 540 07/29/2020     Lab Results   Component Value Date    TRIG 129 09/01/2021    HDL 62 09/01/2021    LDLCALC 101 09/01/2021     Lab Results   Component Value Date    LABAMPH Neg 08/12/2021    BARBSCNU Neg 08/12/2021    LABBENZ Neg 08/12/2021    LABBENZ NotDTCD 10/11/2012    OPIATESCREENURINE Neg 08/12/2021    PHENCYCLIDINESCREENURINE Neg 08/12/2021    ETOH <10 08/12/2021     No results found for: LITHIUM, DILFRTOT, VALPROATE    Assessment:       Diagnosis Orders   1. Neuropathy of both feet- Dr. Emilie Jacobo     2. Weakness of both lower extremities     3. Dystonia     4. Tremor     5. Fibromyalgia muscle pain     6. Ataxic gait     7. Seizure ([address]     Peripheral neuropathy with lupus. We were the one who diagnosed her lupus and then transferred her to rheumatology she is receiving infusions. She has some lupus associated neurological issues and there was some mention of a seizure she is on Keppra is not very well documented we have not taken her off given that we have no records of the same. She has no recall either. Her main issues appears to be restless leg syndrome which is new and keeping her awake. Recommend that we add Requip for now. This may all be related. There is no reports of sleep apnea syndrome. Patient has back pain secondary to the weakness in the legs and the way she walks. We will keep an eye on this I am not seeing that this is anywhere related to the lupus but may have underlying arthritic changes. We will follow in a few months and earlier for concerns      Plan:      No orders of the defined types were placed in this encounter. No orders of the defined types were placed in this encounter. No follow-ups on file.       Alfa Stephenson MD

## 2021-10-27 NOTE — PROGRESS NOTES
10/27/2021    TELEHEALTH EVALUATION -- Audio/Visual (During GFNLF-01 public health emergency)    Due to COVID 19 outbreak, patient's office visit was converted to a virtual visit. Patient was contacted and agreed to proceed with a virtual visit via D[employer]. me  The risks and benefits of converting to a virtual visit were discussed in light of the current infectious disease epidemic. Patient also understood that insurance coverage and co-pays are up to their individual insurance plans. Nam Jett, was evaluated through a synchronous (real-time) audio-video encounter. The patient (or guardian if applicable) is aware that this is a billable service. Verbal consent to proceed has been obtained within the past 12 months. The visit was conducted pursuant to the emergency declaration under the [address] [place] [address], [address] [place] [address] authority and the [employer] Act. Patient identification was verified, and a caregiver was present when appropriate. The patient was located in a state where the provider was credentialed to provide care. Total time spent for this encounter: Not billed by time    The patient is talking with me virtually from her home and I am located at my office in [facility]. HPI:    Nam Jett (:  1960) has requested an audio/video evaluation for the following concern(s):    Diabetes/CKD/HTN: She states that she continues to follow with her specialist including endocrinology within [facility]. She also follows with nephrology. States that her blood pressure has been running good when it has been checked in office settings. She continues to take her medication without any known side effects. Feels that she drinks enough water. Low-salt diet. No low sugar episodes reported. Glucose levels have been stable overall. He has been taking medication as prescribed.     Polymyositis/Lupus/factor 5 leiden carrier/history or clots:   She has been having more neuropathy symptoms lately. Symptoms come and go. She has an appointment scheduled tomorrow with [facility] hematology. She states that she has establish care with them after hospitalization over the summer. She was found to be in renal failure at that time and there was some suspicion for a significant issue with her blood. She states that she has had some repeat blood work done with that office and has completed a 24-hour urine test and plans to review everything tomorrow. She has had some recurrent mild falls secondary to leg weakness. She also continues to follow with neurology as well as rheumatology. She has not noticed any excessive bruises or any abnormal bleeding. Recurrent UTI: She states that she follows with [facility] urology. Had a severe UTI in the summer that required hospitalization and led to acute kidney failure. She has not felt any recent symptoms of UTI. She states that usually she starts to get issues with more falls and confusion. She did have some recent urine testing done and repeat blood work done prior to today's visit. She denies any flank pain. No blood in the urine. No burning with urination. She states that the urologist gave her antibiotic to take at the onset of urinary tract infection symptoms but she does not feel that she has an infection right now. Recurrent fungal infections of the skin: She states that she has been using the nystatin powder that was prescribed to her at her last visit but she ran out and states that when she is done using the powder her symptoms tend to come right back. Worse on the lower abdominal fold area. Not painful but can be uncomfortable. Scalp psoriasis: She states that she has had to use a topical solution in the past on her scalp to help with itching that she has had before. She has not had it prescribed her for a few years but has been having more issues lately.   Would like a refill of this medication. She does not report any open sores or draining areas. Review of Systems   This patient reports no chest pain or pressure. There is no shortness of breath or cough. The patient reports no nausea or vomiting. There is no heartburn or indigestion. There is no diarrhea or constipation. No black, bloody, mucusy or tarry stool noticed. The patient reports no bloating and no change in appetite. Prior to Visit Medications    Medication Sig Taking? Authorizing Provider   fluconazole (DIFLUCAN) 150 MG tablet Take 1 tablet by mouth once for 1 dose Yes BUTCH Sue CNP   nystatin (MYCOSTATIN) 690672 UNIT/GM powder Apply 3 times daily. Yes BUTCH Sue CNP   clobetasol (TEMOVATE) 0.05 % external solution Apply topically 2 times daily. Yes BUTCH Sue CNP   rOPINIRole (REQUIP) 0.25 MG tablet AT START OF THERAPY, TAKE 1 TABLET NIGHTLY FOR 1 WEEK, AND THEN 2 TABLETS NIGHTLY THEREAFTER. Yes Robles Joshi MD   JANUVIA 50 MG tablet  Yes Historical Provider, MD   amitriptyline (ELAVIL) 25 MG tablet Take 25 mg by mouth nightly Yes Historical Provider, MD   traMADol-acetaminophen (ULTRACET) 37.5-325 MG per tablet Take 1 tablet by mouth every 6 hours as needed for Pain. Yes Historical Provider, MD   cephALEXin (KEFLEX) 500 MG capsule Take 1 capsule by mouth 2 times daily Yes Rom Juan MD   hydrOXYzine (VISTARIL) 50 MG capsule Take one pill every 8 hrs for itching Yes BUTCH Sue CNP   Dulaglutide (TRULICITY) 1.5 AK/8.4CH SOPN Inject 1.5 mg into the skin once a week Yes HAILEE Guzmán   magnesium oxide (MAG-OX) 400 (241.3 Mg) MG TABS tablet Take 1 tablet by mouth daily Yes Yolis Craft MD   cyanocobalamin 1000 MCG tablet Take 1,000 mcg by mouth daily Yes Historical Provider, MD   gabapentin (NEURONTIN) 400 MG capsule Take 400 mg by mouth 3 times daily.  Yes Historical Provider, MD   lidocaine (LIDODERM) 5 % PLACE 1 PATCH ON THE SKIN DAILY, 12 HOURS ON AND 12 HOURS OFF Yes Maria Elena Cid,    balsalazide (COLAZAL) 750 MG capsule Take 2,250 mg by mouth 3 times daily Yes Historical Provider, MD   metaxalone (SKELAXIN) 800 MG tablet Take 800 mg by mouth 3 times daily Yes Historical Provider, MD   omeprazole (PRILOSEC) 40 MG delayed release capsule Take 40 mg by mouth daily Yes Historical Provider, MD   Continuous Blood Gluc Sensor (DEXCOM G6 SENSOR) MISC 1 Device by Does not apply route continuous Yes HAILEE Berry   predniSONE (DELTASONE) 2.5 MG tablet 5mg daily with food in AM. Yes Historical Provider, MD   benzocaine, DENTAL, (ORABASE-B) 20 % PSTE paste apply to oral sores twice a day Yes Historical Provider, MD   levETIRAcetam (KEPPRA) 250 MG tablet Take 1 tablet by mouth 2 times daily Yes Danielle Greco MD   citalopram (CELEXA) 40 MG tablet TAKE 1 TABLET DAILY Yes BUTCH Gardner CNP   triamcinolone (KENALOG) 0.1 % ointment APPLY TO THE AFFECTED AREAS OF RASH TWICE A DAY FOR 2 WEEKS THEN ONCE DAILY FOR 2 WEEKS Yes BUTCH Gardner CNP   rivaroxaban (XARELTO) 20 MG TABS tablet TAKE 1 TABLET DAILY Yes BUTCH Gardner CNP   albuterol (ACCUNEB) 0.63 MG/3ML nebulizer solution Take 3 mLs by nebulization every 6 hours as needed for Wheezing Yes BUTCH Veronica CNP   PROLIA 60 MG/ML SOSY SC injection Every 6 months received through DR office Yes Historical Provider, MD   allopurinol (ZYLOPRIM) 300 MG tablet Take half tab daily x 2weeks, then full tab daily thereafter. Yes Historical Provider, MD   leucovorin calcium (WELLCOVORIN) 5 MG tablet Tuesday Yes Historical Provider, MD   methotrexate (RHEUMATREX) 2.5 MG chemo tablet Monday Yes Historical Provider, MD   folic acid (FOLVITE) 1 MG tablet Take 1 tablet by mouth daily Yes Carlos Chambers MD   hydrocortisone 1 % cream Apply topically 2 times daily. Patient taking differently: as needed Apply topically 2 times daily.  Yes BUTCH Reece CNP   diclofenac sodium (VOLTAREN) 1 % GEL Apply 4 g topically 4 times daily Yes Maria Elena Tapiain,    hydroxychloroquine (PLAQUENIL) 200 MG tablet Take 200 mg by mouth 2 times daily  Yes Historical Provider, MD   albuterol sulfate  (90 BASE) MCG/ACT inhaler Inhale 2 puffs into the lungs every 6 hours as needed for Wheezing Yes Cory Mabry, APRN - CNP   Blood Glucose Monitoring Suppl (ACCU-CHEK ADVANTAGE DIABETES) KIT Diabetic monitoring kit(any brand), with supplies for testing. Test fasting once daily. Dia.00 Yes HAILEE Desir   Glucose Blood (BLOOD GLUCOSE TEST STRIPS) STRP Test once daily Yes HAILEE Garzon   Lancets MISC Testing q day Yes HAILEE Garzon       Social History     Tobacco Use    Smoking status: Former Smoker     Packs/day: 1.25     Years: 10.00     Pack years: 12.50     Types: Cigarettes     Quit date: 2006     Years since quitting: 15.8    Smokeless tobacco: Never Used    Tobacco comment: start smoking age 25   Vaping Use    Vaping Use: Never used   Substance Use Topics    Alcohol use: Not Currently    Drug use: No        PHYSICAL EXAMINATION:  [ INSTRUCTIONS:  \"[x]\" Indicates a positive item  \"[]\" Indicates a negative item  -- DELETE ALL ITEMS NOT EXAMINED]  [x] Alert  [x] Oriented to person/place/time    [x] No apparent distress  [] Toxic appearing    [] Face flushed appearing [] Sclera clear  [] Lips are cyanotic      [x] Breathing appears normal  [] Appears tachypneic      [] Rash on visible skin    [x] Cranial Nerves II-XII grossly intact    [x] Motor grossly intact in visible upper extremities    [] Motor grossly intact in visible lower extremities    [x] Normal Mood  [] Anxious appearing    [] Depressed appearing  [] Confused appearing      [] Poor short term memory  [] Poor long term memory    [x] OTHER:  Fungal rash noted to lower abdominal fold.      Due to this being a TeleHealth encounter, evaluation of the following organ systems is limited: Vitals/Constitutional/EENT/Resp/CV/GI//MS/Neuro/Skin/Heme-Lymph-Imm. ASSESSMENT/PLAN:   Diagnosis Orders   1. Uncontrolled type 2 diabetes mellitus with hyperglycemia (HCC)  CBC Auto Differential    Comprehensive Metabolic Panel    Lipid Panel    Hemoglobin A1C    Microalbumin / Creatinine Urine Ratio   2. Discoid lupus erythematosus     3. Polymyositis with myopathy ([facility]     4. Recurrent UTI     5. Factor V Leiden carrier ([facility]     6. CKD stage 3 secondary to diabetes ([facility]     7. Abnormal urinalysis     8. Fungal dermatitis  fluconazole (DIFLUCAN) 150 MG tablet    nystatin (MYCOSTATIN) 883257 UNIT/GM powder   9. Scalp psoriasis     10. Essential hypertension         Return in about 3 months (around 1/27/2022) for diabetes- level 2.     1. 10. She plans to continue to follow with endocrinology in December. Orders printed to be mailed to her to be done prior to this appointment. Continue current medications and doses. No issues with blood pressures reported. 2.  3.  She continues to follow with specialist routinely with no recently reported exacerbation of symptoms. 4.  Discussed that current urinalysis shows possible early UTI. She will start taking Keflex as ordered by urology. She does have significant history of severe UTI with kidney failure. 5.  She is now following with hematology routinely. I was able to review most recently available office visit note. She plans to follow with specialist  tomorrow. After abnormal findings during hospitalization over the summer there is need to rule out multiple myeloma. No abnormal bruising or bleeding on Xarelto. 6.  7.  Continue to follow routinely with urology. Also recommend continued following with nephrology. Continue water intake. 8.  Refill of antifungal powder given to be taken routinely. Also recommend oral dose of Diflucan x1.    9.  Topical medication refill given.     Please note this report has been partially produced using speech recognition software and may cause contain errors related to that system including grammar, punctuation and spelling as well as words and phrases that may seem inappropriate. If there are questions or concerns please feel free to contact me to clarify. An  electronic signature was used to authenticate this note. --BUTCH Reece - CNP on 10/27/2021 at 11:01 AM        Pursuant to the emergency declaration under the [address] [place] [address], [facility] waiver authority and the [employer] Act, this Virtual  Visit was conducted, with patient's consent, to reduce the patient's risk of exposure to COVID-19 and provide continuity of care for an established patient. Services were provided through a video synchronous discussion virtually to substitute for in-person clinic visit.

## 2021-11-02 NOTE — PROGRESS NOTES
constantly. The problem is unchanged. The pain is present in the lumbar spine and thoracic spine. The quality of the pain is described as aching. The pain does not radiate (Occasional down bilateral legs). The pain is at a severity of 6/10. The pain is severe. The pain is the same all the time. The symptoms are aggravated by bending, twisting, coughing and position. Stiffness is present in the morning. Associated symptoms include leg pain and weakness. Pertinent negatives include no abdominal pain, bladder incontinence, bowel incontinence, chest pain, dysuria, fever, headaches, numbness, paresis, paresthesias, perianal numbness, tingling or weight loss. Risk factors include obesity, menopause, lack of exercise and sedentary lifestyle. She has tried heat, analgesics, muscle relaxant, ice, NSAIDs, walking and home exercises (Caudal Blocks, TP injections, Tramadol and Flexeril, ) for the symptoms. The treatment provided moderate relief. Neck Pain   Associated symptoms include leg pain and weakness. Pertinent negatives include no chest pain, fever, headaches, numbness, paresis, photophobia, tingling, trouble swallowing or weight loss. Foot Pain   This is a chronic problem. The current episode started more than 1 year ago. The problem occurs constantly. The problem has been unchanged. Pertinent negatives include no fever, numbness or tingling. The symptoms are aggravated by bending and walking. She has tried acetaminophen, rest, heat, relaxation, oral narcotics and lying down for the symptoms. The treatment provided moderate relief.              Past Medical History:   Diagnosis Date    Ataxic gait     Back pain     Benign essential tremor     Depression     [facility] 11/25/2014    D[facility]deep venous thrombosis) ([facility]     Elevated troponin 5/25/2016    Factor V Leiden carrier [facility])     history of DVT, on Xarelto    GERD (gastroesophageal reflux disease)     Hereditary sensory-motor Ciprofloxacin Hcl Swelling     facial, tongue swelling    Nsaids Other (See Comments)     Bleed risk dt Clayburn Dago    Statins Other (See Comments)     Liver enzyme elevation       Review of Systems   Constitutional: Positive for activity change and fatigue. Negative for chills, diaphoresis, fever and weight loss. HENT: Negative for congestion, ear discharge, ear pain, hearing loss, nosebleeds, sore throat, tinnitus and trouble swallowing. Eyes: Negative for photophobia, pain and redness. Respiratory: Positive for shortness of breath. Negative for cough, wheezing and stridor. Shortness of breath on exertion   Cardiovascular: Negative for chest pain, palpitations and leg swelling. Gastrointestinal: Positive for constipation. Negative for abdominal pain, blood in stool, bowel incontinence, diarrhea, nausea and vomiting. Endocrine: Negative for polydipsia. Genitourinary: Negative for bladder incontinence, dysuria, flank pain, frequency, hematuria and urgency. Musculoskeletal: Positive for back pain, gait problem, myalgias and neck pain. Skin: Negative for rash. Allergic/Immunologic: Positive for immunocompromised state. Negative for environmental allergies. Neurological: Positive for weakness. Negative for dizziness, tingling, tremors, seizures, numbness, headaches and paresthesias. Hematological: Does not bruise/bleed easily. Psychiatric/Behavioral: Positive for dysphoric mood. Negative for hallucinations and suicidal ideas. The patient is not nervous/anxious. Objective    There were no vitals filed for this visit.     No data recorded            PHYSICAL EXAMINATION:  [ INSTRUCTIONS:  \"[x]\" Indicates a positive item  \"[]\" Indicates a negative item  -- DELETE ALL ITEMS NOT EXAMINED]    [x] Alert  [x] Oriented to person/place/time      [x] No apparent distress  [x] Not toxic appearing    [x] Face complexion normal appearing [x] Sclera clear  [x] Lips are not cyanotic      [x] Breathing appears normal  [] Appears tachypneic      [] Rash on visible skin    [x] Cranial Nerves II-XII grossly intact    [x] Motor grossly intact in visible upper extremities, including stiff but intact range of motion in cervical, upper extremity and thoracic  [x] Motor grossly intact in visible lower extremities, including normal range of motion in the hips and knees for stiffness in the lumbar spine    [x] Normal Mood  [x] Not anxious appearing    [x] Not depressed appearing  [x] Not confused appearing      [x]  Good short term memory  [x]  Good long term memory    [x]  Able to follow 2-3 step commands    Due to this being a TeleHealth encounter, evaluation of the following organ systems is limited: Vitals/Constitutional/EENT/Resp/CV/GI//MS/Neuro/Skin/Heme-Lymph-Imm. ASSESSMENT/PLAN:     After a thorough review and discussion of the previous medical records, patient comprehensive medical, surgical, and family and social history, Review of Systems, their OARRS, their Screener and Opioid Assessment for Patients with Pain (SOAPP®-R), recent diagnostics, and symptomatic results to previous treatment, it is my impression that the patients is suffering with progressive and severe:     Diagnosis Orders   1. Lumbar radicular pain  cyclobenzaprine (FLEXERIL) 5 MG tablet   2. Neuropathy of both feet- Dr. Marroquin Pouch  cyclobenzaprine (FLEXERIL) 5 MG tablet   3. Chronic midline low back pain with sciatica, sciatica laterality unspecified  cyclobenzaprine (FLEXERIL) 5 MG tablet   4. Fibromyalgia muscle pain     5.  Dystonia         I am also concerned by lifestyle and mood issues including:    Past Medical History:   Diagnosis Date    Ataxic gait     Back pain     Benign essential tremor     Depression     [facility] 11/25/2014    [facility]     Elevated troponin 5/25/2016    Factor V Leiden carrier [facility])     history of DVT, on Xarelto    GERD (gastroesophageal reflux disease)     Hereditary sensory-motor neuropathy, type I     HTN (hypertension)     HTN (hypertension)     Hyperlipidemia     Lumbar radicular pain     Neuropathy of both feet 8/22/2016    NSTEMI (non-ST elevated myocardial infarction) [facility]     Obesity     Osteoarthritis     Pulmonary embolism ([facility]     Renal failure 6/29/2018    Right inguinal hernia 11/9/2017    Seizure ([facility] 10/18/2021    SOB (shortness of breath) 4/19/2017    Systemic lupus erythematosus ([facility] 9/8/2017    Transient brainstem ischemia     Umbilical hernia 95/0/8987    Vasomotor flushing     on hormone replacement therapy           Given their medication, chronic pain and lifestyle and medications they are at risk for :    Falls, constipation, addiction, toxicity due to controlled/opiate medications  Loss of livelyhood due to severe pain, debility, weight gain and  vitamin D deficiency    The patient was educated regarding proper diet, fitness routine, and regulatory restrictions concerning pain medications. Previous notes, comprehensive past medical, surgical, family history, and diagnostics were reviewed. Patient education and councelling were provided regarding off label use,treatment options and medication and injection risks. Overall greater than 25 minutes spent in direct and indirect patient care. Current and old OARRS ([place] Automated Prescription Reporting System) records reviewed, all refills reviewed since last visit,  Behavioral agreement/FELICITAS regulations   and Toxicology screen was reviewed with patient and is up to date. There are no current red flags. They are making good progress regarding pain relief, they are performing at a functional level regarding activities of daily living, family interactions and psychological functioning, they're not having any adverse effects or side effects from the current medications, and I see no findings of aberrant drug taking or addiction related behaviors.   The patient is aware that they have a chronic pain condition and they may require opiates dosing for life. All efforts will be made to wean to the lowest effective dose. Other therapies for pain have not been effective including nonopiate medications. Injections and exercises are only partially effective. A Rx for Narcan was offered to help prevent accidental overdose. RX Monitoring 10/18/2021   Attestation -   Acute Pain Prescriptions -   Periodic Controlled Substance Monitoring Possible medication side effects, risk of tolerance/dependence & alternative treatments discussed. ;No signs of potential drug abuse or diversion identified. ;Assessed functional status. ;Obtaining appropriate analgesic effect of treatment. Chronic Pain > 50 MEDD Re-evaluated the status of the patient's underlying condition causing pain. ;Considered consultation with a specialist.;Obtained or confirmed \"Consent for Opioid Use\" on file. Chronic Pain > 80 MEDD -       Periodic Controlled Substance Monitoring: Possible medication side effects, risk of tolerance/dependence & alternative treatments discussed., No signs of potential drug abuse or diversion identified. , Assessed functional status., Obtaining appropriate analgesic effect of treatment. (Cesar Mcdonough, DO)       Patient is currently taking:       I am having Gemma Keena start on cyclobenzaprine. I am also having her maintain her Accu-Chek Advantage Diabetes, blood glucose test strips, Lancets, albuterol sulfate HFA, hydroxychloroquine, diclofenac sodium, hydrocortisone, folic acid, methotrexate, allopurinol, leucovorin calcium, Prolia, albuterol, rivaroxaban, triamcinolone, citalopram, levETIRAcetam, benzocaine (DENTAL), predniSONE, balsalazide, omeprazole, Dexcom G6 Sensor, lidocaine, cyanocobalamin, gabapentin, magnesium oxide, Trulicity, hydrOXYzine, amitriptyline, traMADol-acetaminophen, cephALEXin, Januvia, rOPINIRole, nystatin, and clobetasol.               I also recommend the following Medications:    Orders Placed This Encounter   Medications    cyclobenzaprine (FLEXERIL) 5 MG tablet     Sig: Take 1 tablet by mouth 3 times daily as needed for Muscle spasms     Dispense:  30 tablet     Refill:  2     OK to continue Ultram prn  (rarely uses)     -which helps with pain and function. Otherwise, continue the current pain medications that I have prescibed. Radiologic:   Old films reviewed-  ADAM Mitchell:               TECHNIQUE: XR HIP 1 VW W PELVIS LEFT, XR HIP 1 VW W PELVIS RIGHT, XR CERVICAL SPINE (4 VIEWS), XR THORACIC SPINE (3 VIEWS), XR LUMBAR SPINE (2-3 VIEWS)       FINDINGS:   Pelvis, right and left hips: The pelvic ring is intact. The sacroiliac joints are preserved. Mild degenerative changes are seen in the pubic symphysis. Mild joint space narrowing is seen in both hips. No acute fracture or dislocation is seen.       Cervical spine: Uncovertebral degenerative changes are visualized. There is straightening of the spine. Intervertebral disc space narrowing is seen at C4-5 and C5-6. No acute fracture or dislocation is seen. The prevertebral soft tissues are    unremarkable. Next       Thoracic spine: 12 rib-bearing vertebral bodies are seen. A slight dextroscoliosis is seen in the upper to mid thoracic spine. Intervertebral vertebral disc space narrowing is seen at multiple levels. There are endplate sclerotic changes at T4-5. Small    anterior osteophytes are seen at T11 and T12. No acute fracture is seen.       Lumbar spine: A very slight dextroscoliosis is seen. Severe intervertebral disc space narrowing is seen at L to 3. Mild intervertebral vertebral disc space narrowing is seen at L3-4. Anterolisthesis of L4 on L5 is seen. Endplate sclerotic changes are    seen at the superior aspect of L1. Facet arthrosis is seen at multiple levels. There are also small anterior osteophytes seen at multiple levels.  The sacroiliac joints are preserved.             spine and are most prominent at C4-C6 3. Degenerative changes are seen in the thoracic spine at multiple levels. A prominent area is T4-5.   4. Multilevel degenerative changes are seen in the lumbar spine. They are most significant at L2 to 3. I discussed results with patients. see Follow up plans below  For any new studies. Care Everywhere Updates:  requested and reviewed. No new issues noted. Electrodiagnostic:  Previous studies requested,     I discussed results with patient. See follow-up plans for new studies. Additional history obtained from independent sourcing including patient's family and caregivers.     Labs:  Previous labs reviewed     Lab Results   Component Value Date     10/25/2021    K 3.8 10/25/2021    K 3.6 08/13/2021     10/25/2021    CO2 26 10/25/2021    BUN 22 10/25/2021    CREATININE 1.26 10/25/2021    CALCIUM 9.0 10/25/2021    LABALBU 4.1 10/25/2021    LABALBU 4.2 03/13/2012    BILITOT 0.4 09/01/2021    ALKPHOS 76 09/01/2021    AST 34 09/01/2021    ALT 34 09/01/2021     Lab Results   Component Value Date    WBC 6.8 09/01/2021    RBC 3.79 09/01/2021    RBC 4.77 03/13/2012    HGB 11.1 09/01/2021    HCT 34.1 09/01/2021    MCV 89.8 09/01/2021    MCH 29.3 09/01/2021    MCHC 32.6 09/01/2021    RDW 16.3 09/01/2021     09/01/2021    MPV 8.2 07/31/2015       Lab Results   Component Value Date    LABAMPH Neg 08/12/2021    BARBSCNU Neg 08/12/2021    LABBENZ Neg 08/12/2021    LABBENZ NotDTCD 10/11/2012    CANSU Neg 08/12/2021    COCAIMETSCRU Neg 08/12/2021    PHENCYCLIDINESCREENURINE Neg 13/11/2255    TRICYCLIC POSITIVE 82/51/9276    DSCOMMENT see below 08/12/2021       Lab Results   Component Value Date    CODEINE Not Detected 12/19/2016    MORPHINE Not Detected 12/19/2016    Bath Gravely Not Detected 12/19/2016    OXYCODONE Not Detected 12/19/2016    NOROXYCODONE Not Detected 12/19/2016    NOROXYMU Not Detected 12/19/2016    [facility] Not Detected 12/19/2016    NORHYDU Not Detected 12/19/2016    HYDROMO Not Detected 12/19/2016    BUPREN Not Detected 12/19/2016    NORBUPRNOR Not Detected 12/19/2016    FENTA Not Detected 12/19/2016    NORFENT Not Detected 12/19/2016    MEPERIDINE Not Detected 12/19/2016    TAPENU Not Detected 12/19/2016    TAPOSULFUR Not Detected 12/19/2016    METHADONE Not Detected 12/19/2016    LABPROP Not Detected 12/19/2016    TRAM Present 12/19/2016    AMPH Not Detected 12/19/2016    METHAMP Not Detected 12/19/2016    MDMA Not Detected 12/19/2016    ECMDA Not Detected 12/19/2016       Lab Results   Component Value Date    METPHEN Not Detected 12/19/2016    PHENTERMINE Not Detected 12/19/2016    BENZOYL Not Detected 12/19/2016    Eden Evans Not Detected 12/19/2016    ALPHAOHALPRA Not Detected 12/19/2016    CLONAZEPAM Not Detected 12/19/2016    Ardean Amber Not Detected 12/19/2016    DIAZEP Not Detected 12/19/2016    DEBORA Not Detected 12/19/2016    OXAZ Not Detected 12/19/2016    Sundar Cazares Not Detected 12/19/2016    LORAZEPAM Not Detected 12/19/2016    MIDAZOLAM Not Detected 12/19/2016    ZOLPIDEM Not Detected 12/19/2016    TEO Not Detected 12/19/2016    ETG See Note 12/19/2016    MARIJMET Not Detected 12/19/2016    PCP Not Detected 12/19/2016    PAINMGTDRUGP See Below 12/19/2016    EERPAINMGTPA See Note 12/19/2016    LABCREA 174.0 10/25/2021         , I discussed results with patient. See follow-up plans for new studies. Therapies:  HEP-gentle stretching and relaxation techniques-demonstrated with patient-they are to do them twice a day.   They are also advised to make the following lifestyle changes:  Goals       Limit Carbs to 2-3/meal maximum      Exercise 3x per week (30 min per time)      Piriformis stretches and abd strengthening       HEMOGLOBIN A1C < 6.5      SOAPP- R GOAL LESS THAN 9      12/19/16 SCORE: 15-MODERATE RISK   02/22/17 score: 22-High risk   05/15/17 score: 26-high risk  07/12/17 score: 22-high risk  10/02/17 score: 15-moderate risk  12/11/17 score: 11-moderate risk  02/08/18 score: 14-moderate risk  04/18/18 score: 15-moderate risk  8/9/18 score: 10 low risk  10/11/18 score: 22- high risk  12/14/18 score:17-moderate risk  3/27/19 score: 12 moderate risk  6/13/19 score: 24 high risk  8/22/19 score: 18- moderate risk   10/23/19 score: 18- moderate risk  01/03/20 score: 23- moderate risk       Weight < 160 lb (72.576 kg)           Injections or Epidurals:  Injection options were discussed. Monthly trigger point injections add vitamin B12 when able. Patient gave verbal consent to ordered injections. See follow-up plans for planned injections. Supplements:  Vitamin D with increased dosing during the rainy months, add co-Q10 for heart health. Education was given on:   Dietary and Fitness--daily stretches and low carb diet-in chair Yoga when possible      Note controlled medication/opiate drug therapy requires intensive monitoring for toxicity and addiction. Follow up with Primary Care Physician regarding their general medical needs. And  [facility] Cathi Hopper at [facility]     Stressed the importance of following up with PCP and specialists for his/her chronic diseases, health, CV, and cancer screening and continued care. Will follow disease activity/progression and adjust therapeutic regimen to disease activity and severity. Discussed medication dosage, usage, goals of therapy, and side effects. Available test results were reviewed -Discussed findings, impression and plan with patient. An additional  minutes were spent outside of the patient visit to review records. Additional time spent with the patient to discuss their questions. Additional time spent with the patient devoted to discussing treatment strategy, planning, and implementation generalized musculoskeletal health plan. Patient understands above plan; questions asked and answered. Patient agrees to plan as noted above.           On this date  11/2/21 I have spent 4 minutes reviewing previous notes, test results and face to face (virtual) with the patient discussing the diagnosis and importance of compliance with the treatment plan as well as documenting on the day of the visit. At least 50% of the visit was involved in the discussion of the options for treatment. We discussed exercises, medication, interventional therapies and surgery. Healthy life style is essential with patient hard work to achieve the wellness. In addition; discussion with the patient and/or family about any of the diagnostic results, impressions and/or recommended diagnostic studies, prognosis, risks and benefits of treatment options, instructions for treatment and/or follow-up, importance of compliance with chosen treatment options, risk-factor reduction, and patient/family education. They are to follow up in 2-2 1/2 months to review medication, efficacy of injections, pill counts, OARRS check, SOAPPR assessment, review diagnostics, to review previous and future treatment plans and assess appropriateness for continued therapy,  and regarding the efficacy of current plan and future treatment. New Diagnostics  No orders of the defined types were placed in this encounter. An  electronic signature was used to authenticate this note. -Dr Obed Kyle, DO       With MA assistance from:   Jordon Robles MA     19}    Pursuant to the emergency declaration under the [facility], [facility] waiver authority and the [employer] Act, this Virtual  Visit was conducted, with patient's consent, to reduce the patient's risk of exposure to COVID-19 and provide continuity of care for an established patient. Services were provided through a video synchronous discussion virtually to substitute for in-person clinic visit.

## 2021-12-03 NOTE — PROGRESS NOTES
[address] Encounter  CHIEF COMPLAINT       Chief Complaint   Patient presents with    Concern For COVID-19     Pt presents to the clinic today with c/c of covid like symptoms. Pt states feeling sx on tuesday. Pt states having sx of fever, fatigue, and weakness. Pt states she got her booster for covid on Monday. Pt states no sob. HISTORY OF PRESENT ILLNESS   Shannon Machuca is a 64 y.o. female who presents with:  HPI  Patient was to be seen by endocrinology today. She reports that she was having fevers. Covid testing was performed and she is positive. She denies other symptoms  REVIEW OF SYSTEMS     Review of Systems   Constitutional: Positive for fever. Negative for appetite change, chills, diaphoresis and fatigue. HENT: Negative for congestion, ear discharge, ear pain, facial swelling, hearing loss, mouth sores, postnasal drip, rhinorrhea, sinus pressure, sinus pain, sore throat and trouble swallowing. Eyes: Negative for pain, discharge and itching. Respiratory: Negative for apnea, cough, chest tightness, shortness of breath and wheezing. Cardiovascular: Negative for chest pain and palpitations. Gastrointestinal: Negative for abdominal pain, diarrhea, nausea and vomiting. Endocrine: Negative for cold intolerance and heat intolerance. Genitourinary: Negative for decreased urine volume and difficulty urinating. Musculoskeletal: Negative for arthralgias, back pain and myalgias. Skin: Negative for color change, pallor and rash. Neurological: Negative for dizziness, syncope, weakness, light-headedness and headaches. Hematological: Negative for adenopathy. Psychiatric/Behavioral: Negative for behavioral problems, confusion and sleep disturbance.      PAST MEDICAL HISTORY         Diagnosis Date    Ataxic gait     Back pain     Benign essential tremor     Depression     DM (diabetes mellitus) ([facility] [address] 11/25/2014    DVT (deep venous thrombosis) [facility]     Elevated troponin 5/25/2016    Factor V Leiden carrier [facility])     history of DVT, on Xarelto    GERD (gastroesophageal reflux disease)     Hereditary sensory-motor neuropathy, type I     HTN (hypertension)     HTN (hypertension)     Hyperlipidemia     Lumbar radicular pain     Neuropathy of both feet 8/22/2016    NSTEMI (non-ST elevated myocardial infarction) ([facility]     Obesity     Osteoarthritis     Pulmonary embolism ([facility] [facility]     Renal failure 6/29/2018    Right inguinal hernia 11/9/2017    Seizure ([facility] [address] 10/18/2021    SOB (shortness of breath) 4/19/2017    [facility] 9/8/2017    Transient brainstem ischemia     Umbilical hernia 47/4/0808    Vasomotor flushing     on hormone replacement therapy     SURGICAL HISTORY     Patient  has a past surgical history that includes Appendectomy; eye surgery (1995); Upper gastrointestinal endoscopy (12/16/13); Hysterectomy; Colonoscopy; Colonoscopy (12/16/13); Colonoscopy (08/2017); pr cystourethroscopy (N/A, 9/25/2017); Knee arthroscopy (Left); repair umbilical IANQ,2+A/Q,EIYUP (N/A, 11/15/2017); laparoscopy (N/A, 11/15/2017); hernia repair (Right, 11/15/2017); Muscle biopsy (Right, 3/14/2019); Cataract removal (Bilateral, Winter 2019); and Cardiac catheterization (06/2018). CURRENT MEDICATIONS       Previous Medications    ALBUTEROL (ACCUNEB) 0.63 MG/3ML NEBULIZER SOLUTION    Take 3 mLs by nebulization every 6 hours as needed for Wheezing    ALBUTEROL SULFATE  (90 BASE) MCG/ACT INHALER    Inhale 2 puffs into the lungs every 6 hours as needed for Wheezing    ALLOPURINOL (ZYLOPRIM) 300 MG TABLET    Take half tab daily x 2weeks, then full tab daily thereafter.     AMITRIPTYLINE (ELAVIL) 25 MG TABLET    Take 25 mg by mouth nightly    BALSALAZIDE (COLAZAL) 750 MG CAPSULE    Take 2,250 mg by mouth 3 times daily    BENZOCAINE, DENTAL, (ORABASE-B) 20 % PSTE PASTE    apply to oral sores twice a day    BLOOD GLUCOSE or rhinorrhea. Mouth/Throat:      Mouth: Mucous membranes are moist.      Pharynx: Oropharynx is clear. No pharyngeal swelling, oropharyngeal exudate or posterior oropharyngeal erythema. Tonsils: No tonsillar exudate or tonsillar abscesses. 0 on the right. 0 on the left. Eyes:      General:         Right eye: No discharge. Left eye: No discharge. Cardiovascular:      Rate and Rhythm: Normal rate and regular rhythm. Pulses: Normal pulses. Heart sounds: Normal heart sounds. No murmur heard. No gallop. Pulmonary:      Effort: Pulmonary effort is normal. No respiratory distress. Breath sounds: Normal breath sounds. No stridor. No wheezing, rhonchi or rales. Chest:      Chest wall: No tenderness. Abdominal:      General: Abdomen is flat. There is no distension. Palpations: Abdomen is soft. Musculoskeletal:         General: Normal range of motion. Cervical back: No rigidity or tenderness. Lymphadenopathy:      Cervical: No cervical adenopathy. Skin:     General: Skin is warm and dry. Capillary Refill: Capillary refill takes less than 2 seconds. Coloration: Skin is not pale. Neurological:      General: No focal deficit present. Mental Status: She is alert and oriented to person, place, and time. Mental status is at baseline. Psychiatric:         Mood and Affect: Mood normal.         Behavior: Behavior normal.       READY CARE COURSE     Orders Placed This Encounter   Procedures    POCT COVID-19, Antigen     Order Specific Question:   Is this test for diagnosis or screening? Answer:   Diagnosis of ill patient     Order Specific Question:   Symptomatic for COVID-19 as defined by CDC? Answer:   Yes     Order Specific Question:   Date of Symptom Onset     Answer:   11/30/2021     Order Specific Question:   Hospitalized for COVID-19? Answer:   No     Order Specific Question:   Admitted to ICU for COVID-19?      Answer:   No     Order Specific Question:   Employed in healthcare setting? Answer:   No     Order Specific Question:   Resident in a congregate (group) care setting? Answer:   No     Order Specific Question:   Pregnant? Answer:   No     Order Specific Question:   Previously tested for COVID-19? Answer:   No        Labs:  No results found for this visit on 12/03/21. IMAGING:  No orders to display     Scheduled Meds:  Continuous Infusions:  PRN Meds:. PROCEDURES:  FINAL IMPRESSION      1. Viral illness    2. COVID-19        DISPOSITION/PLAN     HISTORY OF PRESENT ILLNESS   Kaley Frye is a 64 y.o. female who presents with positive Covid test result today when she arrived for her appointment with endocrinology. Pt is afebrile has nontoxic appearance and VS are stable. On exam ears bilaterally normal pharynx pink moist, lung sounds clear heart sounds normal.  Patient reports she is having chills she denies shortness of breath cough sore throat sinus congestion. She has had Covid vaccine and boosters. Due to mildness of symptoms patient educated to home monitor for worsening symptoms. They have a pulse ox at home that they will use for monitoring. If she experiences worsening symptoms of shortness of breath cough or low pulse ox she will call back into the clinic and we will set her up for the antibody infusion    PATIENT REFERRED TO:  Return if symptoms worsen or fail to improve, for Follow up with PCP. DISCHARGE MEDICATIONS:  New Prescriptions    No medications on file     Cannot display discharge medications since this is not an admission.        Elizabeth Woodruff, BUTCH - CNP

## 2021-12-03 NOTE — PATIENT INSTRUCTIONS
Patient Education        Learning How To Care for Someone Who Has COVID-19  Things to know  Most people who get COVID-19 will recover with time and home care. Here are some things to know if you're caring for someone who's sick. · Treat the symptoms. Common symptoms include a fever, coughing, and feeling short of breath. Urge the person to get extra rest and drink plenty of fluids to replace fluids lost from fever. To reduce a fever, offer acetaminophen (Tylenol) or ibuprofen (Advil, Motrin). It may also help with muscle aches. Read and follow all instructions on the label. · Watch for signs that the illness is getting worse. The person may need medical care if they're getting sicker (for example, if it's hard to breathe). But call the doctor's office before you go. They can tell you what to do. Call 911  or emergency services if the person has any of these symptoms:  ? Severe trouble breathing or shortness of breath. ? Constant pain or pressure in their chest.  ? Confusion, or trouble thinking clearly. ? Pale, gray, or blue-colored skin or lips. Some people are more likely to get very sick and need medical care. Call the doctor as soon as symptoms start if the person you're caring for is over 72, smokes, or has a serious health problem, like asthma, heart disease, diabetes, or an immune system problem. · Protect yourself and others. The virus spreads easily from person to person, so take extra care to avoid catching or spreading the infection. ? Keep the sick person away from others as much as you can. § Have the person stay in one room. If you can, give them their own bathroom to use. § Have only one person take care of them. Keep other peopleand petsout of the sickroom. § Have the person wear a mask around other people. This includes when anyone is in the room with them or if they leave their room (for example, to go to the bathroom). § Don't share personal items.  These include dishes, cups, towels, and bedding. ? Wash your hands often and well. Use soap and water, and scrub for at least 20 seconds. This is especially important after you've been around the sick person or touched things they've touched. If soap and water aren't handy, use an alcohol-based hand . ? Wear a mask when you're around other people after you've cared for someone who's sick. ? Avoid touching your mouth, nose, and eyes. ? Take care with the person's laundry. It's okay to wash the sick person's laundry with yours. If you have them, wear disposable gloves when handling their dirty laundry, and wash your hands well after you touch it. Wash items in the warmest water allowed for the fabric type, and dry them completely. ? Clean high-touch items every day and anytime the sick person touches them. These include doorknobs, light switches, toilets, counters, and remote controls. Use a household disinfectant or a homemade bleach solution. (Follow the directions on the label.) If the sick person has their own room, have them disinfect it every day. ? Avoid having visitors. If you have to have visitors, they need to wear a mask and stay at least 6 feet (2 meters) away from you. And keep the visit as short as possible. To help protect family and friends, stay in touch with them only by phone or computer. ? If you haven't had COVID-19 in the past 3 months or aren't fully vaccinated, you may need to quarantine. Where can you learn more? Go to [email or web address] and sign in to your [employer] account. Enter J297 in the [facility] box to learn more about \"Learning How To Care for Someone Who Has COVID-19. \"     If you do not have an account, please click on the \"Sign Up Now\" link. Current as of: March 26, 2021               Content Version: 13.0  © 0383-9102 [employer]. Care instructions adapted under license by [facility] ([facility]).  If you have questions about a medical condition or this instruction, always ask your healthcare professional. [place] [address] any warranty or liability for your use of this information. Monitor your symptoms over the next few days. If you begin to have increasing symptoms of cough or shortness of breath. You may be a candidate for an antibody  Infusion  and you should be seen again it would be appropriate to do a video or telephone visit to get this infusion set up.

## 2021-12-07 PROBLEM — U07.1 ACUTE HYPOXEMIC RESPIRATORY FAILURE DUE TO COVID-19 (HCC): Status: ACTIVE | Noted: 2021-01-01

## 2021-12-07 PROBLEM — J96.01 ACUTE HYPOXEMIC RESPIRATORY FAILURE DUE TO COVID-19 (HCC): Status: ACTIVE | Noted: 2021-01-01

## 2021-12-07 NOTE — ED PROVIDER NOTES
[facility] ED  eMERGENCY dEPARTMENT eNCOUnter      Pt Name: Darci Diaz  MRN: [id number]  [place] 1960  Date of evaluation: 12/7/2021  Provider: Angie Schwartz MD    CHIEF COMPLAINT       Chief Complaint   Patient presents with    Positive For Covid-19    Shortness of Breath         HISTORY OF PRESENT ILLNESS   (Location/Symptom, Timing/Onset,Context/Setting, Quality, Duration, Modifying Factors, Severity)  Note limiting factors. Darci Diaz is a 64 y.o. female who presents to the emergency department with complaint of shortness of breath. Patient tested positive for Covid on Friday. She has been coughing, congested, short of breath. She has had 2 Moderna Covid vaccines back in March. She has multiple risk factors including diabetes, thromboembolism with PE and DVTs, lupus erythematosus, hypertension, coronary artery disease status post non-STEMI, gastroesophageal reflux disease, fibromyalgia, vitamin D deficiency, reactive airway disease, chronic pain syndrome. 1st responders found her with pulse ox in the 50s. She denies chest pain, palpitations, orthopnea or PND. Denies any other systemic symptoms. HPI    Nursing Notes were reviewed. REVIEW OF SYSTEMS    (2-9 systems for level 4, 10 or more for level 5)     Review of Systems   Constitutional: Positive for activity change, appetite change, chills and fever. Negative for fatigue. HENT: Negative for congestion, ear discharge, ear pain, hearing loss, rhinorrhea, sinus pressure and sore throat. Eyes: Negative for photophobia, pain and visual disturbance. Respiratory: Positive for cough and shortness of breath. Negative for apnea and wheezing. Cardiovascular: Negative for chest pain, palpitations and leg swelling. Gastrointestinal: Negative for abdominal distention, abdominal pain, constipation, diarrhea, nausea and vomiting. Endocrine: Negative for cold intolerance, heat intolerance and polyuria. Genitourinary: Negative for dysuria, flank pain, frequency and urgency. Musculoskeletal: Positive for arthralgias and myalgias. Negative for back pain, gait problem and neck stiffness. Skin: Negative for color change, pallor and rash. Allergic/Immunologic: Negative for food allergies and immunocompromised state. Neurological: Negative for dizziness, tremors, syncope, weakness, light-headedness and headaches. Psychiatric/Behavioral: Negative for agitation, confusion and hallucinations. All other systems reviewed and are negative. Except as noted above the remainder of the review of systems was reviewed and negative.        PAST MEDICAL HISTORY     Past Medical History:   Diagnosis Date    Ataxic gait     Back pain     Benign essential tremor     Depression     DM (diabetes mellitus) ([facility] 11/25/2014    DVT [facility]deep venous thrombosis) [facility]     Elevated troponin 5/25/2016    Factor V Leiden carrier ([facility]     history of DVT, on Xarelto    GERD (gastroesophageal reflux disease)     Hereditary sensory-motor neuropathy, type I     HTN (hypertension)     HTN (hypertension)     Hyperlipidemia     Lumbar radicular pain     Neuropathy of both feet 8/22/2016    NSTEMI (non-ST elevated myocardial infarction) ([facility])     Obesity     Osteoarthritis     Pulmonary embolism ([facility]     Renal failure 6/29/2018    Right inguinal hernia 11/9/2017    Seizure ([address] 10/18/2021    SOB (shortness of breath) 4/19/2017    Systemic lupus erythematosus ([facility] 9/8/2017    Transient brainstem ischemia     Umbilical hernia 17/1/2051    Vasomotor flushing     on hormone replacement therapy         SURGICAL HISTORY       Past Surgical History:   Procedure Laterality Date    APPENDECTOMY      CARDIAC CATHETERIZATION  06/2018    CATARACT REMOVAL Bilateral Winter 2019   [employer] COLONOSCOPY      Dr. Azra Caraballo    COLONOSCOPY  12/16/13    DR SINGER repeat in 10 yrs per pt    COLONOSCOPY  08/2017    Dr Rhiannon Hardy DICLOFENAC SODIUM (VOLTAREN) 1 % GEL    Apply 4 g topically 4 times daily    DULAGLUTIDE (TRULICITY) 1.5 PA/7.9WH SOPN    Inject 1.5 mg into the skin once a week    FOLIC ACID (FOLVITE) 1 MG TABLET    Take 1 tablet by mouth daily    FUROSEMIDE (LASIX) 20 MG TABLET        GABAPENTIN (NEURONTIN) 400 MG CAPSULE    Take 400 mg by mouth 3 times daily. GLUCOSE BLOOD (BLOOD GLUCOSE TEST STRIPS) STRP    Test once daily    HYDROCORTISONE 1 % CREAM    Apply topically 2 times daily. HYDROXYCHLOROQUINE (PLAQUENIL) 200 MG TABLET    Take 200 mg by mouth 2 times daily     HYDROXYZINE (VISTARIL) 50 MG CAPSULE    Take one pill every 8 hrs for itching    JANUVIA 50 MG TABLET        LANCETS MISC    Testing q day    LEUCOVORIN CALCIUM (WELLCOVORIN) 5 MG TABLET    Tuesday    LEVETIRACETAM (KEPPRA) 250 MG TABLET    Take 1 tablet by mouth 2 times daily    LIDOCAINE (LIDODERM) 5 %    PLACE 1 PATCH ON THE SKIN DAILY, 12 HOURS ON AND 12 HOURS OFF    MAGNESIUM OXIDE (MAG-OX) 400 (241.3 MG) MG TABS TABLET    Take 1 tablet by mouth daily    METHOTREXATE (RHEUMATREX) 2.5 MG CHEMO TABLET    Monday    NYSTATIN (MYCOSTATIN) 808580 UNIT/GM POWDER    Apply 3 times daily. OMEPRAZOLE (PRILOSEC) 40 MG DELAYED RELEASE CAPSULE    Take 40 mg by mouth daily    PREDNISONE (DELTASONE) 2.5 MG TABLET    5mg daily with food in AM.    PROLIA 60 MG/ML SOSY SC INJECTION    Every 6 months received through DR office    RIVAROXABAN (XARELTO) 20 MG TABS TABLET    TAKE 1 TABLET DAILY    ROPINIROLE (REQUIP) 0.25 MG TABLET    AT START OF THERAPY, TAKE 1 TABLET NIGHTLY FOR 1 WEEK, AND THEN 2 TABLETS NIGHTLY THEREAFTER. TRAMADOL-ACETAMINOPHEN (ULTRACET) 37.5-325 MG PER TABLET    Take 1 tablet by mouth every 6 hours as needed for Pain.     TRIAMCINOLONE (KENALOG) 0.1 % OINTMENT    APPLY TO THE AFFECTED AREAS OF RASH TWICE A DAY FOR 2 WEEKS THEN ONCE DAILY FOR 2 WEEKS       ALLERGIES     Sulfa antibiotics, Ciprofloxacin hcl, Nsaids, and Statins    FAMILY HISTORY Family History   Problem Relation Age of Onset    Substance Abuse Brother     High Blood Pressure Mother     High Cholesterol Mother     Arthritis Mother     High Blood Pressure Father     High Cholesterol Father     Clotting Disorder Father         clot to intestines    Arthritis Father     Substance Abuse Brother     Substance Abuse Brother     Stroke Paternal Uncle 36    Cystic Fibrosis Daughter           SOCIAL HISTORY       Social History     Socioeconomic History    Marital status:      Spouse name: Jacque Zavala    Number of children: 1    Years of education: 15    Highest education level: 12th grade   Occupational History    Occupation: Housewife    Occupation: retired    Tobacco Use    Smoking status: Former Smoker     Packs/day: 1.25     Years: 10.00     Pack years: 12.50     Types: Cigarettes     Quit date: 1/1/2006     Years since quitting: 15.9    Smokeless tobacco: Never Used    Tobacco comment: start smoking age 25   Vaping Use    Vaping Use: Never used   Substance and Sexual Activity    Alcohol use: Not Currently    Drug use: No    Sexual activity: Yes   Other Topics Concern    None   Social History Narrative    Off work dt--retired early from --worked [employer]---moved back to [place] to work at [employer] in [address] up in [place] [facility] [facility]: Low Risk     Difficulty of Paying Living Expenses: Not hard at [employer] Insecurity: No Food Insecurity    Worried About [address] in the Last Year: Never true    [address] in the Last Year: Never true   Transportation Needs:     Lack of Transportation (Medical): Not on file    Lack of Transportation (Non-Medical):  Not on file   Physical Activity:     Days of Exercise per Week: Not on file    Minutes of Exercise per Session: Not on file   Stress:     Feeling of Stress : Not on file   Social Connections:     Frequency of Communication with Friends and Family: Not on file    Frequency of Social Gatherings with Friends and Family: Not on file    Attends Buddhism Services: Not on file    Active Member of Clubs or Organizations: Not on file    Attends Club or Organization Meetings: Not on file    Marital Status: Not on file   Intimate Partner Violence:     Fear of Current or Ex-Partner: Not on file    Emotionally Abused: Not on file    Physically Abused: Not on file    Sexually Abused: Not on file   Housing Stability:     Unable to Pay for Housing in the Last Year: Not on file    Number of [place] in the Last Year: Not on file    Unstable Housing in the Last Year: Not on file       SCREENINGS             PHYSICAL EXAM    (up to 7 for level 4, 8 or more for level 5)     ED Triage Vitals [12/07/21 0722]   BP Temp Temp Source Pulse Resp SpO2 Height Weight   109/67 99.5 °F (37.5 °C) Oral 91 20 97 % -- 175 lb (79.4 kg)       Physical Exam  Vitals and nursing note reviewed. Constitutional:       General: She is in acute distress. Appearance: She is well-developed. She is obese. She is ill-appearing. She is not toxic-appearing or diaphoretic. Interventions: She is not intubated. HENT:      Head: Normocephalic and atraumatic. Nose: Nose normal.      Mouth/Throat:      Mouth: Mucous membranes are moist.      Pharynx: Oropharynx is clear. No pharyngeal swelling or oropharyngeal exudate. Eyes:      General: No scleral icterus. Right eye: No discharge. Left eye: No discharge. Conjunctiva/sclera: Conjunctivae normal.      Pupils: Pupils are equal, round, and reactive to light. Neck:      Thyroid: No thyromegaly. Vascular: No hepatojugular reflux or JVD. Trachea: No tracheal deviation. Cardiovascular:      Rate and Rhythm: Normal rate and regular rhythm. No extrasystoles are present. Pulses: Normal pulses. No decreased pulses. Heart sounds: Normal heart sounds.  No murmur heard. No friction rub. No gallop. Pulmonary:      Effort: Tachypnea and accessory muscle usage present. No bradypnea or respiratory distress. She is not intubated. Breath sounds: No stridor. Decreased breath sounds present. No wheezing, rhonchi or rales. Chest:      Chest wall: No mass, deformity, tenderness, crepitus or edema. There is no dullness to percussion. Abdominal:      General: Bowel sounds are normal. There is no distension. Palpations: Abdomen is soft. There is no hepatomegaly, splenomegaly or mass. Tenderness: There is no abdominal tenderness. There is no guarding or rebound. Musculoskeletal:         General: No tenderness or deformity. Normal range of motion. Cervical back: Normal range of motion and neck supple. Right lower leg: No tenderness. No edema. Left lower leg: No tenderness. No edema. Lymphadenopathy:      Cervical: No cervical adenopathy. Skin:     General: Skin is warm and dry. Capillary Refill: Capillary refill takes less than 2 seconds. Coloration: Skin is not cyanotic or pale. Findings: No ecchymosis, erythema or rash. Nails: There is no clubbing. Neurological:      General: No focal deficit present. Mental Status: She is alert and oriented to person, place, and time. Cranial Nerves: No cranial nerve deficit. Motor: Weakness present. No abnormal muscle tone. Coordination: Coordination normal.      Deep Tendon Reflexes: Reflexes are normal and symmetric. Reflexes normal.   Psychiatric:         Mood and Affect: Mood normal. Mood is not anxious. Behavior: Behavior normal. Behavior is not agitated. Thought Content:  Thought content normal.         Judgment: Judgment normal.         DIAGNOSTIC RESULTS     EKG: All EKG's are interpreted by the Emergency Department Physician who either signs or Co-signs this chart in the absence of a cardiologist.    Twelve-lead EKG shows normal sinus rhythm, rate 89 bpm, normal intervals, normal electrical axis, no acute ST-T wave changes. RADIOLOGY:   Non-plain film images such as CT, Ultrasound and MRI are read by the radiologist. Carrienedra Mason radiographicimages are visualized and preliminarily interpreted by the emergency physician with the below findings:        Interpretation per the Radiologist below, if available at the time of this note:    CTA Chest W WO  (PE study)   Final Result   1. No CT evidence of pulmonary embolism in the primary or secondary branches of the pulmonary arteries. 2.Bilateral multifocal infiltrates that may be due to bacterial or viral Colles which includes covid 19. .         All CT scans at this facility use dose modulation, iterative reconstruction, and/or weight based dosing when appropriate to reduce radiation dose to as low as reasonably achievable.                      ED BEDSIDE ULTRASOUND:   Performed by ED Physician - none    LABS:  Labs Reviewed   COMPREHENSIVE METABOLIC PANEL - Abnormal; Notable for the following components:       Result Value    Glucose 100 (*)     CREATININE 1.06 (*)     GFR Non- 52.6 (*)     Calcium 8.3 (*)     Total Protein 6.2 (*)     Alkaline Phosphatase 162 (*)     ALT 42 (*)     AST 77 (*)     All other components within normal limits    Narrative:     CALL  Solorzano  ER tel. [phone],  Chemistry results called to and read back by chas, 12/07/2021 08:24, by CASMELISSA   CBC WITH AUTO DIFFERENTIAL - Abnormal; Notable for the following components:    WBC 2.5 (*)     Hematocrit 36.1 (*)     RDW 14.6 (*)     Platelets 586 (*)     Eosinophils % 0.0 (*)     Lymphocytes Absolute 0.6 (*)     All other components within normal limits   MAGNESIUM - Abnormal; Notable for the following components:    Magnesium 1.4 (*)     All other components within normal limits    Narrative:     Griselda Celaya tel. [phone],  Chemistry results called to and read back by chas, 12/07/2021 08:24, by Qranio   TROPONIN - Abnormal; Notable for the following components:    Troponin 0.013 (*)     All other components within normal limits    Narrative:     Ad Mackey tel. [phone],  Chemistry results called to and read back by chas, 12/07/2021 08:24, by ALBAN   D-DIMER, QUANTITATIVE - Abnormal; Notable for the following components:    D-Dimer, Quant 2.83 (*)     All other components within normal limits    Narrative:     Ad Mackey tel. [phone],  Coag results called to and read back by Marianne Foy, 12/07/2021 08:21, by Adeline Chen results called to and read back by, 12/07/2021 08:21, by Patricio Gill - Abnormal; Notable for the following components:    Bacteria, UA RARE (*)     All other components within normal limits   CULTURE, BLOOD 1   CULTURE, BLOOD 2   LACTIC ACID, PLASMA    Narrative:     Jose Alberto STOUT tel. [phone],  Chemistry results called to and read back by chas, 12/07/2021 08:24, by Counts include 234 beds at the [facility] [address] other labs were within normal range or not returned as of this dictation. EMERGENCY DEPARTMENT COURSE and DIFFERENTIALDIAGNOSIS/MDM:   Vitals:    Vitals:    12/07/21 1030 12/07/21 1100 12/07/21 1130 12/07/21 1500   BP:  91/63  106/75   Pulse:  82     Resp:  18     Temp:       TempSrc:       SpO2: 91% (!) 88% (!) 89% 90%   Weight:               MDM  Number of Diagnoses or Management Options     Amount and/or Complexity of Data Reviewed  Clinical lab tests: reviewed and ordered  Tests in the radiology section of CPT®: reviewed and ordered  Tests in the medicine section of CPT®: reviewed and ordered    Risk of Complications, Morbidity, and/or Mortality  Presenting problems: high  Diagnostic procedures: high  Management options: high    Patient Progress  Patient progress: improved      CRITICAL CARE TIME   Total Critical Care time was  minutes, excluding separately reportable procedures.   There was a high probability of clinically significant/life threatening deterioration in the patient's condition which required my urgentintervention. CONSULTS:  None    PROCEDURES:  Unless otherwise noted below, none     Procedures    FINAL IMPRESSION      1. Pneumonia due to COVID-19 virus    2. Acute respiratory failure with hypoxia [facility])          DISPOSITION/PLAN   DISPOSITION Decision To Transfer 12/07/2021 10:03:07 AM      PATIENT REFERRED TO:  No follow-up provider specified.     DISCHARGE MEDICATIONS:  New Prescriptions    No medications on file          (Please note that portions of this note were completed with a voice recognitionprogram.  Efforts were made to edit the dictations but occasionally words are mis-transcribed.)    Kristin Jj MD (electronically signed)  Attending Emergency Physician          Kristin Jj MD  12/07/21 2359

## 2021-12-08 PROBLEM — J12.82 PNEUMONIA DUE TO COVID-19 VIRUS: Status: ACTIVE | Noted: 2021-01-01

## 2021-12-08 NOTE — ED NOTES
[facility] called to [facility] ED [facility]. ETA 45 minutes. Will continue to monitor patient. Pt. Updated on plan of care.       Mary Jaime RN  12/08/21 2320

## 2021-12-08 NOTE — PROGRESS NOTES
Pt assessed and medications given. Pt tolerated well. Pt anxious; A&Ox4 with n/o pain. VSS with exception to being on 60L heated high flow. Call light within reach. Will continue to monitor.

## 2021-12-08 NOTE — PROGRESS NOTES
Order for Remdesivir received from Dr. Delroy Phoenix    1)  Confirmed drug availability for complete patient course of therapy (200 mg IV x 1, then 100 mg daily on days 2-5)    2)  Inclusion Criteria:  Reviewed/Confirmed with provider criteria present   Adults, children (?3.5Kg, only use lyophilized powder), or pregnant women with proven COVID19 as defined by a positive nasopharyngeal swab, tracheal aspirate or sputum   SARS-CoV-2 PCR or a positive serology test requiring hospitalization for COVID-19-severe pneumonia.    Requiring supplemental oxygen     12/03/2021 1114 12/03/2021 1114 POCT COVID-19, Antigen [[id number]]   (Abnormal)   Nasal    Component Value   SARS-COV-2, POC Detected Abnormal    Lot Number [id number]   QC Pass/Fail Pass   Performing Instrument [place]          3)  Recommend prioritization of patients who present with symptom onset < 14 days and within 10 days of acute care admission     4)  Exclusion Criteria:  Reviewed/Confirmed none of the following  present: (criteria in bold present during review; risk/benefit rationale of physician documented)    Requiring invasive or non-invasive mechanical ventilation  A) Consider use in patients requiring high-flow oxygen early in the disease state (based on symptom duration)    Use of more than 1 vasopressor agent prior to start of remdesivir    Already improving on current treatment/supportive regimen as evidenced by improving oxygenation, and/or impending discharge    Patients in whom the clinical team think death is in the immediate short-term whereby administration of RDV unlikely to change clinical outcome     5) Monitoring Parameters:  CMP (includes hepatic panel) x 5 days    Consider discontinuation of remdesivir if LFTs substantially increase following initiation of therapy (ie: 5x baseline lab values) or if ALT elevation is accompanied by signs or symptoms of liver inflammation    GFR < 30mL/min: Use with caution due to risk of cyclodextrin toxicity with

## 2021-12-08 NOTE — ED NOTES
[facility] transfer center called with bed assignment. Pt. Admitted to 4W bed 463.   Number for report is [id number], RN  12/08/21 2143

## 2021-12-08 NOTE — PROGRESS NOTES
Patient seen and examined. Was becoming hypoxic and therefore started on heated high flow. Discussed with respiratory therapist.  Case also discussed with infectious disease in person, rheumatoid arthritis history; was on methotrexate and also on Prolia. Although the Prolia was being used for osteoporosis, it may also cause immunosuppression. Treating current Covid infection with steroids, remdesivir, baricitinib as per discussion with infectious disease.   Detailed history and physical to follow

## 2021-12-08 NOTE — CONSULTS
Infectious Diseases Inpatient Consult Note      Reason for Consult:   COVID-23  Requesting Physician:   Dr. Oren Dockery  Primary Care Physician:  Deon Carias, APRN - CNP  History Obtained From:   Pt, EPIC    Admit Date: 12/8/2021  Hospital Day: 1      HISTORY OF PRESENT ILLNESS:  This is a 64 y.o. female was admitted to [facility]  from home  through ER with dry cough and progressive shortness of breath of 1 week duration. Patient reported that her symptoms started soon after she took her COVID-19 booster last Monday. She has been very weak with decreased appetite. She denies any myalgias or fatigue. No headache. Patient is on methotrexate for connective tissue disease. Follows up by Dr. Katherin Davis at [facility] clinic.      Past medical surgical and social history were reviewed and are as detailed below  Past Medical History:   Diagnosis Date    Ataxic gait     Back pain     Benign essential tremor     Depression     DM (diabetes mellitus) ([address] 11/25/2014    DVT [facility]deep venous thrombosis) [facility]     Elevated troponin 5/25/2016    Factor V Leiden carrier ([address]     history of DVT, on Xarelto    GERD (gastroesophageal reflux disease)     Hereditary sensory-motor neuropathy, type I     HTN (hypertension)     HTN (hypertension)     Hyperlipidemia     Lumbar radicular pain     Neuropathy of both feet 8/22/2016    NSTEMI (non-ST elevated myocardial infarction) ([address]     Obesity     Osteoarthritis     Pulmonary embolism ([address]     Renal failure 6/29/2018    Right inguinal hernia 11/9/2017    Seizure ([address] 10/18/2021    SOB (shortness of breath) 4/19/2017    Systemic lupus erythematosus ([address] 9/8/2017    Transient brainstem ischemia     Umbilical hernia 54/0/0732    Vasomotor flushing     on hormone replacement therapy       Past Surgical History:   Procedure Laterality Date    APPENDECTOMY      CARDIAC CATHETERIZATION  06/2018    CATARACT REMOVAL Bilateral Winter 2019   Marta Coral COLONOSCOPY      Dr. Tiffani Levin COLONOSCOPY  12/16/13    DR SINGER repeat in 10 yrs per pt    COLONOSCOPY  08/2017    Dr Iraheta Snipe Right 11/15/2017    RIGHT  HERNIA INGUINAL REPAIR performed by Kathrine Stevens MD at [address] [place], [place] [address]Mg ARTHROSCOPY Left     LAPAROSCOPY N/A 11/15/2017    DIAGNOSTIC LAPAROSCOPY performed by Kathrine Stevens MD at [address] Right 3/14/2019    RIGHT QUADRICEPS MUSCLE BIOPSY performed by Tamar Gage MD at [address] [facility] N/A 9/25/2017    [address] [place] [address] performed by Juju Ying MD at [facility],8+K/D,MGIHA N/A 44/76/5818    HERNIA UMBILICAL REPAIR, [place] performed by Kathrine Stevens MD at [address]  12/16/13    DR Go Antis       Current Medications:     sodium chloride flush  5-40 mL IntraVENous 2 times per day    enoxaparin  30 mg SubCUTAneous BID    dexamethasone  6 mg Oral Daily    Vitamin D  2,000 Units Oral Daily    remdesivir IVPB  200 mg IntraVENous Once    Followed by   Stephania John ON 12/9/2021] remdesivir IVPB  100 mg IntraVENous Q24H       Allergies:  Sulfa antibiotics, Ciprofloxacin hcl, Nsaids, and Statins    Social History     Socioeconomic History    Marital status:      Spouse name: [address] Number of children: 1    Years of education: 12    Highest education level: 12th grade   Occupational History    Occupation: Housewife    Occupation: retired    Tobacco Use    Smoking status: Former Smoker     Packs/day: 1.25     Years: 10.00     Pack years: 12.50     Types: Cigarettes     Quit date: 1/1/2006     Years since quitting: 15.9    Smokeless tobacco: Never Used    Tobacco comment: start smoking age 25   Vaping Use    Vaping Use: Never used   Substance and Sexual Activity    Alcohol use: Not Currently    Drug use: No    Sexual activity: Yes   Other Topics Concern    Not on file   Social History Narrative Off work dt--retired early from [employer]---moved back to [place] [facility] to work at [employer] in [address] up in [place] went to [employer] [facility]: Low Risk     Difficulty of Paying Living Expenses: Not hard at [facility] Insecurity: No Food Insecurity    Worried About [address] [employer] in the Last Year: Never true   Esvin Gudino in the Last Year: Never true   Transportation Needs:     Lack of Transportation (Medical): Not on file    Lack of Transportation (Non-Medical):  Not on file   Physical Activity:     Days of Exercise per Week: Not on file    Minutes of Exercise per Session: Not on file   Stress:     Feeling of Stress : Not on file   Social Connections:     Frequency of Communication with Friends and Family: Not on file    Frequency of Social Gatherings with Friends and Family: Not on file    Attends Spiritism Services: Not on file    Active Member of Clubs or Organizations: Not on file    Attends Club or Organization Meetings: Not on file    Marital Status: Not on file   Intimate Partner Violence:     Fear of Current or Ex-Partner: Not on file    Emotionally Abused: Not on file    Physically Abused: Not on file    Sexually Abused: Not on file   Housing Stability:     Unable to Pay for Housing in the Last Year: Not on file    Number of [place] in the Last Year: Not on file    Unstable Housing in the Last Year: Not on file         Family History:   Family History   Problem Relation Age of Onset    Substance Abuse Brother     High Blood Pressure Mother     High Cholesterol Mother     Arthritis Mother     High Blood Pressure Father     High Cholesterol Father     Clotting Disorder Father         clot to intestines    Arthritis Father     Substance Abuse Brother     Substance Abuse Brother     Stroke Paternal Uncle 36    Cystic Fibrosis Daughter        Review of Systems  14 system review is negative [facility]    Other osteoporosis without current pathological fracture    Cushingoid side effect of steroids (HCC)    Hereditary sensory-motor neuropathy, type I    Ataxic gait    Lumbar radicular pain    Transient brainstem ischemia    Essential tremor    Bronchitis    Tachycardia    Primary insomnia    Uncontrolled type 2 diabetes mellitus with hyperglycemia (HCC)    CKD stage 3 secondary to diabetes [facility]    Hypercalcemia    Seizure [facility]    Acute hypoxemic respiratory failure due to COVID-19 ([facility]       PLAN:  · Hold methotrexate  · IV remdesivir for 5 days  · Follow-up CBC complete metabolic profile daily while on remdesivir  · Oxygen support and weaning as tolerated  · Baricitinib daily up to 14 days or until improvement as discussed with the patient who is agreeable to take it under emergency use authorization for COVID-19.   Benefit outweighs risk in this patient who is requiring high amount of oxygen and who is at risk for rapid progression because of immunosuppression  · We will monitor for superimposed bacterial infection and blood clots  · Decadron and anticoagulation as ordered    Discussed with patient and Dr. Mee Moncada MD

## 2021-12-08 NOTE — PROGRESS NOTES
Baricitinib Daily Monitoring    Black Box Warnings:   Infections  Malignancy  Thrombosis  Tuberculosis  Precautions:   GI perforations  Hematologic toxicity  Hepatic effects   Hypersensitivity  Lipid abnormalities      Monitor for signs and symptoms of Black Box Warnings and precautions. AST   Date Value Ref Range Status   12/08/2021 69 (H) 0 - 35 U/L Final     ALT   Date Value Ref Range Status   12/08/2021 35 (H) 0 - 33 U/L Final     WBC   Date Value Ref Range Status   12/08/2021 4.6 4.0 - 10.0 K/uL Final     Hemoglobin   Date Value Ref Range Status   12/08/2021 11.4 11.2 - 15.7 g/dL Final     Platelets   Date Value Ref Range Status   12/08/2021 193 182 - 369 K/uL Final        Baricitinib is not recommended in patients with the following abnormal lab values:   Absolute lymphocyte count (ALC) <200 cells/µL (consider interruption until ALC is ?200 cells/µL)  Absolute neutrophil count (ANC) <500 cells/µL (consider interruption until ANC is ?500 cells/µL)    Lymphocytes Absolute   Date Value Ref Range Status   12/08/2021 0.6 (L) 1.2 - 3.7 K/uL Final     Neutrophils Absolute   Date Value Ref Range Status   12/08/2021 3.8 1.6 - 6.1 K/uL Final       Dose is renally adjusted based on eGFR:  ?60 mL/min/1.73 m2: No dose adjustment  30 to <60 mL/min/1.73 m2: ?9 years - 2 mg once daily; 2 to <9 years  1 mg once daily  15 to <30 mL/min/1.73 m2: ?9 years  1 mg once daily; 2 to <9 years  Not recommended  <15 mL/min/1.73 m2: Not recommended    GFR Non-   Date Value Ref Range Status   12/08/2021 >60.0 >60 Final     Comment:     >60 mL/min/1.73m2 EGFR, calc. for ages 25 and older using the  MDRD formula (not corrected for weight), is valid for stable  renal function. GFR    Date Value Ref Range Status   12/08/2021 >60.0 >60 Final     Comment:     >60 mL/min/1.73m2 EGFR, calc. for ages 25 and older using the  MDRD formula (not corrected for weight), is valid for stable  renal function. Renal function assessed and dose will start at 4 mg  Use of baricitinib is appropriate based on above labs and renal function. Patient given baricitinib patient medication guide.

## 2021-12-08 NOTE — ED NOTES
Report called to [facility] RN @ [phone] [address]       Anibal Anguiano RN  12/08/21 9775

## 2021-12-08 NOTE — PROGRESS NOTES
1934-Chitra Hawk and Dr Maxwell Vail notified of pt ER Bed 3 for transfer 12 hour time limit is up.  [facility] has accepted but no bed assignment

## 2021-12-09 NOTE — PROGRESS NOTES
Hospitalist Progress Note      Date of Admission: 12/8/2021  Chief Complaint:    No chief complaint on file. Subjective:  No new complaints. No nausea, vomiting, chest pain, or headache      Medications:    Infusion Medications    dextrose      sodium chloride       Scheduled Medications    allopurinol  300 mg Oral Daily    citalopram  40 mg Oral Daily    gabapentin  400 mg Oral TID    lidocaine  1 patch TransDERmal Daily    rivaroxaban  20 mg Oral Daily    pill splitter   Does not apply Once    insulin glargine  20 Units SubCUTAneous BID    QUEtiapine  50 mg Oral Daily    sodium chloride flush  5-40 mL IntraVENous 2 times per day    dexamethasone  6 mg Oral Daily    Vitamin D  2,000 Units Oral Daily    baricitinib  4 mg Oral Daily    remdesivir IVPB  100 mg IntraVENous Q24H    amitriptyline  25 mg Oral Nightly    carvedilol  6.25 mg Oral BID    levETIRAcetam  250 mg Oral BID    pantoprazole  40 mg Oral QAM AC     PRN Meds: traMADol **AND** acetaminophen, glucose, dextrose, glucagon (rDNA), dextrose, sodium chloride flush, sodium chloride, ondansetron **OR** ondansetron, polyethylene glycol, acetaminophen **OR** acetaminophen, guaiFENesin-dextromethorphan, sodium chloride    Intake/Output Summary (Last 24 hours) at 12/9/2021 1722  Last data filed at 12/9/2021 0306  Gross per 24 hour   Intake 360 ml   Output    Net 360 ml     Exam:  /85   Pulse 103   Temp 100.2 °F (37.9 °C) (Oral)   Resp 24   LMP  (LMP Unknown)   SpO2 90%   Head: Normocephalic, atraumatic  Sclera clear  Neck JVD flat  Lungs: normal effort of breathing, scattered crackles. tachypneic at time, but improves with deep redirection deep controlled breaths.     Labs:   Recent Labs     12/07/21  0803 12/07/21  0803 12/08/21  0438 12/09/21  0453 12/09/21  1644   WBC 2.5*  --  4.6 6.5  --    HGB 11.7   < > 11.4 12.1 10.7*   HCT 36.1*  --  34.7* 36.2*  --    *  --  193 245  --     < > = values in this interval not displayed. Recent Labs     12/07/21  0803 12/07/21  0803 12/08/21  0438 12/09/21  0453 12/09/21  1644     --  138 140  --    K 4.4  --  4.7 5.2*  --    CL 98  --  102 104  --    CO2 23  --  21 19*  --    BUN 18  --  25* 34*  --    CREATININE 1.06*   < > 0.88 0.90 1.0   CALCIUM 8.3*  --  8.0* 8.7  --    AST 77*  --  69* 66*  --    ALT 42*  --  35* 32  --    BILITOT 0.3  --  <0.2 0.3  --    ALKPHOS 162*  --  148* 145*  --     < > = values in this interval not displayed. No results for input(s): INR in the last 72 hours. Recent Labs     12/07/21  0803   TROPONINI 0.013*     Radiology:  No orders to display     Assessment/Plan:    1. Acute hypoxic respiratory failure secondary to Covid pneumonia. Currently on high flow oxygen. High FiO2 requirements. Appreciate pulm eval and input. 2. Covid pneumonia: Steroids, remdesivir, baricitinib. Primarily being managed by infectious disease. 3. History of rheumatoid arthritis: Hold methotrexate, Plaquenil. Was on Prolia prior to admission, based on patient's symptoms patient was told she would not be getting her last scheduled dose of Prolia. 4. Diabetes: Monitor blood sugars, Lantus, lispro with meals, insulin sliding scale. 5. History of pulmonary embolism: On Xarelto.     35 minutes total care time, >1/2 in unit/floor time and care coordination     Kash Guevara MD ,MD

## 2021-12-09 NOTE — PROGRESS NOTES
Daughter, Cecy Penny was updated via telephone. 36- Pt's spouse Tory Mendozas updated via telephone.  Pt will be going to TCU bed 7

## 2021-12-09 NOTE — CONSULTS
INPATIENT PROGRESS NOTES    PATIENT NAME: Alivia Chandler  MRN: [id number]  SERVICE DATE:  December 9, 2021   SERVICE TIME:  3:55 PM      PRIMARY SERVICE: Pulmonary Disease    CHIEF COMPLAIN: Respiratory failure,      INTERVAL HPI: Patient seen and examined at bedside, Interval Notes, orders reviewed. Nursing notes noted  This is a 61-year-old female with complaint of progressive shortness of breath for 1 week dry cough. Patient states she is reported having symptoms soon after she took her Covid-19 booster shot Monday. She is very weak tired. She has decreased appetite. She has no headache, no nausea. Vomiting or diarrhea. He has a fever with T-max 100.2. Currently she is on heated high flow 100% and O2 saturation is ranging from 88 to 94%. She has been on immunosuppressive medication methotrexate for connective tissue disease, polymyositis and has been following rheumatologist Dr. Melody Greer at [facility]. OBJECTIVE    There is no height or weight on file to calculate BMI. PHYSICAL EXAM:  Vitals:  /85   Pulse 103   Temp 100.2 °F (37.9 °C) (Oral)   Resp 24   LMP  (LMP Unknown)   SpO2 90%   General: Sleepy but easily arousable on high flow O2.comfortable in bed, No distress. Head: Atraumatic , Normocephalic   Eyes: PERRL. No sclera icterus. No conjunctival injection. No discharge   ENT: No nasal  discharge. Pharynx clear. Neck:  Trachea midline. No thyromegaly, no JVD, No cervical adenopathy. Chest : Bilaterally symmetrical ,Normal effort,  No accessory muscle use  Lung : . Fair BS bilateral, decreased BS at bases. Few scattered basilar Rales. No wheezing. No rhonchi. Heart[de-identified] Normal  rate. Regular rhythm. No mumur ,  Rub or gallop  ABD: Non-tender. Non-distended. No masses. No organmegaly. Normal bowel sounds. No hernia.   Ext : No Pitting both leg , No Cyanosis No clubbing  Neuro: no focal weakness          DATA:   Recent Labs     12/08/21  0438 12/09/21  0453   WBC 4.6 6.5   HGB 11.4 12.1 HCT 34.7* 36.2*   MCV 91.6 96.1    245     Recent Labs     12/08/21  0438 12/08/21  0438 12/09/21  0453     --  140   K 4.7  --  5.2*     --  104   CO2 21  --  19*   BUN 25*  --  34*   CREATININE 0.88  --  0.90   GLUCOSE 300*  --  297*   CALCIUM 8.0*  --  8.7   PROT 5.9*  --  6.3   LABALBU 3.1*  --  3.3*   BILITOT <0.2  --  0.3   ALKPHOS 148*  --  145*   AST 69*  --  66*   ALT 35*   < > 32   LABGLOM >60.0   < > >60.0   GFRAA >60.0   < > >60.0   GLOB 2.8   < > 3.0    < > = values in this interval not displayed. MV Settings:     FiO2 : 90 %    No results for input(s): PHART, GRJ6NYW, PO2ART, EAS1ZJC, BEART, H7SNJPTK in the last 72 hours. O2 Device: Heated high flow cannula  O2 Flow Rate (L/min): 60 L/min    ADULT DIET;  Regular     MEDICATIONS during current hospitalization:    Continuous Infusions:   dextrose      sodium chloride         Scheduled Meds:   allopurinol  300 mg Oral Daily    citalopram  40 mg Oral Daily    gabapentin  400 mg Oral TID    lidocaine  1 patch TransDERmal Daily    rivaroxaban  20 mg Oral Daily    pill splitter   Does not apply Once    insulin glargine  20 Units SubCUTAneous BID    insulin lispro  10 Units SubCUTAneous TID     QUEtiapine  50 mg Oral Daily    sodium chloride flush  5-40 mL IntraVENous 2 times per day    dexamethasone  6 mg Oral Daily    Vitamin D  2,000 Units Oral Daily    baricitinib  4 mg Oral Daily    remdesivir IVPB  100 mg IntraVENous Q24H    amitriptyline  25 mg Oral Nightly    carvedilol  6.25 mg Oral BID    levETIRAcetam  250 mg Oral BID    pantoprazole  40 mg Oral QAM AC       PRN Meds:traMADol **AND** acetaminophen, glucose, dextrose, glucagon (rDNA), dextrose, sodium chloride flush, sodium chloride, ondansetron **OR** ondansetron, polyethylene glycol, acetaminophen **OR** acetaminophen, guaiFENesin-dextromethorphan, sodium chloride    Radiology  CTA Chest W WO  (PE study)    Result Date: 12/7/2021  HISTORY: Hanh Bradley Rylan Sheehan is a Female of 64 years age. Evaluate for pulmonary embolism. Shortness of breath, elevated d-dimer COMPARISON: February 1, 2019 TECHNIQUE: Thin spiral chest CT was performed per pulmonary embolism protocol, following uneventful administration of intravenous contrast. Amount of intravenous contrast: 100 mL of Isovue-370. MIP images are included. FINDINGS:  There is no intraluminal filling defect identified within the central and proximal segmental pulmonary arteries to suggest pulmonary embolism. The thoracic aorta shows no evidence for dissection. The pulmonary parenchyma shows groundglass opacities in the upper lobes and lower lobes with somewhat peripheral predominance. There also is consolidation in the bases. No pleural effusion is seen. There is no mediastinal or axillary lymphadenopathy. There is a slightly prominent lymph node in the right hilum that measures 13 mm on greatest short axis. No evidence of chest wall mass or pleural effusion is present. The central airways and visualized portion of the esophagus are normal. The visualized portion of the thyroid gland is not enlarged. Appears to be inhomogeneous and may contain small nodules. Limited survey views of the upper abdomen show small hiatal hernia. The liver appears decreased in attenuation. Degenerative changes are seen in the thoracic spine and there is a dextro levoscoliosis. No destructive bony lesions are seen. 1.No CT evidence of pulmonary embolism in the primary or secondary branches of the pulmonary arteries. 2.Bilateral multifocal infiltrates that may be due to bacterial or viral Colles which includes covid 19. . All CT scans at this facility use dose modulation, iterative reconstruction, and/or weight based dosing when appropriate to reduce radiation dose to as low as reasonably achievable. IMPRESSION AND SUGGESTION:  1. Severe COVID-19 pneumonia  2. Acute respiratory failure with hypoxia  3.  Immunosuppression secondary

## 2021-12-09 NOTE — CARE COORDINATION
ALERTED PT THAT THIS CM WOULD CALL HER VIA CELL PHONE TO COMPLETE CMI SEVERAL TIMES. PT NOT ANSWERING CELL PHONE. WILL CONTINUE TO FOLLOW. PHONED John Meckel AT [address] Case Management Initial Discharge Assessment    Met with SPOUSE to discuss discharge plan. PCP: Francis Jolly, APRN - CNP                                Date of Last Visit: \"NOT LONG AGO\"    If no PCP, list provided? N/A    Discharge Planning    Living Arrangements: independently at home    Who do you live with? SPOUSE    Who helps you with your care:  self    If lives at home:     Do you have any barriers navigating in your home? no    Patient can perform ADL? Yes    Current Services (outpatient and in home) :  None    Dialysis: No    Is transportation available to get to your appointments? Yes    DME Equipment:  yes - [employer]    Respiratory equipment: None    Respiratory provider:  no     Pharmacy:  yes - [address] [place] [address] with Medication Assistance Program?  No      Patient agreeable to [facility]? Yes, Company WILL NEED LIST    Patient agreeable to SNF/Rehab? Yes, [facility]    Other discharge needs identified? N/A    Does Patient Have a High-Risk for Readmission Diagnosis (CHF, PN, MI, COPD)? Yes    Initial Discharge Plan? (Note: please see concurrent daily documentation for any updates after initial note).     HOME UNLESS OTHER INDICATIONS OF HHC/SNF NEEDED    Readmission Risk              Risk of Unplanned Readmission:  39         Electronically signed by Urszula Borja RN on 12/9/2021 at 3:11 PM

## 2021-12-09 NOTE — PROGRESS NOTES
Shift assessment complete. Patient is A&Ox1. VSS with the exception of RR and SPO2. Patient is very anxious and has shallow, rapid breathing doctor notified. She is on heated high flow NC 90% 60 L. Call placed to RT who then came to bedside O2 increased to 100%. Spoke with Dr Zach Patel orders placed for anxiety medication. He would also like SPO2 monitored via forehead due to patient's gel nail polish. Dr. Marcel Garnica requested consult to pulmonology. Order placed.

## 2021-12-09 NOTE — H&P
Hospital Medicine  History and Physical    Patient:  Mariia Cottrell  MRN: [id number]    CHIEF COMPLAINT:  No chief complaint on file. History Obtained From:  Patient, EMR  Primary Care Physician: BUTCH Merida CNP    HISTORY OF PRESENT ILLNESS:   The patient is a 64 y.o. female who presents with shortness of breath. Associated with weakness and decreased appetite. Duration of symptoms: Approximately 5 to 7 days. Timing: Gradually worsening. Aggravating relieving factors. Although patient states she did get her Covid booster last week.   Patient has a longstanding rheumatic disease history and has been on methotrexate currently on Plaquenil in the past.  Additionally, patient has been on Prolia for osteoporosis    Past Medical History:      Diagnosis Date    Ataxic gait     Back pain     Benign essential tremor     Depression     DM (diabetes mellitus) ([address] 11/25/2014    DVT [facility]deep venous thrombosis) [facility]     Elevated troponin 5/25/2016    Factor V Leiden carrier ([address]     history of DVT, on Xarelto    GERD (gastroesophageal reflux disease)     Hereditary sensory-motor neuropathy, type I     HTN (hypertension)     HTN (hypertension)     Hyperlipidemia     Lumbar radicular pain     Neuropathy of both feet 8/22/2016    NSTEMI (non-ST elevated myocardial infarction) ([facility])     Obesity     Osteoarthritis     Pulmonary embolism ([address]     Renal failure 6/29/2018    Right inguinal hernia 11/9/2017    Seizure ([address] 10/18/2021    SOB (shortness of breath) 4/19/2017    Systemic lupus erythematosus ([address] 9/8/2017    Transient brainstem ischemia     Umbilical hernia 47/8/9560    Vasomotor flushing     on hormone replacement therapy       Past Surgical History:      Procedure Laterality Date    APPENDECTOMY      CARDIAC CATHETERIZATION  06/2018    CATARACT REMOVAL Bilateral Winter 2019   [address] [place] [address] COLONOSCOPY      Dr. Kay Sampson    COLONOSCOPY  12/16/13    DR SINGER repeat in 10 yrs per pt  COLONOSCOPY  08/2017    Dr Delgado Pontiflee Right 11/15/2017    RIGHT  HERNIA INGUINAL REPAIR performed by Wilfred Bello MD at [address] [place] [address]  ARTHROSCOPY Left     LAPAROSCOPY N/A 11/15/2017    DIAGNOSTIC LAPAROSCOPY performed by Wilfred Bello MD at [address] [place] [address] Right 3/14/2019    RIGHT QUADRICEPS MUSCLE BIOPSY performed by Laron Koch MD at [address] [place] [address] N/A 9/25/2017    FLEXABLE CYSTOSCOPY performed by Byron Dutta MD at [facility],3+A/H,IHPKK N/A 74/20/8097    HERNIA UMBILICAL REPAIR, [place] performed by Wilfred Bello MD at [address]  12/16/13    DR SINGER       Medications Prior to Admission:    Prior to Admission medications    Medication Sig Start Date End Date Taking? Authorizing Provider   nystatin (MYCOSTATIN) 989753 UNIT/GM powder Apply 3 times daily. 10/27/21  Yes Devin Mabry, APRN - CNP   JANUVIA 50 MG tablet  10/12/21  Yes Historical Provider, MD   amitriptyline (ELAVIL) 25 MG tablet Take 25 mg by mouth nightly   Yes Historical Provider, MD   traMADol-acetaminophen (ULTRACET) 37.5-325 MG per tablet Take 1 tablet by mouth every 6 hours as needed for Pain. Yes Historical Provider, MD   cephALEXin (KEFLEX) 500 MG capsule Take 1 capsule by mouth 2 times daily 10/7/21  Yes Byron Dutta MD   Dulaglutide (TRULICITY) 1.5 NX/1.5TN SOPN Inject 1.5 mg into the skin once a week 8/27/21  Yes HAILEE Perez   magnesium oxide (MAG-OX) 400 (241.3 Mg) MG TABS tablet Take 1 tablet by mouth daily 8/16/21  Yes Vimal Steven MD   cyanocobalamin 1000 MCG tablet Take 1,000 mcg by mouth daily   Yes Historical Provider, MD   gabapentin (NEURONTIN) 400 MG capsule Take 400 mg by mouth 3 times daily.    Yes Historical Provider, MD   balsalazide (COLAZAL) 750 MG capsule Take 2,250 mg by mouth 3 times daily   Yes Historical Provider, MD omeprazole (PRILOSEC) 40 MG delayed release capsule Take 40 mg by mouth daily   Yes Historical Provider, MD   predniSONE (DELTASONE) 2.5 MG tablet 5mg daily with food in AM. 6/7/21  Yes Historical Provider, MD   levETIRAcetam (KEPPRA) 250 MG tablet Take 1 tablet by mouth 2 times daily 5/4/21  Yes Isidoro Sandoval MD   citalopram (CELEXA) 40 MG tablet TAKE 1 TABLET DAILY 1/25/21  Yes BUTCH Gardner CNP   triamcinolone (KENALOG) 0.1 % ointment APPLY TO THE AFFECTED AREAS OF RASH TWICE A DAY FOR 2 WEEKS THEN ONCE DAILY FOR 2 WEEKS 7/9/20  Yes BUTCH Hernandez CNP   rivaroxaban (XARELTO) 20 MG TABS tablet TAKE 1 TABLET DAILY 4/6/20  Yes BUTCH Hernandez CNP   leucovorin calcium (WELLCOVORIN) 5 MG tablet Tuesday 12/9/19  Yes Historical Provider, MD   methotrexate (RHEUMATREX) 2.5 MG chemo tablet Monday 4/18/19  Yes Historical Provider, MD   folic acid (FOLVITE) 1 MG tablet Take 1 tablet by mouth daily 2/6/19  Yes Ivonne Groves MD   hydrocortisone 1 % cream Apply topically 2 times daily. Patient taking differently: as needed Apply topically 2 times daily. 11/20/17  Yes BUTCH Hernandez CNP   diclofenac sodium (VOLTAREN) 1 % GEL Apply 4 g topically 4 times daily 10/12/17  Yes Maria Elena Cid DO   hydroxychloroquine (PLAQUENIL) 200 MG tablet Take 200 mg by mouth 2 times daily  9/8/17  Yes Historical Provider, MD   carvedilol (COREG) 6.25 MG tablet  11/25/21   Historical Provider, MD   furosemide (LASIX) 20 MG tablet  11/25/21   Historical Provider, MD   clobetasol (TEMOVATE) 0.05 % external solution Apply topically 2 times daily.  10/27/21   BUTCH Hernandez CNP   rOPINIRole (REQUIP) 0.25 MG tablet AT START OF THERAPY, TAKE 1 TABLET NIGHTLY FOR 1 WEEK, AND THEN 2 TABLETS NIGHTLY THEREAFTER. 10/22/21   Henrry Polk MD   hydrOXYzine (VISTARIL) 50 MG capsule Take one pill every 8 hrs for itching 9/22/21   BUTCH Hernandez CNP   lidocaine (LIDODERM) 5 % PLACE 1 PATCH ON THE SKIN Cystic Fibrosis Daughter        REVIEW OF SYSTEMS:  Ten systems reviewed and negative except as stated in the HPI    Physical Exam:    Vitals: /85   Pulse 103   Temp 100.2 °F (37.9 °C) (Oral)   Resp 24   LMP  (LMP Unknown)   SpO2 90%   General appearance: alert, appears stated age and cooperative  Skin: Skin color, texture, turgor normal. No rashes or lesions  HEENT: Head: Normocephalic, no lesions, without obvious abnormality. . No dental issues   Neck: no jugular venous distention  Lungs: clear to auscultation bilaterally, scattered crackles  Heart: regular rate and rhythm, S1, S2 normal, no murmur, click, rub or gallop  Abdomen: soft, non-tender; bowel sounds normal; no masses,  no organomegaly  Extremities: extremities normal, atraumatic, no cyanosis or edema  Neurologic: Mental status: Alert, oriented, thought content appropriate. Recent Labs     12/07/21 0803 12/07/21 0803 12/08/21 0438 12/09/21 0453 12/09/21  1644   WBC 2.5*  --  4.6 6.5  --    HGB 11.7   < > 11.4 12.1 10.7*   *  --  193 245  --     < > = values in this interval not displayed. Recent Labs     12/07/21 0803 12/07/21 0803 12/08/21 0438 12/09/21 0453 12/09/21  1644     --  138 140  --    K 4.4  --  4.7 5.2*  --    CL 98  --  102 104  --    CO2 23  --  21 19*  --    BUN 18  --  25* 34*  --    CREATININE 1.06*   < > 0.88 0.90 1.0   GLUCOSE 100*  --  300* 297*  --    AST 77*  --  69* 66*  --    ALT 42*  --  35* 32  --    BILITOT 0.3  --  <0.2 0.3  --    ALKPHOS 162*  --  148* 145*  --     < > = values in this interval not displayed. Troponin T:   Recent Labs     12/07/21 0803   TROPONINI 0.013*       ABGs:   Lab Results   Component Value Date    PHART 7.433 12/09/2021    PO2ART 41 12/09/2021    GYO4TAE 36 12/09/2021     INR: No results for input(s): INR in the last 72 hours.   URINALYSIS:  Recent Labs     12/07/21  1040   COLORU Yellow   PHUR 5.0   WBCUA 6-9   RBCUA 0-2   BACTERIA RARE*   CLARITYU Clear SPECGRAV <=1.005   LEUKOCYTESUR Moderate   UROBILINOGEN 0.2   BILIRUBINUR Negative   BLOODU Negative   GLUCOSEU Negative     -----------------------------------------------------------------   No results found. Assessment and Plan   1. Acute hypoxic respiratory failure secondary to Covid pneumonia. Currently on high flow oxygen. High FiO2 requirements. 2. Covid pneumonia: Steroids, remdesivir, baricitinib. Primarily being managed by infectious disease. 3. History of rheumatoid arthritis: Hold methotrexate, Plaquenil. Was on Prolia prior to admission, based on patient's symptoms patient was told she would not be getting her last scheduled dose of Prolia. 4. Diabetes: Monitor blood sugars, Lantus, lispro with meals, insulin sliding scale. 5. History of pulmonary embolism: On Xarelto. 6. DVT ppx  7. Disposition: Dependent on hospital course. Will discharge once medically stable. SW on board for discharge planning. Patient Active Problem List   Diagnosis Code    GERD (gastroesophageal reflux disease) K21.9    Depression F32. A    Fibromyalgia muscle pain M79.7    SI (sacroiliac) pain M53.3    Vitamin D deficiency E55.9    Chronic low back pain M54.50, G89.29    Postmenopausal symptoms N95.9    Postmenopausal hormone replacement therapy Z79.890    Pulmonary embolism ([facility]) I26.99    Deep vein thrombosis (DVT) ([facility]) I82.409    DM (diabetes mellitus) ([facility]) E11.9    Reactive airway disease J45.909    Borderline hypertension R03.0    Neck pain M54.2    Thoracic back pain M54.6    Factor 5 Leiden mutation, heterozygous ([facility] D68.51    Neuropathy of both feet- Dr. Kelly Arrington G57.93    Weakness of both lower extremities R29.898    Paresthesia of both feet R20.2    Positive DESTINEE (antinuclear antibody) Dr. Meseret Elise R76.8    Dyspnea R06.00    Type 2 diabetes mellitus without complication, without long-term current use of insulin ([facility]) E11.9    Wrist pain, left M25.532    Chronic bilateral low back pain with bilateral sciatica M54.42, M54.41, G89.29    Colitis K52.9    History of recurrent UTIs Z87.440    Chronic bilateral thoracic back pain M54.6, G89.29    Pain in both hands M79.641, M79.642    Autoantibody titer elevated R76.8    Generalized OA M15.9    Lupus erythematosus L93.0    Chronic abdominal pain R10.9, G89.29    Vaginal dryness N89.8    S/P total hysterectomy Z90.710    Long-term use of Plaquenil Z79.899    Rash and nonspecific skin eruption R21    Esophageal web Q39.4    Class 1 obesity with serious comorbidity and body mass index (BMI) of 34.0 to 34.9 in adult E66.9, Z68.34    Renal failure N19    Esophageal dysphagia R13.19    NSTEMI (non-ST elevated myocardial infarction) ([facility]) I21.4    Back pain M54.9    History of rhabdomyolysis Z87.39    Elevated aldolase level R74.8    Elevated LFTs R79.89    HyperCKemia R74.8    Hyperuricemia E79.0    Excessive sweating R61    Dystonia G24.9    Factor V Leiden carrier ([facility] D68.51    HTN (hypertension) I10    Lactic acidosis E87.2    Benzodiazepine misuse F13.90    Hyperlipidemia E78.5    Tremor R25.1    Muscle weakness (generalized) M62.81    History of pulmonary embolism Z86.711    History of deep vein thrombosis (DVT) of lower extremity Z86.718    Other thrombophilia ([facility]) D68.69    Long term current use of systemic steroids Z79.52    Long-term use of high-risk medication Z79.899    Mouth sores K13.79    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    Right leg swelling M79.89    Hyperhomocysteinemia ([facility]) E72.11    Small vessel vasculitis ([facility]) I77.6    Immunosuppressed status ([facility]- IV rituxan at [facility] D84.9    Other osteoporosis without current pathological fracture M81.8    Cushingoid side effect of steroids ([facility]) E24.2    Hereditary sensory-motor neuropathy, type I G60.0    Ataxic gait R26.0    Lumbar radicular pain M54.16    Transient brainstem ischemia G45.9    Essential tremor G25.0    Bronchitis J40    Tachycardia R00.0    Primary insomnia F51.01    Uncontrolled type 2 diabetes mellitus with hyperglycemia (HCC) E11.65    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

## 2021-12-09 NOTE — PROGRESS NOTES
Infectious Diseases Inpatient Progress Note          HISTORY OF PRESENT ILLNESS:  Follow up severe COVID-19 pneumonia with severe hypoxia with worsening condition today, confusion and tachypnea on 100% high flow oxygen, on IV remdesivir and oral baricitinib  Current Medications:     allopurinol  300 mg Oral Daily    citalopram  40 mg Oral Daily    gabapentin  400 mg Oral TID    lidocaine  1 patch TransDERmal Daily    rivaroxaban  20 mg Oral Daily    pill splitter   Does not apply Once    insulin glargine  20 Units SubCUTAneous BID    QUEtiapine  50 mg Oral Daily    sodium chloride flush  5-40 mL IntraVENous 2 times per day    dexamethasone  6 mg Oral Daily    Vitamin D  2,000 Units Oral Daily    baricitinib  4 mg Oral Daily    remdesivir IVPB  100 mg IntraVENous Q24H    amitriptyline  25 mg Oral Nightly    carvedilol  6.25 mg Oral BID    levETIRAcetam  250 mg Oral BID    pantoprazole  40 mg Oral QAM AC       Allergies:  Sulfa antibiotics, Ciprofloxacin hcl, Nsaids, and Statins      Review of Systems  unable to provide ROS because of confusion      Physical Exam  Vitals:    12/09/21 0843 12/09/21 1015 12/09/21 1258 12/09/21 1654   BP:  135/85     Pulse:  103     Resp: 20 24  24   Temp:  100.2 °F (37.9 °C)     TempSrc:  Oral     SpO2: 92% (!) 85% 90%      General Appearance: alert and disoriented to person, place and time, well-developed and well-nourished, in acute distress, tachypneic on high flow oxygen  Skin: warm and dry, no rash. Head: normocephalic and atraumatic  Eyes: anicteric sclerae  ENT: oropharynx clear and moist with normal mucous membranes.  No oral thrush  Lungs: normal respiratory effort, managed breath sounds bilateral lung fields with basilar rales  Heart tachycardic no murmur  Abdomen: soft, no distention or rigidity  No leg edema  No erythema      DATA:    Lab Results   Component Value Date    WBC 6.5 12/09/2021    HGB 10.7 (L) 12/09/2021    HCT 36.2 (L) 12/09/2021    MCV 96.1 12/09/2021     12/09/2021     Lab Results   Component Value Date    CREATININE 1.0 12/09/2021    BUN 34 (H) 12/09/2021     12/09/2021    K 5.2 (H) 12/09/2021     12/09/2021    CO2 19 (L) 12/09/2021       Hepatic Function Panel:  Lab Results   Component Value Date    ALKPHOS 145 12/09/2021    ALT 32 12/09/2021    AST 66 12/09/2021    PROT 6.3 12/09/2021    BILITOT 0.3 12/09/2021    LABALBU 3.3 12/09/2021    LABALBU 4.2 03/13/2012       Microbiology:   Recent Labs     12/07/21  0827   BC No Growth to date. Any change in status will be called. Recent Labs     12/07/21  0939   BLOODCULT2 No Growth to date. Any change in status will be called. No results for input(s): LABURIN in the last 72 hours. No results for input(s): WNDABS in the last 72 hours. No results for input(s): CULTRESP in the last 72 hours. No results for input(s): PH, PO2, PCO2, HCO3, BE, O2SAT in the last 72 hours. CTA Chest W WO  (PE study)    Result Date: 12/7/2021  1. No CT evidence of pulmonary embolism in the primary or secondary branches of the pulmonary arteries. 2.Bilateral multifocal infiltrates that may be due to bacterial or viral Colles which includes covid 19. . All CT scans at this facility use dose modulation, iterative reconstruction, and/or weight based dosing when appropriate to reduce radiation dose to as low as reasonably achievable.          IMPRESSION:    · Severe COVID 19 pneumonia  · Acute respiratory failure with hypoxia, worsening  · Hypercoagulable state  · Immunosuppression secondary to methotrexate for polymyositis    Patient Active Problem List   Diagnosis    GERD (gastroesophageal reflux disease)    Depression    Fibromyalgia muscle pain    SI (sacroiliac) pain    Vitamin D deficiency    Chronic low back pain    Postmenopausal symptoms    Postmenopausal hormone replacement therapy    Pulmonary embolism (HCC)    Deep vein thrombosis (DVT) (HCC)    DM [facility]di[facility] mellitus) [facility]  Reactive airway disease    Borderline hypertension    Neck pain    Thoracic back pain    Factor 5 Leiden mutation, heterozygous (HCC)    Neuropathy of both feet- Dr. Fernando Castelan    Weakness of both lower extremities    Paresthesia of both feet    Positive DESTINEE (antinuclear antibody) Dr. Pinedo Newer    Dyspnea    Type 2 diabetes mellitus without complication, without long-term current use of insulin (HCC)    Wrist pain, left    Chronic bilateral low back pain with bilateral sciatica    Colitis    History of recurrent UTIs    Chronic bilateral thoracic back pain    Pain in both hands    Autoantibody titer elevated    Generalized OA    Lupus erythematosus    Chronic abdominal pain    Vaginal dryness    S/P total hysterectomy    Long-term use of Plaquenil    Rash and nonspecific skin eruption    Esophageal web    Class 1 obesity with serious comorbidity and body mass index (BMI) of 34.0 to 34.9 in adult    Renal failure    Esophageal dysphagia    NSTEMI (non-ST elevated myocardial infarction) ([facility]    Back pain    History of rhabdomyolysis    Elevated aldolase level    Elevated LFTs    HyperCKemia    Hyperuricemia    Excessive sweating    Dystonia    Factor V Leiden carrier ([facility]    HTN (hypertension)    Lactic acidosis    Benzodiazepine misuse    Hyperlipidemia    Tremor    Muscle weakness (generalized)    History of pulmonary embolism    History of deep vein thrombosis (DVT) of lower extremity    Other thrombophilia ([facility]    Long term current use of systemic steroids    Long-term use of high-risk medication    Mouth sores    Polymyositis with myopathy (HCC)    Right leg swelling    Hyperhomocysteinemia (HCC)    Small vessel vasculitis (HCC)    Immunosuppressed status (HCC)- [facility]    Other osteoporosis without current pathological fracture    Cushingoid side effect of steroids (HCC)    Hereditary sensory-motor neuropathy, type I    Ataxic gait    Lumbar radicular pain    Transient brainstem ischemia    Essential tremor    Bronchitis    Tachycardia    Primary insomnia    Uncontrolled type 2 diabetes mellitus with hyperglycemia (HCC)    CKD stage 3 secondary to diabetes ([facility]    Hypercalcemia    Seizure ([facility]    Pneumonia due to COVID-19 virus    Hypercoagulable state ([facility]       PLAN:  · I discussed case with Dr. Sawyer Soto and agreed to transfer patient to an ICU setting   · Changed high airflow to BiPAP   · IV remdesivir for 5 days  · Follow-up CBC complete metabolic profile daily while on remdesivir  · Oxygen support and weaning as tolerated  · Baricitinib daily up to 14 days or until improvement   · We will monitor for superimposed bacterial infection and blood clots  · Decadron and anticoagulation as ordered  ·     Discussed with patient and RN    Josie Dickey MD

## 2021-12-10 NOTE — PROGRESS NOTES
PHARMACY NOTE:   ICU Rounds Attended (10-15 minutes outside patient room):    Pt diagnosis: COVID-19    IV Fluids: none at this time    Renal:   Recent Labs     12/09/21  1644 12/10/21  0458 12/10/21  0510   CREATININE 1.0 1.2 1.03*    Estimated Creatinine Clearance: 57 mL/min (A) (based on SCr of 1.03 mg/dL (H)). Antimicrobial Therapy: no antimicrobial therapy at this time   Day 3: baricitinib 4mg daily - note, monitoring LFTs (almost to 200 range), will discuss HOLDING therapy with AM labs tomorrow (dose administered for 12/10/21) + decrease to 2mg daily if eGFR falls <60mL/min/1.73m2  Day 3: remdesivir   Cultures none done    ID on consult: yes    Recent Labs     12/08/21  0438 12/09/21  0453   WBC 4.6 6.5           Pressors:   norepinephrine @ 3 mcg/min --> 0.04 mcg/kg/min, per last rate change on MAR (date: 12/10/21, time: 0512)  Sedation:   Precedex @ 0.8mcg/kg/hr, per last rate change on MAR (date: 12/10/21, time: 2170)  Fentanyl @ 150mcg/hr, per last rate change on MAR (date: 12/10/212, time: 0904)  Propofol @ 40 mcg/kg/min, per last rate change on MAR (date: 12/10/21, time: 0802)   Drips: none at this time     Insulin Therapy (goal: 140-180): TOO Duarte added medium dose sliding scale   N/A units given past 24 hours - SSI just added   Lantus 20 units BID    Recent Labs     12/08/21  0438 12/09/21  0453 12/10/21  0510   GLUCOSE 300* 297* 170*       Steroid Therapy: day 3/10: dexamethasone 6mg IV daily   Stress Ulcer Prophylaxis:   Pantoprazole 40mg PO daily    On at home: omeprazole 40mg PO daily     DVT Prophylaxis/Anticoagulant Therapy: Xarelto 20mg PO daily (on at home)- indication of history of PE   Recent Labs     12/08/21  0438 12/09/21  0453    245     No results for input(s): INR in the last 72 hours.       Bowel Regimen:   Miralax daily PRN   Senna S BID added     IV to PO: none at this time    Diet (NPO, tube feeds, TPN): to start tube feeds    Oxygen Therapy: intubated      Follow up/Changes:   Vent bundle added per NP Felicia   Medium SSI with Humalog added per NP Tawny Olivera S BID added   Nimbex 10mg bolus X 1 added   Patient to have a-line + PICC placed   Baricitinib and remdesivir renal and LFT monitoring (borderline for adjustments/HOLD), to follow up with AM labs on 12/11/21      Stephanie Mejias PharmD, BCPS   12/10/2021 11:22 AM

## 2021-12-10 NOTE — PLAN OF CARE
Nutrition Problem #1: Inadequate oral intake  Intervention: Food and/or Nutrient Delivery: Start Tube Feeding (begin Trophic TF. Renal TF ( Nepro) @ 20 ml/hr x 24 hrs via OG. Add 2 protien modulars daily with water flush.  Flush 50 ml every 4 hrs)  Nutritional Goals: initiation and tolerance of EN

## 2021-12-10 NOTE — PROGRESS NOTES
Assumed care of pt. Dia RN called and updated  Eloy Dyson and consent obtained for PICC. Pt respiratory rate continues to be in the 48 to 52. Difficult to sedate, cont to wean meds as needed to stabalize respirations.

## 2021-12-10 NOTE — SIGNIFICANT EVENT
Rapid response note    Patient was becoming hypoxic and breathing 50 breaths/min. She was on BiPAP with no improvement. Patient has been very agitated throughout the night. She was started on Precedex and she was given Ativan. Given the respiratory distress and increased work of breathing, decision was to intubate for impending respiratory failure and hypoxia. Patient was intubated with no complication. Her oxygenation improved after intubation. Blood pressure was borderline. Therefore, she was started on IV fluids. Will start Levophed if patient continues to be hypotensive.     35 min of CC time    Electronically signed by Edison Pierson MD on 12/10/2021 at 4:16 AM

## 2021-12-10 NOTE — CONSULTS
Comprehensive Nutrition Assessment    Type and Reason for Visit:  Initial, Consult    Nutrition Recommendations/Plan:   Begin Trophic TF. Renal TF ( Nepro) @ 20 ml/hr x 24 hrs via OG. Add 2 protien modulars daily with water flush. Flush 50 ml every 4 hrs  Monitor for changes in propofol rate /proning schedule for adjustments to EN goal rate  WiIll likely need to change formula once K wnl    Nutrition Assessment:  Pt at risk for malnutrition related to acute illness ( COVID with intubation) , per rounds okay to start trophic rate TF,pt may prone late today, Will begin Renal TF due to hyperkalemia, but may need to adjsust formula if propofol increases to prevent excessive kcal/fat intake    Malnutrition Assessment:  Malnutrition Status: At risk for malnutrition (Comment)    Context:  Acute Illness     Findings of the 6 clinical characteristics of malnutrition:  Energy Intake:  Mild decrease in energy intake (Comment)  Weight Loss:  Unable to assess     Body Fat Loss:  Unable to assess     Muscle Mass Loss:  Unable to assess    Fluid Accumulation:  No significant fluid accumulation     Strength:  Not Performed    Estimated Daily Nutrient Needs:  Energy (kcal):  948-1106 kcals @ 12-14 kcal/kg; Weight Used for Energy Requirements:  Admission (79 kg)     Protein (g):  ~ 104 g protein @ 2 g/kg IBW; Weight Used for Protein Requirements:  Ideal (52 kg)        Fluid (ml/day):  ~ 1560 ml @ 30 ml/kg IBW; Method Used for Fluid Requirements:  ml/Kg (52 kg)      Nutrition Related Findings:  \"tested positive for Covid on Friday ( 12/3) . She has been coughing, congested, short of breath. She has had 2 Moderna Covid vaccines back in March. She has multiple risk factors including diabetes, thromboembolism with PE and DVTs, lupus erythematosus, hypertension, coronary artery disease status post non-STEMI, gastroesophageal reflux disease, fibromyalgia, vitamin D deficiency, reactive airway disease, chronic pain syndrome. \" intubated 12/10, OG in place + BM last night, on propofol @ 11 ( ~ 290 kcals) and fentanyl, plan is to prone later today per rounds, Labs noted ( K= 5.2-5.5)/ meds reviewed      Wounds:  None       Current Nutrition Therapies:    Current Tube Feeding (TF) Orders:  · Feeding Route: Orogastric  · Formula: Renal Formula  · Schedule: Continuous @ 20 m/hr  · Additives/Modulars: Protein (x2 =208 kcals, 52 g protein)  · Water Flushes: 50 ml every 4 hrs ( 300 ml)  · Current TF & Flush Orders Provides: 1072 kcals, 91 g protein, 650 ml free water  · Goal TF & Flush Orders Provides: 1072 kcals, 91 g protein, 650 ml free water    Anthropometric Measures:  · Height: 5' 3\" (160 cm)  · Current Body Weight:  (UTD)   · Admission Body Weight: 175 lb (79.4 kg)    · Usual Body Weight: 191 lb (86.6 kg) (( 8/21), 191# ( 10/21), 186# ( 12/3/21))     · Ideal Body Weight: 115 lbs;    · BMI:  31   · BMI Categories: Obese Class 1 (BMI 30.0-34. 9)       Nutrition Diagnosis:   · Inadequate oral intake related to impaired respiratory function as evidenced by intubation, NPO or clear liquid status due to medical condition    Nutrition Interventions:   Food and/or Nutrient Delivery:  Start Tube Feeding (begin Trophic TF. Renal TF ( Nepro) @ 20 ml/hr x 24 hrs via OG. Add 2 protien modulars daily with water flush. Flush 50 ml every 4 hrs)  Nutrition Education/Counseling:  Education not indicated   Coordination of Nutrition Care:  Continue to monitor while inpatient    Goals:  initiation and tolerance of EN       Nutrition Monitoring and Evaluation:   Behavioral-Environmental Outcomes:  None Identified   Food/Nutrient Intake Outcomes:  Food and Nutrient Intake  Physical Signs/Symptoms Outcomes:  Hemodynamic Status, Weight, Fluid Status or Edema, Biochemical Data, GI Status     Discharge Planning:     Too soon to determine     Electronically signed by Dave Saba RD, LD on 12/10/21 at 10:43 AM EST

## 2021-12-10 NOTE — PROCEDURES
PROCEDURE:   Radial artery line placement. (A-line)  [id number]    INDICATION:   Acute respiratory failure need for frequent ABG and blood pressure monitoring    CONSENT: The patient  Ivette Files counseled regarding the procedure, it's indications, risks, potential complications and alternatives and any questions were answered. Consent was obtained. nt. PROCEDURE SUMMARY:   Radial arteries were assessed by palpation and ultrasound localization. Brisa Florentin test was done to asses collateral circulation. The patient was prepped and draped in the usual sterile manner using chlorhexidine scrub. 1% lidocaine was used to numb the region. The left  radial artery was palpated and successfully cannulated on the first pass. Pulsatile, arterial blood was visualized and the artery was then threaded using the Seldinger technique and a catheter was then sutured into place. Good wave-form was obtained. The patient tolerated the procedure well without any immediate complications. The area was cleaned and Tegaderm was applied.      ESTIMATED BLOOD LOSS:   Minimal    COMPLICATIONS:  No immediate complication     Ivelisse Arenas MD

## 2021-12-10 NOTE — PROGRESS NOTES
Patient ID:    Facundo Marin  [id number]  89 y.o.  1960     Assumed Care of Patient. Report Received from RN. 2300 patient transferred from [facility] to [address] [place] [address] 7 without incident. Patient placed immediately on bi-pap by respiratory therapist.    Patient is alert only to self. Patient is unaware that she is in the hospital.    Assessment Complete, please see flow sheets. Labs, orders, plan of care and meds reviewed. Patient noted to have increased movements and jerking-like movements since arriving in [address] [place] [address].  called for update on patient. HIPAA code requested and received. Update given. 7532 Dr. Negrita Corral notified of the patient's increasing movements and attempting to remove mask. New orders received. 0126 updated Negrita Posada on  Patient condition. New orders received.  0386 Updated Dr. Negrita Corral on patient and her oxygen level. New orders received and relayed to respiratory therapist.  Bessie Ingram rapid response called on for patient. All team members were present. Abbi physician decided to intubate patient. 20 mg etomidate given per physician.    Shanell Campbell NP inserted ET tube under the supervision of Dr. Negrita Corral. ET tube 7.5 at 23 teeth. Positive color change on the CO2 monitor. OG inserted at 53 at the lips. Physician ordered 0.9% NS 1 liter bolus for blood pressure support. Precedex stopped per Dr. Negrita Corral. 0330 PCXR at bedside. ET tube and OG verified by CXR per Dr. Negrita Corral. 7227 this RN contacted , Efren Marin to give update on patient condition including her respiratory status requiring intubation and restraints. All husbands questions and concerns were addressed appropriately.         Electronically signed by Mendez Rodriguez RN

## 2021-12-10 NOTE — PROCEDURES
Patient Name: Facundo Marin   Medical Record Number: [id number]  Date: 12/10/2021   Time: 3:42 AM   Room/Bed: [facility]/[place] [address]  Intubation Procedure Note  Indication: Respiratory failure, impending respiratory failure, hypoxia and airway protection    Consent: Unable to be obtained due to the emergent nature of this procedure. Medications Used: etomidate intravenously    Procedure: The patient was placed in the appropriate position. Cricoid pressure was not required. Intubation was performed by direct laryngoscopy using a laryngoscope and a 7.5 cuffed endotracheal tube. The cuff was then inflated and the tube was secured appropriately at a distance of 23 cm to the dental ridge. Initial confirmation of placement included bilateral breath sounds, an end tidal CO2 detector, absence of sounds over the stomach, tube fogging and adequate chest rise. A chest x-ray to verify correct placement of the tube showed the tube needed pulled back one cm and the tube was repositioned accordingly. The patient tolerated the procedure well.      Complications: None    Supervised by Dr. Tasha Bear MD    Electronically Signed by: Electronically signed by BUTCH Yoder CNP on 12/10/2021 at 3:44 AM

## 2021-12-10 NOTE — PROGRESS NOTES
Baricitinib Daily Monitoring    Day 3 of 14    AST   Date Value Ref Range Status   12/10/2021 191 (H) 0 - 35 U/L Final     ALT   Date Value Ref Range Status   12/10/2021 108 (H) 0 - 33 U/L Final     WBC   Date Value Ref Range Status   12/09/2021 6.5 4.8 - 10.8 K/uL Final     Hemoglobin   Date Value Ref Range Status   12/10/2021 10.8 (L) 12.0 - 16.0 gm/dL Final     Platelets   Date Value Ref Range Status   12/09/2021 245 130 - 400 K/uL Final        Lymphocytes Absolute   Date Value Ref Range Status   12/09/2021 0.6 (L) 1.0 - 4.8 K/uL Final     Neutrophils Absolute   Date Value Ref Range Status   12/09/2021 5.4 1.4 - 6.5 K/uL Final       GFR Non-   Date Value Ref Range Status   12/10/2021 54.4 (L) >60 Final     Comment:     >60 mL/min/1.73m2 EGFR, calc. for ages 25 and older using the  MDRD formula (not corrected for weight), is valid for stable  renal function. GFR    Date Value Ref Range Status   12/10/2021 >60.0 >60 Final     Comment:     >60 mL/min/1.73m2 EGFR, calc. for ages 25 and older using the  MDRD formula (not corrected for weight), is valid for stable  renal function. Renal function assessed and dose will continue at baricitinib 4mg daily. NOTE: discussed with Dr. Kiko Mitchell that if LFTs increase to 200 (5x ULN), order to be HELD. Medication given today, 12/10/21, will check prior to administration with morning labs 12/11/21. Use of baricitinib is appropriate (for now) based on above labs and renal function.      Nika Dutta, Doug, BCPS   12/10/2021 11:21 AM

## 2021-12-10 NOTE — PROGRESS NOTES
Pt remains of the ventilator. Sats 88% difficult to sedate at this time. Increased precedex, propofol and fentynal as ordered. Pt still breathing 50 . Suctioned and repositioned sats 86%. New orders obtained and given.  Pt  saturationsslowly to 94%

## 2021-12-10 NOTE — PROGRESS NOTES
Pulmonary & Critical Care Medicine ICU Progress Note  Chief complaint : Acute hypoxic respiratory failure    Subjunctive/24 hour events :   Patient seen and examined during multidisciplinary rounds with RN, charge nurse, RT, pharmacy, dietitian, and social service.      Patient became more hypoxic yesterday, requiring intubation currently on vent, she is not sedated adequately, tachypneic, sedation increased, she required Nimbex to sync with the vent, currently patient is on 90% FiO2, 14 of PEEP, saturation 95%, she did require Levophed overnight currently with soft, no reported vomiting, urine output is good, and she did have a bowel movement yesterday        Social History     Tobacco Use    Smoking status: Former Smoker     Packs/day: 1.25     Years: 10.00     Pack years: 12.50     Types: Cigarettes     Quit date: 1/1/2006     Years since quitting: 15.9    Smokeless tobacco: Never Used    Tobacco comment: start smoking age 25   Substance Use Topics    Alcohol use: Not Currently         Problem Relation Age of Onset    Substance Abuse Brother     High Blood Pressure Mother     High Cholesterol Mother     Arthritis Mother     High Blood Pressure Father     High Cholesterol Father     Clotting Disorder Father         clot to intestines    Arthritis Father     Substance Abuse Brother     Substance Abuse Brother     Stroke Paternal Uncle 36    Cystic Fibrosis Daughter        Recent Labs     12/09/21  1644 12/10/21  0458   PHART 7.433 7.418   EEO8LPI 36 30*   PO2ART 41* 69*       MV Settings:  Vent Mode: AC/VC Rate Set: 30 bmp/Vt Ordered: 350 mL/ /FiO2 : 90 %           IV:   dexmedetomidine 0.8 mcg/hr (12/10/21 0910)    propofol 40 mcg/kg/min (12/10/21 0730)    norepinephrine Stopped (12/10/21 0900)    fentaNYL (SUBLIMAZE) infusion 150 mcg/hr (12/10/21 0910)    sodium chloride      sodium chloride      cisatracurium (NIMBEX) infusion      dextrose      sodium chloride         Vitals:  BP (!) BID    levETIRAcetam  250 mg Oral BID       PRN Meds:  sodium chloride flush, sodium chloride, traMADol **AND** acetaminophen, glucose, dextrose, glucagon (rDNA), dextrose, sodium chloride flush, sodium chloride, ondansetron **OR** ondansetron, polyethylene glycol, acetaminophen **OR** acetaminophen, guaiFENesin-dextromethorphan, sodium chloride    Results: reviewed by me   CBC:   Recent Labs     12/08/21 0438 12/08/21 0438 12/09/21 0453 12/09/21  1644 12/10/21  0458   WBC 4.6  --  6.5  --   --    HGB 11.4   < > 12.1 10.7* 10.8*   HCT 34.7*  --  36.2*  --   --    MCV 91.6  --  96.1  --   --      --  245  --   --     < > = values in this interval not displayed. BMP:   Recent Labs     12/08/21 0438 12/08/21 0438 12/09/21 0453 12/09/21  1644 12/10/21  0458 12/10/21  0510     --  140  --   --  138   K 4.7  --  5.2*  --   --  5.5*  5.5*     --  104  --   --  104   CO2 21  --  19*  --   --  16*   BUN 25*  --  34*  --   --  36*   CREATININE 0.88   < > 0.90 1.0 1.2 1.03*    < > = values in this interval not displayed. LIVER PROFILE:   Recent Labs     12/08/21  0438 12/09/21  0453 12/10/21  0510   AST 69* 66* 191*   ALT 35* 32 108*   BILITOT <0.2 0.3 0.6   ALKPHOS 148* 145* 164*     PT/INR: No results for input(s): PROTIME, INR in the last 72 hours. APTT: No results for input(s): APTT in the last 72 hours. UA:No results for input(s): NITRITE, COLORU, PHUR, LABCAST, WBCUA, RBCUA, MUCUS, TRICHOMONAS, YEAST, BACTERIA, CLARITYU, SPECGRAV, LEUKOCYTESUR, UROBILINOGEN, BILIRUBINUR, BLOODU, GLUCOSEU, AMORPHOUS in the last 72 hours. Invalid input(s): KETONESU    Cultures:  None  Films:  CXR reviewed by me and it showed bilateral groundglass infiltrates      Assessment:   This is a critically ill patient at risk of deterioration / death , needing close ICU monitoring and intervention due to below noted problems     · Acute hypoxic respiratory failure  · Severe COVID-19 pneumonia  · ARDS induced by COVID-19 pneumonia  · Acute encephalopathy  · Evolving acute kidney injury  · Chronic immunosuppression on methotrexate for polymyositis  · Hypercoagulable state secondary to COVID-19      Recommendation  · Vent support lung protective strategy  · head of the bed 30°  · Sedation holidays when patient lung mechanics improve  · Sedation for now target RASS -2 to -3  · Will use Nimbex as briefly as possible in light of underlying polymyositis currently needed to maintain oxygenation  · Prone position 18 hours, and supine 6 hours  · Watch for ICU delirium: TV on, natural light, avoid benzos, pain control, early mobility, and family engagement  · PUD prophylaxis  · DVT prophylaxis, on Xarelto  · Monitor CK  · Trophic tube feed  · Central line, preferably PICC line to minimize risk of infection  · Arterial line for frequent ABGs and blood pressure monitoring  · Continue baricitinib for 4 weeks or until clinical improvement  · Monitor LFT, borderline, discussed with pharmacy if numbers increase tomorrow we will need to hold baricitinib  · Monitor renal function panel, CBC, procalcitonin and D-dimer  · Dexamethasone for 10 days  · Remdesivir for 5 days  · Hold methotrexate for now  · Watch renal function and urine output, consult nephrology if worsening creatinine  · Target blood sugar 140180  ·        Due to the immediate potential for life-threatening deterioration due to acute respiratory failure I spent 35  minutes providing critical care.  This time is excluding time spent performing procedures.           Electronically signed by Eddie Black MD,  [facility] ,on 12/10/2021 at 11:44 AM

## 2021-12-11 NOTE — PROGRESS NOTES
Hospitalist Progress Note      Date of Admission: 12/8/2021  Chief Complaint:    No chief complaint on file. Subjective: Intubated, sedated.       Medications:    Infusion Medications    insulin 14 Units/hr (12/11/21 1128)    dextrose      propofol 25 mcg/kg/min (12/11/21 0913)    norepinephrine 13 mcg/min (12/11/21 1201)    fentaNYL (SUBLIMAZE) infusion 200 mcg/hr (12/11/21 1146)    sodium chloride      cisatracurium (NIMBEX) infusion 1 mcg/kg/min (12/11/21 1136)    sodium bicarbonate infusion 150 mL/hr at 12/11/21 0631    dexmedetomidine (PRECEDEX) IV infusion Stopped (12/11/21 0818)    sodium chloride       Scheduled Medications    levETIRAcetam  250 mg Oral BID    sodium chloride flush  5-40 mL IntraVENous 2 times per day    chlorhexidine  15 mL Mouth/Throat BID    pantoprazole  40 mg IntraVENous Daily    And    sodium chloride (PF)  10 mL IntraVENous Daily    insulin lispro  0-12 Units SubCUTAneous 4 times per day    cisatracurium Besylate  10 mg IntraVENous Once    allopurinol  300 mg Oral Daily    citalopram  40 mg Oral Daily    gabapentin  400 mg Oral TID    lidocaine  1 patch TransDERmal Daily    rivaroxaban  20 mg Oral Daily    pill splitter   Does not apply Once    QUEtiapine  50 mg Oral Daily    sodium chloride flush  5-40 mL IntraVENous 2 times per day    dexamethasone  6 mg Oral Daily    Vitamin D  2,000 Units Oral Daily    baricitinib  4 mg Oral Daily    remdesivir IVPB  100 mg IntraVENous Q24H    amitriptyline  25 mg Oral Nightly    [Held by provider] carvedilol  6.25 mg Oral BID     PRN Meds: dextrose, glucagon (rDNA), dextrose, sodium chloride flush, sodium chloride, traMADol **AND** acetaminophen, glucose, sodium chloride flush, sodium chloride, ondansetron **OR** ondansetron, polyethylene glycol, acetaminophen **OR** acetaminophen, guaiFENesin-dextromethorphan, sodium chloride    Intake/Output Summary (Last 24 hours) at 12/11/2021 1227  Last data filed at 12/11/2021 0600  Gross per 24 hour   Intake 4168.52 ml   Output 850 ml   Net 3318.52 ml     Exam:  BP 93/72   Pulse 107   Temp 98.8 °F (37.1 °C) (Bladder)   Resp (!) 34   Ht 5' 3\" (1.6 m)   Wt 188 lb 4.8 oz (85.4 kg)   LMP  (LMP Unknown)   SpO2 100%   BMI 33.36 kg/m²   Head: Normocephalic, atraumatic  Sclera clear  Neck JVD flat  Lungs: normal effort of breathing, scattered crackles. tachypneic at time, but improves with deep redirection deep controlled breaths. Labs:   Recent Labs     12/09/21  0453 12/09/21  1644 12/11/21  0410 12/11/21  0545 12/11/21  0749   WBC 6.5  --   --  11.2*  --    HGB 12.1   < > 14.5 12.7 14.4   HCT 36.2*  --   --  40.0  --      --   --  182  --     < > = values in this interval not displayed. Recent Labs     12/09/21 0453 12/09/21  1644 12/10/21  0510 12/10/21  1423 12/11/21  0410 12/11/21  0638 12/11/21  0749     --  138  --   --  135  --    K 5.2*  --  5.5*  5.5*  --   --  5.8*  --      --  104  --   --  95  --    CO2 19*  --  16*  --   --  27  --    BUN 34*  --  36*  --   --  48*  --    CREATININE 0.90   < > 1.03*   < > 2.5* 2.02* 2.4*   CALCIUM 8.7  --  8.8  --   --  6.8*  --    AST 66*  --  191*  --   --  189*  --    ALT 32  --  108*  --   --  130*  --    BILITOT 0.3  --  0.6  --   --  0.6  --    ALKPHOS 145*  --  164*  --   --  136*  --     < > = values in this interval not displayed. No results for input(s): INR in the last 72 hours. No results for input(s): Laura Wallace in the last 72 hours. Radiology:  XR CHEST PORTABLE   Final Result      Status post intubation. There are bilateral alveolar opacities , infiltrates worrisome for viral pneumonia. The differential includes COVID-19 pneumonia versus other etiologies            IR PICC WO SQ PORT/PUMP > 5 YEARS    (Results Pending)     Assessment/Plan:    1. Acute hypoxic respiratory failure secondary to Covid pneumonia.   intubated overnight with rapid deterioration with high fio2 requirements on vent. Concerning prognosis. 2. Covid pneumonia: Steroids, remdesivir, baricitinib. Primarily being managed by infectious disease. 3. History of rheumatoid arthritis: Hold methotrexate, Plaquenil. Was on Prolia prior to admission, based on patient's symptoms patient was told she would not be getting her last scheduled dose of Prolia. 4. Diabetes: Monitor blood sugars, Lantus, lispro with meals, insulin sliding scale. 5. History of pulmonary embolism: On Xarelto. 34 mins critical care time.      Francesca Rosas MD

## 2021-12-11 NOTE — PROGRESS NOTES
CARDIOLOGY CONSULT CALLED TO DR INGRID MADERA Electronically signed by Chichi Butler on 12/11/2021 at 8:49 AM    Nephro consult called to DR Steven Valle Electronically signed by Chichi Butler on 12/11/2021 at 10:58 AM

## 2021-12-11 NOTE — PROGRESS NOTES
Hospitalist Progress Note      Date of Admission: 12/8/2021  Chief Complaint:    No chief complaint on file. Subjective: Intubated, sedated.       Medications:    Infusion Medications    propofol 50 mcg/kg/min (12/10/21 1743)    norepinephrine Stopped (12/10/21 0900)    fentaNYL (SUBLIMAZE) infusion 200 mcg/hr (12/10/21 1307)    sodium chloride      cisatracurium (NIMBEX) infusion 1 mcg/kg/min (12/10/21 1305)    sodium chloride      sodium chloride      sodium bicarbonate infusion 150 mL/hr at 12/10/21 1621    dexmedetomidine (PRECEDEX) IV infusion 1.4 mcg/kg/hr (12/10/21 1813)    dextrose      sodium chloride       Scheduled Medications    sodium chloride flush  5-40 mL IntraVENous 2 times per day    chlorhexidine  15 mL Mouth/Throat BID    pantoprazole  40 mg IntraVENous Daily    And    sodium chloride (PF)  10 mL IntraVENous Daily    insulin lispro  0-12 Units SubCUTAneous 4 times per day    cisatracurium Besylate  10 mg IntraVENous Once    allopurinol  300 mg Oral Daily    citalopram  40 mg Oral Daily    gabapentin  400 mg Oral TID    lidocaine  1 patch TransDERmal Daily    rivaroxaban  20 mg Oral Daily    pill splitter   Does not apply Once    insulin glargine  20 Units SubCUTAneous BID    QUEtiapine  50 mg Oral Daily    sodium chloride flush  5-40 mL IntraVENous 2 times per day    dexamethasone  6 mg Oral Daily    Vitamin D  2,000 Units Oral Daily    baricitinib  4 mg Oral Daily    remdesivir IVPB  100 mg IntraVENous Q24H    amitriptyline  25 mg Oral Nightly    [Held by provider] carvedilol  6.25 mg Oral BID    levETIRAcetam  250 mg Oral BID     PRN Meds: sodium chloride flush, sodium chloride, traMADol **AND** acetaminophen, glucose, dextrose, glucagon (rDNA), dextrose, sodium chloride flush, sodium chloride, ondansetron **OR** ondansetron, polyethylene glycol, acetaminophen **OR** acetaminophen, guaiFENesin-dextromethorphan, sodium chloride    Intake/Output Summary (Last 24 hours) at 12/10/2021 1912  Last data filed at 12/10/2021 1815  Gross per 24 hour   Intake 2621.9 ml   Output 1200 ml   Net 1421.9 ml     Exam:  BP 93/72   Pulse 113   Temp 99.1 °F (37.3 °C)   Resp (!) 32   Ht 5' 3\" (1.6 m)   LMP  (LMP Unknown)   SpO2 100%   BMI 31.00 kg/m²   Head: Normocephalic, atraumatic  Sclera clear  Neck JVD flat  Lungs: normal effort of breathing, scattered crackles. tachypneic at time, but improves with deep redirection deep controlled breaths. Labs:   Recent Labs     12/08/21  0438 12/08/21  0438 12/09/21  0453 12/09/21  0453 12/09/21  1644 12/10/21  0458 12/10/21  1423   WBC 4.6  --  6.5  --   --   --   --    HGB 11.4   < > 12.1   < > 10.7* 10.8* 13.1   HCT 34.7*  --  36.2*  --   --   --   --      --  245  --   --   --   --     < > = values in this interval not displayed. Recent Labs     12/08/21  0438 12/08/21  0438 12/09/21  0453 12/09/21  1644 12/10/21  0458 12/10/21  0510 12/10/21  1423     --  140  --   --  138  --    K 4.7  --  5.2*  --   --  5.5*  5.5*  --      --  104  --   --  104  --    CO2 21  --  19*  --   --  16*  --    BUN 25*  --  34*  --   --  36*  --    CREATININE 0.88   < > 0.90   < > 1.2 1.03* 1.6*   CALCIUM 8.0*  --  8.7  --   --  8.8  --    AST 69*  --  66*  --   --  191*  --    ALT 35*  --  32  --   --  108*  --    BILITOT <0.2  --  0.3  --   --  0.6  --    ALKPHOS 148*  --  145*  --   --  164*  --     < > = values in this interval not displayed. No results for input(s): INR in the last 72 hours. No results for input(s): Emilie Height in the last 72 hours. Radiology:  XR CHEST PORTABLE   Final Result      Status post intubation. There are bilateral alveolar opacities , infiltrates worrisome for viral pneumonia. The differential includes COVID-19 pneumonia versus other etiologies            IR PICC WO SQ PORT/PUMP > 5 YEARS    (Results Pending)     Assessment/Plan:    1.  Acute hypoxic respiratory failure secondary to Covid pneumonia. intubated overnight with rapid deterioration with high fio2 requirements on vent. Concerning prognosis. 2. Covid pneumonia: Steroids, remdesivir, baricitinib. Primarily being managed by infectious disease. 3. History of rheumatoid arthritis: Hold methotrexate, Plaquenil. Was on Prolia prior to admission, based on patient's symptoms patient was told she would not be getting her last scheduled dose of Prolia. 4. Diabetes: Monitor blood sugars, Lantus, lispro with meals, insulin sliding scale. 5. History of pulmonary embolism: On Xarelto. 34 mins critical care time.      Lee Melendez MD

## 2021-12-11 NOTE — PROGRESS NOTES
It appears appt has been rescheduled to 1/21/2021.   PH 7.12  PCO2 98.2  PO2 107.7  HCO3 31.9  BE 2.6  SO2 95.3      K 5.6  CA 1.03    TCO2 34.5  AGAP 6  HCT 43  HGB 14.5      LAC1.35  CREA 2.46    CRITICAL RESULTS GIVEN TO RN    VENT SETTINGS AS FOLLOWS     AC 34 240 P12 70%

## 2021-12-11 NOTE — PROGRESS NOTES
called in , update given. Would like update from [id number] [facility] [address]. Cell phone number [phone].

## 2021-12-11 NOTE — CONSULTS
Chief complaint: Bradycardia     Patient is a 64 y.o. female who presents with a chief complaint of shortness of breath. Patient is followed on a regular basis by Dr. Loren Grant, APRN - CNP. Patient with past medical history of diabetes mellitus, factor V Leiden deficiency, DVT/pulmonary embolism, morbid obesity who presented with worsening shortness of breath noted to have COVID-19 pneumonia. He is currently seen intensive care unit. She is in prone position on ventilator and sedated. Apparently she developed sinus bradycardia with heart rate into the 30s to 40s while she was on heavy sedation/narcotics. Since those were decreased the patient's rhythm has been  sinus tachycardia with heart rate of 107 bpm now. History of cardiac catheterization 2018 with normal coronaries with recovered nonischemic cardiomyopathy. Most recent echo ejection fraction of 60%. Patient on lifelong oral anticoagulation.     Past Medical History:   Diagnosis Date    Ataxic gait     Back pain     Benign essential tremor     Depression     DM (diabetes mellitus) ([address] 11/25/2014    DVT (deep venous thrombosis) (HCC)     Elevated troponin 5/25/2016    Factor V Leiden carrier ([address]     history of DVT, on Xarelto    GERD (gastroesophageal reflux disease)     Hereditary sensory-motor neuropathy, type I     HTN (hypertension)     HTN (hypertension)     Hyperlipidemia     Lumbar radicular pain     Neuropathy of both feet 8/22/2016    NSTEMI (non-ST elevated myocardial infarction) ([address]     Obesity     Osteoarthritis     Pulmonary embolism ([address]     Renal failure 6/29/2018    Right inguinal hernia 11/9/2017    Seizure ([address] 10/18/2021    SOB (shortness of breath) 4/19/2017    Systemic lupus erythematosus ([address] 9/8/2017    Transient brainstem ischemia     Umbilical hernia 47/4/7817    Vasomotor flushing     on hormone replacement therapy      Patient Active Problem List   Diagnosis    GERD (gastroesophageal reflux disease)    Depression    Fibromyalgia muscle pain    SI (sacroiliac) pain    Vitamin D deficiency    Chronic low back pain    Postmenopausal symptoms    Postmenopausal hormone replacement therapy    Pulmonary embolism (HCC)    Deep vein thrombosis (DVT) (HCC)    DM (diabetes mellitus) ([facility]    Reactive airway disease    Borderline hypertension    Neck pain    Thoracic back pain    Factor 5 Leiden mutation, heterozygous ([facility]    Neuropathy of both feet- Dr. Keri Barajas    Weakness of both lower extremities    Paresthesia of both feet    Positive DESTINEE (antinuclear antibody) Dr. Uribe Baas    Dyspnea    Type 2 diabetes mellitus without complication, without long-term current use of insulin ([facility])    Wrist pain, left    Chronic bilateral low back pain with bilateral sciatica    Colitis    History of recurrent UTIs    Chronic bilateral thoracic back pain    Pain in both hands    Autoantibody titer elevated    Generalized OA    Lupus erythematosus    Chronic abdominal pain    Vaginal dryness    S/P total hysterectomy    Long-term use of Plaquenil    Rash and nonspecific skin eruption    Esophageal web    Class 1 obesity with serious comorbidity and body mass index (BMI) of 34.0 to 34.9 in adult    Renal failure    Esophageal dysphagia    NSTEMI (non-ST elevated myocardial infarction) ([facility])    Back pain    History of rhabdomyolysis    Elevated aldolase level    Elevated LFTs    HyperCKemia    Hyperuricemia    Excessive sweating    Dystonia    Factor V Leiden carrier ([facility]    HTN (hypertension)    Lactic acidosis    Benzodiazepine misuse    Hyperlipidemia    Tremor    Muscle weakness (generalized)    History of pulmonary embolism    History of deep vein thrombosis (DVT) of lower extremity    Other thrombophilia ([facility]    Long term current use of systemic steroids    Long-term use of high-risk medication    Mouth sores    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)    Right leg Occupation: retired [employer]   Tobacco Use    Smoking status: Former Smoker     Packs/day: 1.25     Years: 10.00     Pack years: 12.50     Types: Cigarettes     Quit date: 1/1/2006     Years since quitting: 15.9    Smokeless tobacco: Never Used    Tobacco comment: start smoking age 25   Vaping Use    Vaping Use: Never used   Substance and Sexual Activity    Alcohol use: Not Currently    Drug use: No    Sexual activity: Yes   Other Topics Concern    Not on file   Social History Narrative    Off work dt--retired early from --worked [employer]---moved back to [place] to work at [employer] in [address] up in [place] went to [employer] of [facility]: Low Risk     Difficulty of Paying Living Expenses: Not hard at [facility] Insecurity: No Food Insecurity    Worried About [address] [facility] in the Last Year: Never true    [address] [facility] [address] in the Last Year: Never true   Transportation Needs:     Lack of Transportation (Medical): Not on file    Lack of Transportation (Non-Medical):  Not on file   Physical Activity:     Days of Exercise per Week: Not on file    Minutes of Exercise per Session: Not on file   Stress:     Feeling of Stress : Not on file   Social Connections:     Frequency of Communication with Friends and Family: Not on file    Frequency of Social Gatherings with Friends and Family: Not on file    Attends Anglican Services: Not on file    Active Member of [address] [place] [address] or Organizations: Not on file    Attends Club or Organization Meetings: Not on file    Marital Status: Not on file   Intimate Partner Violence:     Fear of Current or Ex-Partner: Not on file    Emotionally Abused: Not on file    Physically Abused: Not on file    Sexually Abused: Not on file   Housing Stability:     Unable to Pay for Housing in the Last Year: Not on file    Number of [place] in the Last Year: Not on file    Unstable Housing in the Last Year: Not on file Family History   Problem Relation Age of Onset    Substance Abuse Brother     High Blood Pressure Mother     High Cholesterol Mother     Arthritis Mother     High Blood Pressure Father     High Cholesterol Father     Clotting Disorder Father         clot to intestines    Arthritis Father     Substance Abuse Brother     Substance Abuse Brother     Stroke Paternal Uncle 36    Cystic Fibrosis Daughter        Current Facility-Administered Medications   Medication Dose Route Frequency Provider Last Rate Last Admin    insulin regular (HUMULIN R;NOVOLIN R) 100 Units in sodium chloride 0.9 % 100 mL infusion  0.5 Units/hr IntraVENous Continuous Robin Isaac MD 21.2 mL/hr at 12/11/21 1442 21.21 Units/hr at 12/11/21 1442    dextrose 50 % IV solution  12.5 g IntraVENous PRN Robin Isaac MD        glucagon (rDNA) injection 1 mg  1 mg IntraMUSCular PRN Robin Isaac MD        dextrose 5 % solution  100 mL/hr IntraVENous PRN Robin Isaac MD        levETIRAcetam (KEPPRA) 100 MG/ML solution 250 mg  250 mg Oral BID Robin Isaac MD   250 mg at 12/11/21 1124    [START ON 12/12/2021] baricitinib (OLUMIANT) tablet 1 mg  1 mg Oral Daily Belle Chu MD        0.9 % sodium chloride bolus  250 mL IntraVENous Once Nanci Mayfield,  mL/hr at 12/11/21 1359 250 mL at 12/11/21 1359    gabapentin (NEURONTIN) capsule 300 mg  300 mg Oral BID Robin Isaac MD        propofol injection  5-50 mcg/kg/min IntraVENous Titrated Erin Abdul MD 14.3 mL/hr at 12/11/21 1448 30 mcg/kg/min at 12/11/21 1448    norepinephrine (LEVOPHED) 16 mg in dextrose 5 % 250 mL infusion  2-100 mcg/min IntraVENous Continuous Erin Abdul MD 17.8 mL/hr at 12/11/21 1449 19 mcg/min at 12/11/21 1449    fentaNYL (SUBLIMAZE) 1,000 mcg in sodium chloride 0.9 % 100 mL infusion  12.5-200 mcg/hr IntraVENous Continuous Elesa Challenger, APRN - CNP 20 mL/hr at 12/11/21 1146 200 mcg/hr at 12/11/21 1146    sodium chloride flush 0.9 % injection 5-40 mL  5-40 mL IntraVENous 2 times per day Cathye , APRN - CNP   10 mL at 12/11/21 0824    sodium chloride flush 0.9 % injection 5-40 mL  5-40 mL IntraVENous PRN Cathye , APRN - CNP        0.9 % sodium chloride infusion  25 mL IntraVENous PRN Cathye , APRN - CNP        chlorhexidine (PERIDEX) 0.12 % solution 15 mL  15 mL Mouth/Throat BID Cathye , APRN - CNP   15 mL at 12/11/21 0853    pantoprazole (PROTONIX) injection 40 mg  40 mg IntraVENous Daily Cathye , APRN - CNP   40 mg at 12/11/21 0819    And    sodium chloride (PF) 0.9 % injection 10 mL  10 mL IntraVENous Daily Cathye , APRN - CNP   10 mL at 12/11/21 0819    insulin lispro (HUMALOG) injection vial 0-12 Units  0-12 Units SubCUTAneous 4 times per day Cathye , APRN - CNP   10 Units at 12/11/21 0517    cisatracurium besylate (NIMBEX) 200 mg in sodium chloride 0.9 % 100 mL infusion  0.5-10 mcg/kg/min IntraVENous Continuous Joleen Yang MD 3.6 mL/hr at 12/11/21 1446 1.5 mcg/kg/min at 12/11/21 1446    cisatracurium Besylate injection SOLN 10 mg  10 mg IntraVENous Once Cathye , APRN - CNP        sodium bicarbonate 150 mEq in dextrose 5 % 1,000 mL infusion   IntraVENous Continuous Cathye , APRN -  mL/hr at 12/11/21 1450 New Bag at 12/11/21 1450    dexmedetomidine (PRECEDEX) 1,000 mcg in sodium chloride 0.9 % 250 mL infusion  0.2-1.4 mcg/kg/hr IntraVENous Continuous Sena Fraser MD   Paused at 12/11/21 0818    citalopram (CELEXA) tablet 40 mg  40 mg Oral Daily Mary Kan MD   40 mg at 12/11/21 0827    lidocaine 4 % external patch 1 patch  1 patch TransDERmal Daily Mary Kan MD   1 patch at 12/10/21 0935    rivaroxaban (XARELTO) tablet 20 mg  20 mg Oral Daily Mary Kan MD   20 mg at 12/10/21 2117    traMADol (ULTRAM) tablet 37.5 mg  37.5 mg Oral Q6H PRN Mary Kan MD        And    acetaminophen (TYLENOL) tablet 325 mg  325 mg Oral Q6H PRN Mary Kan, MD        pill splitter   Does not apply Once Mata Alfaro MD        glucose (GLUTOSE) 40 % oral gel 15 g  15 g Oral PRN Mata Alfaro MD        QUEtiapine (SEROQUEL) tablet 50 mg  50 mg Oral Daily Mata Alfaro MD   50 mg at 12/11/21 0827    sodium chloride flush 0.9 % injection 5-40 mL  5-40 mL IntraVENous 2 times per day Mata Alfaro MD   10 mL at 12/09/21 2316    sodium chloride flush 0.9 % injection 5-40 mL  5-40 mL IntraVENous PRN Mata Alfaro MD        0.9 % sodium chloride infusion  25 mL IntraVENous PRN Mata Alfaro MD        ondansetron (ZOFRAN-ODT) disintegrating tablet 4 mg  4 mg Oral Q8H PRN Mata Alfaro MD        Or    ondansetron [facility]) injection 4 mg  4 mg IntraVENous Q6H PRN Mata Alfaro MD        polyethylene glycol (GLYCOLAX) packet 17 g  17 g Oral Daily PRN Mata Alfaro MD        acetaminophen (TYLENOL) tablet 650 mg  650 mg Oral Q6H PRN Mata Alfaro MD   650 mg at 12/09/21 1025    Or    acetaminophen (TYLENOL) suppository 650 mg  650 mg Rectal Q6H PRN Mata Alfaro MD        dexamethasone (DECADRON) tablet 6 mg  6 mg Oral Daily Mata Alfaro MD   6 mg at 12/11/21 0827    guaiFENesin-dextromethorphan (ROBITUSSIN DM) 100-10 MG/5ML syrup 5 mL  5 mL Oral Q4H PRN Mata Alfaro MD        Vitamin D (CHOLECALCIFEROL) tablet 2,000 Units  2,000 Units Oral Daily Mata Alfaro MD   2,000 Units at 12/11/21 0819    0.9 % sodium chloride bolus  30 mL IntraVENous PRN Melissa Alexis MD        remdesivir 100 mg in sodium chloride 0.9 % 250 mL IVPB  100 mg IntraVENous Q24H Melissa Alexis MD   Stopped at 12/11/21 1340    amitriptyline (ELAVIL) tablet 25 mg  25 mg Oral Nightly Manpreet Campbell DO   25 mg at 12/10/21 2308    [Held by provider] carvedilol (COREG) tablet 6.25 mg  6.25 mg Oral BID Braxton Campbell DO   6.25 mg at 12/09/21 9393       ALLERGIES: Sulfa antibiotics, Ciprofloxacin hcl, Nsaids, and Statins    Review of Systems   Unable to perform ROS: Intubated VITALS:  Blood pressure 93/72, pulse 107, temperature 98.8 °F (37.1 °C), temperature source Bladder, resp. rate (!) 34, height 5' 3\" (1.6 m), weight 188 lb 4.8 oz (85.4 kg), SpO2 100 %, not currently breastfeeding. Body mass index is 33.36 kg/m². Physical Exam  Per ICU physical exam.  Limited contact secondary COVID-19 isolation.       LABS:  Recent Results (from the past 24 hour(s))   POCT Glucose    Collection Time: 12/10/21  6:49 PM   Result Value Ref Range    POC Glucose 141 (H) 60 - 115 mg/dl    Performed on ACCU-CHEK    POCT Glucose    Collection Time: 12/10/21  9:16 PM   Result Value Ref Range    POC Glucose 367 (H) 60 - 115 mg/dl    Performed on ACCU-CHEK    POCT Glucose    Collection Time: 12/10/21 11:58 PM   Result Value Ref Range    POC Glucose 344 (H) 60 - 115 mg/dl    Performed on ACCU-CHEK    POCT Arterial    Collection Time: 12/10/21 11:59 PM   Result Value Ref Range    POC Sodium 139 136 - 145 mEq/L    POC Potassium 6.3 (HH) 3.5 - 5.1 mEq/L    POC Chloride 101 99 - 110 mEq/L    POC Glucose 578 (HH) 60 - 115 mg/dl    POC Creatinine 2.6 (H) 0.6 - 1.2 mg/dL    GFR Non- 19 (A) >60    GFR  23 (A) >60    Calcium, Ion 1.03 (L) 1.12 - 1.32 mmol/L    pH, Arterial 6.962 (LL) 7.350 - 7.450    pCO2, Arterial 148 (HH) 35 - 45 mm Hg    pO2, Arterial 148 (HH) 75 - 108 mm Hg    HCO3, Arterial 33.5 (H) 21.0 - 29.0 mmol/L    Base Excess, Arterial 2 -3 - 3    O2 Sat, Arterial 97 (HH) 93 - 100 %    TCO2, Arterial 38 (H) 22 - 29    Lactate 1.06 0.40 - 2.00 mmol/L    POC Hematocrit 43 36 - 48 %    Hemoglobin 14.6 12.0 - 16.0 gm/dL    FIO2 90.000     Sample Type ART     Performed on SEE BELOW    POCT Arterial    Collection Time: 12/11/21 12:11 AM   Result Value Ref Range    POC Sodium 140 136 - 145 mEq/L    POC Potassium 6.3 (HH) 3.5 - 5.1 mEq/L    POC Chloride 101 99 - 110 mEq/L    POC Glucose 567 (HH) 60 - 115 mg/dl    POC Creatinine 2.4 (H) 0.6 - 1.2 mg/dL    GFR Non-African American 20 (A) >60    GFR  25 (A) >60    Calcium, Ion 1.06 (L) 1.12 - 1.32 mmol/L    pH, Arterial 6.957 (LL) 7.350 - 7.450    pCO2, Arterial 141 (HH) 35 - 45 mm Hg    pO2, Arterial 153 (HH) 75 - 108 mm Hg    HCO3, Arterial 31.6 (H) 21.0 - 29.0 mmol/L    Base Excess, Arterial 0 -3 - 3    O2 Sat, Arterial 97 (HH) 93 - 100 %    TCO2, Arterial 36 (H) 22 - 29    Lactate 0.93 0.40 - 2.00 mmol/L    POC Hematocrit 44 36 - 48 %    Hemoglobin 14.8 12.0 - 16.0 gm/dL    Sample Type ART     Performed on SEE BELOW    POCT Arterial    Collection Time: 12/11/21  4:10 AM   Result Value Ref Range    POC Sodium 138 136 - 145 mEq/L    POC Potassium 5.6 (H) 3.5 - 5.1 mEq/L    POC Chloride 100 99 - 110 mEq/L    POC Glucose 586 (HH) 60 - 115 mg/dl    POC Creatinine 2.5 (H) 0.6 - 1.2 mg/dL    GFR Non-African American 20 (A) >60    GFR  24 (A) >60    Calcium, Ion 1.03 (L) 1.12 - 1.32 mmol/L    pH, Arterial 7.120 (LL) 7.350 - 7.450    pCO2, Arterial 98 (HH) 35 - 45 mm Hg    pO2, Arterial 108 (HH) 75 - 108 mm Hg    HCO3, Arterial 31.9 (H) 21.0 - 29.0 mmol/L    Base Excess, Arterial 3 -3 - 3    O2 Sat, Arterial 95 (HH) 93 - 100 %    TCO2, Arterial 35 (H) 22 - 29    Lactate 1.35 0.40 - 2.00 mmol/L    POC Hematocrit 43 36 - 48 %    Hemoglobin 14.5 12.0 - 16.0 gm/dL    FIO2 70.000     Sample Type ART     Performed on SEE BELOW    POCT Glucose    Collection Time: 12/11/21  5:14 AM   Result Value Ref Range    POC Glucose 385 (H) 60 - 115 mg/dl    Performed on ACCU-CHEK    CBC Auto Differential    Collection Time: 12/11/21  5:45 AM   Result Value Ref Range    WBC 11.2 (H) 4.8 - 10.8 K/uL    RBC 4.28 4.20 - 5.40 M/uL    Hemoglobin 12.7 12.0 - 16.0 g/dL    Hematocrit 40.0 37.0 - 47.0 %    MCV 93.4 82.0 - 100.0 fL    MCH 29.6 27.0 - 31.3 pg    MCHC 31.7 (L) 33.0 - 37.0 %    RDW 15.8 (H) 11.5 - 14.5 %    Platelets 041 021 - 948 K/uL    Neutrophils % 90.2 %    Lymphocytes % 6.7 %    Monocytes % 2.4 %    Eosinophils % 0.5 %    Basophils % 0.2 %    Neutrophils Absolute 10.1 (H) 1.4 - 6.5 K/uL    Lymphocytes Absolute 0.8 (L) 1.0 - 4.8 K/uL    Monocytes Absolute 0.3 0.2 - 0.8 K/uL    Eosinophils Absolute 0.1 0.0 - 0.7 K/uL    Basophils Absolute 0.0 0.0 - 0.2 K/uL   Hemoglobin A1c    Collection Time: 12/11/21  5:45 AM   Result Value Ref Range    Hemoglobin A1C 8.0 (H) 4.8 - 5.9 %   SPECIMEN REJECTION    Collection Time: 12/11/21  6:16 AM   Result Value Ref Range    Rejected Test CMPX     Reason for Rejection see below    Comprehensive Metabolic Panel w/ Reflex to MG    Collection Time: 12/11/21  6:38 AM   Result Value Ref Range    Sodium 135 135 - 144 mEq/L    Potassium reflex Magnesium 5.8 (H) 3.4 - 4.9 mEq/L    Chloride 95 95 - 107 mEq/L    CO2 27 20 - 31 mEq/L    Anion Gap 13 9 - 15 mEq/L    Glucose 519 (HH) 70 - 99 mg/dL    BUN 48 (H) 8 - 23 mg/dL    CREATININE 2.02 (H) 0.50 - 0.90 mg/dL    GFR Non-African American 25.0 (L) >60    GFR  30.2 (L) >60    Calcium 6.8 (L) 8.5 - 9.9 mg/dL    Total Protein 5.4 (L) 6.3 - 8.0 g/dL    Albumin 2.5 (L) 3.5 - 4.6 g/dL    Total Bilirubin 0.6 0.2 - 0.7 mg/dL    Alkaline Phosphatase 136 (H) 40 - 130 U/L     (H) 0 - 33 U/L     (H) 0 - 35 U/L    Globulin 2.9 2.3 - 3.5 g/dL   POCT Arterial    Collection Time: 12/11/21  7:49 AM   Result Value Ref Range    POC Sodium 138 136 - 145 mEq/L    POC Potassium 5.2 (H) 3.5 - 5.1 mEq/L    POC Chloride 94 (L) 99 - 110 mEq/L    POC Glucose 592 (HH) 60 - 115 mg/dl    POC Creatinine 2.4 (H) 0.6 - 1.2 mg/dL    GFR Non-African American 20 (A) >60    GFR  25 (A) >60    Calcium, Ion 0.94 (L) 1.12 - 1.32 mmol/L    pH, Arterial 7.157 (LL) 7.350 - 7.450    pCO2, Arterial 88 (HH) 35 - 45 mm Hg    pO2, Arterial 85 (HH) 75 - 108 mm Hg    HCO3, Arterial 31.3 (H) 21.0 - 29.0 mmol/L    Base Excess, Arterial 3 -3 - 3    O2 Sat, Arterial 92 (HH) 93 - 100 %    TCO2, Arterial 34 (H) 22 - 29    Lactate 1.34 0.40 - 2.00 mmol/L    POC Hematocrit 42 36 - 48 %    Hemoglobin 14.4 12.0 - 16.0 gm/dL    FIO2 70.000     Sample Type ART     Performed on SEE BELOW    POCT Glucose    Collection Time: 12/11/21  9:01 AM   Result Value Ref Range    POC Glucose 362 (H) 60 - 115 mg/dl    Performed on ACCU-CHEK    POCT Glucose    Collection Time: 12/11/21 11:28 AM   Result Value Ref Range    POC Glucose 410 (HH) 60 - 115 mg/dl    Performed on ACCU-CHEK    Basic Metabolic Panel w/ Reflex to MG    Collection Time: 12/11/21 12:00 PM   Result Value Ref Range    Sodium 135 135 - 144 mEq/L    Potassium reflex Magnesium 4.2 3.4 - 4.9 mEq/L    Chloride 92 (L) 95 - 107 mEq/L    CO2 27 20 - 31 mEq/L    Anion Gap 16 (H) 9 - 15 mEq/L    Glucose 400 (HH) 70 - 99 mg/dL    BUN 47 (H) 8 - 23 mg/dL    CREATININE 2.18 (H) 0.50 - 0.90 mg/dL    GFR Non-African American 22.9 (L) >60    GFR  27.7 (L) >60    Calcium 6.4 (L) 8.5 - 9.9 mg/dL   POCT Glucose    Collection Time: 12/11/21 12:28 PM   Result Value Ref Range    POC Glucose 229 (H) 60 - 115 mg/dl    Performed on ACCU-CHEK    POCT Glucose    Collection Time: 12/11/21  1:42 PM   Result Value Ref Range    POC Glucose 395 (H) 60 - 115 mg/dl    Performed on ACCU-CHEK    POCT Glucose    Collection Time: 12/11/21  2:41 PM   Result Value Ref Range    POC Glucose 363 (H) 60 - 115 mg/dl    Performed on ACCU-CHEK      Troponin:   Lab Results   Component Value Date    TROPONINI 0.013 12/07/2021       `      ASSESSMENT:    Active Hospital Problems    Diagnosis Date Noted    Acute respiratory failure with hypoxia (HCC) [J96.01]      Priority: Low    Hypercoagulable state ([facility] [D68.59]      Priority: Low    Pneumonia due to COVID-19 virus [U07.1, J12.82] 12/07/2021     Priority: Low   COVID-19 pneumonia  Vent dependent respiratory failure  Sinus bradycardia likely related to sedation medications.   History of thromboembolic disease/DVT/pulmonary believes him on lifelong oral anticoagulation  History of factor V Chelsea Hurst

## 2021-12-11 NOTE — PROGRESS NOTES
Assumed care of this patient from [facility]. Patient remains proned. VS and assessments as noted in flow. Critical ABGs as noted in flow and reported to Dr. Susan Canseco and Dr. Vishal Bansal. Vent changes as noted per Georgiana Muhammad RT. AM labs sent. Lab made RN aware that potassium was hemolyzed. New sample sent at 8017. All gtts infusing without issue per [facility]. All tubes and lines in place and functioning. No acute events noted overnight. Patient spouse, Toyin Chi, called for an update at 1. Update given and spouse would like an update from a physician in the AM. Phone number [phone].

## 2021-12-11 NOTE — PROGRESS NOTES
PHARMACY NOTE:     Renal Adjustment Per Protocol: gabapentin 400mg PO TID changed to gabapentin 300mg PO BID based on hospital renal dosing protocol for CrCl 15-29mL/min     Recent Labs     12/11/21  1200   CREATININE 2.18*   Estimated Creatinine Clearance: 28 mL/min (A) (based on SCr of 2.18 mg/dL (H)).   Cinda Santoyo, PharmD, BCPS   12/11/2021 2:02 PM

## 2021-12-11 NOTE — PROGRESS NOTES
Ph- 6.96  pco2-148. 3  po2- 148  hco3-33.5  Be- 1.6  so2 96.9    Na+ 139  K+ - 6.3  Ca++ -1.03  Cl- -101  tco2- 38.1  agap- 5  hct- 43%  hbg- 14.6.     Glu- 578  Lac- 1.06  Creat- 2.59    Vent settings AC 32 210 90% P14     RN aware of critical results

## 2021-12-11 NOTE — PROGRESS NOTES
Pulmonary ICU Progress Note    PRIMARY SERVICE: Pulmonary Disease    INTERVAL HPI: Patient seen and examined at bedside, Interval Notes, orders reviewed. Nursing notes noted    Patient is on vent Support with assist control with rate of 34 tidal volume 300 FiO2 of 70% PEEP of 10. O2 saturation is 100%. She had ABG shows pH 7.15 PCO2 of 88 PO2 85 saturation 92 bicarb 31.3. She is on Nimbex drip. She is on propofol and fentanyl. She was on Precedex drip which was discontinued as patient's heart rate went down in 30s. Diprivan drip was also decreased. Now heart rate back up to 107. O2 saturation 100%. She is on prone position. Urine output 850 cc. Review of Systems   unable to obtain. Intake/Output Summary (Last 24 hours) at 12/11/2021 1126  Last data filed at 12/11/2021 0600  Gross per 24 hour   Intake 4168.52 ml   Output 850 ml   Net 3318.52 ml       Vitals:  BP 93/72   Pulse 107   Temp 98.8 °F (37.1 °C) (Bladder)   Resp (!) 34   Ht 5' 3\" (1.6 m)   Wt 188 lb 4.8 oz (85.4 kg)   LMP  (LMP Unknown)   SpO2 100%   BMI 33.36 kg/m²   EXAM:  General: Orally intubated sedated comfortable in bed, No distress. Head: Atraumatic ,Normocephalic   Eyes: PERRL. No sclera icterus. No conjunctival injection. No discharge   ENT: No nasal  discharge. Pharynx clear. Neck:  Trachea midline. No thyromegaly, no JVD, No cervical adenopathy. Resp : Normal effort,  No accessory muscle use. Bibasilar Rales. No wheezing. No rhonchi. CV: Normal  rate. Regular rhythm. No mumur ,  Rub or gallop  ABD: Non-tender. Non-distended. No masses. No organmegaly. Normal bowel sounds. No hernia.   EXT: No Pitting, No Cyanosis No clubbing  CNS: Unable to examine      ABG:     Lab Results   Component Value Date    PHART 7.157 12/11/2021    QIB1JNA 88 12/11/2021    PO2ART 85 12/11/2021    HSO7LRQ 31.3 12/11/2021    BEART 3 12/11/2021    S6ZXYLPQ 92 12/11/2021     Lab Results   Component Value Date    LACTA 1.1 12/07/2021     O2 Device: Ventilator  O2 Flow Rate (L/min): 60 L/min    MV Settings:     Vent Mode: AC/VC  Vt Ordered: 300 mL  Rate Set: 34 bmp  FiO2 : 7 %  PEEP/CPAP: 10  Peak Inspiratory Pressure: 35 cmH2O  Plateau Pressure: 26 cmH20  Mean Airway Pressure: 16 cmH20  I:E Ratio: 1:2.5    Diet NPO  ADULT TUBE FEEDING; Orogastric; Renal Formula; Continuous; 20; No; 50; Q 4 hours; Protein; give 2 wtih water flush    IV:    insulin 9.1 Units/hr (12/11/21 0904)    dextrose      propofol 25 mcg/kg/min (12/11/21 0913)    norepinephrine 11 mcg/min (12/11/21 0936)    fentaNYL (SUBLIMAZE) infusion 200 mcg/hr (12/11/21 0450)    sodium chloride      cisatracurium (NIMBEX) infusion 1.5 mcg/kg/min (12/11/21 0922)    sodium bicarbonate infusion 150 mL/hr at 12/11/21 0631    dexmedetomidine (PRECEDEX) IV infusion Stopped (12/11/21 0818)    sodium chloride         Medications:  Scheduled Meds:   levETIRAcetam  250 mg Oral BID    sodium chloride flush  5-40 mL IntraVENous 2 times per day    chlorhexidine  15 mL Mouth/Throat BID    pantoprazole  40 mg IntraVENous Daily    And    sodium chloride (PF)  10 mL IntraVENous Daily    insulin lispro  0-12 Units SubCUTAneous 4 times per day    cisatracurium Besylate  10 mg IntraVENous Once    allopurinol  300 mg Oral Daily    citalopram  40 mg Oral Daily    gabapentin  400 mg Oral TID    lidocaine  1 patch TransDERmal Daily    rivaroxaban  20 mg Oral Daily    pill splitter   Does not apply Once    QUEtiapine  50 mg Oral Daily    sodium chloride flush  5-40 mL IntraVENous 2 times per day    dexamethasone  6 mg Oral Daily    Vitamin D  2,000 Units Oral Daily    baricitinib  4 mg Oral Daily    remdesivir IVPB  100 mg IntraVENous Q24H    amitriptyline  25 mg Oral Nightly    [Held by provider] carvedilol  6.25 mg Oral BID       PRN Meds:  dextrose, glucagon (rDNA), dextrose, sodium chloride flush, sodium chloride, traMADol **AND** acetaminophen, glucose, sodium chloride flush, sodium chloride, ondansetron **OR** ondansetron, polyethylene glycol, acetaminophen **OR** acetaminophen, guaiFENesin-dextromethorphan, sodium chloride        Radiology      CTA Chest W WO  (PE study)    Result Date: 12/7/2021  HISTORY: Tone South is a Female of 64 years age. Evaluate for pulmonary embolism. Shortness of breath, elevated d-dimer COMPARISON: February 1, 2019 TECHNIQUE: Thin spiral chest CT was performed per pulmonary embolism protocol, following uneventful administration of intravenous contrast. Amount of intravenous contrast: 100 mL of Isovue-370. MIP images are included. FINDINGS:  There is no intraluminal filling defect identified within the central and proximal segmental pulmonary arteries to suggest pulmonary embolism. The thoracic aorta shows no evidence for dissection. The pulmonary parenchyma shows groundglass opacities in the upper lobes and lower lobes with somewhat peripheral predominance. There also is consolidation in the bases. No pleural effusion is seen. There is no mediastinal or axillary lymphadenopathy. There is a slightly prominent lymph node in the right hilum that measures 13 mm on greatest short axis. No evidence of chest wall mass or pleural effusion is present. The central airways and visualized portion of the esophagus are normal. The visualized portion of the thyroid gland is not enlarged. Appears to be inhomogeneous and may contain small nodules. Limited survey views of the upper abdomen show small hiatal hernia. The liver appears decreased in attenuation. Degenerative changes are seen in the thoracic spine and there is a dextro levoscoliosis. No destructive bony lesions are seen. 1.No CT evidence of pulmonary embolism in the primary or secondary branches of the pulmonary arteries. 2.Bilateral multifocal infiltrates that may be due to bacterial or viral Colles which includes covid 19. . All CT scans at this facility use dose modulation, iterative reconstruction, and/or weight based dosing when appropriate to reduce radiation dose to as low as reasonably achievable. XR CHEST PORTABLE    Result Date: 12/10/2021  Exam: XR CHEST PORTABLE History:  ett tube placement Technique: AP portable view of the chest obtained. Comparison: none Chest x-ray portable Findings: The patient is intubated, there is an NG tube coursing towards the stomach  The cardiomediastinal silhouette is within normal limits. There is no pneumothorax There are bilateral patchy alveolar opacities. There are no pleural effusions. Bones of the thorax appear intact. Status post intubation. There are bilateral alveolar opacities , infiltrates worrisome for viral pneumonia. The differential includes COVID-19 pneumonia versus other etiologies       Results:  CBC:   Recent Labs     12/09/21 0453 12/09/21 1644 12/11/21  0410 12/11/21  0545 12/11/21  0749   WBC 6.5  --   --  11.2*  --    HGB 12.1   < > 14.5 12.7 14.4   HCT 36.2*  --   --  40.0  --    MCV 96.1  --   --  93.4  --      --   --  182  --     < > = values in this interval not displayed.     :   Recent Labs     12/09/21 0453 12/09/21  1644 12/10/21  0510 12/10/21  1423 12/11/21  0410 12/11/21  0638 12/11/21  0749     --  138  --   --  135  --    K 5.2*  --  5.5*  5.5*  --   --  5.8*  --      --  104  --   --  95  --    CO2 19*  --  16*  --   --  27  --    BUN 34*  --  36*  --   --  48*  --    CREATININE 0.90   < > 1.03*   < > 2.5* 2.02* 2.4*    < > = values in this interval not displayed. LIVER PROFILE:   Recent Labs     12/09/21  0453 12/10/21  0510 12/11/21  0638   AST 66* 191* 189*   ALT 32 108* 130*   BILITOT 0.3 0.6 0.6   ALKPHOS 145* 164* 136*     PT/INR: No results for input(s): PROTIME, INR in the last 72 hours. APTT: No results for input(s): APTT in the last 72 hours.   UA:No results for input(s): NITRITE, COLORU, PHUR, LABCAST, WBCUA, RBCUA, MUCUS, TRICHOMONAS, YEAST, BACTERIA, CLARITYU, SPECGRAV, LEUKOCYTESUR, UROBILINOGEN, BILIRUBINUR, BLOODU, GLUCOSEU, AMORPHOUS in the last 72 hours. Invalid input(s): KETONESU    Cultures:    No results for input(s): SPUTUM, BLOOD in the last 72 hours. Invalid input(s): URINE      Assessment: This is a critically ill patient at risk of deterioration / death , needing close ICU monitoring and intervention due to below noted problems      1. Acute hypoxic respiratory failure  2. Severe COVID-19 pneumonia  3. ARDS induced by COVID-19 pneumonia  4. Acute encephalopathy  5. Acute kidney injury  6. Chronic immunosuppression on methotrexate for polymyositis  7. Hypercoagulable state secondary to COVID-19      Suggestion:  Vent support with lung protective strategy. Head of the bed 30 degree. She has ARDS and despite COVID-19 pneumonia. Currently on vent support with assist control with rate of 34 tidal volume 300 FiO2 70% PEEP of 10. ABG noted. Patient has episode of bradycardia decrease sedation with diprivan and discontinued Precedex. Currently heart rate went up to 107. She is on Nimbex drip. She is on for position. Continue trophic tube feeding. She has Harris catheter. DVT and GI prophylaxis. Prognosis guarded. Critical care time spent reviewing labs/films, examining patient, collaborating with otherphysicians but excluding procedures for life threatening organ failure is 34 minutes.       SIGNATURE: Linsey Garcia MD, [facility]

## 2021-12-12 NOTE — SIGNIFICANT EVENT
Called to bedside to pronounce death after patient was noted to be in asystole at 6:55am on 12/12/2021. NO pupillary, corneal, doll's eye or gag reflex noted. NO heart sounds or pulse noted. NO Chest rise or Lung sounds noted.    NO Response to painful stimuli  Time of death declared at 3:06WF.    Death certificate will be signed by Dr. Caryle Pines, MD   12/12/2021  7:00 AM

## 2021-12-12 NOTE — PROGRESS NOTES
Spiritual Care Services     Summary of Visit:  Code blue event. Patient  at 6:55.  Andrew Ybarra was called in to support the family.  present and no others.  appreciative of the support but appears agitated (constantly on the move and unable to sit down).  informed staff that the patient's father and sister will also be coming in.  informed Ken Mcleod that the  home will be [facility] and the information was relayed to the nurse and subsequently recorded in flowsheet. Spiritual Assessment/Intervention/Outcomes:    Encounter Summary  Services provided to[de-identified] Patient and family together  Referral/Consult From[de-identified] Nursing Supervisor/Manager  Support System: Spouse, Children  Place of Spiritism: [facility]  Continue Visiting: No  Complexity of Encounter: High  Length of Encounter: 30 minutes  Routine  Type: Follow up  Assessment: Unable to respond  Intervention: Sustaining presence/ Ministry of presence  Outcome: Did not respond  Crisis  Type: Code        Grief and Life Adjustment  Type: End of life, Death  Assessment: Calm, Approachable, Coping  Intervention: Active listening, Explored feelings, thoughts, concerns, Prayer, End of life care, Grief care  Outcome: Expressed gratitude, Engaged in conversation, Receptive  Primary Decision Maker (Healthcare Proxy)  [address] is[de-identified] Legal Next of [address] [place] [address],[address]:    No follow up required. Spiritual Care Services   Electronically signed by Ryan Pena on 21 at 8:16 AM EST     To reach a  for emotional and spiritual support, place an [facility] consult request.   If a  is needed immediately, dial 0 and ask to page the on-call .

## 2021-12-12 NOTE — SIGNIFICANT EVENT
CODE BLUE note    I was contacted by the nurse because patient blood pressure was decreasing despite maxing on Levophed and vasopressin. Epinephrine drip was ordered but patient lost her pulse before the drip arrived. Patient's oxygenation was not detected prior to that and the nurse was unable to get an accurate reading. Patient was in a prone position at that time. She was immediately supine. CPR started right away. Patient had V. fib after which she was defibrillated twice with 360 J. Patient also received a bolus 300 mg amiodarone. After 20 minutes of CPR and multiple doses of epi and bicarb with no ROSC, the decision was to end CPR given the futility of all efforts. Patient's  was contacted and he was on his way.     40 min of cc time    Electronically signed by Apolonia Crockett MD on 12/12/2021 at 7:00 AM

## 2021-12-12 NOTE — PROCEDURES
PROCEDURE NOTE      PROCEDURE:     Arterial line placement. INDICATIONS:     Continuous hemodynamic monitoring of vital signs. ASSISTANT:        ICU Nurse    ANESTHESIA:     Sedated. CONSCENT:      Emergent. Andreina Epstein PROCEDURAL TECHNIQUE:  Procedure was done using strict aseptic technique. Right radial site was cleaned with chloraprep and draped. Radial artery was identified using ultrasound . Arterial line was inserted, a good blood flow was obtained, after which guidewire was inserted all the way with no resistance. Then the canula was inserted and needle with guidewire was withdrawn. Pulsatile bright red blood flow was observed. The canula was connected to BP monitoring apparatus and a good waveform was noted. Then the canula was secured with 3 stay sutures of 3-0 silk, following which dressing was applied. The patient tolerated the procedure. COMPLICATIONS: No immediate complication.        Ju Crisostomo MD,

## 2021-12-12 NOTE — PROGRESS NOTES
Assumed care of this patient from [place]. Patient remains proned. VS and assessments as noted in flow. Patient remains sedated and paralyzed with gtts running per MAR. Patient hypotensive throughout shift. Levophed titrated per MAR. At 0146, a new arterial line was placed by Dr. Amanda Qiu. See physician note. Good waveform achieved. At [phone], perfect serve sent to Dr. Amanda Qiu re: patient hypotensive. Vasopressin added to regimen as noted per flow. 8007, secure chat sent to Dr. Amanda Qiu re: VS and MAP. Vasopressors as documented via flow. 6026: Patient remains hypotensive. Levophed at max per MAR. Vaso infusing per MAR. Secure chat sent. 5151 STAT secure chat sent to Dr. Amanda Qiu to call ASAP. Dr. Amanda Qiu called back at 2740. New orders in place. Dr. Mary Teixeira route to unit. 9497: Dr. Amanda Qiu in unit to evaluate patient. Epinephrine gtt en route. Arterial line waveform dampened. As arterial line is being recalibrated, waveform is lost. 8123: Code blue called. See code narrator for narration of event. Code ended at [phone]. Patient pronounced by Dr. Amanda Qiu. See physician note. Post mortem care and flowsheet completed. Post mortem care given by this RN, Mian Horowitz RN and Nany RN. 100mL Nimbex wasted by Nany RN with Vivienne and tabatha RN to witness. 40mL fentanyl wasted by Eugena Fleischer RN with tabatha RN and Vivienne RN to witness. Patient family at bedside at 0. All belongings given to family.  at bedside. [facility] called. Referral # is [id number]. Referral number placed in chart.  speaking with family re:  home location at this time. Nursing supervisor made aware of patient's demise and requests that staff call when they are ready for patient to leave the unit.

## 2021-12-13 LAB
HEMATOLOGY PATH CONSULT: NORMAL
ORGANISM: ABNORMAL
URINE CULTURE, ROUTINE: ABNORMAL

## 2022-03-29 NOTE — ED NOTES
Dr. Joanie Vieira called back to Dr. Marly Collier.   Clau Collins [facility]  08/12/21 2580 Left voicemail for callback

## (undated) DEVICE — LAP CHOLE CDS: Brand: MEDLINE INDUSTRIES, INC.

## (undated) DEVICE — SPONGE,LAP,18"X18",DLX,XR,ST,5/PK,40/PK: Brand: MEDLINE

## (undated) DEVICE — INTENDED FOR TISSUE SEPARATION, AND OTHER PROCEDURES THAT REQUIRE A SHARP SURGICAL BLADE TO PUNCTURE OR CUT.: Brand: BARD-PARKER ® CARBON RIB-BACK BLADES

## (undated) DEVICE — [HIGH FLOW INSUFFLATOR,  DO NOT USE IF PACKAGE IS DAMAGED,  KEEP DRY,  KEEP AWAY FROM SUNLIGHT,  PROTECT FROM HEAT AND RADIOACTIVE SOURCES.]: Brand: PNEUMOSURE

## (undated) DEVICE — SLEEVE CMPR SM STD CALF SCD ANEMB LF

## (undated) DEVICE — ELECTROSURGICAL PENCIL BUTTON SWITCH E-Z CLEAN COATED BLADE ELECTRODE 10 FT (3 M) CORD HOLSTER: Brand: MEGADYNE

## (undated) DEVICE — WARMER LAPSCP BST 2 STG STRL DISP HEAT BLU

## (undated) DEVICE — NEEDLE HYPO 22GA L1 1/2IN PIVOTING SHLD FOR LUERLOCK SYR

## (undated) DEVICE — CYSTO/BLADDER IRRIGATION SET, REGULATING CLAMP

## (undated) DEVICE — GAUZE,SPONGE,4"X4",12PLY,STERILE,LF,2'S: Brand: MEDLINE

## (undated) DEVICE — SUTURE MCRYL SZ 4-0 L27IN ABSRB UD L19MM PS-2 1/2 CIR PRIM Y426H

## (undated) DEVICE — LABEL MED MINI W/ MARKER

## (undated) DEVICE — SUTURE VCRL SZ 4-0 L27IN ABSRB UD L19MM FS-2 3/8 CIR REV J422H

## (undated) DEVICE — DISCONTINUED USE 393278 SYRINGE 10 ML HYPO W/O NDL LL TP PLSTC ST

## (undated) DEVICE — SUTURE SZ 0 27IN 5/8 CIR UR-6  TAPER PT VIOLET ABSRB VICRYL J603H

## (undated) DEVICE — SPONGE 400750 ELVIS 10PK ANTI-FOG: Brand: MEROCEL® ELVIS®

## (undated) DEVICE — SYRINGE MED 10ML TRNSLUC BRL PLUNG BLK MRK POLYPR CTRL

## (undated) DEVICE — PENCIL ES CRD L10FT HND SWCHING ROCK SWCH W/ EDGE COAT BLDE

## (undated) DEVICE — TOWEL,OR,DSP,ST,BLUE,STD,4/PK,20PK/CS: Brand: MEDLINE

## (undated) DEVICE — TISSUE RETRIEVAL SYSTEM: Brand: INZII RETRIEVAL SYSTEM

## (undated) DEVICE — STERILE LATEX POWDER-FREE SURGICAL GLOVESWITH NITRILE COATING: Brand: PROTEXIS

## (undated) DEVICE — BAG DRAINAGE URIN LIGEMAN W/ ADPT SUCTION HOSE CYSTO URO

## (undated) DEVICE — INTENDED TO BE USED TO OCCLUDE, RETRACT AND IDENTIFY ARTERIES, VEINS, TENDONS AND NERVES IN SURGICAL PROCEDURES: Brand: STERION®  VESSEL LOOP

## (undated) DEVICE — SCISSORS ENDOSCP DIA5MM CRV MPLR CAUT W/ RATCH HNDL

## (undated) DEVICE — DRAIN SURG W0.5XL18IN FLAT GRAV FOR OPN WND PENROSE

## (undated) DEVICE — SUTURE VCRL + SZ 3-0 L27IN ABSRB UD L26MM SH 1/2 CIR VCP416H

## (undated) DEVICE — COVER LT HNDL BLU PLAS

## (undated) DEVICE — ELECTRODE PT RET AD L9FT HI MOIST COND ADH HYDRGEL CORDED

## (undated) DEVICE — CONVERTED USE 393139 JELLY LUBE ST 3 GM PACKET MEDICHOICE

## (undated) DEVICE — 2000CC GUARDIAN II: Brand: GUARDIAN

## (undated) DEVICE — SUTURE VCRL SZ 3-0 L27IN ABSRB UD L26MM SH 1/2 CIR J416H

## (undated) DEVICE — GOWN,AURORA,NONREINFORCED,LARGE: Brand: MEDLINE

## (undated) DEVICE — SUTURE PROL SZ 0 L30IN NONABSORBABLE BLU L26MM CT-2 1/2 CIR 8412H

## (undated) DEVICE — SMARTGOWN BREATHABLE SPECIALTY GOWN: Brand: CONVERTORS

## (undated) DEVICE — Z DISCONTINUED TAPE SURG W2INXL10YD SFT CLTH EZ TEAR HYPOALRG H2O RESIST

## (undated) DEVICE — SUTURE VCRL SZ 3-0 L54IN ABSRB VLT LIGAPAK REEL NDL J205G

## (undated) DEVICE — MARKER SURG SKIN GENTIAN VLT REG TIP W/ 6IN RUL

## (undated) DEVICE — GAUZE,SPONGE,2"X2",8PLY,STERILE,LF,2'S: Brand: MEDLINE

## (undated) DEVICE — GLOVE SURG ESTEEM SYNTH  SMTH 5.5 PF LF

## (undated) DEVICE — CONVERTED USE 248063 TOWELS OR BL ST

## (undated) DEVICE — SUTURE VCRL SZ 2-0 L54IN ABSRB VLT LIGAPAK REEL NDL J206G

## (undated) DEVICE — DBD-PACK,CYSTOSCOPY,PK VI,AURORA: Brand: MEDLINE

## (undated) DEVICE — DISSECTOR ENDOSCP DIA5MM CRV MPLR CAUT ENDOPATH

## (undated) DEVICE — TRAY SKIN PREP PVP-I SCRUB

## (undated) DEVICE — SUTURE PROL SZ 0 L30IN NONABSORBABLE BLU L36MM CT-1 1/2 CIR 8424H

## (undated) DEVICE — NEEDLE HYPO 25GA L1.5IN BLU POLYPR HUB S STL REG BVL STR

## (undated) DEVICE — 3M™ STERI-STRIP™ REINFORCED ADHESIVE SKIN CLOSURES, R1547, 1/2 IN X 4 IN (12 MM X 100 MM), 6 STRIPS/ENVELOPE: Brand: 3M™ STERI-STRIP™

## (undated) DEVICE — TRAY PREP DRY W/ PREM GLV 2 APPL 6 SPNG 2 UNDPD 1 OVERWRAP

## (undated) DEVICE — APPLIER CLP M L L11.4IN DIA10MM ENDOSCP ROT MULT FOR LIG

## (undated) DEVICE — Z DISCONTINUED NO SUB IDED GLOVE SURG BEAD CUF 8 STD PF WHT STRL TRIUMPH LT LTX

## (undated) DEVICE — KIT CHOLGM POLYUR W/ KARLAN BLLN CATH 4FR L60CM 5MM INTRO

## (undated) DEVICE — GARMENT REPROCESS CALF FLOWTRON

## (undated) DEVICE — COUNTER NDL 40 COUNT HLD 70 FOAM BLK ADH W/ MAG

## (undated) DEVICE — CLAMP SURG JAW W12MM POLYPR DISP FOR MUSC BX RAYPRT

## (undated) DEVICE — MINOR: Brand: MEDLINE INDUSTRIES, INC.

## (undated) DEVICE — SPONGE GZ W4XL4IN RAYON POLY FILL CVR W/ NONWOVEN FAB

## (undated) DEVICE — SONY PRINTER PAPER

## (undated) DEVICE — SNAP KOVER: Brand: UNBRANDED

## (undated) DEVICE — PACK,LAPAROTOMY,NO GOWNS: Brand: MEDLINE

## (undated) DEVICE — E-Z CLEAN, NON-STICK, PTFE COATED, ELECTROSURGICAL BLADE ELECTRODE, MODIFIED EXTENDED INSULATION, 2.5 INCH (6.35 CM): Brand: MEGADYNE

## (undated) DEVICE — STANDARD SURGICAL GOWN, L: Brand: CONVERTORS

## (undated) DEVICE — GOWN,AURORA,NONRNF,XL,30/CS: Brand: MEDLINE

## (undated) DEVICE — SUTURE VCRL + SZ 2-0 L36IN ABSRB UD L36MM CT-1 1/2 CIR VCP945H

## (undated) DEVICE — Z CONVERTED USE 2271043 CONTAINER SPEC COLL 4OZ SCR ON LID PEEL PCH

## (undated) DEVICE — Z DISCONTINUED PER MEDLINE USE 2741942 DRESSING AQUACEL 6 IN ALG W9XL15CM SIL CVR WTRPRF VIR BACT BARR ANTIMIC

## (undated) DEVICE — SKIN MARKER,REGULAR TIP WITH RULER: Brand: DEVON

## (undated) DEVICE — CHLORAPREP 26ML ORANGE

## (undated) DEVICE — 3M™ MEDIPORE™ SOFT CLOTH TAPE, 4 INCH X 10 YARDS, 12 ROLLS/CASE, 2964: Brand: 3M™ MEDIPORE™

## (undated) DEVICE — TROCAR ENDOSCP L100MM DIA12MM BLNT STBL SL DISP ENDOPATH